# Patient Record
Sex: MALE | Race: WHITE | Employment: OTHER | URBAN - NONMETROPOLITAN AREA
[De-identification: names, ages, dates, MRNs, and addresses within clinical notes are randomized per-mention and may not be internally consistent; named-entity substitution may affect disease eponyms.]

---

## 2017-07-24 RX ORDER — HYDROCODONE BITARTRATE AND ACETAMINOPHEN 7.5; 325 MG/1; MG/1
1 TABLET ORAL EVERY 6 HOURS PRN
Qty: 120 TABLET | Refills: 0 | Status: SHIPPED | OUTPATIENT
Start: 2017-07-24 | End: 2017-07-26 | Stop reason: CLARIF

## 2017-07-26 RX ORDER — HYDROCODONE BITARTRATE AND ACETAMINOPHEN 10; 325 MG/1; MG/1
1 TABLET ORAL EVERY 6 HOURS
COMMUNITY
Start: 2017-06-30 | End: 2017-07-26 | Stop reason: SDUPTHER

## 2017-07-26 RX ORDER — HYDROCODONE BITARTRATE AND ACETAMINOPHEN 10; 325 MG/1; MG/1
1 TABLET ORAL EVERY 6 HOURS
Qty: 120 TABLET | Refills: 0 | Status: SHIPPED | OUTPATIENT
Start: 2017-07-26 | End: 2017-07-26 | Stop reason: SDUPTHER

## 2017-07-26 RX ORDER — HYDROCODONE BITARTRATE AND ACETAMINOPHEN 10; 325 MG/1; MG/1
1 TABLET ORAL EVERY 6 HOURS
Qty: 120 TABLET | Refills: 0 | Status: SHIPPED | OUTPATIENT
Start: 2017-07-26 | End: 2017-08-22 | Stop reason: SDUPTHER

## 2017-08-02 ENCOUNTER — TELEPHONE (OUTPATIENT)
Dept: FAMILY MEDICINE CLINIC | Age: 72
End: 2017-08-02

## 2017-08-02 DIAGNOSIS — G47.33 OBSTRUCTIVE SLEEP APNEA (ADULT) (PEDIATRIC): Primary | ICD-10-CM

## 2017-08-22 RX ORDER — HYDROCODONE BITARTRATE AND ACETAMINOPHEN 10; 325 MG/1; MG/1
1 TABLET ORAL EVERY 6 HOURS
Qty: 120 TABLET | Refills: 0 | Status: SHIPPED | OUTPATIENT
Start: 2017-08-22 | End: 2017-09-28 | Stop reason: SDUPTHER

## 2017-09-27 DIAGNOSIS — I10 ESSENTIAL HYPERTENSION, MALIGNANT: ICD-10-CM

## 2017-09-27 DIAGNOSIS — N40.0 BENIGN PROSTATIC HYPERPLASIA, PRESENCE OF LOWER URINARY TRACT SYMPTOMS UNSPECIFIED, UNSPECIFIED MORPHOLOGY: Primary | ICD-10-CM

## 2017-09-27 DIAGNOSIS — R73.9 BLOOD GLUCOSE ELEVATED: ICD-10-CM

## 2017-09-27 DIAGNOSIS — E78.01 FAMILIAL HYPERCHOLESTEROLEMIA: ICD-10-CM

## 2017-09-28 ENCOUNTER — OFFICE VISIT (OUTPATIENT)
Dept: FAMILY MEDICINE CLINIC | Age: 72
End: 2017-09-28
Payer: MEDICARE

## 2017-09-28 ENCOUNTER — TELEPHONE (OUTPATIENT)
Dept: FAMILY MEDICINE CLINIC | Age: 72
End: 2017-09-28

## 2017-09-28 VITALS
TEMPERATURE: 98.9 F | OXYGEN SATURATION: 97 % | WEIGHT: 233.5 LBS | DIASTOLIC BLOOD PRESSURE: 112 MMHG | BODY MASS INDEX: 35.39 KG/M2 | HEIGHT: 68 IN | SYSTOLIC BLOOD PRESSURE: 164 MMHG | RESPIRATION RATE: 18 BRPM | HEART RATE: 84 BPM

## 2017-09-28 DIAGNOSIS — J41.0 SIMPLE CHRONIC BRONCHITIS (HCC): ICD-10-CM

## 2017-09-28 DIAGNOSIS — F33.41 RECURRENT MAJOR DEPRESSIVE DISORDER, IN PARTIAL REMISSION (HCC): ICD-10-CM

## 2017-09-28 DIAGNOSIS — G47.33 OBSTRUCTIVE SLEEP APNEA SYNDROME: ICD-10-CM

## 2017-09-28 DIAGNOSIS — Z00.00 ROUTINE GENERAL MEDICAL EXAMINATION AT A HEALTH CARE FACILITY: Primary | ICD-10-CM

## 2017-09-28 DIAGNOSIS — N18.30 CKD (CHRONIC KIDNEY DISEASE) STAGE 3, GFR 30-59 ML/MIN (HCC): ICD-10-CM

## 2017-09-28 DIAGNOSIS — F41.8 DEPRESSION WITH ANXIETY: ICD-10-CM

## 2017-09-28 DIAGNOSIS — K21.9 GASTROESOPHAGEAL REFLUX DISEASE WITHOUT ESOPHAGITIS: ICD-10-CM

## 2017-09-28 DIAGNOSIS — E78.2 MIXED HYPERLIPIDEMIA: ICD-10-CM

## 2017-09-28 DIAGNOSIS — Z91.81 RISK FOR FALLS: ICD-10-CM

## 2017-09-28 DIAGNOSIS — R73.9 HYPERGLYCEMIA: ICD-10-CM

## 2017-09-28 DIAGNOSIS — R27.0 ATAXIA: ICD-10-CM

## 2017-09-28 DIAGNOSIS — I25.10 CORONARY ARTERY DISEASE INVOLVING NATIVE CORONARY ARTERY OF NATIVE HEART WITHOUT ANGINA PECTORIS: ICD-10-CM

## 2017-09-28 DIAGNOSIS — I10 ESSENTIAL HYPERTENSION: ICD-10-CM

## 2017-09-28 DIAGNOSIS — Z23 INFLUENZA VACCINE NEEDED: ICD-10-CM

## 2017-09-28 PROCEDURE — G0439 PPPS, SUBSEQ VISIT: HCPCS | Performed by: FAMILY MEDICINE

## 2017-09-28 PROCEDURE — G0008 ADMIN INFLUENZA VIRUS VAC: HCPCS | Performed by: FAMILY MEDICINE

## 2017-09-28 PROCEDURE — G8926 SPIRO NO PERF OR DOC: HCPCS | Performed by: FAMILY MEDICINE

## 2017-09-28 PROCEDURE — G8599 NO ASA/ANTIPLAT THER USE RNG: HCPCS | Performed by: FAMILY MEDICINE

## 2017-09-28 PROCEDURE — 4040F PNEUMOC VAC/ADMIN/RCVD: CPT | Performed by: FAMILY MEDICINE

## 2017-09-28 PROCEDURE — 99214 OFFICE O/P EST MOD 30 MIN: CPT | Performed by: FAMILY MEDICINE

## 2017-09-28 PROCEDURE — 3023F SPIROM DOC REV: CPT | Performed by: FAMILY MEDICINE

## 2017-09-28 PROCEDURE — 1123F ACP DISCUSS/DSCN MKR DOCD: CPT | Performed by: FAMILY MEDICINE

## 2017-09-28 PROCEDURE — 4004F PT TOBACCO SCREEN RCVD TLK: CPT | Performed by: FAMILY MEDICINE

## 2017-09-28 PROCEDURE — G8427 DOCREV CUR MEDS BY ELIG CLIN: HCPCS | Performed by: FAMILY MEDICINE

## 2017-09-28 PROCEDURE — 3017F COLORECTAL CA SCREEN DOC REV: CPT | Performed by: FAMILY MEDICINE

## 2017-09-28 PROCEDURE — G8417 CALC BMI ABV UP PARAM F/U: HCPCS | Performed by: FAMILY MEDICINE

## 2017-09-28 PROCEDURE — 90662 IIV NO PRSV INCREASED AG IM: CPT | Performed by: FAMILY MEDICINE

## 2017-09-28 RX ORDER — HYDROCODONE BITARTRATE AND ACETAMINOPHEN 10; 325 MG/1; MG/1
1 TABLET ORAL EVERY 6 HOURS
Qty: 120 TABLET | Refills: 0 | Status: SHIPPED | OUTPATIENT
Start: 2017-09-28 | End: 2017-10-31 | Stop reason: SDUPTHER

## 2017-09-28 RX ORDER — METOPROLOL SUCCINATE 50 MG/1
50 TABLET, EXTENDED RELEASE ORAL DAILY
Qty: 90 TABLET | Refills: 3 | Status: SHIPPED | OUTPATIENT
Start: 2017-09-28 | End: 2018-04-10 | Stop reason: SDUPTHER

## 2017-09-28 RX ORDER — LISINOPRIL 40 MG/1
40 TABLET ORAL DAILY
Qty: 30 TABLET | Refills: 5 | Status: SHIPPED | OUTPATIENT
Start: 2017-09-28 | End: 2018-04-10 | Stop reason: SDUPTHER

## 2017-09-28 ASSESSMENT — LIFESTYLE VARIABLES: HOW OFTEN DO YOU HAVE A DRINK CONTAINING ALCOHOL: 0

## 2017-09-28 ASSESSMENT — ANXIETY QUESTIONNAIRES: GAD7 TOTAL SCORE: 5

## 2017-09-28 ASSESSMENT — PATIENT HEALTH QUESTIONNAIRE - PHQ9: SUM OF ALL RESPONSES TO PHQ QUESTIONS 1-9: 0

## 2017-09-28 NOTE — PROGRESS NOTES
Vaccine Information Sheet, \"Influenza - Inactivated\" OR \"Live - Intranasal\"  given to Amirah Sue., or parent/legal guardian of  Amirah Sue. and verbalized understanding. Patient responses:    Have you ever had a reaction to a flu vaccine? No  Are you able to eat eggs without adverse effects? No  Do you have any current illness? No  Have you ever had Guillian Galt Syndrome? No    Flu vaccine given per order. Please see immunization tab.

## 2017-09-28 NOTE — MR AVS SNAPSHOT
After Visit Summary             Lorena Mcarthur.   2017 12:45 PM   Office Visit    Description:  Male : 1945   Provider:  Tacos Chadwick MD   Department:  [address]              Your Follow-Up and Future Appointments         Below is a list of your follow-up and future appointments. This may not be a complete list as you may have made appointments directly with providers that we are not aware of or your providers may have made some for you. Please call your providers to confirm appointments. It is important to keep your appointments. Please bring your current insurance card, photo ID, co-pay, and all medication bottles to your appointment. If self-pay, payment is expected at the time of service. Your To-Do List     Future Appointments Provider Department Dept Phone    2017 3:00 PM MD [address] [phone]    Please arrive 15 minutes prior to appointment time, bring insurance card and photo ID.     10/2/2018 1:00 PM MD [address] [phone]    If this is a sports or school physical please bring the physical form with you. Follow-Up    Return in about 3 months (around 2017) for Medicare Annual Wellness Visit in 1 year, Routine follow up. Information from Your Visit        Department     Name Address Phone Fax    [address] [address] [address]  [phone]      You Were Seen for:         Comments    Essential hypertension   [[id number]]         Vital Signs     Blood Pressure Pulse Temperature Respirations Height Weight    164/112 84 98.9 °F (37.2 °C) 18 5' 8\" (1.727 m) 233 lb 8 oz (105.9 kg)    Oxygen Saturation Body Mass Index Smoking Status             97% 35.5 kg/m2 Current Every Day Smoker         Additional Information about your Body Mass Index (BMI)           Your BMI as listed above is considered obese (30 or more).  BMI is an estimate of body fat, calculated from your height and weight. The higher your BMI, the greater your risk of heart disease, high blood pressure, type 2 diabetes, stroke, gallstones, arthritis, sleep apnea, and certain cancers. BMI is not perfect. It may overestimate body fat in athletes and people who are more muscular. Even a small weight loss (between 5 and 10 percent of your current weight) by decreasing your calorie intake and becoming more physically active will help lower your risk of developing or worsening diseases associated with obesity. Learn more at: [employer]          Instructions      Personalized Preventive Plan for Tiffanie Barger. - 9/28/2017  Medicare offers a range of preventive health benefits. Some of the tests and screenings are paid in full while other may be subject to a deductible, co-insurance, and/or copay. Some of these benefits include a comprehensive review of your medical history including lifestyle, illnesses that may run in your family, and various assessments and screenings as appropriate. After reviewing your medical record and screening and assessments performed today your provider may have ordered immunizations, labs, imaging, and/or referrals for you. A list of these orders (if applicable) as well as your Preventive Care list are included within your After Visit Summary for your review. Other Preventive Recommendations:    · A preventive eye exam performed by an eye specialist is recommended every 1-2 years to screen for glaucoma; cataracts, macular degeneration, and other eye disorders. · A preventive dental visit is recommended every 6 months. · Try to get at least 150 minutes of exercise per week or 10,000 steps per day on a pedometer . · Order or download the FREE \"Exercise & Physical Activity: Your Everyday Guide\" from [facility].  Call 2-498.363.7649 or search The [employer] Aging online. · You need 1037-5142 mg of calcium and 9356-6888 IU of vitamin D per day. It is possible to meet your calcium requirement with diet alone, but a vitamin D supplement is usually necessary to meet this goal.  · When exposed to the sun, use a sunscreen that protects against both UVA and UVB radiation with an SPF of 30 or greater. Reapply every 2 to 3 hours or after sweating, drying off with a towel, or swimming. · Always wear a seat belt when traveling in a car. Always wear a helmet when riding a bicycle or motorcycle. Today's Medication Changes          These changes are accurate as of: 9/28/17  1:40 PM.  If you have any questions, ask your nurse or doctor. CHANGE how you take these medications           lisinopril 40 MG tablet   Commonly known as:  PRINIVIL;ZESTRIL   Instructions: Take 1 tablet by mouth daily   Quantity:  30 tablet   Refills:  5   What changed:    - medication strength  - how much to take   Changed by:  Manuela Atkinson MD            Where to Get Your Medications      These medications were sent to [address] [place] [address],[address]  [address], [facility] [address]     Phone:  [phone]     HYDROcodone-acetaminophen  MG per tablet    lisinopril 40 MG tablet    metoprolol succinate 50 MG extended release tablet               Your Current Medications Are              metoprolol succinate (TOPROL XL) 50 MG extended release tablet Take 1 tablet by mouth daily    lisinopril (PRINIVIL;ZESTRIL) 40 MG tablet Take 1 tablet by mouth daily    HYDROcodone-acetaminophen (NORCO)  MG per tablet Take 1 tablet by mouth every 6 hours .     aspirin 81 MG EC tablet Take 1 tablet by mouth daily    venlafaxine (EFFEXOR) 75 MG tablet Take 1 tablet by mouth daily    atorvastatin (LIPITOR) 40 MG tablet Take 1 tablet by mouth daily clopidogrel (PLAVIX) 75 MG tablet Take 1 tablet by mouth daily    famotidine (PEPCID) 20 MG tablet Take 1 tablet by mouth 2 times daily    nitroGLYCERIN (NITROSTAT) 0.4 MG SL tablet Place 0.4 mg under the tongue every 5 minutes as needed for Chest pain      Allergies           No Known Allergies      We Ordered/Performed the following           INFLUENZA, HIGH DOSE, 65 YRS +, IM, PF, PREFILL SYR, 0.5ML (FLUZONE HD)          Additional Information        Basic Information     Date Of Birth Sex Race Ethnicity Preferred Language Preferred Written Language    1945 Male White Non-/Non  English English      Problem List as of 9/28/2017  Date Reviewed: 7/22/2016                Depression with anxiety    Hyperglycemia    Gastroesophageal reflux disease without esophagitis    Injury of right hypoglossal nerve    Stenosis of right carotid artery    Coronary artery disease involving native heart without angina pectoris    Cerebrovascular accident (CVA) due to embolism of right middle cerebral artery (HCC)    Essential hypertension    CKD (chronic kidney disease) stage 3, GFR 30-59 ml/min    Carotid artery occlusion    Obstructive sleep apnea syndrome    Cerebrovascular accident (CVA) due to embolism (HCC)    CAD (coronary artery disease)    Mixed hyperlipidemia    HTN (hypertension)    AAA (abdominal aortic aneurysm) (HCC)    Aortic dissection (HCC)    Depression    Sleep apnea    Carotid artery stenosis      Preventive Care        Date Due    Tetanus Combination Vaccine (1 - Tdap) 2/23/1962    Colonoscopy 2/23/1993    Zoster Vaccine 2/23/2003    Pneumococcal Vaccines (two) for all adults aged 72 and over (1 of 2 - PCV13) 2/23/2008    Yearly Flu Vaccine (1) 9/1/2017    Cholesterol Screening 2/11/2021            [employer] Signup           Our records indicate that you have an active [employer] account.     You can view your After Visit Summary by going to [email or web address] and logging in with your LS9 username and password. If you don't have a [employer] username and password but a parent or guardian has access to your record, the parent or guardian should login with their own [employer] username and password and access your record to view the After Visit Summary. Additional Information  If you have questions, please contact the physician practice where you receive care. Remember, [employer] is NOT to be used for urgent needs. For medical emergencies, dial 911. For questions regarding your [employer] account call [phone]. If you have a clinical question, please call your doctor's office.

## 2017-09-28 NOTE — PATIENT INSTRUCTIONS
Personalized Preventive Plan for Lyssa Caputo. - 9/28/2017  Medicare offers a range of preventive health benefits. Some of the tests and screenings are paid in full while other may be subject to a deductible, co-insurance, and/or copay. Some of these benefits include a comprehensive review of your medical history including lifestyle, illnesses that may run in your family, and various assessments and screenings as appropriate. After reviewing your medical record and screening and assessments performed today your provider may have ordered immunizations, labs, imaging, and/or referrals for you. A list of these orders (if applicable) as well as your Preventive Care list are included within your After Visit Summary for your review. Other Preventive Recommendations:    · A preventive eye exam performed by an eye specialist is recommended every 1-2 years to screen for glaucoma; cataracts, macular degeneration, and other eye disorders. · A preventive dental visit is recommended every 6 months. · Try to get at least 150 minutes of exercise per week or 10,000 steps per day on a pedometer . · Order or download the FREE \"Exercise & Physical Activity: Your Everyday Guide\" from [facility] [employer]. Call 8-309.371.3702 or search The [employer] online. · You need 9689-3792 mg of calcium and 9239-3232 IU of vitamin D per day. It is possible to meet your calcium requirement with diet alone, but a vitamin D supplement is usually necessary to meet this goal.  · When exposed to the sun, use a sunscreen that protects against both UVA and UVB radiation with an SPF of 30 or greater. Reapply every 2 to 3 hours or after sweating, drying off with a towel, or swimming. · Always wear a seat belt when traveling in a car. Always wear a helmet when riding a bicycle or motorcycle.

## 2017-09-28 NOTE — PROGRESS NOTES
[address]  [address] [facility]  [address] [place] [address]  Dept: [phone]  Dept Fax: [phone]  Loc: [phone]    Jennifer Dowell is a 74 y.o. male who presents today for his medical conditions/complaints as noted below. Jennifer Dowell is here for Medicare AWV (wants to get PT )        HPI:   CC: Here today to discuss the following:    Hypertension  Tells me he has been out of his medication for 4 days. No headache or chest pain. Hyperlipidemia  Tolerating current cholesterol medication without side effects. No body aches. Attempting to reduce processed sugar and cholesterol from diet. Coronary Artery Disease  Currently no active angina symptoms. No chest pain with exertion. No orthopnea. Depression with Anxiety  Compliant with medications. No adverse effects from medication. Depression and anxiety symptoms are stable today. No suicidal or homicidal ideation. Excessive worry, insomnia, and anxiousness are stable. Energy, concentration, and apathy are stable. Gastroesophageal Reflux Disease  Symptoms currently under control. Medication adequately controls his symptoms. No hematochezia or melena. He has obstructive sleep apnea but is not currently tolerating his CPAP. He wishes to speak to a sleep specialist regarding this. been having increased ataxia lately. Left-sided weakness secondary to his previous stroke. He's had a couple of falls since his last visit as well. No injury.  He is requesting referral to physical therapy         HPI    Past Medical History:   Diagnosis Date    AAA (abdominal aortic aneurysm) ([address]     Aortic dissection ([address] 10/10/2012    CAD (coronary artery disease)     Cancer (HCC)     lung&lymphnode    Carotid artery occlusion     Cerebrovascular accident (CVA) ([address]     Cerebrovascular accident (CVA) due to embolism ([address] 2/16/2016    Cerebrovascular accident (CVA) due to embolism Bowel sounds are normal. He exhibits no distension. There is no tenderness. There is no rebound and no guarding. Musculoskeletal: exhibits no edema. Neurological: alert. Psychiatric: normal mood and affect. behavior is normal.       Recent Results (from the past 672 hour(s))   Lipid Panel    Collection Time: 09/28/17 12:05 PM   Result Value Ref Range    Cholesterol, Total 101 (L) 160 - 199 mg/dL    Triglycerides 103 (L) 150 - 199 mg/dL    HDL 39 (L) 55 - 121 mg/dL    LDL Calculated 41 <100 mg/dL   Comprehensive Metabolic Panel    Collection Time: 09/28/17 12:05 PM   Result Value Ref Range    Sodium 141 136 - 145 mmol/L    Potassium 4.1 3.5 - 5.0 mmol/L    Chloride 103 98 - 111 mmol/L    CO2 23 22 - 29 mmol/L    Anion Gap 15 7 - 19 mmol/L    Glucose 106 74 - 109 mg/dL    BUN 23 8 - 23 mg/dL    CREATININE 1.4 (H) 0.5 - 1.2 mg/dL    GFR Non- 49 (A) >60    Calcium 11.7 (H) 8.8 - 10.2 mg/dL    Total Protein 7.2 6.6 - 8.7 g/dL    Alb 4.3 3.5 - 5.2 g/dL    Total Bilirubin 0.4 0.2 - 1.2 mg/dL    Alkaline Phosphatase 69 40 - 130 U/L    ALT 19 5 - 41 U/L    AST 20 5 - 40 U/L   Hemoglobin A1C    Collection Time: 09/28/17 12:05 PM   Result Value Ref Range    Hemoglobin A1C 6.6 (H) See comment %               Assessment & Plan:      Routine general medical examination at a health care facility  Completed today primary reason for visit addressed all of these issues as well      The following diagnoses and conditions are stable with no further orders unless indicated:  1. Essential hypertension  Blood pressure severely elevated. Refill the following medications and increase lisinopril. Advised him to continue checking his blood pressure. We will have home health come out as well. - metoprolol succinate (TOPROL XL) 50 MG extended release tablet; Take 1 tablet by mouth daily  Dispense: 90 tablet; Refill: 3  - lisinopril (PRINIVIL;ZESTRIL) 40 MG tablet; Take 1 tablet by mouth daily  Dispense: 30 tablet;  Refill: 5    2. Mixed hyperlipidemia  Currently on high intensity statin therapy and tolerating. LDL at goal    3. Coronary artery disease involving native coronary artery of native heart without angina pectoris  No chest pain or palpitations. 4. CKD (chronic kidney disease) stage 3, GFR 30-59 ml/min  GFR remains stable    5. Depression with anxiety  Symptoms stable    6. Hyperglycemia  Discussed lifestyle changes such as diet and exercise. Recommended eliminate processed food from diet such as sugar and fried foods. Recommended exercising at least 150 minutes/week. Try to do full body resistance training twice a week as well. Offered suggestions for calorie counting such as phone apps and online resources such as [employer] fitness pal and Lose it. Discussed healthy weight. Hopefully physical therapy can help him    7. Gastroesophageal reflux disease without esophagitis  Stable    8. Routine general medical examination at a health care facility  Completed today primary reason for visit addressed all of these issues as well    9. Influenza vaccine needed    - INFLUENZA, HIGH DOSE, 65 YRS +, IM, PF, PREFILL SYR, 0.5ML (FLUZONE HD)    10. Risk for falls    - External Referral To Home Health    11. Ataxia    - External Referral To Home Health    12. Recurrent major depressive disorder, in partial remission (HCC)  Currently stable    13. Obstructive sleep apnea syndrome  Refer to sleep specialist    14. Simple chronic bronchitis (HCC)  Spent 5 minutes discussing smoking cessation. Discussed medication options including nicotine replacement, Wellbutrin, and Chantix. Discussed calling 1-477-QUIT-NOW for further assistance. Recommended picking a quit date. Return in about 3 months (around 12/28/2017) for Medicare Annual Wellness Visit in 1 year, Routine follow up. Discussed use, benefit, and side effects of prescribed medications. All patient questions answered. Pt voiced understanding.  Reviewed health maintenance. Instructed to continue current medications, diet and exercise. Patient agreed with treatment plan. Follow up as directed.

## 2017-09-28 NOTE — PROGRESS NOTES
Medicare Annual Wellness Visit  Name: Maria Eugenia HENRY Date: 2017   MRN: [id number] Sex: Male   Age: 74 y.o. Ethnicity: Non-/Non    : 1945 Race: Chiquita [place]. is here for Medicare AWV (wants to get PT )    Screenings for behavioral, psychosocial and functional/safety risks, and cognitive dysfunction are all negative except as indicated below. These results, as well as other patient data from the [address] form, are documented in Flowsheets linked to this Encounter. No Known Allergies  Prior to Visit Medications    Medication Sig Taking? Authorizing Provider   HYDROcodone-acetaminophen (NORCO)  MG per tablet Take 1 tablet by mouth every 6 hours .   Abdullahi Arteaga MD   aspirin 81 MG EC tablet Take 1 tablet by mouth daily  Dimple Lake MD   venlafaxine [facility]) 75 MG tablet Take 1 tablet by mouth daily  Dimple Lake MD   atorvastatin (LIPITOR) 40 MG tablet Take 1 tablet by mouth daily  Dimple Lake MD   lisinopril (PRINIVIL;ZESTRIL) 30 MG tablet Take 1 tablet by mouth daily  Dimple Lake MD   metoprolol succinate ER (TOPROL-XL) 50 MG XL tablet Take 1 tablet by mouth daily  Dimple Lake MD   clopidogrel (PLAVIX) 75 MG tablet Take 1 tablet by mouth daily  Dimple Lake MD   famotidine (PEPCID) 20 MG tablet Take 1 tablet by mouth 2 times daily  Dimple Lake MD   nitroGLYCERIN (NITROSTAT) 0.4 MG SL tablet Place 0.4 mg under the tongue every 5 minutes as needed for Chest pain  Historical Provider, MD     Past Medical History:   Diagnosis Date    AAA (abdominal aortic aneurysm) ([address]     Aortic dissection ([address] 10/10/2012    CAD (coronary artery disease)     Cancer ([address]     lung&lymphnode    Carotid artery occlusion     Cerebrovascular accident (CVA) ([address]     Cerebrovascular accident (CVA) due to embolism ([address] 2016    Cerebrovascular accident (CVA) due to embolism of right middle cerebral artery ([address]     Depression     year?: (!) yes  Fall with injury in past year?: no  Fall Risk Interventions:    · Home safety tips provided  · Home exercises provided to promote strength and balance  · PT referral recommended he will consider    Depression:  PHQ-2 Score: 0lungs a few minutes or not  Depression Interventions:  · None indicated    Anxiety:  Anxiety Score: 5  Anxiety Interventions:  · Discussed my recommendation to refer him for therapy. He will consider    Cognitive: Words recalled: 3  Clock Drawing Test (CDT) Score: Normal  Cognitive Impairment Interventions:  · None indicated    Substance Abuse:  Social History     Social History Main Topics    Smoking status: Current Every Day Smoker     Packs/day: 0.50     Years: 40.00    Smokeless tobacco: Never Used    Alcohol use No    Drug use: No    Sexual activity: Not on file     Audit Questionnaire: Screen for Alcohol Misuse  How often do you have a drink containing alcohol?: Never  Substance Abuse Interventions:  · None indicated    Health Risk Assessment:   General  In general, how would you say your health is?: Fair  In the past 7 days, have you experienced any of the following?: (!) Anger  Do you get the social and emotional support that you need?: (!) No  Do you have a Living Will?: (!) No  General Health Risk Interventions:  · Loneliness: Recommend referral to counseling. May benefit from local services  · Social isolation: Recommend pursuing services  · Stress: Therapy recommended  · Anger: Therapy recommended  · Inadequate social/emotional support: Pursuing services locally through therapy  · No Living Will: additional information provided    Health Habits/Nutrition  Do you exercise for at least 20 minutes 2-3 times per week?: (!) No  Have you lost any weight without trying in the past 3 months?: No  Do you eat fewer than 2 meals per day?: No  Have you seen a dentist within the past year?: (!) No  Body mass index is 35.5 kg/(m^2).   Health Habits/Nutrition

## 2017-10-05 ENCOUNTER — TELEPHONE (OUTPATIENT)
Dept: FAMILY MEDICINE CLINIC | Age: 72
End: 2017-10-05

## 2017-10-05 ENCOUNTER — TELEPHONE (OUTPATIENT)
Dept: NEUROSURGERY | Age: 72
End: 2017-10-05

## 2017-10-05 NOTE — TELEPHONE ENCOUNTER
Connor Pierce with [facility] called and reported pt BP was 184/118 30 min after taking daily BP medications. He had already spoken with mikala and was advised to cb after taking meds to report reading. BP had not went down. Pt was not experiencing any other symptoms. I advised him to go to the closest walk in clinic to be evaluated and they could decide if he needed further treatment at the ER. The live out of the area.  Connor Pierce verbalized understanding and will let pt know what was discussed

## 2017-10-25 ENCOUNTER — TELEPHONE (OUTPATIENT)
Dept: FAMILY MEDICINE CLINIC | Age: 72
End: 2017-10-25

## 2017-10-25 ENCOUNTER — OFFICE VISIT (OUTPATIENT)
Dept: GASTROENTEROLOGY | Facility: CLINIC | Age: 72
End: 2017-10-25

## 2017-10-25 VITALS
BODY MASS INDEX: 34.51 KG/M2 | OXYGEN SATURATION: 98 % | SYSTOLIC BLOOD PRESSURE: 184 MMHG | DIASTOLIC BLOOD PRESSURE: 102 MMHG | HEART RATE: 60 BPM | TEMPERATURE: 96.1 F | HEIGHT: 69 IN | WEIGHT: 233 LBS

## 2017-10-25 DIAGNOSIS — Z79.02 ANTIPLATELET OR ANTITHROMBOTIC LONG-TERM USE: ICD-10-CM

## 2017-10-25 DIAGNOSIS — K63.5 POLYP OF COLON, UNSPECIFIED PART OF COLON, UNSPECIFIED TYPE: Primary | ICD-10-CM

## 2017-10-25 DIAGNOSIS — Z80.0 FAMILY HISTORY OF COLON CANCER: ICD-10-CM

## 2017-10-25 PROCEDURE — S0260 H&P FOR SURGERY: HCPCS | Performed by: NURSE PRACTITIONER

## 2017-10-25 RX ORDER — SODIUM, POTASSIUM,MAG SULFATES 17.5-3.13G
2 SOLUTION, RECONSTITUTED, ORAL ORAL ONCE
Qty: 2 BOTTLE | Refills: 0 | Status: SHIPPED | OUTPATIENT
Start: 2017-10-25 | End: 2017-10-25

## 2017-10-25 RX ORDER — HYDROCODONE BITARTRATE AND ACETAMINOPHEN 10; 325 MG/1; MG/1
1 TABLET ORAL EVERY 6 HOURS PRN
COMMUNITY
Start: 2017-09-28

## 2017-10-25 RX ORDER — FAMOTIDINE 20 MG/1
20 TABLET, FILM COATED ORAL 2 TIMES DAILY
COMMUNITY
Start: 2017-10-13

## 2017-10-25 RX ORDER — CLOPIDOGREL BISULFATE 75 MG/1
75 TABLET ORAL DAILY
Status: ON HOLD | COMMUNITY
Start: 2017-10-13 | End: 2018-04-16

## 2017-10-25 RX ORDER — LISINOPRIL 40 MG/1
40 TABLET ORAL DAILY
COMMUNITY
Start: 2017-10-23

## 2017-10-25 RX ORDER — LISINOPRIL 30 MG/1
TABLET ORAL
Status: ON HOLD | COMMUNITY
Start: 2017-08-26 | End: 2018-04-09

## 2017-10-25 RX ORDER — VENLAFAXINE 75 MG/1
75 TABLET ORAL DAILY
COMMUNITY
Start: 2017-10-16

## 2017-10-25 RX ORDER — METOPROLOL SUCCINATE 50 MG/1
50 TABLET, EXTENDED RELEASE ORAL DAILY
COMMUNITY
Start: 2017-09-28

## 2017-10-25 NOTE — TELEPHONE ENCOUNTER
Dr Fortunato Luther office needing permission for pt to be off of plavix for 5 days prior.  If ok, we just need to notify patient

## 2017-10-25 NOTE — PROGRESS NOTES
Chief Complaint   Patient presents with   • Colon Cancer Screening     Patient is here today for colon screening.     Subjective   HPI  Dalton Cheatham is a 72 y.o. male who presents as a referral for preventative maintenance. He has no complaints of nausea or vomiting. No change in bowels. No wt loss. No BRBPR. No melena. There is a family hx for colon cancer brother. No abdominal pain.The patients last colonoscopy was 9/16/14 with polyp.   Past Medical History:   Diagnosis Date   • Colon polyp    • Diverticulitis    • HTN (hypertension)    • Stroke      Past Surgical History:   Procedure Laterality Date   • ANAL FISSURECTOMY     • COLONOSCOPY  09/16/2014   • LUNG LOBECTOMY         Current Outpatient Prescriptions:   •  clopidogrel (PLAVIX) 75 MG tablet, , Disp: , Rfl:   •  famotidine (PEPCID) 20 MG tablet, , Disp: , Rfl:   •  HYDROcodone-acetaminophen (NORCO)  MG per tablet, , Disp: , Rfl:   •  lisinopril (PRINIVIL,ZESTRIL) 30 MG tablet, , Disp: , Rfl:   •  lisinopril (PRINIVIL,ZESTRIL) 40 MG tablet, , Disp: , Rfl:   •  metoprolol succinate XL (TOPROL-XL) 50 MG 24 hr tablet, , Disp: , Rfl:   •  venlafaxine (EFFEXOR) 75 MG tablet, , Disp: , Rfl:   •  sodium-potassium-magnesium sulfates (SUPREP BOWEL PREP KIT) 17.5-3.13-1.6 GM/180ML solution oral solution, Take 2 bottles by mouth 1 (One) Time for 1 dose. Split dose prep as directed by office instructions provided.  2 bottles = one kit., Disp: 2 bottle, Rfl: 0  No Known Allergies  Social History     Social History   • Marital status:      Spouse name: N/A   • Number of children: N/A   • Years of education: N/A     Occupational History   • Not on file.     Social History Main Topics   • Smoking status: Current Every Day Smoker   • Smokeless tobacco: Never Used   • Alcohol use No   • Drug use: Not on file   • Sexual activity: Not on file     Other Topics Concern   • Not on file     Social History Narrative     Family History   Problem Relation Age of Onset  "  • Colon cancer Brother        REVIEW OF SYSTEMS  General: well appearing, no fever chills or sweats, no unexplained wt loss  HEENT: no acute visual or hearing disturbances  Cardiovascular: No chest pain or palpitations  Pulmonary: No shortness of breath, coughing, wheezing or hemoptysis  : No burning, urgency, hematuria, or dysuria  Musculoskeletal: No joint pain or stiffness  Peripheral: no edema  Skin: No lesions or rashes  Neuro: No dizziness, headaches, stroke, syncope  Endocrine: No hot or cold intolerances  Hematological: No blood dyscrasias    Objective   Vitals:    10/25/17 1438   BP: (!) 184/102   Pulse: 60   Temp: 96.1 °F (35.6 °C)   SpO2: 98%   Weight: 233 lb (106 kg)   Height: 69\" (175.3 cm)     Body mass index is 34.41 kg/(m^2).    PHYSICAL EXAM  General: age appropriate well nourished well appearing, no acute distress  Head: normocephalic and atraumatic  Global assessment-supple  Neck-No JVD noted, no lymphadenopathy  Pulmonary-clear to auscultation bilaterally, normal respiratory effort  Cardiovascular-normal rate and rhythm, normal heart sounds, S1 and S2 noted  Abdomen-soft, non tender, non distended, normal bowel sounds all 4 quadrants, no hepatosplenomegaly noted  Extremities-No clubbing cyanosis or edema  Neuro-Non focal, converses appropriately, awake, alert, oriented    Imaging Results (most recent)     None        Assessment/Plan   Dalton was seen today for colon cancer screening.    Diagnoses and all orders for this visit:    Polyp of colon, unspecified part of colon, unspecified type  -     Case Request; Standing  -     Case Request    Family history of colon cancer  Comments:  brother   Orders:  -     Case Request; Standing  -     Case Request    Antiplatelet or antithrombotic long-term use  Comments:  will need to hold Plavix 5 days prior to procedure hx of stents and carotids/      Other orders  -     Implement Anesthesia Orders Day of Procedure; Standing  -     Obtain " Informed Consent; Standing  -     Verify bowel prep was successful; Standing  -     sodium-potassium-magnesium sulfates (SUPREP BOWEL PREP KIT) 17.5-3.13-1.6 GM/180ML solution oral solution; Take 2 bottles by mouth 1 (One) Time for 1 dose. Split dose prep as directed by office instructions provided.  2 bottles = one kit.      COLONOSCOPY WITH ANESTHESIA (N/A)      All risks, benefits, alternatives, and indications of colonoscopy procedure have been discussed with the patient. Risks to include perforation of the colon requiring possible surgery or colostomy, risk of bleeding from biopsies or removal of colon tissue, possibility of missing a colon polyp or cancer, or adverse drug reaction.  Benefits to include the diagnosis and management of disease of the colon and rectum. Alternatives to include barium enema, radiographic evaluation, lab testing or no intervention. Pt verbalizes understanding and agrees.

## 2017-10-26 NOTE — TELEPHONE ENCOUNTER
Okay to discontinue Plavix 5 days before procedure.  Must resume the Plavix the day after the procedure or as instructed by his gastroenterologist

## 2017-11-02 RX ORDER — HYDROCODONE BITARTRATE AND ACETAMINOPHEN 10; 325 MG/1; MG/1
1 TABLET ORAL EVERY 6 HOURS
Qty: 120 TABLET | Refills: 0 | Status: SHIPPED | OUTPATIENT
Start: 2017-11-02 | End: 2017-11-02 | Stop reason: SDUPTHER

## 2017-11-02 RX ORDER — HYDROCODONE BITARTRATE AND ACETAMINOPHEN 10; 325 MG/1; MG/1
1 TABLET ORAL EVERY 6 HOURS
Qty: 120 TABLET | Refills: 0 | Status: SHIPPED | OUTPATIENT
Start: 2017-11-02 | End: 2017-11-27 | Stop reason: SDUPTHER

## 2017-11-02 NOTE — TELEPHONE ENCOUNTER
Refilled medication and e-scribed to pharmacy. Confirm prescription was due. Make sure MOR and urine drug screen is up to date.

## 2017-11-13 PROBLEM — K63.5 POLYP OF COLON: Status: ACTIVE | Noted: 2017-11-13

## 2017-11-27 RX ORDER — HYDROCODONE BITARTRATE AND ACETAMINOPHEN 10; 325 MG/1; MG/1
1 TABLET ORAL EVERY 6 HOURS
Qty: 120 TABLET | Refills: 0 | Status: SHIPPED | OUTPATIENT
Start: 2017-11-27 | End: 2017-12-28 | Stop reason: SDUPTHER

## 2017-11-30 ENCOUNTER — TELEPHONE (OUTPATIENT)
Dept: FAMILY MEDICINE CLINIC | Age: 72
End: 2017-11-30

## 2017-11-30 NOTE — TELEPHONE ENCOUNTER
Pt was at Pt today BP was very high 168/110. Pt had not taken medication and needing advice. Told to take medication and if it does not come down recommended scheduling with NP today. Phys therapist states he would have him take meds and relay the message to the patient.

## 2017-12-01 ENCOUNTER — TELEPHONE (OUTPATIENT)
Dept: FAMILY MEDICINE CLINIC | Age: 72
End: 2017-12-01

## 2017-12-01 NOTE — TELEPHONE ENCOUNTER
Home health nurse calling. They are with patient again today and again his blood pressure is elevated. He has taken his medication today. It is 140/98. He does not have any clonidine.  Please advise

## 2017-12-01 NOTE — TELEPHONE ENCOUNTER
I'll fax in chlorthalidone to add to his current medications. This should help his blood pressure. Find out which pharmacy they want me to use.

## 2017-12-04 RX ORDER — CHLORTHALIDONE 25 MG/1
25 TABLET ORAL DAILY
Qty: 30 TABLET | Refills: 3 | Status: SHIPPED | OUTPATIENT
Start: 2017-12-04 | End: 2018-10-04

## 2017-12-29 RX ORDER — HYDROCODONE BITARTRATE AND ACETAMINOPHEN 10; 325 MG/1; MG/1
1 TABLET ORAL EVERY 6 HOURS
Qty: 120 TABLET | Refills: 0 | Status: SHIPPED | OUTPATIENT
Start: 2017-12-29 | End: 2018-01-26 | Stop reason: SDUPTHER

## 2018-01-03 ENCOUNTER — TELEPHONE (OUTPATIENT)
Dept: FAMILY MEDICINE CLINIC | Age: 73
End: 2018-01-03

## 2018-01-03 NOTE — TELEPHONE ENCOUNTER
Patient called today asking for a refill on Effexor 75 MG. I advised that I would send a request. Pharmacy has been verified.  Thanks-    Josefa 01/03/18

## 2018-01-09 ENCOUNTER — OFFICE VISIT (OUTPATIENT)
Dept: FAMILY MEDICINE CLINIC | Age: 73
End: 2018-01-09
Payer: MEDICARE

## 2018-01-09 VITALS
HEART RATE: 75 BPM | TEMPERATURE: 97.7 F | RESPIRATION RATE: 18 BRPM | WEIGHT: 233 LBS | DIASTOLIC BLOOD PRESSURE: 88 MMHG | BODY MASS INDEX: 35.43 KG/M2 | SYSTOLIC BLOOD PRESSURE: 132 MMHG | OXYGEN SATURATION: 98 %

## 2018-01-09 DIAGNOSIS — I10 ESSENTIAL HYPERTENSION: ICD-10-CM

## 2018-01-09 DIAGNOSIS — Z23 NEED FOR PROPHYLACTIC VACCINATION AGAINST STREPTOCOCCUS PNEUMONIAE (PNEUMOCOCCUS): ICD-10-CM

## 2018-01-09 DIAGNOSIS — Z12.5 ENCOUNTER FOR PROSTATE CANCER SCREENING: ICD-10-CM

## 2018-01-09 DIAGNOSIS — F41.8 DEPRESSION WITH ANXIETY: ICD-10-CM

## 2018-01-09 DIAGNOSIS — K21.9 GASTROESOPHAGEAL REFLUX DISEASE WITHOUT ESOPHAGITIS: ICD-10-CM

## 2018-01-09 DIAGNOSIS — E78.01 FAMILIAL HYPERCHOLESTEROLEMIA: ICD-10-CM

## 2018-01-09 DIAGNOSIS — I25.10 CORONARY ARTERY DISEASE INVOLVING NATIVE HEART WITHOUT ANGINA PECTORIS, UNSPECIFIED VESSEL OR LESION TYPE: Primary | ICD-10-CM

## 2018-01-09 DIAGNOSIS — R73.9 HYPERGLYCEMIA: ICD-10-CM

## 2018-01-09 DIAGNOSIS — N18.30 CKD (CHRONIC KIDNEY DISEASE) STAGE 3, GFR 30-59 ML/MIN (HCC): ICD-10-CM

## 2018-01-09 DIAGNOSIS — Z12.11 ENCOUNTER FOR SCREENING COLONOSCOPY: ICD-10-CM

## 2018-01-09 PROCEDURE — 4004F PT TOBACCO SCREEN RCVD TLK: CPT | Performed by: FAMILY MEDICINE

## 2018-01-09 PROCEDURE — G0009 ADMIN PNEUMOCOCCAL VACCINE: HCPCS | Performed by: FAMILY MEDICINE

## 2018-01-09 PROCEDURE — 99214 OFFICE O/P EST MOD 30 MIN: CPT | Performed by: FAMILY MEDICINE

## 2018-01-09 PROCEDURE — 4040F PNEUMOC VAC/ADMIN/RCVD: CPT | Performed by: FAMILY MEDICINE

## 2018-01-09 PROCEDURE — 1123F ACP DISCUSS/DSCN MKR DOCD: CPT | Performed by: FAMILY MEDICINE

## 2018-01-09 PROCEDURE — G8484 FLU IMMUNIZE NO ADMIN: HCPCS | Performed by: FAMILY MEDICINE

## 2018-01-09 PROCEDURE — 3017F COLORECTAL CA SCREEN DOC REV: CPT | Performed by: FAMILY MEDICINE

## 2018-01-09 PROCEDURE — 90732 PPSV23 VACC 2 YRS+ SUBQ/IM: CPT | Performed by: FAMILY MEDICINE

## 2018-01-09 PROCEDURE — G8417 CALC BMI ABV UP PARAM F/U: HCPCS | Performed by: FAMILY MEDICINE

## 2018-01-09 PROCEDURE — G8427 DOCREV CUR MEDS BY ELIG CLIN: HCPCS | Performed by: FAMILY MEDICINE

## 2018-01-09 PROCEDURE — G8599 NO ASA/ANTIPLAT THER USE RNG: HCPCS | Performed by: FAMILY MEDICINE

## 2018-01-09 RX ORDER — FAMOTIDINE 20 MG/1
20 TABLET, FILM COATED ORAL 2 TIMES DAILY
Qty: 180 TABLET | Refills: 3 | Status: SHIPPED | OUTPATIENT
Start: 2018-01-09 | End: 2019-01-07 | Stop reason: SDUPTHER

## 2018-01-09 RX ORDER — VENLAFAXINE 75 MG/1
75 TABLET ORAL DAILY
Qty: 90 TABLET | Refills: 3 | Status: SHIPPED | OUTPATIENT
Start: 2018-01-09 | End: 2019-02-13 | Stop reason: SDUPTHER

## 2018-01-09 RX ORDER — ATORVASTATIN CALCIUM 40 MG/1
40 TABLET, FILM COATED ORAL DAILY
Qty: 90 TABLET | Refills: 3 | Status: SHIPPED | OUTPATIENT
Start: 2018-01-09 | End: 2018-05-24 | Stop reason: SDUPTHER

## 2018-01-09 ASSESSMENT — ENCOUNTER SYMPTOMS
SHORTNESS OF BREATH: 1
DIARRHEA: 0
ANAL BLEEDING: 0
CHEST TIGHTNESS: 0
COUGH: 1
NAUSEA: 0
CONSTIPATION: 0
ABDOMINAL PAIN: 0

## 2018-01-09 NOTE — PROGRESS NOTES
After obtaining consent, and per orders of Dr. Dick Benedict, injection of Pneumovax given in Right deltoid by [place]. Patient instructed to remain in clinic for 20 minutes afterwards, and to report any adverse reaction to me immediately.

## 2018-01-17 ENCOUNTER — TELEPHONE (OUTPATIENT)
Dept: GASTROENTEROLOGY | Facility: CLINIC | Age: 73
End: 2018-01-17

## 2018-01-17 NOTE — TELEPHONE ENCOUNTER
Received Plavix clearance from Dr. Worrell. I have scanned this to you under media in his chart.     [colonoscopy]

## 2018-01-18 NOTE — TELEPHONE ENCOUNTER
OK to schedule procedure now that clearance to hold antiplatelet/anticoagulatant has been obtained.    Ruth Wellington MD

## 2018-01-26 RX ORDER — HYDROCODONE BITARTRATE AND ACETAMINOPHEN 10; 325 MG/1; MG/1
1 TABLET ORAL EVERY 6 HOURS
Qty: 120 TABLET | Refills: 0 | Status: SHIPPED | OUTPATIENT
Start: 2018-01-26 | End: 2018-02-22 | Stop reason: SDUPTHER

## 2018-01-26 NOTE — TELEPHONE ENCOUNTER
UDS: unknown (allscripts is down) put in apt notes to be done at next Office visit  Danae Vacaver: 11/28/17  Last ov: 1/9/18  Next ov: 4/10/18

## 2018-01-26 NOTE — TELEPHONE ENCOUNTER
He did not call back so I have sent a letter letting him know that we have received clearance and to please call back to schedule the colonoscopy. I have set another reminder in the chart for this message to come back to me in a couple of weeks to see if he has called back?

## 2018-02-09 ENCOUNTER — PREP FOR SURGERY (OUTPATIENT)
Dept: OTHER | Facility: HOSPITAL | Age: 73
End: 2018-02-09

## 2018-02-09 ENCOUNTER — TELEPHONE (OUTPATIENT)
Dept: GASTROENTEROLOGY | Facility: CLINIC | Age: 73
End: 2018-02-09

## 2018-02-09 DIAGNOSIS — K63.5 POLYP OF COLON, UNSPECIFIED PART OF COLON, UNSPECIFIED TYPE: Primary | ICD-10-CM

## 2018-02-09 DIAGNOSIS — Z80.0 FAMILY HISTORY OF COLON CANCER: ICD-10-CM

## 2018-02-22 ENCOUNTER — TELEPHONE (OUTPATIENT)
Dept: FAMILY MEDICINE CLINIC | Age: 73
End: 2018-02-22

## 2018-02-23 RX ORDER — HYDROCODONE BITARTRATE AND ACETAMINOPHEN 10; 325 MG/1; MG/1
1 TABLET ORAL EVERY 6 HOURS
Qty: 120 TABLET | Refills: 0 | Status: SHIPPED | OUTPATIENT
Start: 2018-02-23 | End: 2018-03-19 | Stop reason: SDUPTHER

## 2018-03-19 RX ORDER — HYDROCODONE BITARTRATE AND ACETAMINOPHEN 10; 325 MG/1; MG/1
1 TABLET ORAL EVERY 6 HOURS
Qty: 120 TABLET | Refills: 0 | Status: SHIPPED | OUTPATIENT
Start: 2018-03-21 | End: 2018-03-22 | Stop reason: SDUPTHER

## 2018-03-22 RX ORDER — HYDROCODONE BITARTRATE AND ACETAMINOPHEN 10; 325 MG/1; MG/1
1 TABLET ORAL EVERY 6 HOURS
Qty: 120 TABLET | Refills: 0 | Status: SHIPPED | OUTPATIENT
Start: 2018-03-22 | End: 2018-04-21

## 2018-04-09 ENCOUNTER — HOSPITAL ENCOUNTER (OUTPATIENT)
Facility: HOSPITAL | Age: 73
Setting detail: HOSPITAL OUTPATIENT SURGERY
Discharge: HOME | End: 2018-04-09
Attending: INTERNAL MEDICINE | Admitting: INTERNAL MEDICINE

## 2018-04-09 ENCOUNTER — ANESTHESIA EVENT (OUTPATIENT)
Dept: GASTROENTEROLOGY | Facility: HOSPITAL | Age: 73
End: 2018-04-09

## 2018-04-09 ENCOUNTER — ANESTHESIA (OUTPATIENT)
Dept: GASTROENTEROLOGY | Facility: HOSPITAL | Age: 73
End: 2018-04-09

## 2018-04-09 VITALS
BODY MASS INDEX: 34.86 KG/M2 | RESPIRATION RATE: 14 BRPM | HEART RATE: 65 BPM | HEIGHT: 68 IN | OXYGEN SATURATION: 97 % | SYSTOLIC BLOOD PRESSURE: 118 MMHG | DIASTOLIC BLOOD PRESSURE: 81 MMHG | WEIGHT: 230 LBS | TEMPERATURE: 97.4 F

## 2018-04-09 DIAGNOSIS — K63.5 POLYP OF COLON, UNSPECIFIED PART OF COLON, UNSPECIFIED TYPE: ICD-10-CM

## 2018-04-09 DIAGNOSIS — Z80.0 FAMILY HISTORY OF COLON CANCER: ICD-10-CM

## 2018-04-09 PROCEDURE — 45385 COLONOSCOPY W/LESION REMOVAL: CPT | Performed by: INTERNAL MEDICINE

## 2018-04-09 PROCEDURE — 25010000002 PROPOFOL 10 MG/ML EMULSION: Performed by: NURSE ANESTHETIST, CERTIFIED REGISTERED

## 2018-04-09 PROCEDURE — 88305 TISSUE EXAM BY PATHOLOGIST: CPT | Performed by: INTERNAL MEDICINE

## 2018-04-09 RX ORDER — SODIUM CHLORIDE 0.9 % (FLUSH) 0.9 %
3 SYRINGE (ML) INJECTION AS NEEDED
Status: DISCONTINUED | OUTPATIENT
Start: 2018-04-09 | End: 2018-04-09 | Stop reason: HOSPADM

## 2018-04-09 RX ORDER — PROPOFOL 10 MG/ML
VIAL (ML) INTRAVENOUS AS NEEDED
Status: DISCONTINUED | OUTPATIENT
Start: 2018-04-09 | End: 2018-04-09 | Stop reason: SURG

## 2018-04-09 RX ORDER — SODIUM CHLORIDE 9 MG/ML
500 INJECTION, SOLUTION INTRAVENOUS CONTINUOUS PRN
Status: DISCONTINUED | OUTPATIENT
Start: 2018-04-09 | End: 2018-04-09 | Stop reason: HOSPADM

## 2018-04-09 RX ORDER — LIDOCAINE HYDROCHLORIDE 20 MG/ML
INJECTION, SOLUTION INFILTRATION; PERINEURAL AS NEEDED
Status: DISCONTINUED | OUTPATIENT
Start: 2018-04-09 | End: 2018-04-09 | Stop reason: SURG

## 2018-04-09 RX ADMIN — LIDOCAINE HYDROCHLORIDE 0.5 ML: 10 INJECTION, SOLUTION EPIDURAL; INFILTRATION; INTRACAUDAL; PERINEURAL at 11:10

## 2018-04-09 RX ADMIN — PROPOFOL 200 MG: 10 INJECTION, EMULSION INTRAVENOUS at 12:49

## 2018-04-09 RX ADMIN — SODIUM CHLORIDE 500 ML: 9 INJECTION, SOLUTION INTRAVENOUS at 11:10

## 2018-04-09 RX ADMIN — LIDOCAINE HYDROCHLORIDE 100 MG: 20 INJECTION, SOLUTION INFILTRATION; PERINEURAL at 12:49

## 2018-04-09 NOTE — H&P
"    Chief Complaint:   Colon polyps    Subjective     HPI:   The patient has had tubular adenomas removed from the colon.  Last colonoscopy was in September 2014.    Past Medical History:   Past Medical History:   Diagnosis Date   • AAA (abdominal aortic aneurysm)    • Cancer     lymphoma   • Colon polyp    • Coronary artery disease    • Diverticulitis    • HTN (hypertension)    • Lung cancer    • Skin cancer    • Sleep apnea    • Stroke        Past Surgical History:  Past Surgical History:   Procedure Laterality Date   • ANAL FISSURECTOMY     • CARDIAC CATHETERIZATION     • CAROTID ARTERY ANGIOPLASTY     • COLONOSCOPY  09/16/2014   • LUNG LOBECTOMY          Family History:  Family History   Problem Relation Age of Onset   • Colon cancer Brother        Social History:   reports that he has quit smoking. He has never used smokeless tobacco. He reports that he does not drink alcohol or use drugs.    Medications:   Prescriptions Prior to Admission   Medication Sig Dispense Refill Last Dose   • famotidine (PEPCID) 20 MG tablet    Past Week at Unknown time   • HYDROcodone-acetaminophen (NORCO)  MG per tablet    4/9/2018 at 0800   • lisinopril (PRINIVIL,ZESTRIL) 40 MG tablet    4/8/2018 at Unknown time   • metoprolol succinate XL (TOPROL-XL) 50 MG 24 hr tablet    4/8/2018 at 1230   • venlafaxine (EFFEXOR) 75 MG tablet    4/8/2018 at Unknown time   • clopidogrel (PLAVIX) 75 MG tablet    4/1/2018       Allergies:  Review of patient's allergies indicates no known allergies.    ROS:    General: Weight stable  Resp: No SOA  Cardiovascular: No CP      Objective     /90 (BP Location: Right arm, Patient Position: Sitting)   Pulse 99   Temp 97.4 °F (36.3 °C) (Temporal Artery )   Resp 20   Ht 172.7 cm (68\")   Wt 104 kg (230 lb)   SpO2 98%   BMI 34.97 kg/m²     Physical Exam   Constitutional: Pt is oriented to person, place, and in no distress.  Eyes: No icterus  ENMT: Unremarkable   Cardiovascular: Normal rate, " regular rhythm.    Pulmonary/Chest: No distress.  No audible wheezes  Abdominal: Soft.   Skin: Skin is warm and dry.   Psychiatric: Mood, memory, affect and judgment appear normal.     Assessment/Plan     Diagnosis:  Colon polyps    Anticipated Surgical Procedure:  Colonoscopy    The risks, benefits, and alternatives of colonoscopy were reviewed with the patient today.  Risks including perforation of the colon possibly requiring surgery or colostomy.  Additional risks include risk of bleeding from biopsies or removal of colon tissue.  There is also the risk of a drug reaction or problems with anesthesia.  This will be discussed with the further by the anesthesia team on the day of the procedure.  Lastly there is a possibility of missing a colon polyp or cancer.  The benefits include the diagnosis and management of disease of the colon and rectum.  Alternatives to colonoscopy include barium enema, laboratory testing, radiographic evaluation, or no intervention.  The patient verbalizes understanding and agrees.    Much of this encounter note is an electronic transcription/translation of spoken language to printed text. The electronic translation of spoken language may permit erroneous, or at times, nonsensical words or phrases to be inadvertently transcribed; although I have reviewed the note for such errors, some may still exist.

## 2018-04-09 NOTE — ANESTHESIA PREPROCEDURE EVALUATION
Anesthesia Evaluation     Patient summary reviewed and Nursing notes reviewed   no history of anesthetic complications:  NPO Solid Status: > 8 hours             Airway   Mallampati: I  TM distance: >3 FB  Neck ROM: full  No difficulty expected  Dental      Pulmonary    (+) a smoker Former, lung cancer (s/p right lower lobectomy), sleep apnea,   (-) COPD, asthma  Cardiovascular   Exercise tolerance: poor (<4 METS)    Patient on routine beta blocker and Beta blocker given within 24 hours of surgery    (+) hypertension, CAD, cardiac stents (two stents ) more than 12 months ago PVD (AAA, s/p evar),       Neuro/Psych  (+) CVA (left sided weakness) residual symptoms,     GI/Hepatic/Renal/Endo    (+) obesity,     (-) liver disease, no renal disease, diabetes    Musculoskeletal     Abdominal    Substance History      OB/GYN          Other   (+) blood dyscrasia   history of cancer (lung ancer, lymphoma, in remission) remission                  Anesthesia Plan    ASA 3     general     intravenous induction   Anesthetic plan and risks discussed with patient.

## 2018-04-09 NOTE — ANESTHESIA POSTPROCEDURE EVALUATION
"Patient: Dalton Cheatham    Procedure Summary     Date:  04/09/18 Room / Location:  [facility] ENDOSCOPY 5 / [facility]    Anesthesia Start:  1245 Anesthesia Stop:  1314    Procedure:  COLONOSCOPY WITH ANESTHESIA (N/A ) Diagnosis:       Family history of colon cancer      Polyp of colon, unspecified part of colon, unspecified type      (Family history of colon cancer [Z80.0])      (Polyp of colon, unspecified part of colon, unspecified type [K63.5])    Surgeon:  Ruth Wellington MD Provider:  Rancho Kebede CRNA    Anesthesia Type:  general ASA Status:  3          Anesthesia Type: general  Last vitals  BP   114/90 (04/09/18 1047)   Temp   97.4 °F (36.3 °C) (04/09/18 1047)   Pulse   99 (04/09/18 1047)   Resp   20 (04/09/18 1047)     SpO2   98 % (04/09/18 1047)     Post Anesthesia Care and Evaluation    Patient location during evaluation: PHASE II  Patient participation: complete - patient participated  Level of consciousness: awake and alert  Pain management: adequate  Airway patency: patent  Anesthetic complications: No anesthetic complications    Cardiovascular status: acceptable  Respiratory status: acceptable  Hydration status: acceptable    Comments: Blood pressure 114/90, pulse 99, temperature 97.4 °F (36.3 °C), temperature source Temporal Artery , resp. rate 20, height 172.7 cm (68\"), weight 104 kg (230 lb), SpO2 98 %.    Pt discharged from PACU based on shena score >8      "

## 2018-04-10 ENCOUNTER — OFFICE VISIT (OUTPATIENT)
Dept: FAMILY MEDICINE CLINIC | Age: 73
End: 2018-04-10
Payer: MEDICARE

## 2018-04-10 VITALS
SYSTOLIC BLOOD PRESSURE: 120 MMHG | DIASTOLIC BLOOD PRESSURE: 78 MMHG | WEIGHT: 243 LBS | HEART RATE: 82 BPM | OXYGEN SATURATION: 98 % | BODY MASS INDEX: 36.95 KG/M2 | RESPIRATION RATE: 18 BRPM | TEMPERATURE: 98.7 F

## 2018-04-10 DIAGNOSIS — G47.33 OBSTRUCTIVE SLEEP APNEA SYNDROME: ICD-10-CM

## 2018-04-10 DIAGNOSIS — R42 DIZZINESS: Primary | ICD-10-CM

## 2018-04-10 DIAGNOSIS — N18.30 CKD (CHRONIC KIDNEY DISEASE) STAGE 3, GFR 30-59 ML/MIN (HCC): ICD-10-CM

## 2018-04-10 DIAGNOSIS — R53.83 OTHER FATIGUE: ICD-10-CM

## 2018-04-10 DIAGNOSIS — R73.9 HYPERGLYCEMIA: ICD-10-CM

## 2018-04-10 DIAGNOSIS — I25.10 CORONARY ARTERY DISEASE INVOLVING NATIVE HEART WITHOUT ANGINA PECTORIS, UNSPECIFIED VESSEL OR LESION TYPE: ICD-10-CM

## 2018-04-10 DIAGNOSIS — F41.8 DEPRESSION WITH ANXIETY: ICD-10-CM

## 2018-04-10 DIAGNOSIS — I10 ESSENTIAL HYPERTENSION: ICD-10-CM

## 2018-04-10 LAB
CYTO UR: NORMAL
LAB AP CASE REPORT: NORMAL
LAB AP CLINICAL INFORMATION: NORMAL
Lab: NORMAL
PATH REPORT.FINAL DX SPEC: NORMAL
PATH REPORT.GROSS SPEC: NORMAL

## 2018-04-10 PROCEDURE — G8427 DOCREV CUR MEDS BY ELIG CLIN: HCPCS | Performed by: FAMILY MEDICINE

## 2018-04-10 PROCEDURE — 3017F COLORECTAL CA SCREEN DOC REV: CPT | Performed by: FAMILY MEDICINE

## 2018-04-10 PROCEDURE — 99214 OFFICE O/P EST MOD 30 MIN: CPT | Performed by: FAMILY MEDICINE

## 2018-04-10 PROCEDURE — 4040F PNEUMOC VAC/ADMIN/RCVD: CPT | Performed by: FAMILY MEDICINE

## 2018-04-10 PROCEDURE — 1123F ACP DISCUSS/DSCN MKR DOCD: CPT | Performed by: FAMILY MEDICINE

## 2018-04-10 PROCEDURE — G8417 CALC BMI ABV UP PARAM F/U: HCPCS | Performed by: FAMILY MEDICINE

## 2018-04-10 PROCEDURE — G8598 ASA/ANTIPLAT THER USED: HCPCS | Performed by: FAMILY MEDICINE

## 2018-04-10 PROCEDURE — 4004F PT TOBACCO SCREEN RCVD TLK: CPT | Performed by: FAMILY MEDICINE

## 2018-04-10 PROCEDURE — 93000 ELECTROCARDIOGRAM COMPLETE: CPT | Performed by: FAMILY MEDICINE

## 2018-04-10 RX ORDER — METOPROLOL SUCCINATE 50 MG/1
50 TABLET, EXTENDED RELEASE ORAL DAILY
Qty: 90 TABLET | Refills: 3 | Status: SHIPPED | OUTPATIENT
Start: 2018-04-10 | End: 2019-04-09 | Stop reason: SDUPTHER

## 2018-04-10 RX ORDER — CLOPIDOGREL BISULFATE 75 MG/1
75 TABLET ORAL DAILY
Qty: 90 TABLET | Refills: 3 | Status: SHIPPED | OUTPATIENT
Start: 2018-04-10 | End: 2019-04-09 | Stop reason: SDUPTHER

## 2018-04-10 RX ORDER — LISINOPRIL 40 MG/1
40 TABLET ORAL DAILY
Qty: 90 TABLET | Refills: 3 | Status: SHIPPED | OUTPATIENT
Start: 2018-04-10 | End: 2019-04-09 | Stop reason: SDUPTHER

## 2018-04-11 ENCOUNTER — TELEPHONE (OUTPATIENT)
Dept: GASTROENTEROLOGY | Facility: CLINIC | Age: 73
End: 2018-04-11

## 2018-04-11 DIAGNOSIS — E83.52 HYPERCALCEMIA: Primary | ICD-10-CM

## 2018-04-12 ENCOUNTER — TELEPHONE (OUTPATIENT)
Dept: GASTROENTEROLOGY | Facility: CLINIC | Age: 73
End: 2018-04-12

## 2018-04-12 DIAGNOSIS — K92.1 MELENA: Primary | ICD-10-CM

## 2018-04-12 NOTE — TELEPHONE ENCOUNTER
His colonoscopy was Monday and he is having black stool today per his wife's caregiver. I explained to her that he needs to be seen at an urgent care or ER. She said she will explain this to him, but he probably won't go. If she can talk him into going, it will have to be somewhere local. (He lives in [place]). I further explained that I would let you know about this and if you have any further instruction, I will call him back.

## 2018-04-13 ENCOUNTER — ANESTHESIA (OUTPATIENT)
Dept: GASTROENTEROLOGY | Facility: HOSPITAL | Age: 73
End: 2018-04-13

## 2018-04-13 ENCOUNTER — ANESTHESIA EVENT (OUTPATIENT)
Dept: GASTROENTEROLOGY | Facility: HOSPITAL | Age: 73
End: 2018-04-13

## 2018-04-13 ENCOUNTER — HOSPITAL ENCOUNTER (INPATIENT)
Facility: HOSPITAL | Age: 73
LOS: 3 days | Discharge: HOME | End: 2018-04-16
Attending: FAMILY MEDICINE | Admitting: FAMILY MEDICINE

## 2018-04-13 DIAGNOSIS — K92.1 GASTROINTESTINAL HEMORRHAGE WITH MELENA: Primary | ICD-10-CM

## 2018-04-13 PROBLEM — K92.2 GI BLEED: Status: ACTIVE | Noted: 2018-04-13

## 2018-04-13 LAB
ABO GROUP BLD: NORMAL
ALBUMIN SERPL-MCNC: 3.4 G/DL (ref 3.5–5)
ALBUMIN/GLOB SERPL: 1.5 G/DL (ref 1.1–2.5)
ALP SERPL-CCNC: 36 U/L (ref 24–120)
ALT SERPL W P-5'-P-CCNC: 23 U/L (ref 0–54)
ANION GAP SERPL CALCULATED.3IONS-SCNC: 11 MMOL/L (ref 4–13)
APTT PPP: 32.8 SECONDS (ref 24.1–34.8)
AST SERPL-CCNC: 14 U/L (ref 7–45)
BASOPHILS # BLD AUTO: 0.06 10*3/MM3 (ref 0–0.2)
BASOPHILS NFR BLD AUTO: 0.4 % (ref 0–2)
BILIRUB SERPL-MCNC: <0.1 MG/DL (ref 0.1–1)
BILIRUB UR QL STRIP: NEGATIVE
BLD GP AB SCN SERPL QL: NEGATIVE
BUN BLD-MCNC: 76 MG/DL (ref 5–21)
BUN/CREAT SERPL: 46.3 (ref 7–25)
CALCIUM SPEC-SCNC: 10 MG/DL (ref 8.4–10.4)
CHLORIDE SERPL-SCNC: 112 MMOL/L (ref 98–110)
CLARITY UR: CLEAR
CO2 SERPL-SCNC: 19 MMOL/L (ref 24–31)
COLOR UR: YELLOW
CREAT BLD-MCNC: 1.64 MG/DL (ref 0.5–1.4)
CREAT UR-MCNC: 62.9 MG/DL
DEPRECATED RDW RBC AUTO: 45.4 FL (ref 40–54)
EOSINOPHIL # BLD AUTO: 0 10*3/MM3 (ref 0–0.7)
EOSINOPHIL NFR BLD AUTO: 0 % (ref 0–4)
ERYTHROCYTE [DISTWIDTH] IN BLOOD BY AUTOMATED COUNT: 14.6 % (ref 12–15)
GFR SERPL CREATININE-BSD FRML MDRD: 41 ML/MIN/1.73
GLOBULIN UR ELPH-MCNC: 2.3 GM/DL
GLUCOSE BLD-MCNC: 109 MG/DL (ref 70–100)
GLUCOSE UR STRIP-MCNC: NEGATIVE MG/DL
HCT VFR BLD AUTO: 22.1 % (ref 40–52)
HGB BLD-MCNC: 7.2 G/DL (ref 14–18)
HGB UR QL STRIP.AUTO: NEGATIVE
IMM GRANULOCYTES # BLD: 0.17 10*3/MM3 (ref 0–0.03)
IMM GRANULOCYTES NFR BLD: 1 % (ref 0–5)
INR PPP: 1.12 (ref 0.91–1.09)
KETONES UR QL STRIP: NEGATIVE
LEUKOCYTE ESTERASE UR QL STRIP.AUTO: NEGATIVE
LYMPHOCYTES # BLD AUTO: 4.07 10*3/MM3 (ref 0.72–4.86)
LYMPHOCYTES NFR BLD AUTO: 23.8 % (ref 15–45)
MCH RBC QN AUTO: 27.9 PG (ref 28–32)
MCHC RBC AUTO-ENTMCNC: 32.6 G/DL (ref 33–36)
MCV RBC AUTO: 85.7 FL (ref 82–95)
MONOCYTES # BLD AUTO: 1.03 10*3/MM3 (ref 0.19–1.3)
MONOCYTES NFR BLD AUTO: 6 % (ref 4–12)
NEUTROPHILS # BLD AUTO: 11.79 10*3/MM3 (ref 1.87–8.4)
NEUTROPHILS NFR BLD AUTO: 68.8 % (ref 39–78)
NITRITE UR QL STRIP: NEGATIVE
NRBC BLD MANUAL-RTO: 0 /100 WBC (ref 0–0)
PH UR STRIP.AUTO: <=5 [PH] (ref 5–8)
PLATELET # BLD AUTO: 198 10*3/MM3 (ref 130–400)
PMV BLD AUTO: 11.9 FL (ref 6–12)
POTASSIUM BLD-SCNC: 4.2 MMOL/L (ref 3.5–5.3)
PROT SERPL-MCNC: 5.7 G/DL (ref 6.3–8.7)
PROT UR QL STRIP: NEGATIVE
PROTHROMBIN TIME: 14.8 SECONDS (ref 11.9–14.6)
RBC # BLD AUTO: 2.58 10*6/MM3 (ref 4.8–5.9)
RH BLD: POSITIVE
SODIUM BLD-SCNC: 142 MMOL/L (ref 135–145)
SODIUM UR-SCNC: 83 MMOL/L (ref 30–90)
SP GR UR STRIP: 1.02 (ref 1–1.03)
T&S EXPIRATION DATE: NORMAL
TROPONIN I SERPL-MCNC: 0.03 NG/ML (ref 0–0.03)
UROBILINOGEN UR QL STRIP: NORMAL
WBC NRBC COR # BLD: 17.12 10*3/MM3 (ref 4.8–10.8)

## 2018-04-13 PROCEDURE — 99222 1ST HOSP IP/OBS MODERATE 55: CPT | Performed by: INTERNAL MEDICINE

## 2018-04-13 PROCEDURE — 86901 BLOOD TYPING SEROLOGIC RH(D): CPT | Performed by: FAMILY MEDICINE

## 2018-04-13 PROCEDURE — 0DJ08ZZ INSPECTION OF UPPER INTESTINAL TRACT, VIA NATURAL OR ARTIFICIAL OPENING ENDOSCOPIC: ICD-10-PCS | Performed by: INTERNAL MEDICINE

## 2018-04-13 PROCEDURE — 84484 ASSAY OF TROPONIN QUANT: CPT | Performed by: FAMILY MEDICINE

## 2018-04-13 PROCEDURE — 82570 ASSAY OF URINE CREATININE: CPT | Performed by: FAMILY MEDICINE

## 2018-04-13 PROCEDURE — 25010000002 PROPOFOL 10 MG/ML EMULSION: Performed by: NURSE ANESTHETIST, CERTIFIED REGISTERED

## 2018-04-13 PROCEDURE — 85025 COMPLETE CBC W/AUTO DIFF WBC: CPT | Performed by: FAMILY MEDICINE

## 2018-04-13 PROCEDURE — 86901 BLOOD TYPING SEROLOGIC RH(D): CPT

## 2018-04-13 PROCEDURE — 86900 BLOOD TYPING SEROLOGIC ABO: CPT | Performed by: FAMILY MEDICINE

## 2018-04-13 PROCEDURE — 84300 ASSAY OF URINE SODIUM: CPT | Performed by: FAMILY MEDICINE

## 2018-04-13 PROCEDURE — 30233N1 TRANSFUSION OF NONAUTOLOGOUS RED BLOOD CELLS INTO PERIPHERAL VEIN, PERCUTANEOUS APPROACH: ICD-10-PCS | Performed by: FAMILY MEDICINE

## 2018-04-13 PROCEDURE — C1751 CATH, INF, PER/CENT/MIDLINE: HCPCS

## 2018-04-13 PROCEDURE — 81003 URINALYSIS AUTO W/O SCOPE: CPT | Performed by: FAMILY MEDICINE

## 2018-04-13 PROCEDURE — 85610 PROTHROMBIN TIME: CPT | Performed by: FAMILY MEDICINE

## 2018-04-13 PROCEDURE — 86920 COMPATIBILITY TEST SPIN: CPT

## 2018-04-13 PROCEDURE — 05HY33Z INSERTION OF INFUSION DEVICE INTO UPPER VEIN, PERCUTANEOUS APPROACH: ICD-10-PCS | Performed by: FAMILY MEDICINE

## 2018-04-13 PROCEDURE — 36430 TRANSFUSION BLD/BLD COMPNT: CPT

## 2018-04-13 PROCEDURE — 86900 BLOOD TYPING SEROLOGIC ABO: CPT

## 2018-04-13 PROCEDURE — P9016 RBC LEUKOCYTES REDUCED: HCPCS

## 2018-04-13 PROCEDURE — 43235 EGD DIAGNOSTIC BRUSH WASH: CPT | Performed by: INTERNAL MEDICINE

## 2018-04-13 PROCEDURE — 80053 COMPREHEN METABOLIC PANEL: CPT | Performed by: FAMILY MEDICINE

## 2018-04-13 PROCEDURE — 85730 THROMBOPLASTIN TIME PARTIAL: CPT | Performed by: FAMILY MEDICINE

## 2018-04-13 PROCEDURE — 86850 RBC ANTIBODY SCREEN: CPT | Performed by: FAMILY MEDICINE

## 2018-04-13 RX ORDER — SODIUM CHLORIDE 9 MG/ML
500 INJECTION, SOLUTION INTRAVENOUS CONTINUOUS PRN
Status: DISCONTINUED | OUTPATIENT
Start: 2018-04-13 | End: 2018-04-16

## 2018-04-13 RX ORDER — PROPOFOL 10 MG/ML
VIAL (ML) INTRAVENOUS AS NEEDED
Status: DISCONTINUED | OUTPATIENT
Start: 2018-04-13 | End: 2018-04-13 | Stop reason: SURG

## 2018-04-13 RX ORDER — ASPIRIN 81 MG/1
81 TABLET ORAL DAILY
Status: ON HOLD | COMMUNITY
End: 2018-04-16

## 2018-04-13 RX ORDER — ATORVASTATIN CALCIUM 40 MG/1
40 TABLET, FILM COATED ORAL DAILY
COMMUNITY

## 2018-04-13 RX ORDER — SODIUM CHLORIDE 0.9 % (FLUSH) 0.9 %
1-10 SYRINGE (ML) INJECTION AS NEEDED
Status: DISCONTINUED | OUTPATIENT
Start: 2018-04-13 | End: 2018-04-16

## 2018-04-13 RX ORDER — VENLAFAXINE 37.5 MG/1
75 TABLET ORAL DAILY
Status: DISCONTINUED | OUTPATIENT
Start: 2018-04-13 | End: 2018-04-16

## 2018-04-13 RX ORDER — HYDROCODONE BITARTRATE AND ACETAMINOPHEN 7.5; 325 MG/1; MG/1
1 TABLET ORAL EVERY 6 HOURS PRN
Status: DISCONTINUED | OUTPATIENT
Start: 2018-04-13 | End: 2018-04-13

## 2018-04-13 RX ORDER — ATORVASTATIN CALCIUM 40 MG/1
40 TABLET, FILM COATED ORAL NIGHTLY
Status: DISCONTINUED | OUTPATIENT
Start: 2018-04-13 | End: 2018-04-16

## 2018-04-13 RX ORDER — CHLORTHALIDONE 25 MG/1
25 TABLET ORAL DAILY
COMMUNITY

## 2018-04-13 RX ORDER — HYDROCODONE BITARTRATE AND ACETAMINOPHEN 10; 325 MG/1; MG/1
1 TABLET ORAL EVERY 6 HOURS PRN
Status: DISCONTINUED | OUTPATIENT
Start: 2018-04-13 | End: 2018-04-16

## 2018-04-13 RX ORDER — ONDANSETRON 2 MG/ML
4 INJECTION INTRAMUSCULAR; INTRAVENOUS EVERY 6 HOURS PRN
Status: DISCONTINUED | OUTPATIENT
Start: 2018-04-13 | End: 2018-04-16

## 2018-04-13 RX ORDER — SODIUM CHLORIDE 0.9 % (FLUSH) 0.9 %
3 SYRINGE (ML) INJECTION AS NEEDED
Status: DISCONTINUED | OUTPATIENT
Start: 2018-04-13 | End: 2018-04-13 | Stop reason: HOSPADM

## 2018-04-13 RX ORDER — SODIUM CHLORIDE 9 MG/ML
50 INJECTION, SOLUTION INTRAVENOUS CONTINUOUS
Status: DISCONTINUED | OUTPATIENT
Start: 2018-04-13 | End: 2018-04-15

## 2018-04-13 RX ADMIN — VENLAFAXINE HYDROCHLORIDE 75 MG: 37.5 TABLET ORAL at 21:57

## 2018-04-13 RX ADMIN — SODIUM CHLORIDE 8 MG/HR: 900 INJECTION INTRAVENOUS at 22:48

## 2018-04-13 RX ADMIN — PROPOFOL 50 MG: 10 INJECTION, EMULSION INTRAVENOUS at 13:31

## 2018-04-13 RX ADMIN — SODIUM CHLORIDE 100 ML/HR: 9 INJECTION, SOLUTION INTRAVENOUS at 22:01

## 2018-04-13 RX ADMIN — SODIUM CHLORIDE 8 MG/HR: 900 INJECTION INTRAVENOUS at 19:10

## 2018-04-13 RX ADMIN — SODIUM CHLORIDE 1000 ML: 9 INJECTION, SOLUTION INTRAVENOUS at 10:41

## 2018-04-13 RX ADMIN — PROPOFOL 50 MG: 10 INJECTION, EMULSION INTRAVENOUS at 13:35

## 2018-04-13 RX ADMIN — PROPOFOL 50 MG: 10 INJECTION, EMULSION INTRAVENOUS at 13:33

## 2018-04-13 RX ADMIN — SODIUM CHLORIDE 100 ML/HR: 9 INJECTION, SOLUTION INTRAVENOUS at 10:42

## 2018-04-13 RX ADMIN — SODIUM CHLORIDE 8 MG/HR: 900 INJECTION INTRAVENOUS at 12:31

## 2018-04-13 RX ADMIN — DESMOPRESSIN ACETATE 40 MG: 0.2 TABLET ORAL at 21:57

## 2018-04-13 RX ADMIN — HYDROCODONE BITARTRATE AND ACETAMINOPHEN 1 TABLET: 10; 325 TABLET ORAL at 21:59

## 2018-04-13 ASSESSMENT — ENCOUNTER SYMPTOMS
ABDOMINAL PAIN: 0
CHEST TIGHTNESS: 0
COUGH: 0
NAUSEA: 0
DIARRHEA: 0
ANAL BLEEDING: 0
SHORTNESS OF BREATH: 1
CONSTIPATION: 0

## 2018-04-13 NOTE — ANESTHESIA POSTPROCEDURE EVALUATION
Patient: Dalton Cheatham    Procedure Summary     Date:  04/13/18 Room / Location:  [facility] ENDOSCOPY 6 / [facility]    Anesthesia Start:  1326 Anesthesia Stop:      Procedure:  ESOPHAGOGASTRODUODENOSCOPY WITH ANESTHESIA (N/A Esophagus) Diagnosis:      Surgeon:  Mike Sheridan MD Provider:  Kp Escobar CRNA    Anesthesia Type:  general ASA Status:  3          Anesthesia Type: general  Last vitals  BP   115/48 (04/13/18 1138)   Temp   97.9 °F (36.6 °C) (04/13/18 1138)   Pulse   81 (04/13/18 1138)   Resp   20 (04/13/18 1138)     SpO2   99 % (04/13/18 1138)     Post Anesthesia Care and Evaluation    Patient location during evaluation: PHASE II  Patient participation: complete - patient participated  Level of consciousness: awake and sleepy but conscious  Pain score: 0  Pain management: adequate  Airway patency: patent  Anesthetic complications: No anesthetic complications    Cardiovascular status: acceptable  Respiratory status: acceptable  Hydration status: acceptable

## 2018-04-13 NOTE — CONSULTS
8cm PowerGlide Midline inserted into left basilic vein with ultrasound guidance.  Flushes and draws without difficulty.  Dressing applied.

## 2018-04-13 NOTE — TELEPHONE ENCOUNTER
Please call pt to check on him.  I'd like for him to get a CBC today.  I'll enter the order.    Ruth Wellington MD

## 2018-04-13 NOTE — PLAN OF CARE
Problem: Patient Care Overview  Goal: Plan of Care Review  Outcome: Ongoing (interventions implemented as appropriate)   04/13/18 1510   Coping/Psychosocial   Plan of Care Reviewed With patient   Plan of Care Review   Progress improving   OTHER   Outcome Summary DR. MOTTA CONSULTED. ENDO DONE TODAY. CONT. TO MONITOR VITALS AND LAB VALUES. CLEAR LIQUIDS TO BE STARTED 2 HOURS AFTER ENDO. IV FLUIDS PER ORDER.  VOIDING PER URINAL NO C/O PAIN. NO DISTRESS NOTED.     Goal: Discharge Needs Assessment  Outcome: Ongoing (interventions implemented as appropriate)

## 2018-04-13 NOTE — ANESTHESIA PREPROCEDURE EVALUATION
Anesthesia Evaluation     Patient summary reviewed and Nursing notes reviewed   no history of anesthetic complications:  NPO Solid Status: > 8 hours             Airway   Mallampati: I  TM distance: >3 FB  Neck ROM: full  No difficulty expected  Dental      Pulmonary    (+) a smoker Former, lung cancer (s/p right lower lobectomy), sleep apnea,   (-) COPD, asthma  Cardiovascular   Exercise tolerance: poor (<4 METS)    Patient on routine beta blocker and Beta blocker given within 24 hours of surgery    (+) hypertension, CAD, cardiac stents (two stents ) more than 12 months ago PVD (AAA, s/p evar),       Neuro/Psych  (+) CVA (left sided weakness) residual symptoms,     GI/Hepatic/Renal/Endo    (+) obesity,     (-) liver disease, no renal disease, diabetes    Musculoskeletal     Abdominal    Substance History      OB/GYN          Other   (+) blood dyscrasia   history of cancer (lung ancer, lymphoma, in remission) remission                      Anesthesia Plan    ASA 3     general   (Returns, having dark tarry stool. Hgb 9.3 today in [place]. Pt reports left chest wall pain, reproducible with palpation, unlikely this is cardiac in origin. )  intravenous induction   Anesthetic plan and risks discussed with patient.

## 2018-04-13 NOTE — H&P
[facility]  HISTORY AND PHYSICAL    Date of Admission: 4/13/2018  Primary Care Physician: Rikki Worrell MD    Subjective     Chief Complaint: Melena    History of Present Illness  The patient is a pleasant 73 year old gentleman with a history of lung cancer, hypertension, and CAD for which he takes Plavix.  He had a colonoscopy on Monday with Dr. Wellington here.  The following night he says he felt his stool was a little dark but was not worried at that time.  His stools have become increasingly dark.  He says last night it looked like cortes tar.  He now feels weak.  He feels light headed and dizzy.  He has chest pain and SOA.  He has abdominal discomfort.  He denies fever.  He denies dysuria or frequency.  He denies cough or sputum production.  He has not been eating or drinking much.      Dr. Gutierrez from [place] discussed the case with Dr. Sheridan and myself.  He relayed that the patient has dark stool that is hemoccult positive there.           Review of Systems   Constitutional: Positive for fatigue. Negative for fever.   HENT: Negative for congestion and ear pain.    Eyes: Negative for redness and visual disturbance.   Respiratory: Negative for cough, shortness of breath and wheezing.    Cardiovascular: Positive for chest pain. Negative for palpitations.   Gastrointestinal: Positive for abdominal pain and blood in stool. Negative for diarrhea, nausea and vomiting.   Endocrine: Negative for cold intolerance and heat intolerance.   Genitourinary: Negative for dysuria and frequency.   Musculoskeletal: Negative for arthralgias and back pain.   Skin: Negative for rash and wound.   Neurological: Positive for weakness. Negative for dizziness and headaches.   Psychiatric/Behavioral: Negative for confusion. The patient is not nervous/anxious.       Otherwise complete ROS reviewed and negative except as mentioned in the HPI.    Past Medical History:   Past  Medical History:   Diagnosis Date   • AAA (abdominal aortic aneurysm)    • Cancer     lymphoma   • Colon polyp    • Coronary artery disease    • Diverticulitis    • HTN (hypertension)    • Lung cancer    • Skin cancer    • Sleep apnea    • Stroke      Past Surgical History:  Past Surgical History:   Procedure Laterality Date   • ANAL FISSURECTOMY     • CARDIAC CATHETERIZATION     • CAROTID ARTERY ANGIOPLASTY     • COLONOSCOPY  09/16/2014   • COLONOSCOPY N/A 4/9/2018    Procedure: COLONOSCOPY WITH ANESTHESIA;  Surgeon: Ruth Wellington MD;  Location: [facility] ENDOSCOPY;  Service: Gastroenterology   • LUNG LOBECTOMY       Social History:  reports that he has quit smoking. He has never used smokeless tobacco. He reports that he does not drink alcohol or use drugs.    Family History: family history includes Colon cancer in his brother.       Allergies:  No Known Allergies  Medications:  Prior to Admission medications    Medication Sig Start Date End Date Taking? Authorizing Provider   clopidogrel (PLAVIX) 75 MG tablet  10/13/17   Historical Provider, MD   famotidine (PEPCID) 20 MG tablet  10/13/17   Historical Provider, MD   HYDROcodone-acetaminophen (NORCO)  MG per tablet  9/28/17   Historical Provider, MD   lisinopril (PRINIVIL,ZESTRIL) 40 MG tablet  10/23/17   Historical Provider, MD   metoprolol succinate XL (TOPROL-XL) 50 MG 24 hr tablet  9/28/17   Historical Provider, MD   venlafaxine (EFFEXOR) 75 MG tablet  10/16/17   Historical Provider, MD     Objective     Vital Signs: /66 (BP Location: Right arm, Patient Position: Lying)   Pulse 83   Temp 97.9 °F (36.6 °C) (Oral)   Resp 20   SpO2 95%   Physical Exam   Constitutional: He is oriented to person, place, and time. He appears well-developed and well-nourished.   HENT:   Head: Normocephalic and atraumatic.   Right Ear: External ear normal.   Left Ear: External ear normal.   Nose: Nose normal.   Mouth/Throat: Mucous membranes are dry.   Eyes: Conjunctivae  and EOM are normal. Pupils are equal, round, and reactive to light. Right eye exhibits no discharge. Left eye exhibits no discharge. No scleral icterus.   Neck: Normal range of motion. Neck supple. No tracheal deviation present. No thyromegaly present.   Cardiovascular: Normal rate, regular rhythm, normal heart sounds and intact distal pulses.  Exam reveals no gallop and no friction rub.    No murmur heard.  Pulmonary/Chest: Effort normal and breath sounds normal. No stridor. No respiratory distress. He has no wheezes. He has no rales. He exhibits no tenderness.   Abdominal: Soft. Bowel sounds are normal. He exhibits no distension and no mass. There is no tenderness. There is no rebound and no guarding. No hernia.   Musculoskeletal: Normal range of motion. He exhibits no edema or deformity.   Lymphadenopathy:     He has no cervical adenopathy.   Neurological: He is alert and oriented to person, place, and time. He has normal reflexes. He displays normal reflexes. No cranial nerve deficit. He exhibits normal muscle tone. Coordination normal.   Skin: Skin is warm and dry. Capillary refill takes 2 to 3 seconds. No rash noted. No erythema. There is pallor.   Decreased turgor   Psychiatric: He has a normal mood and affect. His behavior is normal. Judgment and thought content normal.   Vitals reviewed.        Results Reviewed:    I have personally reviewed and interpreted the radiology studies and ECG obtained at time of admission.     1.  4/13/18:  CBC:  WBC's 26.1  Platelets 256, Hgb 9.3  CMP:  Na 139, K 4.8, Cl 106, CO2 20, Glucose 198, BUN 95, Creatinine 2.2.  Calcium 11.7    CXR/KUB from OSH reviewed, no acute abnormalities.          Assessment / Plan     Assessment:   1.  GI Bleed  -Had recent colonoscopy with polypectomy, however stools are melenotic.    -GI consulted  -Protonix Drip  -Trend H&H    2.  Chest pain  -EKG shows no acute changes  -serial troponin  -likely supply/demand mismatch   -Plavix held  -will  add Statin    3.  Dehydration  -IVF    4.  Acute Renal failure  -IVF  -monitor electrolytes    5.  Hypercalcemia  -IVF  -Recheck    6.  Diverticulosis    7.  CVD  -Statin  -Plavix held due to bleeding    8.  AYDE  -CPAP QHS    9. Lung Cancer  -In remission    10.  AAA  -s/p repair  -monitor    11.  Lymphoma  -In remission per pt    12.  Leukocytosis   -multifactorial  -PAtient has no tachycardia or tachypnea in spite of being dehyrated.     13.  CAD  -Plavix held given bleeding  -BP borderline, beta blocker held  -Will give Statin     14.  Essential HTN  -BP borderline  -BP meds held    15.  Anemia  -Likely ABLA  -Unable to say if chronic given lack of baseline               Code Status: Full     I discussed the patients findings and my recommendations with the patient, Physician at [facility] ED    Estimated length of stay 1-2 days    Mahad Shields MD   04/13/18   8:58 AM

## 2018-04-13 NOTE — CONSULTS
.  [facility] Gastroenterology  Initial Inpatient Consult Note    Referring Provider: Mahad Shields MD    Reason for Consultation:  Gi bleed     Subjective     History of present illness:       73-year-old transferred from [place] with GI bleed and chest pain.  He had a colonoscopy by Dr. Wellington on 04/09/2018 and had a cecal and transverse polyp removed, and left-sided diverticulosis.  He is on Plavix for history of cardiac stent.  Was off Plavix 5 days prior to the procedure.  He did take his last and only dose since the colonoscopy 2 days ago.  2 days ago he started having black stool.  He states before that felt constipated.  He has had 2 black stools this morning.  He does take aspirin on a daily basis.  He thinks he might have had ulcers in the past.  But cannot remember if he has had an upper endoscopy.  No bright red blood per rectum.  He does take Pepcid twice a day and has done so for several years.  It was mentioned from the ER doctor [place] that he had had bad pain.  The patient states that he does get occasional abdominal pain if he is a little constipated.  He does not have any abdominal pain currently.  He has had some nausea but no vomiting.  No fevers.      States he was not eating or drinking much over the last couple days.  He did not have much of an appetite.    He had chest pain shortness of breath this morning and that is why he went to the emergency along with progressive weakness.  Lab work early this morning and [place] revealed hemoglobin of 9.3, white blood cell count 26, BUN 95, creatinine 2.2, calcium 117,  troponin was 0. her O2, proBNP was 542, d-dimer is 2.37.  Chest x-ray and KUB showed no acute abnormalities.  EKG showed no acute changes.    The patient denies history of anemia and other than when he took chemotherapy several years ago.  He did have labs 3 days ago ordered by Dr. Dharmesh Worrell.  I do not have those results but will request.    Past Medical  History:  Past Medical History:   Diagnosis Date   • AAA (abdominal aortic aneurysm)    • Cancer     lymphoma   • Colon polyp    • Coronary artery disease    • Diverticulitis    • HTN (hypertension)    • Lung cancer    • Skin cancer    • Sleep apnea    • Stroke        Past Surgical History:  Past Surgical History:   Procedure Laterality Date   • ANAL FISSURECTOMY     • CARDIAC CATHETERIZATION     • CAROTID ARTERY ANGIOPLASTY     • COLONOSCOPY  09/16/2014   • COLONOSCOPY N/A 4/9/2018    Procedure: COLONOSCOPY WITH ANESTHESIA;  Surgeon: Ruth Wellington MD;  Location: [facility] ENDOSCOPY;  Service: Gastroenterology   • LUNG LOBECTOMY          Social History:   Social History   Substance Use Topics   • Smoking status: Former Smoker   • Smokeless tobacco: Never Used      Comment: quit november 2017   • Alcohol use No        Family History:  Family History   Problem Relation Age of Onset   • Colon cancer Brother        Home Meds:  Prescriptions Prior to Admission   Medication Sig Dispense Refill Last Dose   • clopidogrel (PLAVIX) 75 MG tablet    4/1/2018   • famotidine (PEPCID) 20 MG tablet    Past Week at Unknown time   • HYDROcodone-acetaminophen (NORCO)  MG per tablet    4/9/2018 at 0800   • lisinopril (PRINIVIL,ZESTRIL) 40 MG tablet    4/8/2018 at Unknown time   • metoprolol succinate XL (TOPROL-XL) 50 MG 24 hr tablet    4/8/2018 at 1230   • venlafaxine (EFFEXOR) 75 MG tablet    4/8/2018 at Unknown time       Current Meds:  Current Facility-Administered Medications   Medication Dose Route Frequency Provider Last Rate Last Dose   • buffered lidocaine syringe 0.5 mL  0.5 mL Intradermal Once PRN Antonio Giordano CRNA       • ondansetron (ZOFRAN) injection 4 mg  4 mg Intravenous Q6H PRN Mahad Shields MD       • pantoprazole (PROTONIX) 40 mg/100 mL (0.4 mg/mL) in 0.9% NS IVPB  8 mg/hr Intravenous Continuous Mahad Shields MD 20 mL/hr at 04/13/18 1231 8 mg/hr at 04/13/18 1231   • sodium chloride 0.9 % flush  1-10 mL  1-10 mL Intravenous PRN Mahad Shields MD       • sodium chloride 0.9 % flush 3 mL  3 mL Intravenous PRN Antonio iGordano CRNA       • sodium chloride 0.9 % infusion 500 mL  500 mL Intravenous Continuous PRN Antonio Giordano CRNA       • sodium chloride 0.9 % infusion  100 mL/hr Intravenous Continuous Mahad Shields  mL/hr at 04/13/18 1042 100 mL/hr at 04/13/18 1042       Allergies:  No Known Allergies    Review of Systems   Review of Systems   Constitutional: Positive for fatigue. Negative for appetite change, chills, fever and unexpected weight change.   HENT: Negative for sore throat and trouble swallowing.    Respiratory: Positive for shortness of breath. Negative for cough, chest tightness and wheezing.    Cardiovascular: Positive for chest pain. Negative for palpitations.   Gastrointestinal: Positive for blood in stool and nausea. Negative for abdominal distention, abdominal pain, diarrhea, rectal pain and vomiting.        As mentioned in hpi   Genitourinary: Negative for difficulty urinating and hematuria.   Musculoskeletal: Negative for arthralgias and back pain.   Skin: Negative for color change and rash.   Neurological: Positive for weakness. Negative for dizziness, syncope and light-headedness.   Psychiatric/Behavioral: Negative for confusion. The patient is not nervous/anxious.         Objective     Vital Signs  Temp:  [97.9 °F (36.6 °C)] 97.9 °F (36.6 °C)  Heart Rate:  [81-83] 81  Resp:  [20] 20  BP: (115-117)/(48-66) 115/48    Physical Exam:   Physical Exam   Constitutional: He appears well-developed and well-nourished. No distress.   HENT:   Head: Normocephalic and atraumatic.   Eyes: EOM are normal. No scleral icterus.   Neck: Neck supple. No JVD present.   Cardiovascular: Normal rate, regular rhythm and normal heart sounds.    Pulmonary/Chest: Effort normal and breath sounds normal. No stridor.   Abdominal: Soft. Bowel sounds are normal. He exhibits no distension and no  mass. There is no tenderness. There is no rebound and no guarding.   Musculoskeletal: He exhibits no edema.   Neurological: He is alert.   Skin: Skin is warm and dry. No rash noted.   Psychiatric: He has a normal mood and affect. His behavior is normal.   Vitals reviewed.      Results Review:   I reviewed the patient's new clinical results.  I reviewed the patient's new imaging results and agree with the interpretation.  I reviewed the patient's other test results and agree with the interpretation    Lab Results (most recent)     None          Radiology:  Imaging Results (last 24 hours)     ** No results found for the last 24 hours. **            Assessment/Plan     Active Problems:    GI bleed      1. Heme pos stool.  2. Melena  3. Coagulopathy secondary to plavix  4. Recent colonoscopy with cecal and transverse polypectomy        I had a long discussion with the patient and his wife and family.  We talked about the differential diagnosis.  I suspect this is a post-polypectomy bleeding aggravated by coagulopathy secondary to Plavix.  I suspect that his acute renal failure is secondary to his decreased by mouth intake.  Since he does have a significantly elevated BUN with black stools I think its reasonable to pursue an upper endoscopy examination to rule out peptic ulcer disease.  This would go along with her symptoms of nausea and indigestion that he complained of.    Otherwise plan to hold Plavix.  Follow his H&H closely and transfuse if needed.  Plan IV fluid hydration.  I discussed with the patient and family if there are signs of continued bleeding despite the supportive care we may need to repeat his colonoscopy exam.  We talked about the risks benefits and alternatives of this.  At this time since post polypectomy bleeds are typically self-limited on the conservative care will plan supportive care and close observation.  They are in agreement with this approach.    I discussed the patients findings and my  recommendations with patient and family.       EMR Dragon/transcription disclaimer:  Much of this encounter note is electronic transcription/translation of spoken language to printed text.  The electronic translation of spoken language may be erroneous, or at times, nonsensical words or phrases may be inadvertently transcribed.  Although I have reviewed the note for such errors, some may still exist.    Aurea AGUIRRE 10:03 AM    Mike Sheridan MD  04/13/18  1:28 PM

## 2018-04-14 LAB
ALBUMIN SERPL-MCNC: 2.9 G/DL (ref 3.5–5)
ALBUMIN/GLOB SERPL: 1.3 G/DL (ref 1.1–2.5)
ALP SERPL-CCNC: 32 U/L (ref 24–120)
ALT SERPL W P-5'-P-CCNC: 27 U/L (ref 0–54)
ANION GAP SERPL CALCULATED.3IONS-SCNC: 7 MMOL/L (ref 4–13)
AST SERPL-CCNC: 14 U/L (ref 7–45)
BASOPHILS # BLD AUTO: 0.04 10*3/MM3 (ref 0–0.2)
BASOPHILS NFR BLD AUTO: 0.4 % (ref 0–2)
BILIRUB SERPL-MCNC: 0.3 MG/DL (ref 0.1–1)
BUN BLD-MCNC: 61 MG/DL (ref 5–21)
BUN/CREAT SERPL: 42.7 (ref 7–25)
CALCIUM SPEC-SCNC: 9.8 MG/DL (ref 8.4–10.4)
CHLORIDE SERPL-SCNC: 115 MMOL/L (ref 98–110)
CO2 SERPL-SCNC: 21 MMOL/L (ref 24–31)
CREAT BLD-MCNC: 1.43 MG/DL (ref 0.5–1.4)
DEPRECATED RDW RBC AUTO: 45.1 FL (ref 40–54)
EOSINOPHIL # BLD AUTO: 0 10*3/MM3 (ref 0–0.7)
EOSINOPHIL NFR BLD AUTO: 0 % (ref 0–4)
ERYTHROCYTE [DISTWIDTH] IN BLOOD BY AUTOMATED COUNT: 14.6 % (ref 12–15)
GFR SERPL CREATININE-BSD FRML MDRD: 48 ML/MIN/1.73
GLOBULIN UR ELPH-MCNC: 2.2 GM/DL
GLUCOSE BLD-MCNC: 109 MG/DL (ref 70–100)
HCT VFR BLD AUTO: 24.4 % (ref 40–52)
HGB BLD-MCNC: 8 G/DL (ref 14–18)
IMM GRANULOCYTES # BLD: 0.07 10*3/MM3 (ref 0–0.03)
IMM GRANULOCYTES NFR BLD: 0.7 % (ref 0–5)
LYMPHOCYTES # BLD AUTO: 2.69 10*3/MM3 (ref 0.72–4.86)
LYMPHOCYTES NFR BLD AUTO: 26.3 % (ref 15–45)
MCH RBC QN AUTO: 28.1 PG (ref 28–32)
MCHC RBC AUTO-ENTMCNC: 32.8 G/DL (ref 33–36)
MCV RBC AUTO: 85.6 FL (ref 82–95)
MONOCYTES # BLD AUTO: 0.6 10*3/MM3 (ref 0.19–1.3)
MONOCYTES NFR BLD AUTO: 5.9 % (ref 4–12)
NEUTROPHILS # BLD AUTO: 6.83 10*3/MM3 (ref 1.87–8.4)
NEUTROPHILS NFR BLD AUTO: 66.7 % (ref 39–78)
NRBC BLD MANUAL-RTO: 0 /100 WBC (ref 0–0)
PLATELET # BLD AUTO: 146 10*3/MM3 (ref 130–400)
PMV BLD AUTO: 11.5 FL (ref 6–12)
POTASSIUM BLD-SCNC: 4.4 MMOL/L (ref 3.5–5.3)
PROT SERPL-MCNC: 5.1 G/DL (ref 6.3–8.7)
RBC # BLD AUTO: 2.85 10*6/MM3 (ref 4.8–5.9)
SODIUM BLD-SCNC: 143 MMOL/L (ref 135–145)
TROPONIN I SERPL-MCNC: 0.03 NG/ML (ref 0–0.03)
WBC NRBC COR # BLD: 10.23 10*3/MM3 (ref 4.8–10.8)

## 2018-04-14 PROCEDURE — 80053 COMPREHEN METABOLIC PANEL: CPT | Performed by: FAMILY MEDICINE

## 2018-04-14 PROCEDURE — 86900 BLOOD TYPING SEROLOGIC ABO: CPT

## 2018-04-14 PROCEDURE — P9016 RBC LEUKOCYTES REDUCED: HCPCS

## 2018-04-14 PROCEDURE — 36430 TRANSFUSION BLD/BLD COMPNT: CPT

## 2018-04-14 PROCEDURE — 85025 COMPLETE CBC W/AUTO DIFF WBC: CPT | Performed by: FAMILY MEDICINE

## 2018-04-14 PROCEDURE — 84484 ASSAY OF TROPONIN QUANT: CPT | Performed by: FAMILY MEDICINE

## 2018-04-14 PROCEDURE — 99232 SBSQ HOSP IP/OBS MODERATE 35: CPT | Performed by: INTERNAL MEDICINE

## 2018-04-14 RX ORDER — METOPROLOL SUCCINATE 50 MG/1
50 TABLET, EXTENDED RELEASE ORAL DAILY
Status: DISCONTINUED | OUTPATIENT
Start: 2018-04-14 | End: 2018-04-16

## 2018-04-14 RX ORDER — PANTOPRAZOLE SODIUM 40 MG/1
40 TABLET, DELAYED RELEASE ORAL
Status: DISCONTINUED | OUTPATIENT
Start: 2018-04-14 | End: 2018-04-16

## 2018-04-14 RX ADMIN — DESMOPRESSIN ACETATE 40 MG: 0.2 TABLET ORAL at 21:18

## 2018-04-14 RX ADMIN — SODIUM CHLORIDE 8 MG/HR: 900 INJECTION INTRAVENOUS at 07:22

## 2018-04-14 RX ADMIN — VENLAFAXINE HYDROCHLORIDE 75 MG: 37.5 TABLET ORAL at 09:46

## 2018-04-14 RX ADMIN — HYDROCODONE BITARTRATE AND ACETAMINOPHEN 1 TABLET: 10; 325 TABLET ORAL at 21:17

## 2018-04-14 RX ADMIN — SODIUM CHLORIDE 8 MG/HR: 900 INJECTION INTRAVENOUS at 03:15

## 2018-04-14 RX ADMIN — SODIUM CHLORIDE 100 ML/HR: 9 INJECTION, SOLUTION INTRAVENOUS at 07:22

## 2018-04-14 RX ADMIN — HYDROCODONE BITARTRATE AND ACETAMINOPHEN 1 TABLET: 10; 325 TABLET ORAL at 09:48

## 2018-04-14 RX ADMIN — HYDROCODONE BITARTRATE AND ACETAMINOPHEN 1 TABLET: 10; 325 TABLET ORAL at 15:19

## 2018-04-14 RX ADMIN — METOPROLOL SUCCINATE 50 MG: 50 TABLET, FILM COATED, EXTENDED RELEASE ORAL at 15:14

## 2018-04-14 NOTE — PROGRESS NOTES
[facility] Gastroenterology  Inpatient Progress Note     TODAY'S DATE: 04/14/18  Dalton Cheatham  1945      Reason for Follow Up:  Gi bleed     Subjective:     No bm this am, last bowel movement was yesterday morning. No abdominal pain, no n/v,  On clear liquids. Received 2 units of blood since here, hgb this am 8.0.   Looks and feels better.      No Known Allergies    Current Facility-Administered Medications:   •  atorvastatin (LIPITOR) tablet 40 mg, 40 mg, Oral, Nightly, Mahad Shields MD, 40 mg at 04/13/18 2157  •  HYDROcodone-acetaminophen (NORCO)  MG per tablet 1 tablet, 1 tablet, Oral, Q6H PRN, Mahad Shields MD, 1 tablet at 04/14/18 0948  •  ondansetron (ZOFRAN) injection 4 mg, 4 mg, Intravenous, Q6H PRN, Mahad Shields MD  •  pantoprazole (PROTONIX) 40 mg/100 mL (0.4 mg/mL) in 0.9% NS IVPB, 8 mg/hr, Intravenous, Continuous, Mahad Shields MD, Last Rate: 20 mL/hr at 04/14/18 0722, 8 mg/hr at 04/14/18 0722  •  sodium chloride 0.9 % flush 1-10 mL, 1-10 mL, Intravenous, PRN, Mahad Shields MD  •  sodium chloride 0.9 % infusion 500 mL, 500 mL, Intravenous, Continuous PRN, Antonio Giordano CRNA  •  sodium chloride 0.9 % infusion, 100 mL/hr, Intravenous, Continuous, Mahad Shields MD, Last Rate: 100 mL/hr at 04/14/18 0722, 100 mL/hr at 04/14/18 0722  •  venlafaxine (EFFEXOR) tablet 75 mg, 75 mg, Oral, Daily, Mahad Shields MD, 75 mg at 04/14/18 0946    Review of Systems   Constitutional: Negative for chills and fever.   Respiratory: Negative for cough, shortness of breath and wheezing.    Cardiovascular: Negative for chest pain and palpitations.   Gastrointestinal: Negative for abdominal distention, abdominal pain, anal bleeding, blood in stool, constipation, diarrhea, nausea, rectal pain and vomiting.       Objective     Vital Signs  Temp:  [97.7 °F (36.5 °C)-98.7 °F (37.1 °C)] 97.9 °F (36.6 °C)  Heart Rate:  [64-93] 86  Resp:  [13-21] 16  BP:  (102-149)/(41-68) 149/60  Body mass index is 34.04 kg/m².    Intake/Output Summary (Last 24 hours) at 04/14/18 1120  Last data filed at 04/14/18 0740   Gross per 24 hour   Intake             3167 ml   Output             1325 ml   Net             1842 ml     I/O this shift:  In: 891 [I.V.:891]  Out: 600 [Urine:600]       PHYSICAL EXAM  Physical Exam   Constitutional: He appears well-developed and well-nourished. No distress.   Cardiovascular: Normal rate, regular rhythm and normal heart sounds.    Pulmonary/Chest: Effort normal and breath sounds normal.   Abdominal: Soft. Bowel sounds are normal. He exhibits no distension and no mass. There is no tenderness. There is no rebound and no guarding.   Musculoskeletal: He exhibits no edema.   Neurological: He is alert.   Skin: Skin is warm and dry.   Psychiatric: He has a normal mood and affect. His behavior is normal.   Vitals reviewed.          Results Review:   I have reviewed all of the patient's current test results      Results from last 7 days  Lab Units 04/14/18  0830 04/13/18  1738   WBC 10*3/mm3 10.23 17.12*   HEMOGLOBIN g/dL 8.0* 7.2*   HEMATOCRIT % 24.4* 22.1*   PLATELETS 10*3/mm3 146 198         Results from last 7 days  Lab Units 04/14/18  0830 04/13/18  1738   SODIUM mmol/L 143 142   POTASSIUM mmol/L 4.4 4.2   CHLORIDE mmol/L 115* 112*   CO2 mmol/L 21.0* 19.0*   BUN mg/dL 61* 76*   CREATININE mg/dL 1.43* 1.64*   CALCIUM mg/dL 9.8 10.0   BILIRUBIN mg/dL 0.3 <0.1*   ALK PHOS U/L 32 36   ALT (SGPT) U/L 27 23   AST (SGOT) U/L 14 14   GLUCOSE mg/dL 109* 109*         Results from last 7 days  Lab Units 04/13/18  1738   INR  1.12*       No results found for: LIPASE    Radiology Review:  Imaging Results (last 24 hours)     ** No results found for the last 24 hours. **          EGD 4-13-18   - Small hiatal hernia.  - A small amount of food (residue) in the stomach with dark fluid but no raymundo blood.  - Normal examined duodenum.  No specimens collected.      Assessment/Plan     Patient Active Problem List   Diagnosis Code   • Colon polyps K63.5   • Family history of colon cancer Z80.0   • Antiplatelet or antithrombotic long-term use Z79.02   • Polyp of colon K63.5   • GI bleed K92.2      1. Heme pos stool.  2. Melena  3. Coagulopathy secondary to plavix  4. Recent colonoscopy with cecal and transverse polypectomy    There are no signs of active GI bleeding.  We will plan to advance diet as tolerated.  Continue to follow closely.  If he has any additional signs of bleeding we will consider colonoscopy investigation that point.  Otherwise continue to hold Plavix and supportive care.  Okay to stop Protonix drip      EMR Dragon/transcription disclaimer:  Much of this encounter note is electronic transcription/translation of spoken language to printed text.  The electronic translation of spoken language may be erroneous, or at times, nonsensical words or phrases may be inadvertently transcribed.  Although I have reviewed the note for such errors, some may still exist.      Aurea AGUIRRE  9:39 AM    Mike Sheridan MD  04/14/18  11:20 AM

## 2018-04-14 NOTE — PLAN OF CARE
Problem: Patient Care Overview  Goal: Plan of Care Review  Outcome: Ongoing (interventions implemented as appropriate)   04/14/18 0552   Coping/Psychosocial   Plan of Care Reviewed With patient   Plan of Care Review   Progress improving   OTHER   Outcome Summary HGB = 8.0 HCT = 24.4 UNIT BLOOD X 1 TO BE GIVEN. PRN PAIN MED GIVEN X 2 THUS FAR THIS SHIFT. VOIDING. NO BM TODAY. DIET ADVANCED TO FULL LIQUIDS TODAY AND PT. TOLERATED WELL. NO DISTRESS NOTED.       Problem: Gastrointestinal Bleeding (Adult)  Goal: Signs and Symptoms of Listed Potential Problems Will be Absent, Minimized or Managed (Gastrointestinal Bleeding)  Outcome: Ongoing (interventions implemented as appropriate)

## 2018-04-14 NOTE — PROGRESS NOTES
[facility] Medicine Services  INPATIENT PROGRESS NOTE    Length of Stay: 1  Date of Admission: 4/13/2018  Primary Care Physician: Rikki Worrell MD    Subjective   Chief Complaint: Weak, tired.  Black bowel movement yesterday.  HPI   Lying in bed.  He tells me he feels weak and tired.  He was able to sit on the side of the bed to void.  He has not yet ambulated as he is extremely weak.  He had black tarry bowel movement yesterday.  No bowel movement since.  He reported lightheadedness and dizziness.  He reported shortness of breath.  He received 2 units packed red blood cells.  Still complains of being weak and tired.  He denies abdominal pain.  He denies dysuria.  He denies sputum production or cough.    Review of Systems   Constitutional: Positive for fatigue. Negative for unexpected weight change.   HENT: Negative for congestion and trouble swallowing.    Eyes: Negative for photophobia and visual disturbance.   Respiratory: Positive for shortness of breath. Negative for cough and wheezing.    Cardiovascular: Negative for chest pain, palpitations and leg swelling.   Gastrointestinal: Negative for abdominal distention, anal bleeding (Black tarry stool day of admission), constipation, diarrhea, nausea and vomiting.   Endocrine: Negative for cold intolerance, heat intolerance and polyuria.   Genitourinary: Negative for dysuria and urgency.   Musculoskeletal: Negative for gait problem.   Skin: Negative for wound.   Allergic/Immunologic: Negative for immunocompromised state.   Neurological: Positive for weakness and light-headedness.   Hematological: Negative for adenopathy. Does not bruise/bleed easily.   Psychiatric/Behavioral: Negative for agitation, behavioral problems and confusion.      All pertinent negatives and positives are as above. All other systems have been reviewed and are negative unless otherwise stated.     Objective    Temp:  [97.7 °F (36.5 °C)-98.7 °F  (37.1 °C)] 98.2 °F (36.8 °C)  Heart Rate:  [64-93] 69  Resp:  [13-21] 16  BP: (102-149)/(41-73) 143/73  Physical Exam   Constitutional: He is oriented to person, place, and time. He appears well-developed and well-nourished.   HENT:   Head: Normocephalic and atraumatic.   Eyes: EOM are normal. Pupils are equal, round, and reactive to light.   Neck: Normal range of motion. Neck supple.   Cardiovascular: Normal rate, regular rhythm, normal heart sounds and intact distal pulses.  Exam reveals no gallop and no friction rub.    No murmur heard.  Pulmonary/Chest: Effort normal and breath sounds normal. No respiratory distress. He has no wheezes. He has no rales.   Abdominal: Soft. Bowel sounds are normal.   Genitourinary:   Genitourinary Comments: Voiding   Musculoskeletal: He exhibits no edema or deformity.   Neurological: He is alert and oriented to person, place, and time.   Skin: Skin is warm and dry.   Psychiatric: He has a normal mood and affect. His behavior is normal. Judgment and thought content normal.     Results Review:  I have reviewed the labs, radiology results, and diagnostic studies.    Laboratory Data:     Results from last 7 days  Lab Units 04/14/18  0830 04/13/18  1738   WBC 10*3/mm3 10.23 17.12*   HEMOGLOBIN g/dL 8.0* 7.2*   HEMATOCRIT % 24.4* 22.1*   PLATELETS 10*3/mm3 146 198          Results from last 7 days  Lab Units 04/14/18  0830 04/13/18  1738   SODIUM mmol/L 143 142   POTASSIUM mmol/L 4.4 4.2   CHLORIDE mmol/L 115* 112*   CO2 mmol/L 21.0* 19.0*   BUN mg/dL 61* 76*   CREATININE mg/dL 1.43* 1.64*   CALCIUM mg/dL 9.8 10.0   BILIRUBIN mg/dL 0.3 <0.1*   ALK PHOS U/L 32 36   ALT (SGPT) U/L 27 23   AST (SGOT) U/L 14 14   GLUCOSE mg/dL 109* 109*        Imaging Results (all)     None        Endoscopy 4/13/18  - Small hiatal hernia.  - A small amount of food (residue) in the stomach with dark fluid but no raymundo blood.  - Normal examined duodenum.  - No specimens collected.  continue  empiric  Intake/Output    Intake/Output Summary (Last 24 hours) at 04/14/18 1333  Last data filed at 04/14/18 1100   Gross per 24 hour   Intake             3167 ml   Output             1625 ml   Net             1542 ml       Scheduled Meds    atorvastatin 40 mg Oral Nightly   pantoprazole 40 mg Oral Q AM   venlafaxine 75 mg Oral Daily       I have reviewed the patient current medications.     Assessment/Plan     Hospital Problem List     GI bleed        Assessment/Plan  1.  GI bleed with melena  - Colonoscopy with polypectomy 4/90/18  - Dr. Sheridan consulted.  - Endoscopy 4/14/18 small amount of food residual in the stomach with dark fluid but no raymundo blood.  - Protonix drip.  Okay to discontinue per GI medicine  - CBC tomorrow.  H/H 8/24.4 post transfusion 2 unit packed red blood cells.  Patient remains symptomatic weak and tired.  -  GI to consider colonoscopy if any additional signs of bleeding.    2.  Blood loss anemia requiring transfusion  -  H/H 7.2/22.1.  Transfuse 2 units packed red blood cells.  H/H -8/24.4 today.  Patient remains weak and tired.  We will transfuse additional unit packed red blood cells today.  -  CBC in a.m. to monitor blood loss  -  GI to consider colonoscopy for any additional signs of bleeding.    3.  Dehydration   -  IV fluids at 100 mL per hour.  We will decrease to 50 mL per hour.    4.  Acute kidney injury.  -  Creatinine 1.6.  Hydrated with IV fluids.  Repeat creatinine 1.43 today.  -  Comprehensive metabolic panel tomorrow to monitor renal function    5.  Hypercalcemia  -  Improved with IV fluid hydration  -  Complains of metabolic panel tomorrow to monitor calcium    6.  Diverticulosis    7.  Coronary artery disease, stable  -  Plavix on hold  -  Atorvastatin  -  -143.  Beta blocker held on admission.  Resume Toprol    8.  Essential hypertension  -  -143.  Beta blocker held on admission.  Resume Toprol today.  -  Hold lisinopril due to elevated creatinine    9.   Vascular disease  -  Statin, atorvastatin resumed  -  Plavix on hold secondary to blood loss anemia    10.  Obstructive sleep apnea  -  CPAP at bedtime    11.  Leukocytosis, resolved    12.  History of lymphoma  -  Remission per patient      The above documentation resulted from a face-to-face encounter by me Ute AGUIRRE, [facility].    Discharge Planning: I expect patient to be discharged to home in 1-2 days.    CARLA Weeks   04/14/18   1:33 PM

## 2018-04-14 NOTE — PLAN OF CARE
Problem: Patient Care Overview  Goal: Plan of Care Review  Outcome: Ongoing (interventions implemented as appropriate)   04/14/18 0141   Coping/Psychosocial   Plan of Care Reviewed With patient   Plan of Care Review   Progress no change   OTHER   Outcome Summary pt. c/o back pain x1 so far this shift; voiding per urinal; up with assist x1; IVF and protonix drip infusing; patient getting 2 units of PRBC, 1st given without issue; no BM so far this shift; will monitor.     Goal: Individualization and Mutuality  Outcome: Ongoing (interventions implemented as appropriate)    Goal: Discharge Needs Assessment  Outcome: Ongoing (interventions implemented as appropriate)    Goal: Interprofessional Rounds/Family Conf  Outcome: Ongoing (interventions implemented as appropriate)      Problem: Gastrointestinal Bleeding (Adult)  Goal: Signs and Symptoms of Listed Potential Problems Will be Absent, Minimized or Managed (Gastrointestinal Bleeding)  Outcome: Ongoing (interventions implemented as appropriate)

## 2018-04-15 LAB
ABO + RH BLD: NORMAL
ALBUMIN SERPL-MCNC: 3.5 G/DL (ref 3.5–5)
ALBUMIN/GLOB SERPL: 1.5 G/DL (ref 1.1–2.5)
ALP SERPL-CCNC: 36 U/L (ref 24–120)
ALT SERPL W P-5'-P-CCNC: 27 U/L (ref 0–54)
ANION GAP SERPL CALCULATED.3IONS-SCNC: 10 MMOL/L (ref 4–13)
AST SERPL-CCNC: 25 U/L (ref 7–45)
BH BB BLOOD EXPIRATION DATE: NORMAL
BH BB BLOOD TYPE BARCODE: 5100
BH BB DISPENSE STATUS: NORMAL
BH BB PRODUCT CODE: NORMAL
BH BB UNIT NUMBER: NORMAL
BILIRUB SERPL-MCNC: 0.2 MG/DL (ref 0.1–1)
BUN BLD-MCNC: 33 MG/DL (ref 5–21)
BUN/CREAT SERPL: 25.4 (ref 7–25)
CALCIUM SPEC-SCNC: 10.4 MG/DL (ref 8.4–10.4)
CHLORIDE SERPL-SCNC: 108 MMOL/L (ref 98–110)
CO2 SERPL-SCNC: 20 MMOL/L (ref 24–31)
CREAT BLD-MCNC: 1.3 MG/DL (ref 0.5–1.4)
CROSSMATCH INTERPRETATION: NORMAL
GFR SERPL CREATININE-BSD FRML MDRD: 54 ML/MIN/1.73
GLOBULIN UR ELPH-MCNC: 2.4 GM/DL
GLUCOSE BLD-MCNC: 129 MG/DL (ref 70–100)
HCT VFR BLD AUTO: 26.7 % (ref 40–52)
HCT VFR BLD AUTO: 27.5 % (ref 40–52)
HGB BLD-MCNC: 9.2 G/DL (ref 14–18)
HGB BLD-MCNC: 9.3 G/DL (ref 14–18)
POTASSIUM BLD-SCNC: 4.5 MMOL/L (ref 3.5–5.3)
PROT SERPL-MCNC: 5.9 G/DL (ref 6.3–8.7)
SODIUM BLD-SCNC: 138 MMOL/L (ref 135–145)
UNIT  ABO: NORMAL
UNIT  RH: NORMAL

## 2018-04-15 PROCEDURE — 80053 COMPREHEN METABOLIC PANEL: CPT | Performed by: NURSE PRACTITIONER

## 2018-04-15 PROCEDURE — 85018 HEMOGLOBIN: CPT | Performed by: NURSE PRACTITIONER

## 2018-04-15 PROCEDURE — 85014 HEMATOCRIT: CPT | Performed by: NURSE PRACTITIONER

## 2018-04-15 PROCEDURE — 85014 HEMATOCRIT: CPT | Performed by: FAMILY MEDICINE

## 2018-04-15 PROCEDURE — 85018 HEMOGLOBIN: CPT | Performed by: FAMILY MEDICINE

## 2018-04-15 PROCEDURE — 99232 SBSQ HOSP IP/OBS MODERATE 35: CPT | Performed by: INTERNAL MEDICINE

## 2018-04-15 RX ADMIN — DESMOPRESSIN ACETATE 40 MG: 0.2 TABLET ORAL at 20:38

## 2018-04-15 RX ADMIN — VENLAFAXINE HYDROCHLORIDE 75 MG: 37.5 TABLET ORAL at 09:01

## 2018-04-15 RX ADMIN — SODIUM CHLORIDE 50 ML/HR: 9 INJECTION, SOLUTION INTRAVENOUS at 01:12

## 2018-04-15 RX ADMIN — HYDROCODONE BITARTRATE AND ACETAMINOPHEN 1 TABLET: 10; 325 TABLET ORAL at 08:28

## 2018-04-15 RX ADMIN — METOPROLOL SUCCINATE 50 MG: 50 TABLET, FILM COATED, EXTENDED RELEASE ORAL at 09:01

## 2018-04-15 RX ADMIN — HYDROCODONE BITARTRATE AND ACETAMINOPHEN 1 TABLET: 10; 325 TABLET ORAL at 14:40

## 2018-04-15 RX ADMIN — HYDROCODONE BITARTRATE AND ACETAMINOPHEN 1 TABLET: 10; 325 TABLET ORAL at 20:38

## 2018-04-15 RX ADMIN — PANTOPRAZOLE SODIUM 40 MG: 40 TABLET, DELAYED RELEASE ORAL at 06:31

## 2018-04-15 NOTE — PROGRESS NOTES
[facility] Medicine Services  INPATIENT PROGRESS NOTE    Length of Stay: 2  Date of Admission: 4/13/2018  Primary Care Physician: Rikki Worrell MD    Subjective   Chief Complaint: Black bowel movement.  Weak, tired.  Feels some better today.  HPI   Sitting up on side of bed.  Family members in room.  He reports one black bowel movement yesterday.  He denies nausea, vomiting or abdominal pain.  He denies palpitations, chest pain, or shortness of breath.  He received 1 unit packed red blood cells yesterday.  He reports feeling some better today.  Less tired.  He just ambulated in the hallway without shortness of breath.    Review of Systems   Constitutional: Positive for fatigue. Negative for unexpected weight change.   HENT: Negative for congestion and trouble swallowing.    Eyes: Negative for photophobia and visual disturbance.   Respiratory: Positive for shortness of breath. Negative for cough and wheezing.    Cardiovascular: Negative for chest pain, palpitations and leg swelling.   Gastrointestinal: Negative for abdominal distention, anal bleeding (Black tarry stool day), constipation, diarrhea, nausea and vomiting.   Endocrine: Negative for cold intolerance, heat intolerance and polyuria.   Genitourinary: Negative for dysuria and urgency.   Musculoskeletal: Negative for gait problem.   Skin: Negative for wound.   Allergic/Immunologic: Negative for immunocompromised state.   Neurological: Positive for weakness and light-headedness.   Hematological: Negative for adenopathy. Does not bruise/bleed easily.   Psychiatric/Behavioral: Negative for agitation, behavioral problems and confusion.   All pertinent negatives and positives are as above. All other systems have been reviewed and are negative unless otherwise stated.     Objective    Temp:  [97.5 °F (36.4 °C)-98.2 °F (36.8 °C)] (P) 97.9 °F (36.6 °C)  Heart Rate:  [52-64] (P) 54  Resp:  [16-18] (P) 16  BP:  (125-149)/(55-73) (P) 120/61  Physical Exam  Constitutional: He is oriented to person, place, and time. He appears well-developed and well-nourished.   HENT:   Head: Normocephalic and atraumatic.   Eyes: EOM are normal. Pupils are equal, round, and reactive to light.   Neck: Normal range of motion. Neck supple.   Cardiovascular: Normal rate, regular rhythm, normal heart sounds and intact distal pulses.  Exam reveals no gallop and no friction rub.    No murmur heard.  Pulmonary/Chest: Effort normal and breath sounds normal. No respiratory distress. He has no wheezes. He has no rales.   Abdominal: Soft. Bowel sounds are normal.   Genitourinary:   Genitourinary Comments: Voiding   Musculoskeletal: He exhibits no edema or deformity.   Neurological: He is alert and oriented to person, place, and time.   Skin: Skin is warm and dry.   Psychiatric: He has a normal mood and affect. His behavior is normal. Judgment and thought content normal    Results Review:  I have reviewed the labs, radiology results, and diagnostic studies.    Laboratory Data:     Results from last 7 days  Lab Units 04/15/18  0300 04/14/18  0830 04/13/18  1738   WBC 10*3/mm3  --  10.23 17.12*   HEMOGLOBIN g/dL 9.2* 8.0* 7.2*   HEMATOCRIT % 26.7* 24.4* 22.1*   PLATELETS 10*3/mm3  --  146 198          Results from last 7 days  Lab Units 04/14/18  0830 04/13/18  1738   SODIUM mmol/L 143 142   POTASSIUM mmol/L 4.4 4.2   CHLORIDE mmol/L 115* 112*   CO2 mmol/L 21.0* 19.0*   BUN mg/dL 61* 76*   CREATININE mg/dL 1.43* 1.64*   CALCIUM mg/dL 9.8 10.0   BILIRUBIN mg/dL 0.3 <0.1*   ALK PHOS U/L 32 36   ALT (SGPT) U/L 27 23   AST (SGOT) U/L 14 14   GLUCOSE mg/dL 109* 109*        Imaging Results (all)     None          Intake/Output    Intake/Output Summary (Last 24 hours) at 04/15/18 1218  Last data filed at 04/15/18 0334   Gross per 24 hour   Intake           1071.9 ml   Output             2050 ml   Net           -978.1 ml       Scheduled Meds    atorvastatin 40 mg  Oral Nightly   metoprolol succinate XL 50 mg Oral Daily   pantoprazole 40 mg Oral Q AM   venlafaxine 75 mg Oral Daily       I have reviewed the patient current medications.     Assessment/Plan     Hospital Problem List     GI bleed        Assessment/Plan  1.  GI bleed with melena  - Colonoscopy with polypectomy 4/90/18  - Dr. Sheridan consulted.  - Endoscopy 4/14/18 small amount of food residual in the stomach with dark fluid but no raymundo blood.  - Protonix drip discontinued. Now on oral protonix  - CBC tomorrow.  H/H 9.2/26.7.  Transfuse total of 3 units packed red blood cells  Patient remains symptomatic weak and tired.  -  GI to consider colonoscopy if any additional signs of bleeding.  -  Continue to hold Plavix ×1 week per GI recommendation     2.  Blood loss anemia requiring transfusion  -  H/H 7.2/22.1.  Transfuse 2 units packed red blood cells.  H/H -8/24.4 4/14.  Transfuse 1 unit packed red blood cells.  Toprol 3 units transfused during admission.  -  CBC in a.m. to monitor blood loss  -  GI to consider colonoscopy for any additional signs of bleeding.  -  Hold Plavix ×1 week per GI recommendation    3.  Dehydration   -   discontinue IV fluids.  Patient tolerating diet     4.  Acute kidney injury.  -  Creatinine 1.6.  Hydrated with IV fluids.  Repeat creatinine 1.43 today.  -  Comprehensive metabolic panel tomorrow to monitor renal function     5.  Hypercalcemia  -  Improved with IV fluid hydration  -  Complains of metabolic panel tomorrow to monitor calcium     6.  Diverticulosis     7.  Coronary artery disease, stable  -  Plavix on hold  -  Atorvastatin  -  -143.  Beta blocker held on admission.  Resume Toprol     8.  Essential hypertension  -  -143.  Beta blocker held on admission. Toprol rstarted 4/14  -  Hold lisinopril due to elevated creatinine     9.  Vascular disease  -  Statin, atorvastatin resumed  -  Plavix on hold secondary to blood loss anemia     10.  Obstructive sleep apnea  -   CPAP at bedtime     11.  Leukocytosis, resolved     12.  History of lymphoma  -  Remission per patient        The above documentation resulted from a face-to-face encounter by me Ute AGUIRRE, [facility].     Discharge Planning: I expect patient to be discharged to home in 1-2 days.       CARLA Weeks   04/15/18   12:18 PM

## 2018-04-15 NOTE — PLAN OF CARE
Problem: Patient Care Overview  Goal: Plan of Care Review  Outcome: Ongoing (interventions implemented as appropriate)   04/15/18 7779   Coping/Psychosocial   Plan of Care Reviewed With patient   Plan of Care Review   Progress improving   OTHER   Outcome Summary HGB = 9.3 HCT = 27.5  PT. HAS AMBULATED IN [place] WITH ASSISTANCE. TOLERATED OK BUT C/O WEAKNESS. PRN PAIN MED AVAILABLE. NO DISTRESS NOTED. HOPING TO GO HOME THE FIRST PART OF THE WEEK.       Problem: Gastrointestinal Bleeding (Adult)  Goal: Signs and Symptoms of Listed Potential Problems Will be Absent, Minimized or Managed (Gastrointestinal Bleeding)  Outcome: Ongoing (interventions implemented as appropriate)

## 2018-04-15 NOTE — PROGRESS NOTES
[facility] Gastroenterology  Inpatient Progress Note     TODAY'S DATE: 04/15/18  Dalton Cheatham  1945      Reason for Follow Up:  Gi bleed     Subjective:    He thinks he had a bowel movement yesterday afternoon.  He states it is black.  He feels well.  He feels improved.  He was able to eat 100% of his diet.  Denies abdominal pain.  No Known Allergies    Current Facility-Administered Medications:   •  atorvastatin (LIPITOR) tablet 40 mg, 40 mg, Oral, Nightly, Mahad Shields MD, 40 mg at 04/14/18 2118  •  HYDROcodone-acetaminophen (NORCO)  MG per tablet 1 tablet, 1 tablet, Oral, Q6H PRN, Mahad Shields MD, 1 tablet at 04/15/18 0828  •  metoprolol succinate XL (TOPROL-XL) 24 hr tablet 50 mg, 50 mg, Oral, Daily, CARLA Solares, 50 mg at 04/15/18 0901  •  ondansetron (ZOFRAN) injection 4 mg, 4 mg, Intravenous, Q6H PRN, Mahad Shields MD  •  pantoprazole (PROTONIX) EC tablet 40 mg, 40 mg, Oral, Q AM, Mike Sheridan MD, 40 mg at 04/15/18 0631  •  sodium chloride 0.9 % flush 1-10 mL, 1-10 mL, Intravenous, PRN, Mahad Shields MD  •  sodium chloride 0.9 % infusion 500 mL, 500 mL, Intravenous, Continuous PRN, Antonio Giordano CRNA  •  sodium chloride 0.9 % infusion, 50 mL/hr, Intravenous, Continuous, CARLA Solares, Last Rate: 50 mL/hr at 04/15/18 0112, 50 mL/hr at 04/15/18 0112  •  venlafaxine (EFFEXOR) tablet 75 mg, 75 mg, Oral, Daily, Mahad Shields MD, 75 mg at 04/15/18 0901    Review of Systems   Constitutional: Negative for chills and fever.   Respiratory: Negative for cough, shortness of breath and wheezing.    Cardiovascular: Negative for chest pain and palpitations.   Gastrointestinal: Negative for abdominal distention, abdominal pain, anal bleeding, blood in stool, constipation, diarrhea, nausea, rectal pain and vomiting.       Objective     Vital Signs  Temp:  [97.5 °F (36.4 °C)-98.2 °F (36.8 °C)] (P) 97.9 °F (36.6 °C)  Heart Rate:  [52-64]  (P) 54  Resp:  [16-18] (P) 16  BP: (125-149)/(55-73) (P) 120/61  Body mass index is 34.04 kg/m².    Intake/Output Summary (Last 24 hours) at 04/15/18 1117  Last data filed at 04/15/18 0334   Gross per 24 hour   Intake           1071.9 ml   Output             2050 ml   Net           -978.1 ml     No intake/output data recorded.       PHYSICAL EXAM  Physical Exam   Constitutional: He appears well-developed and well-nourished. No distress.   Cardiovascular: Normal rate, regular rhythm and normal heart sounds.    Pulmonary/Chest: Effort normal and breath sounds normal.   Abdominal: Soft. Bowel sounds are normal. He exhibits no distension and no mass. There is no tenderness. There is no rebound and no guarding.   Musculoskeletal: He exhibits no edema.   Neurological: He is alert.   Skin: Skin is warm and dry.   Psychiatric: He has a normal mood and affect. His behavior is normal.   Vitals reviewed.          Results Review:   I have reviewed all of the patient's current test results      Results from last 7 days  Lab Units 04/15/18  0300 04/14/18  0830 04/13/18  1738   WBC 10*3/mm3  --  10.23 17.12*   HEMOGLOBIN g/dL 9.2* 8.0* 7.2*   HEMATOCRIT % 26.7* 24.4* 22.1*   PLATELETS 10*3/mm3  --  146 198         Results from last 7 days  Lab Units 04/14/18  0830 04/13/18  1738   SODIUM mmol/L 143 142   POTASSIUM mmol/L 4.4 4.2   CHLORIDE mmol/L 115* 112*   CO2 mmol/L 21.0* 19.0*   BUN mg/dL 61* 76*   CREATININE mg/dL 1.43* 1.64*   CALCIUM mg/dL 9.8 10.0   BILIRUBIN mg/dL 0.3 <0.1*   ALK PHOS U/L 32 36   ALT (SGPT) U/L 27 23   AST (SGOT) U/L 14 14   GLUCOSE mg/dL 109* 109*         Results from last 7 days  Lab Units 04/13/18  1738   INR  1.12*       No results found for: LIPASE    Radiology Review:  Imaging Results (last 24 hours)     ** No results found for the last 24 hours. **          EGD 4-13-18   - Small hiatal hernia.  - A small amount of food (residue) in the stomach with dark fluid but no raymundo blood.  - Normal examined  duodenum.  No specimens collected.     Assessment/Plan     Patient Active Problem List   Diagnosis Code   • Colon polyps K63.5   • Family history of colon cancer Z80.0   • Antiplatelet or antithrombotic long-term use Z79.02   • Polyp of colon K63.5   • GI bleed K92.2      1. Heme pos stool.  2. Melena  3. Coagulopathy secondary to plavix  4. Recent colonoscopy with cecal and transverse polypectomy  5.  Acute renal insufficiency    There are no signs of active GI bleeding.  He is improving.  Renal function is resolving.  We will continue on daily PPI.  Continue to hold Plavix I suggest to hold it for one week.  Continue to advance diet to regular.  Hopefully should be able to be discharged in the next 24 hours    EMR Dragon/transcription disclaimer:  Much of this encounter note is electronic transcription/translation of spoken language to printed text.  The electronic translation of spoken language may be erroneous, or at times, nonsensical words or phrases may be inadvertently transcribed.  Although I have reviewed the note for such errors, some may still exist.        Mike Sheridan MD  04/15/18  11:17 AM

## 2018-04-15 NOTE — PLAN OF CARE
Problem: Patient Care Overview  Goal: Plan of Care Review  Outcome: Ongoing (interventions implemented as appropriate)  ivf cont. prbc completed. No active bleeding noted. Voiding without difficulty. Po pain med for back pain- effective relief. Awaiting post transfusion h/h.    04/15/18 0134   Coping/Psychosocial   Plan of Care Reviewed With patient   Plan of Care Review   Progress improving     Goal: Individualization and Mutuality  Outcome: Ongoing (interventions implemented as appropriate)    Goal: Discharge Needs Assessment  Outcome: Ongoing (interventions implemented as appropriate)      Problem: Gastrointestinal Bleeding (Adult)  Goal: Signs and Symptoms of Listed Potential Problems Will be Absent, Minimized or Managed (Gastrointestinal Bleeding)  Outcome: Ongoing (interventions implemented as appropriate)

## 2018-04-16 VITALS
OXYGEN SATURATION: 100 % | TEMPERATURE: 97.8 F | WEIGHT: 230.5 LBS | SYSTOLIC BLOOD PRESSURE: 164 MMHG | RESPIRATION RATE: 18 BRPM | HEART RATE: 67 BPM | HEIGHT: 69 IN | BODY MASS INDEX: 34.14 KG/M2 | DIASTOLIC BLOOD PRESSURE: 81 MMHG

## 2018-04-16 LAB
ALBUMIN SERPL-MCNC: 3.4 G/DL (ref 3.5–5)
ALBUMIN/GLOB SERPL: 1.3 G/DL (ref 1.1–2.5)
ALP SERPL-CCNC: 43 U/L (ref 24–120)
ALT SERPL W P-5'-P-CCNC: 27 U/L (ref 0–54)
ANION GAP SERPL CALCULATED.3IONS-SCNC: 7 MMOL/L (ref 4–13)
AST SERPL-CCNC: 22 U/L (ref 7–45)
BASOPHILS # BLD AUTO: 0.03 10*3/MM3 (ref 0–0.2)
BASOPHILS NFR BLD AUTO: 0.4 % (ref 0–2)
BILIRUB SERPL-MCNC: 0.3 MG/DL (ref 0.1–1)
BUN BLD-MCNC: 25 MG/DL (ref 5–21)
BUN/CREAT SERPL: 19.4 (ref 7–25)
CALCIUM SPEC-SCNC: 10.7 MG/DL (ref 8.4–10.4)
CHLORIDE SERPL-SCNC: 108 MMOL/L (ref 98–110)
CO2 SERPL-SCNC: 25 MMOL/L (ref 24–31)
CREAT BLD-MCNC: 1.29 MG/DL (ref 0.5–1.4)
DEPRECATED RDW RBC AUTO: 44.6 FL (ref 40–54)
EOSINOPHIL # BLD AUTO: 0.01 10*3/MM3 (ref 0–0.7)
EOSINOPHIL NFR BLD AUTO: 0.1 % (ref 0–4)
ERYTHROCYTE [DISTWIDTH] IN BLOOD BY AUTOMATED COUNT: 14.6 % (ref 12–15)
GFR SERPL CREATININE-BSD FRML MDRD: 54 ML/MIN/1.73
GLOBULIN UR ELPH-MCNC: 2.6 GM/DL
GLUCOSE BLD-MCNC: 90 MG/DL (ref 70–100)
HCT VFR BLD AUTO: 28.8 % (ref 40–52)
HGB BLD-MCNC: 9.5 G/DL (ref 14–18)
IMM GRANULOCYTES # BLD: 0.04 10*3/MM3 (ref 0–0.03)
IMM GRANULOCYTES NFR BLD: 0.5 % (ref 0–5)
LYMPHOCYTES # BLD AUTO: 2.38 10*3/MM3 (ref 0.72–4.86)
LYMPHOCYTES NFR BLD AUTO: 29.7 % (ref 15–45)
MCH RBC QN AUTO: 28.4 PG (ref 28–32)
MCHC RBC AUTO-ENTMCNC: 33 G/DL (ref 33–36)
MCV RBC AUTO: 86 FL (ref 82–95)
MONOCYTES # BLD AUTO: 0.52 10*3/MM3 (ref 0.19–1.3)
MONOCYTES NFR BLD AUTO: 6.5 % (ref 4–12)
NEUTROPHILS # BLD AUTO: 5.04 10*3/MM3 (ref 1.87–8.4)
NEUTROPHILS NFR BLD AUTO: 62.8 % (ref 39–78)
NRBC BLD MANUAL-RTO: 0 /100 WBC (ref 0–0)
PLATELET # BLD AUTO: 164 10*3/MM3 (ref 130–400)
PMV BLD AUTO: 11.5 FL (ref 6–12)
POTASSIUM BLD-SCNC: 4.3 MMOL/L (ref 3.5–5.3)
PROT SERPL-MCNC: 6 G/DL (ref 6.3–8.7)
RBC # BLD AUTO: 3.35 10*6/MM3 (ref 4.8–5.9)
SODIUM BLD-SCNC: 140 MMOL/L (ref 135–145)
WBC NRBC COR # BLD: 8.02 10*3/MM3 (ref 4.8–10.8)

## 2018-04-16 PROCEDURE — 85025 COMPLETE CBC W/AUTO DIFF WBC: CPT | Performed by: FAMILY MEDICINE

## 2018-04-16 PROCEDURE — 99232 SBSQ HOSP IP/OBS MODERATE 35: CPT | Performed by: INTERNAL MEDICINE

## 2018-04-16 PROCEDURE — 80053 COMPREHEN METABOLIC PANEL: CPT | Performed by: FAMILY MEDICINE

## 2018-04-16 RX ORDER — ATORVASTATIN CALCIUM 40 MG/1
40 TABLET, FILM COATED ORAL NIGHTLY
Status: DISCONTINUED | OUTPATIENT
Start: 2018-04-16 | End: 2018-04-16 | Stop reason: HOSPADM

## 2018-04-16 RX ORDER — ASPIRIN 81 MG/1
81 TABLET ORAL DAILY
Start: 2018-04-16

## 2018-04-16 RX ORDER — VENLAFAXINE 37.5 MG/1
75 TABLET ORAL DAILY
Status: DISCONTINUED | OUTPATIENT
Start: 2018-04-17 | End: 2018-04-16 | Stop reason: HOSPADM

## 2018-04-16 RX ORDER — LISINOPRIL 20 MG/1
40 TABLET ORAL DAILY
Status: DISCONTINUED | OUTPATIENT
Start: 2018-04-16 | End: 2018-04-16 | Stop reason: HOSPADM

## 2018-04-16 RX ORDER — ACETAMINOPHEN 325 MG/1
650 TABLET ORAL EVERY 6 HOURS PRN
Status: DISCONTINUED | OUTPATIENT
Start: 2018-04-16 | End: 2018-04-16 | Stop reason: HOSPADM

## 2018-04-16 RX ORDER — CLOPIDOGREL BISULFATE 75 MG/1
75 TABLET ORAL DAILY
Qty: 30 TABLET
Start: 2018-04-16

## 2018-04-16 RX ORDER — PANTOPRAZOLE SODIUM 40 MG/1
40 TABLET, DELAYED RELEASE ORAL
Status: DISCONTINUED | OUTPATIENT
Start: 2018-04-17 | End: 2018-04-16 | Stop reason: HOSPADM

## 2018-04-16 RX ORDER — METOPROLOL SUCCINATE 50 MG/1
50 TABLET, EXTENDED RELEASE ORAL DAILY
Status: DISCONTINUED | OUTPATIENT
Start: 2018-04-17 | End: 2018-04-16 | Stop reason: HOSPADM

## 2018-04-16 RX ORDER — HYDROCODONE BITARTRATE AND ACETAMINOPHEN 10; 325 MG/1; MG/1
1 TABLET ORAL EVERY 6 HOURS PRN
Status: DISCONTINUED | OUTPATIENT
Start: 2018-04-16 | End: 2018-04-16 | Stop reason: HOSPADM

## 2018-04-16 RX ORDER — VALACYCLOVIR HYDROCHLORIDE 500 MG/1
500 TABLET, FILM COATED ORAL
Status: CANCELLED | OUTPATIENT
Start: 2018-04-16 | End: 2018-04-23

## 2018-04-16 RX ADMIN — VENLAFAXINE HYDROCHLORIDE 75 MG: 37.5 TABLET ORAL at 08:19

## 2018-04-16 RX ADMIN — HYDROCODONE BITARTRATE AND ACETAMINOPHEN 1 TABLET: 10; 325 TABLET ORAL at 14:26

## 2018-04-16 RX ADMIN — PANTOPRAZOLE SODIUM 40 MG: 40 TABLET, DELAYED RELEASE ORAL at 05:49

## 2018-04-16 RX ADMIN — METOPROLOL SUCCINATE 50 MG: 50 TABLET, FILM COATED, EXTENDED RELEASE ORAL at 08:19

## 2018-04-16 RX ADMIN — ACETAMINOPHEN 650 MG: 325 TABLET ORAL at 10:50

## 2018-04-16 RX ADMIN — HYDROCODONE BITARTRATE AND ACETAMINOPHEN 1 TABLET: 10; 325 TABLET ORAL at 08:19

## 2018-04-16 NOTE — PLAN OF CARE
Problem: Patient Care Overview  Goal: Plan of Care Review  Outcome: Ongoing (interventions implemented as appropriate)   04/15/18 1706 04/16/18 0114   Coping/Psychosocial   Plan of Care Reviewed With patient --    Plan of Care Review   Progress improving --    OTHER   Outcome Summary --  Pain medcation PRN, given once so far this shift. Labs to be drawn this AM, no BMs this shift. Tolerating fulls without c/o N/V.     Goal: Individualization and Mutuality  Outcome: Ongoing (interventions implemented as appropriate)   04/16/18 0114   Individualization   Patient Specific Preferences none   Patient Specific Goals (Include Timeframe) no bloody BMs, no pain   Patient Specific Interventions PRN pain medication     Goal: Discharge Needs Assessment  Outcome: Ongoing (interventions implemented as appropriate)   04/13/18 1134 04/14/18 1645   Discharge Needs Assessment   Readmission Within the Last 30 Days --  no previous admission in last 30 days   Concerns to be Addressed --  no discharge needs identified   Patient/Family Anticipates Transition to home with family --    Patient/Family Anticipated Services at Transition none --    Transportation Concerns car, none --    Transportation Anticipated car, drives self;family or friend will provide --    Disability   Equipment Currently Used at Home none --        Problem: Gastrointestinal Bleeding (Adult)  Goal: Signs and Symptoms of Listed Potential Problems Will be Absent, Minimized or Managed (Gastrointestinal Bleeding)  Outcome: Ongoing (interventions implemented as appropriate)   04/15/18 1706   Goal/Outcome Evaluation   Problems Assessed (GI Bleeding) all   Problems Present (GI Bleeding) situational response

## 2018-04-16 NOTE — PLAN OF CARE
Problem: Patient Care Overview  Goal: Plan of Care Review   04/16/18 1430   Coping/Psychosocial   Plan of Care Reviewed With patient;family   Plan of Care Review   Progress improving   OTHER   Outcome Summary Patient denies BM today or yesterday, medicated for low back pain x3, tolerated regular diet, no c/o nausea or vomiting, discharge home with family     Goal: Individualization and Mutuality  Outcome: Outcome(s) achieved Date Met: 04/16/18 04/16/18 0114 04/16/18 1430   Individualization   Patient Specific Preferences none --    Patient Specific Goals (Include Timeframe) no bloody BMs, no pain --    Patient Specific Interventions PRN pain medication --    Mutuality/Individual Preferences   What Anxieties, Fears, Concerns, or Questions Do You Have About Your Care? --  None   What Information Would Help Us Give You More Personalized Care? --  Close door   How Would You and/or Your Support Person Like to Participate in Your Care? --  Be included in care   Mutuality/Individual Preferences   How to Address Anxieties/Fears --  NA     Goal: Discharge Needs Assessment   04/13/18 1134 04/14/18 1645   Discharge Needs Assessment   Readmission Within the Last 30 Days --  no previous admission in last 30 days   Concerns to be Addressed --  no discharge needs identified   Patient/Family Anticipates Transition to home with family --    Patient/Family Anticipated Services at Transition none --    Transportation Concerns car, none --    Transportation Anticipated car, drives self;family or friend will provide --    Disability   Equipment Currently Used at Home none --      Goal: Interprofessional Rounds/Family Conf  Outcome: Ongoing (interventions implemented as appropriate)   04/16/18 1435   Interdisciplinary Rounds/Family Conf   Summary Wife at bedside, questions encourgaed and answered   Participants patient;family;physician       Problem: Gastrointestinal Bleeding (Adult)  Intervention: Monitor/Manage Gastrointestinal Bleed  Characteristics/Effects   04/16/18 1430   Safety Management   Medication Review/Management medications reviewed     Intervention: Support/Optimize Psychosocial Response to Illness   04/16/18 0800 04/16/18 1430   Psychosocial Support   Family/Support System Care --  involvement promoted;presence promoted;support provided   Coping/Psychosocial Interventions   Supportive Measures active listening utilized --      Intervention: Provide Comfort Measures Related to Nausea/Vomiting   04/16/18 1430   Monitor/Manage Hypovolemia   Nausea/Vomiting Interventions stimuli minimized     Intervention: Elevate Head of Bed 30-45 Degrees   04/16/18 1430   Positioning   Head of Bed (HOB) HOB at 30-45 degrees   Safety Interventions   Aspiration Precautions awake/alert before oral intake       Goal: Signs and Symptoms of Listed Potential Problems Will be Absent, Minimized or Managed (Gastrointestinal Bleeding)  Outcome: Ongoing (interventions implemented as appropriate)   04/16/18 1430   Goal/Outcome Evaluation   Problems Assessed (GI Bleeding) all   Problems Present (GI Bleeding) hemorrhage

## 2018-04-16 NOTE — PROGRESS NOTES
[facility] Gastroenterology  Inpatient Progress Note     TODAY'S DATE: 04/16/18  Dalton Cheatham  1945      Reason for Follow Up:  Gi bleed     Subjective:      No bm today or yesterday, no abdominal pain. No n/v. No sign of active gi bleeding. On solid diet.       No Known Allergies    Current Facility-Administered Medications:   •  acetaminophen (TYLENOL) tablet 650 mg, 650 mg, Oral, Q6H PRN, Murphy Chen MD, 650 mg at 04/16/18 1050  •  atorvastatin (LIPITOR) tablet 40 mg, 40 mg, Oral, Nightly, Murphy Chen MD  •  HYDROcodone-acetaminophen (NORCO)  MG per tablet 1 tablet, 1 tablet, Oral, Q6H PRN, Murphy Chen MD  •  lisinopril (PRINIVIL,ZESTRIL) tablet 40 mg, 40 mg, Oral, Daily, Ute Flores, APRN  •  [START ON 4/17/2018] metoprolol succinate XL (TOPROL-XL) 24 hr tablet 50 mg, 50 mg, Oral, Daily, Murphy Chen MD  •  [START ON 4/17/2018] pantoprazole (PROTONIX) EC tablet 40 mg, 40 mg, Oral, Q AM, Murphy Chen MD  •  [START ON 4/17/2018] venlafaxine (EFFEXOR) tablet 75 mg, 75 mg, Oral, Daily, Murphy Chen MD    Review of Systems   Constitutional: Negative for chills and fever.   Respiratory: Negative for cough, shortness of breath and wheezing.    Cardiovascular: Negative for chest pain and palpitations.   Gastrointestinal: Negative for abdominal distention, abdominal pain, anal bleeding, blood in stool, constipation, diarrhea, nausea, rectal pain and vomiting.       Objective     Vital Signs  Temp:  [97.7 °F (36.5 °C)-98.1 °F (36.7 °C)] 97.8 °F (36.6 °C)  Heart Rate:  [52-67] 67  Resp:  [16-18] 18  BP: (130-164)/(64-81) 164/81  Body mass index is 34.04 kg/m².    Intake/Output Summary (Last 24 hours) at 04/16/18 1330  Last data filed at 04/16/18 0806   Gross per 24 hour   Intake                0 ml   Output              575 ml   Net             -575 ml     I/O this shift:  In: -   Out: 200 [Urine:200]       PHYSICAL EXAM  Physical Exam   Constitutional: He appears well-developed  and well-nourished. No distress.   Cardiovascular: Normal rate, regular rhythm and normal heart sounds.    Pulmonary/Chest: Effort normal and breath sounds normal.   Abdominal: Soft. Bowel sounds are normal. He exhibits no distension and no mass. There is no tenderness. There is no rebound and no guarding.   Neurological: He is alert.   Skin: Skin is warm and dry.   Vitals reviewed.          Results Review:   I have reviewed all of the patient's current test results      Results from last 7 days  Lab Units 04/16/18  0459 04/15/18  1324 04/15/18  0300 04/14/18  0830 04/13/18  1738   WBC 10*3/mm3 8.02  --   --  10.23 17.12*   HEMOGLOBIN g/dL 9.5* 9.3* 9.2* 8.0* 7.2*   HEMATOCRIT % 28.8* 27.5* 26.7* 24.4* 22.1*   PLATELETS 10*3/mm3 164  --   --  146 198         Results from last 7 days  Lab Units 04/16/18  0459 04/15/18  1324 04/14/18  0830   SODIUM mmol/L 140 138 143   POTASSIUM mmol/L 4.3 4.5 4.4   CHLORIDE mmol/L 108 108 115*   CO2 mmol/L 25.0 20.0* 21.0*   BUN mg/dL 25* 33* 61*   CREATININE mg/dL 1.29 1.30 1.43*   CALCIUM mg/dL 10.7* 10.4 9.8   BILIRUBIN mg/dL 0.3 0.2 0.3   ALK PHOS U/L 43 36 32   ALT (SGPT) U/L 27 27 27   AST (SGOT) U/L 22 25 14   GLUCOSE mg/dL 90 129* 109*         Results from last 7 days  Lab Units 04/13/18  1738   INR  1.12*       No results found for: LIPASE    Radiology Review:  Imaging Results (last 24 hours)     ** No results found for the last 24 hours. **            Assessment/Plan     Patient Active Problem List   Diagnosis Code   • Colon polyps K63.5   • Family history of colon cancer Z80.0   • Antiplatelet or antithrombotic long-term use Z79.02   • Polyp of colon K63.5   • GI bleed K92.2       1. Heme pos stool.  2. Melena  3. Coagulopathy secondary to plavix  4. Recent colonoscopy with cecal and transverse polypectomy  5.  Acute renal insufficiency  6.  Polypectomy bleeding    There are no signs of active bleeding.  He is getting back to his baseline.  He is tolerating regular diet.   Okay to discharge from a GI standpoint.  I advised that he hold his Plavix for this week and can restart on Saturday.  He is to follow-up with GI as needed and continue ongoing care with his primary physicians.    Discussed with the patient and wife about signs and symptoms he needs to watch for recurrent bleeding.  If he should develop any then he is to go to his nearest hospital ER.  They expressed understanding    EMR Dragon/transcription disclaimer:  Much of this encounter note is electronic transcription/translation of spoken language to printed text.  The electronic translation of spoken language may be erroneous, or at times, nonsensical words or phrases may be inadvertently transcribed.  Although I have reviewed the note for such errors, some may still exist.      Aurea AGUIRRE  10:02 AM    Mike Sheridan MD  04/16/18  1:30 PM

## 2018-04-16 NOTE — DISCHARGE SUMMARY
[facility] Medicine Services  DISCHARGE SUMMARY       Date of Admission: 4/13/2018  Date of Discharge:  4/16/2018  Primary Care Physician: Rikki Worrell MD    Discharge Diagnoses:  Hospital Problem List     GI bleed        Discharge diagnoses   1.  GI bleed with melena  2.  Blood loss anemia requiring transfusion 3 units packed red blood cells  3.  Dehydration, resolved with hydration  4.  Acute kidney injury  5.  Hypercalcemia  6.  Diverticulosis  7.  Coronary artery disease, stable  8.  Essential hypertension  9.  Vascular disease  10.  Obstructive sleep apnea, wears CPAP  11.  Leukocytosis, resolved  12.  History of lymphoma in remission  Presenting Problem/History of Present Illness:  GI bleed [K92.2]     Chief Complaint on Day of Discharge: No complaints.  No black stools.  No abdominal pain  History of Present Illness on Day of Discharge:   Lying in bed.  No family present.  He tolerated regular diet without nausea, vomiting or abdominal pain.  No further episodes of black stools.  He has ambulated without shortness of breath.  He denies chest pain, palpitations or shortness of breath.  He was seen by Dr. Sheridan today who cleared him for discharge.  May resume anticoagulation 4/22/18/     Consults: Dr. Mike Shreidan, gastroenterology    Procedures Performed:   Endoscopy 4/13/18  - Small hiatal hernia.  - A small amount of food (residue) in the stomach with dark fluid but no raymundo blood.  - Normal examined duodenum.  - No specimens collected.    Pertinent Test Results:  Laboratory Data:      Results from last 7 days  Lab Units 04/16/18  0459 04/15/18  1324 04/15/18  0300 04/14/18  0830 04/13/18  1738   WBC 10*3/mm3 8.02  --   --  10.23 17.12*   HEMOGLOBIN g/dL 9.5* 9.3* 9.2* 8.0* 7.2*   HEMATOCRIT % 28.8* 27.5* 26.7* 24.4* 22.1*   PLATELETS 10*3/mm3 164  --   --  146 198          Results from last 7 days  Lab Units 04/16/18  0459 04/15/18  1324 04/14/18  0830    SODIUM mmol/L 140 138 143   POTASSIUM mmol/L 4.3 4.5 4.4   CHLORIDE mmol/L 108 108 115*   CO2 mmol/L 25.0 20.0* 21.0*   BUN mg/dL 25* 33* 61*   CREATININE mg/dL 1.29 1.30 1.43*   CALCIUM mg/dL 10.7* 10.4 9.8   BILIRUBIN mg/dL 0.3 0.2 0.3   ALK PHOS U/L 43 36 32   ALT (SGPT) U/L 27 27 27   AST (SGOT) U/L 22 25 14   GLUCOSE mg/dL 90 129* 109*       Lab Results  Lab Value Date/Time   TROPONINI 0.030 04/14/2018 0830   TROPONINI 0.031 04/13/2018 1738     Protime-INR [[id number]] (Abnormal) Collected: 04/13/18 1738   Lab Status: Final result Specimen: Blood Updated: 04/13/18 1756    Protime 14.8 (H) Seconds     INR 1.12 (H)   aPTT [[id number]] (Normal) Collected: 04/13/18 1738   Lab Status: Final result Specimen: Blood Updated: 04/13/18 1756    PTT 32.8 seconds       Collected: 04/13/18 1738   Lab Status: Final result Specimen: Blood Updated: 04/13/18 1819    Troponin I 0.031 ng/mL    Urinalysis With / Culture If Indicated - Urine, Clean Catch [[id number]] (Normal) Collected: 04/13/18 1228   Lab Status: Final result Specimen: Urine from Urine, Clean Catch Updated: 04/13/18 1237    Color, UA Yellow    Appearance, UA Clear    pH, UA <=5.0    Specific Gravity, UA 1.019    Glucose, UA Negative    Ketones, UA Negative    Bilirubin, UA Negative    Blood, UA Negative    Protein, UA Negative    Leuk Esterase, UA Negative    Nitrite, UA Negative    Urobilinogen, UA 0.2 E.U./dL   Narrative:     Urine microscopic not indicated.   Sodium, Urine, Random - Urine, Clean Catch [[id number]] (Normal) Collected: 04/13/18 1228   Lab Status: Final result Specimen: Urine from Urine, Clean Catch Updated: 04/13/18 1259    Sodium, Urine 83 mmol/L    Creatinine, Urine, Random - Urine, Clean Catch [[id number]] Collected: 04/13/18 1228   Lab Status: Final result Specimen: Urine from Urine, Clean Catch Updated: 04/13/18 1259    Creatinine, Urine 62.9 mg/dL      Imaging Results (all)     None        Hospital Course  Patient is a 73 y.o. male presented to  [facility] 4/13/18 after having black stools.  Patient had colonoscopy on 4/9/18 by Dr. Wellington.  The following day he reported his stool was a little dark but he was not worried at that time.  He then reported stools becoming increasingly dark-looking black as cortes tar.  He complained of weakness, lightheadedness and dizziness.  He noted chest pain and shortness of air.  He reported abdominal discomfort but denied fever.  He denied dysuria or frequency.  He denies sputum cough.  He reported decreased eating and drinking.  He presented to outside facility where stool was reported as dark and Hemoccult positive.    He was admitted to the medical floor with melena.  WBC 17.1, hemoglobin 7.2, hematocrit 22.1.  He was placed on IV fluid hydration.  He was transfused 2 units packed red blood cells and  hemoglobin improved to 8 with hematocrit 24.4.  He was seen in consultation by Dr. Mike Sheridan from gastroenterology as patient had recent colonoscopy on 4/9 by Dr. Wellington.  Dr. Sheridan elected to proceed with endoscopy 4/13.  Findings included small hiatal hernia.  Small amount of food residue in the stomach with dark fluid but no raymundo blood.  Normal examined duodenum.  No specimens collected.  If there were no active signs of GI bleeding patient was allowed clear liquid diet.  If patient had any additional signs of bleeding then consider colonoscopy investigation.  Otherwise, hold Plavix and supportive care.  Protonix drip was discontinued and he was placed on oral protonix.  Patient continued to complain of shortness of breath weakness and lightheaded with ambulation.  He was transfused an additional unit of packed red blood cells for symptomatic anemia.  Hemoglobin improved to 9.2 hematocrit 26.7 posttransfusion.  He received a total of 3 units packed red blood cells during admission.  Hemoglobin 9.5 with hematocrit 28.8 on date of discharge.  He was noted to have black stools during admission.  He had no  "further stools greater than 36 hours prior to discharge.  Diet was advanced to regular and he tolerated without nausea, vomiting or abdominal pain.    Initial creatinine 1.64.  He was hydrated with IV fluids.  By discharge creatinine 1.29.  He has history of hypertension which has remained stable.    On 4/16/18 he is medically stable for discharge.  He has been evaluated by gastroenterology as well as Hospital medicine on date of discharge.  He may resume Plavix and aspirin 4/22/18.  He will follow-up with his primary care provider Dr. Dharmesh Worrell on 4/23/18.  He will follow-up with GI medicine on as-needed basis.    Physical Exam on Discharge:  /81 (BP Location: Right arm, Patient Position: Sitting)   Pulse 67   Temp 97.8 °F (36.6 °C) (Oral)   Resp 18   Ht 175.3 cm (69\")   Wt 105 kg (230 lb 8 oz)   SpO2 100%   BMI 34.04 kg/m²   Physical Exam  Constitutional: He is oriented to person, place, and time. He appears well-developed and well-nourished.   HENT:   Head: Normocephalic and atraumatic.   Eyes: EOM are normal. Pupils are equal, round, and reactive to light.   Neck: Normal range of motion. Neck supple.   Cardiovascular: Normal rate, regular rhythm, normal heart sounds and intact distal pulses.  Exam reveals no gallop and no friction rub.    No murmur heard.  Pulmonary/Chest: Effort normal and breath sounds normal. No respiratory distress. He has no wheezes. He has no rales.   Abdominal: Soft. Bowel sounds are normal.   Genitourinary:   Genitourinary Comments: Voiding   Musculoskeletal: He exhibits no edema or deformity.   Neurological: He is alert and oriented to person, place, and time.   Skin: Skin is warm and dry.   Psychiatric: He has a normal mood and affect. His behavior is normal. Judgment and thought content normal     Condition on Discharge:  Stable and improved    Discharge Disposition:  Home with family    Discharge Diet:   Diet Instructions     Advance Diet As Tolerated          as " tolerated    Activity at Discharge:   Activity Instructions     Activity as Tolerated          as tolerated    Discharge Care Plan / Instructions:   1.  Should he have worsening returning symptoms black stools he should seek medical attention.  2.  Hold Plavix and aspirin.  May resume 4/22/18 per gastroenterology    Discharge Medications:   Dalton Cheatham   Home Medication Instructions JADE:[id number]    Printed on:04/16/18 1339   Medication Information                      aspirin 81 MG EC tablet  Take 1 tablet by mouth Daily. Hold.  May resume 4/22/18.             atorvastatin (LIPITOR) 40 MG tablet  Take 40 mg by mouth Daily.             chlorthalidone (HYGROTON) 25 MG tablet  Take 25 mg by mouth Daily.             clopidogrel (PLAVIX) 75 MG tablet  Take 1 tablet by mouth Daily. Hold.  May resume 4/22/18.             famotidine (PEPCID) 20 MG tablet  Take 20 mg by mouth 2 (Two) Times a Day.             HYDROcodone-acetaminophen (NORCO)  MG per tablet  Take 1 tablet by mouth Every 6 (Six) Hours As Needed for Mild Pain .             lisinopril (PRINIVIL,ZESTRIL) 40 MG tablet  Take 40 mg by mouth Daily.             metoprolol succinate XL (TOPROL-XL) 50 MG 24 hr tablet  Take 50 mg by mouth Daily.             venlafaxine (EFFEXOR) 75 MG tablet  Take 75 mg by mouth Daily.               Follow-up Appointments:   Follow-up Information     Rikki Worrell MD Follow up.    Specialty:  Family Medicine  Why:  April 23, 2018 at 11:15AM with Christa  Contact information:  [address] [place] [address] DR GARCIA  [facility] [id number]  [phone]                   Test Results Pending at Discharge: None    The above documentation resulted from a face-to-face encounter by me Ute AGUIRRE, [facility].    CARLA Weeks  04/16/18  1:38 PM    Time:  This discharge process required 35 minutes for completion.     Plan discussed with Dr. Chen and patient.    Time spent in face-to-face evaluation, chart review,  planning and education 35 minutes.  Please note that portions of this note may have been completed with a voice recognition program. Efforts were made to edit the dictations, but occasionally words are mistranscribed.      I personally evaluated and examined the patient in conjunction with CARLA Cornelius and agree with the assessment, treatment plan, and disposition of the patient as recorded by her. My history, exam, and further recommendations are: I have reviewed and agree with the plans. Mayank.

## 2018-04-17 ENCOUNTER — TELEPHONE (OUTPATIENT)
Dept: INTERNAL MEDICINE | Age: 73
End: 2018-04-17

## 2018-04-17 DIAGNOSIS — K92.2 GASTROINTESTINAL HEMORRHAGE, UNSPECIFIED GASTROINTESTINAL HEMORRHAGE TYPE: Primary | ICD-10-CM

## 2018-04-23 ENCOUNTER — OFFICE VISIT (OUTPATIENT)
Dept: FAMILY MEDICINE CLINIC | Age: 73
End: 2018-04-23
Payer: MEDICARE

## 2018-04-23 VITALS
DIASTOLIC BLOOD PRESSURE: 76 MMHG | HEIGHT: 68 IN | SYSTOLIC BLOOD PRESSURE: 124 MMHG | OXYGEN SATURATION: 97 % | TEMPERATURE: 97.6 F | BODY MASS INDEX: 35.24 KG/M2 | HEART RATE: 81 BPM | WEIGHT: 232.5 LBS

## 2018-04-23 DIAGNOSIS — I10 ESSENTIAL HYPERTENSION: Primary | ICD-10-CM

## 2018-04-23 DIAGNOSIS — D62 ACUTE BLOOD LOSS ANEMIA: ICD-10-CM

## 2018-04-23 DIAGNOSIS — Z79.899 LONG-TERM USE OF HIGH-RISK MEDICATION: ICD-10-CM

## 2018-04-23 DIAGNOSIS — G89.29 CHRONIC MIDLINE LOW BACK PAIN WITHOUT SCIATICA: ICD-10-CM

## 2018-04-23 DIAGNOSIS — K92.2 GASTROINTESTINAL HEMORRHAGE, UNSPECIFIED GASTROINTESTINAL HEMORRHAGE TYPE: ICD-10-CM

## 2018-04-23 DIAGNOSIS — M54.50 CHRONIC MIDLINE LOW BACK PAIN WITHOUT SCIATICA: ICD-10-CM

## 2018-04-23 LAB
AMPHETAMINE SCREEN, URINE: NEGATIVE
BARBITURATE SCREEN, URINE: NEGATIVE
BENZODIAZEPINE SCREEN, URINE: NEGATIVE
COCAINE METABOLITE SCREEN URINE: NEGATIVE
MDMA URINE: NEGATIVE
METHADONE SCREEN, URINE: NEGATIVE
METHAMPHETAMINE, URINE: NEGATIVE
OPIATE SCREEN URINE: POSITIVE
OXYCODONE SCREEN URINE: NEGATIVE
PHENCYCLIDINE SCREEN URINE: NEGATIVE
PROPOXYPHENE SCREEN, URINE: NEGATIVE
THC: NEGATIVE
TRICYCLIC ANTIDEPRESSANTS, UR: NEGATIVE

## 2018-04-23 PROCEDURE — 99496 TRANSJ CARE MGMT HIGH F2F 7D: CPT | Performed by: NURSE PRACTITIONER

## 2018-04-23 PROCEDURE — 80305 DRUG TEST PRSMV DIR OPT OBS: CPT | Performed by: NURSE PRACTITIONER

## 2018-04-23 RX ORDER — HYDROCODONE BITARTRATE AND ACETAMINOPHEN 10; 325 MG/1; MG/1
1 TABLET ORAL EVERY 6 HOURS PRN
COMMUNITY
End: 2018-04-23 | Stop reason: SDUPTHER

## 2018-04-23 RX ORDER — HYDROCODONE BITARTRATE AND ACETAMINOPHEN 10; 325 MG/1; MG/1
1 TABLET ORAL EVERY 6 HOURS PRN
Qty: 120 TABLET | Refills: 0 | Status: SHIPPED | OUTPATIENT
Start: 2018-04-23 | End: 2020-10-15 | Stop reason: SDUPTHER

## 2018-04-23 ASSESSMENT — ENCOUNTER SYMPTOMS
BACK PAIN: 1
CHEST TIGHTNESS: 0
DIARRHEA: 0
SHORTNESS OF BREATH: 0
SORE THROAT: 0
BLOOD IN STOOL: 0
NAUSEA: 0
VOMITING: 0
WHEEZING: 0
COUGH: 0
ABDOMINAL PAIN: 0

## 2018-05-24 DIAGNOSIS — E78.01 FAMILIAL HYPERCHOLESTEROLEMIA: ICD-10-CM

## 2018-05-24 RX ORDER — HYDROCODONE BITARTRATE AND ACETAMINOPHEN 10; 325 MG/1; MG/1
1 TABLET ORAL EVERY 6 HOURS PRN
Qty: 120 TABLET | Refills: 0 | OUTPATIENT
Start: 2018-05-24 | End: 2018-06-23

## 2018-05-24 RX ORDER — ATORVASTATIN CALCIUM 40 MG/1
40 TABLET, FILM COATED ORAL DAILY
Qty: 90 TABLET | Refills: 3 | Status: SHIPPED | OUTPATIENT
Start: 2018-05-24 | End: 2019-04-09 | Stop reason: SDUPTHER

## 2018-05-24 RX ORDER — HYDROCODONE BITARTRATE AND ACETAMINOPHEN 10; 325 MG/1; MG/1
1 TABLET ORAL EVERY 6 HOURS
Qty: 120 TABLET | Refills: 0 | Status: SHIPPED | OUTPATIENT
Start: 2018-05-24 | End: 2018-06-18 | Stop reason: SDUPTHER

## 2018-06-18 DIAGNOSIS — M54.50 CHRONIC MIDLINE LOW BACK PAIN WITHOUT SCIATICA: Primary | ICD-10-CM

## 2018-06-18 DIAGNOSIS — G89.29 CHRONIC MIDLINE LOW BACK PAIN WITHOUT SCIATICA: Primary | ICD-10-CM

## 2018-06-18 RX ORDER — HYDROCODONE BITARTRATE AND ACETAMINOPHEN 10; 325 MG/1; MG/1
1 TABLET ORAL EVERY 6 HOURS
Qty: 120 TABLET | Refills: 0 | Status: SHIPPED | OUTPATIENT
Start: 2018-06-18 | End: 2018-06-22 | Stop reason: SDUPTHER

## 2018-06-22 DIAGNOSIS — M54.50 CHRONIC MIDLINE LOW BACK PAIN WITHOUT SCIATICA: ICD-10-CM

## 2018-06-22 DIAGNOSIS — G89.29 CHRONIC MIDLINE LOW BACK PAIN WITHOUT SCIATICA: ICD-10-CM

## 2018-06-22 RX ORDER — HYDROCODONE BITARTRATE AND ACETAMINOPHEN 10; 325 MG/1; MG/1
1 TABLET ORAL EVERY 6 HOURS
Qty: 120 TABLET | Refills: 0 | Status: SHIPPED | OUTPATIENT
Start: 2018-06-22 | End: 2018-06-25 | Stop reason: SDUPTHER

## 2018-06-25 DIAGNOSIS — G89.29 CHRONIC MIDLINE LOW BACK PAIN WITHOUT SCIATICA: ICD-10-CM

## 2018-06-25 DIAGNOSIS — M54.50 CHRONIC MIDLINE LOW BACK PAIN WITHOUT SCIATICA: ICD-10-CM

## 2018-06-25 RX ORDER — HYDROCODONE BITARTRATE AND ACETAMINOPHEN 10; 325 MG/1; MG/1
1 TABLET ORAL EVERY 6 HOURS
Qty: 120 TABLET | Refills: 0 | Status: SHIPPED | OUTPATIENT
Start: 2018-06-25 | End: 2018-06-26 | Stop reason: SDUPTHER

## 2018-06-26 DIAGNOSIS — M54.50 CHRONIC MIDLINE LOW BACK PAIN WITHOUT SCIATICA: ICD-10-CM

## 2018-06-26 DIAGNOSIS — G89.29 CHRONIC MIDLINE LOW BACK PAIN WITHOUT SCIATICA: ICD-10-CM

## 2018-06-26 RX ORDER — HYDROCODONE BITARTRATE AND ACETAMINOPHEN 10; 325 MG/1; MG/1
1 TABLET ORAL EVERY 6 HOURS
Qty: 120 TABLET | Refills: 0 | Status: SHIPPED | OUTPATIENT
Start: 2018-06-26 | End: 2018-07-20 | Stop reason: SDUPTHER

## 2018-07-10 ENCOUNTER — HOSPITAL ENCOUNTER (OUTPATIENT)
Dept: GENERAL RADIOLOGY | Age: 73
Discharge: HOME | End: 2018-07-10
Payer: MEDICARE

## 2018-07-10 ENCOUNTER — OFFICE VISIT (OUTPATIENT)
Dept: FAMILY MEDICINE CLINIC | Age: 73
End: 2018-07-10
Payer: MEDICARE

## 2018-07-10 VITALS
WEIGHT: 232 LBS | OXYGEN SATURATION: 98 % | TEMPERATURE: 98.7 F | DIASTOLIC BLOOD PRESSURE: 78 MMHG | BODY MASS INDEX: 35.28 KG/M2 | SYSTOLIC BLOOD PRESSURE: 132 MMHG | HEART RATE: 70 BPM | RESPIRATION RATE: 18 BRPM

## 2018-07-10 DIAGNOSIS — K21.9 GASTROESOPHAGEAL REFLUX DISEASE WITHOUT ESOPHAGITIS: ICD-10-CM

## 2018-07-10 DIAGNOSIS — I10 ESSENTIAL HYPERTENSION: ICD-10-CM

## 2018-07-10 DIAGNOSIS — M54.50 CHRONIC MIDLINE LOW BACK PAIN WITHOUT SCIATICA: ICD-10-CM

## 2018-07-10 DIAGNOSIS — G89.29 CHRONIC MIDLINE LOW BACK PAIN WITHOUT SCIATICA: ICD-10-CM

## 2018-07-10 DIAGNOSIS — M25.562 CHRONIC PAIN OF BOTH KNEES: ICD-10-CM

## 2018-07-10 DIAGNOSIS — R73.9 HYPERGLYCEMIA: ICD-10-CM

## 2018-07-10 DIAGNOSIS — E78.2 MIXED HYPERLIPIDEMIA: ICD-10-CM

## 2018-07-10 DIAGNOSIS — I73.9 CLAUDICATION (HCC): ICD-10-CM

## 2018-07-10 DIAGNOSIS — I25.10 CORONARY ARTERY DISEASE INVOLVING NATIVE HEART WITHOUT ANGINA PECTORIS, UNSPECIFIED VESSEL OR LESION TYPE: ICD-10-CM

## 2018-07-10 DIAGNOSIS — G89.29 CHRONIC PAIN OF BOTH KNEES: ICD-10-CM

## 2018-07-10 DIAGNOSIS — J41.0 SIMPLE CHRONIC BRONCHITIS (HCC): ICD-10-CM

## 2018-07-10 DIAGNOSIS — G47.33 OBSTRUCTIVE SLEEP APNEA SYNDROME: ICD-10-CM

## 2018-07-10 DIAGNOSIS — I63.9 CEREBROVASCULAR ACCIDENT (CVA), UNSPECIFIED MECHANISM (HCC): ICD-10-CM

## 2018-07-10 DIAGNOSIS — I71.40 ABDOMINAL AORTIC ANEURYSM (AAA) WITHOUT RUPTURE: Primary | ICD-10-CM

## 2018-07-10 DIAGNOSIS — M25.561 CHRONIC PAIN OF BOTH KNEES: ICD-10-CM

## 2018-07-10 DIAGNOSIS — I65.23 BILATERAL CAROTID ARTERY OCCLUSION: ICD-10-CM

## 2018-07-10 DIAGNOSIS — N18.30 CKD (CHRONIC KIDNEY DISEASE) STAGE 3, GFR 30-59 ML/MIN (HCC): ICD-10-CM

## 2018-07-10 DIAGNOSIS — F33.41 RECURRENT MAJOR DEPRESSIVE DISORDER IN PARTIAL REMISSION (HCC): ICD-10-CM

## 2018-07-10 DIAGNOSIS — F41.8 DEPRESSION WITH ANXIETY: ICD-10-CM

## 2018-07-10 PROCEDURE — G8926 SPIRO NO PERF OR DOC: HCPCS | Performed by: FAMILY MEDICINE

## 2018-07-10 PROCEDURE — G8417 CALC BMI ABV UP PARAM F/U: HCPCS | Performed by: FAMILY MEDICINE

## 2018-07-10 PROCEDURE — 73562 X-RAY EXAM OF KNEE 3: CPT

## 2018-07-10 PROCEDURE — G8427 DOCREV CUR MEDS BY ELIG CLIN: HCPCS | Performed by: FAMILY MEDICINE

## 2018-07-10 PROCEDURE — 1123F ACP DISCUSS/DSCN MKR DOCD: CPT | Performed by: FAMILY MEDICINE

## 2018-07-10 PROCEDURE — 3017F COLORECTAL CA SCREEN DOC REV: CPT | Performed by: FAMILY MEDICINE

## 2018-07-10 PROCEDURE — 72100 X-RAY EXAM L-S SPINE 2/3 VWS: CPT

## 2018-07-10 PROCEDURE — 3023F SPIROM DOC REV: CPT | Performed by: FAMILY MEDICINE

## 2018-07-10 PROCEDURE — 99214 OFFICE O/P EST MOD 30 MIN: CPT | Performed by: FAMILY MEDICINE

## 2018-07-10 PROCEDURE — 1101F PT FALLS ASSESS-DOCD LE1/YR: CPT | Performed by: FAMILY MEDICINE

## 2018-07-10 PROCEDURE — G8598 ASA/ANTIPLAT THER USED: HCPCS | Performed by: FAMILY MEDICINE

## 2018-07-10 PROCEDURE — 4004F PT TOBACCO SCREEN RCVD TLK: CPT | Performed by: FAMILY MEDICINE

## 2018-07-10 PROCEDURE — 4040F PNEUMOC VAC/ADMIN/RCVD: CPT | Performed by: FAMILY MEDICINE

## 2018-07-10 NOTE — PROGRESS NOTES
CAD (coronary artery disease)     Cancer (HCC)     lung&lymphnode    Carotid artery occlusion     [facility]) [facility] [facility]     Cerebrovascular accident (CVA) due to embolism ([facility] [address] 2/16/2016    Cerebrovascular accident (CVA) due to embolism of right middle cerebral artery ([facility] [address]     Depression     HTN (hypertension)     Hyperlipidemia     MI (myocardial infarction)     acute 5/13/2015    Unspecified sleep apnea       Past Surgical History:   Procedure Laterality Date    CARDIAC CATHETERIZATION  5/13/15  [facility]    EF 40%. stent to LAD, stent to posterior descending branch of RCA.  CAROTID ENDARTERECTOMY Right 7/22/2016    Right carotid endarterectomy,carotid arteriograms. [facility]    COLONOSCOPY      LUNG CANCER SURGERY      right side    LYMPHADENECTOMY      removed lymphnodes     RECTAL SURGERY      fistula     VASCULAR SURGERY  10/29/12 [facility]    open exposure of R common femoral artery; percutaneous cannulation of L  femoral artery with 7-Persian sheath; suprarenal abd aortagram with bilat iliofem arteriogram; endoluminal graft repair of AAA with endologix [id number] main body, 28x95 infrarenal cuff; balloon angioplasty of infrarenal abd aorta and endoluminal graft with coda balloon; completion abd aortogram w bilat iliofem arteriogram;     VASCULAR SURGERY  10/29/ [facility]     (cont) open repair of right common femoral ateriotomy; perclose repair of left common femoral artery puncture site. Family History   Problem Relation Age of Onset    High Blood Pressure Father     High Blood Pressure Mother     Diabetes Mother        Social History   Substance Use Topics    Smoking status: Current Every Day Smoker     Packs/day: 0.50     Years: 40.00    Smokeless tobacco: Never Used    Alcohol use No      Current Outpatient Prescriptions   Medication Sig Dispense Refill    HYDROcodone-acetaminophen (NORCO)  MG per tablet Take 1 tablet by mouth every 6 hours for 30 days. . 120 tablet 0    disease) stage 3, GFR 30-59 ml/min  Lab Results   Component Value Date    CREATININE 1.6 (H) 04/10/2018     Lab Results   Component Value Date    BUN 27 (H) 04/10/2018     Lab Results   Component Value Date    CREATININE 1.6 (H) 04/10/2018     Lab Results   Component Value Date    BUN 27 (H) 04/10/2018       Lab Results   Component Value Date    .9 (H) 02/11/2016    CALCIUM 10.9 (H) 04/10/2018       Lab Results   Component Value Date    HGB 13.6 (L) 04/10/2018       Lab Results   Component Value Date    LABALBU 4.4 04/10/2018       No results found for: Adriana Markhamdoch    Refer to nephrology      7. Depression with anxiety  stable    8. Essential hypertension  BP Readings from Last 3 Encounters:   07/10/18 132/78   04/23/18 124/76   04/10/18 120/78     stable    9. Gastroesophageal reflux disease without esophagitis  stable    10. Hyperglycemia  Discussed lifestyle changes such as diet and exercise. Recommended eliminate processed food from diet such as sugar and fried foods. Recommended exercising at least 150 minutes/week. Try to do full body resistance training twice a week as well. Offered suggestions for calorie counting such as phone apps and online resources such as My fitness pal and Lose it. Discussed healthy weight. Check the following labs:  Lab Results   Component Value Date    LABA1C 6.4 (H) 07/10/2018     No results found for: EAG    - Hemoglobin A1C; Future    11. Mixed hyperlipidemia  Cotninue with high intensity statin    12. Obstructive sleep apnea syndrome  Doesn't wear CPAP. Encouraged to wear CPAP today    13. Chronic pain of both knees  Will check imaging  - XR KNEE LEFT (3 VIEWS); Future  - XR KNEE RIGHT (3 VIEWS); Future    14. Claudication ([address]  Check the following.  - VL LOWER EXTREMITY ARTERIAL SEGMENTAL PRESSURES W PPG BILATERAL; Future        Return in about 3 months (around 10/10/2018) for AWV - 30 minute visit.            Discussed use, benefit, and side effects of

## 2018-07-11 PROBLEM — F33.41 RECURRENT MAJOR DEPRESSIVE DISORDER IN PARTIAL REMISSION (HCC): Status: ACTIVE | Noted: 2018-07-11

## 2018-07-11 ASSESSMENT — ENCOUNTER SYMPTOMS
BACK PAIN: 1
CHEST TIGHTNESS: 0
DIARRHEA: 0
CONSTIPATION: 0
ABDOMINAL PAIN: 0
ANAL BLEEDING: 0
NAUSEA: 0
SHORTNESS OF BREATH: 0
COUGH: 0

## 2018-07-16 ENCOUNTER — HOSPITAL ENCOUNTER (OUTPATIENT)
Dept: ULTRASOUND IMAGING | Age: 73
Discharge: HOME | End: 2018-07-16
Payer: MEDICARE

## 2018-07-16 ENCOUNTER — HOSPITAL ENCOUNTER (OUTPATIENT)
Dept: VASCULAR LAB | Age: 73
Discharge: HOME | End: 2018-07-16
Payer: MEDICARE

## 2018-07-16 DIAGNOSIS — I71.40 ABDOMINAL AORTIC ANEURYSM (AAA) WITHOUT RUPTURE: ICD-10-CM

## 2018-07-16 DIAGNOSIS — I65.23 BILATERAL CAROTID ARTERY OCCLUSION: ICD-10-CM

## 2018-07-16 DIAGNOSIS — I73.9 CLAUDICATION (HCC): ICD-10-CM

## 2018-07-16 PROCEDURE — 76775 US EXAM ABDO BACK WALL LIM: CPT

## 2018-07-16 PROCEDURE — 93923 UPR/LXTR ART STDY 3+ LVLS: CPT

## 2018-07-16 PROCEDURE — 93880 EXTRACRANIAL BILAT STUDY: CPT

## 2018-07-20 DIAGNOSIS — G89.29 CHRONIC MIDLINE LOW BACK PAIN WITHOUT SCIATICA: ICD-10-CM

## 2018-07-20 DIAGNOSIS — M54.50 CHRONIC MIDLINE LOW BACK PAIN WITHOUT SCIATICA: ICD-10-CM

## 2018-07-20 RX ORDER — HYDROCODONE BITARTRATE AND ACETAMINOPHEN 10; 325 MG/1; MG/1
1 TABLET ORAL EVERY 6 HOURS
Qty: 120 TABLET | Refills: 0 | Status: SHIPPED | OUTPATIENT
Start: 2018-07-26 | End: 2018-08-22 | Stop reason: SDUPTHER

## 2018-07-23 ENCOUNTER — HOSPITAL ENCOUNTER (OUTPATIENT)
Dept: CT IMAGING | Age: 73
Discharge: HOME | End: 2018-07-23
Payer: MEDICARE

## 2018-07-23 ENCOUNTER — OFFICE VISIT (OUTPATIENT)
Dept: VASCULAR SURGERY | Age: 73
End: 2018-07-23
Payer: MEDICARE

## 2018-07-23 VITALS
RESPIRATION RATE: 18 BRPM | HEART RATE: 65 BPM | TEMPERATURE: 97.4 F | DIASTOLIC BLOOD PRESSURE: 81 MMHG | SYSTOLIC BLOOD PRESSURE: 122 MMHG

## 2018-07-23 DIAGNOSIS — I71.40 ABDOMINAL AORTIC ANEURYSM (AAA) WITHOUT RUPTURE: Primary | ICD-10-CM

## 2018-07-23 DIAGNOSIS — I71.40 ABDOMINAL AORTIC ANEURYSM (AAA) WITHOUT RUPTURE: ICD-10-CM

## 2018-07-23 DIAGNOSIS — I71.00 DISSECTION OF AORTA, UNSPECIFIED PORTION OF AORTA (HCC): ICD-10-CM

## 2018-07-23 DIAGNOSIS — I65.21 STENOSIS OF RIGHT CAROTID ARTERY: ICD-10-CM

## 2018-07-23 DIAGNOSIS — I73.9 PVD (PERIPHERAL VASCULAR DISEASE) (HCC): ICD-10-CM

## 2018-07-23 PROCEDURE — 3017F COLORECTAL CA SCREEN DOC REV: CPT | Performed by: PHYSICIAN ASSISTANT

## 2018-07-23 PROCEDURE — 4004F PT TOBACCO SCREEN RCVD TLK: CPT | Performed by: PHYSICIAN ASSISTANT

## 2018-07-23 PROCEDURE — G8417 CALC BMI ABV UP PARAM F/U: HCPCS | Performed by: PHYSICIAN ASSISTANT

## 2018-07-23 PROCEDURE — 99214 OFFICE O/P EST MOD 30 MIN: CPT | Performed by: PHYSICIAN ASSISTANT

## 2018-07-23 PROCEDURE — G8427 DOCREV CUR MEDS BY ELIG CLIN: HCPCS | Performed by: PHYSICIAN ASSISTANT

## 2018-07-23 PROCEDURE — 74176 CT ABD & PELVIS W/O CONTRAST: CPT

## 2018-07-23 PROCEDURE — G8598 ASA/ANTIPLAT THER USED: HCPCS | Performed by: PHYSICIAN ASSISTANT

## 2018-07-23 PROCEDURE — 4040F PNEUMOC VAC/ADMIN/RCVD: CPT | Performed by: PHYSICIAN ASSISTANT

## 2018-07-23 PROCEDURE — 1123F ACP DISCUSS/DSCN MKR DOCD: CPT | Performed by: PHYSICIAN ASSISTANT

## 2018-07-23 PROCEDURE — 1101F PT FALLS ASSESS-DOCD LE1/YR: CPT | Performed by: PHYSICIAN ASSISTANT

## 2018-07-23 NOTE — PROGRESS NOTES
Patient Care Team:  Oswald Neely MD as PCP - General (Family Medicine)  SHAE Cordova MD as Consulting Physician (Cardiology)      History and Physical:    Mr. Aneudy Hector has prior history of abdominal aortic aneurysm s/p EVAR in 2012. He also has a history of carotid artery stenosis and PVD. He reports a history of CBP from a previous injury. he reports that this is unchanged. He denies any abdominal pain. He  Denies any new onset of partial or complete loss of vision affecting only one eye, speech difficulty or lateralizing weakness, numbness/tingling.      Salina Matos is a 74 y.o. 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    Stenosis of right carotid artery     Coronary artery disease involving native heart without angina pectoris     Cerebrovascular accident (CVA) due to embolism of right middle cerebral artery ([address]     Essential hypertension     CKD (chronic kidney disease) stage 3, GFR 30-59 ml/min 07/17/2016    Carotid artery occlusion 07/16/2016    Obstructive sleep apnea syndrome 07/16/2016    Cerebrovascular accident (CVA) due to embolism ([address] 02/16/2016    Cerebrovascular accident (CVA) ([address]     CAD (coronary artery disease)     Mixed hyperlipidemia     AAA (abdominal aortic aneurysm) ([address] 10/10/2012    Aortic dissection ([address] 10/10/2012    Carotid artery stenosis 02/08/2012    Depression     Sleep apnea      Current Outpatient Prescriptions   Medication Sig Dispense Refill    [START ON 7/26/2018] HYDROcodone-acetaminophen lymphnodes     RECTAL SURGERY      fistula     VASCULAR SURGERY  10/29/12 [facility]    open exposure of R common femoral artery; percutaneous cannulation of L  femoral artery with 7-Uzbek sheath; suprarenal abd aortagram with bilat iliofem arteriogram; endoluminal graft repair of AAA with endologix [id number] main body, 28x95 infrarenal cuff; balloon angioplasty of infrarenal abd aorta and endoluminal graft with coda balloon; completion abd aortogram w bilat iliofem arteriogram;     VASCULAR SURGERY  10/29/ [facility]     (cont) open repair of right common femoral ateriotomy; perclose repair of left common femoral artery puncture site. Family History   Problem Relation Age of Onset    High Blood Pressure Father     High Blood Pressure Mother     Diabetes Mother      Social History   Substance Use Topics    Smoking status: Current Every Day Smoker     Packs/day: 0.50     Years: 40.00    Smokeless tobacco: Never Used      Comment: quit smoking in nov 2017    Alcohol use No       Old records have been obtained from the referring providers. These records have been reviewed and summarized. Review of Systems    Constitutional - no significant activity change, appetite change, or unexpected weight change. No fever or chills. No diaphoresis or significant fatigue. HENT - no significant rhinorrhea or epistaxis. No tinnitus or significant hearing loss. Eyes - no sudden vision change or amaurosis. Respiratory - no significant shortness of breath, wheezing, or stridor. No apnea, cough, or chest tightness associated with shortness of breath. Cardiovascular - no chest pain, syncope, or significant dizziness. No palpitations or significant leg swelling. Reports leg pain due to his CBP  Gastrointestinal -  has not had abdominal swelling or pain. No blood in stool. No severe constipation, diarrhea, nausea, or vomiting. Genitourinary - No difficulty urinating, dysuria, frequency, or urgency.   No flank pain or diabetes mellitus were discussed with the patient. CDU - 7/16/18   Impression        Well healed right carotid endarterectomy site with no recurrent stenosis    or residual plaque.    There is heterogeneous plaque visualized in the left internal carotid    artery(ies).    There is less than 50% stenosis of the left internal carotid artery.    There is normal antegrade flow in the bilateral vertebral artery(ies). HARVEY - 7/16/18   Impression        Based on ankle-brachial indices and doppler waveforms, the patient has    relatively normal flow to the right lower extremity arterial system at   [facility]    The patient has noncompressible left tibial arteries. This would be    consistent with a history of diabetes and calcified tibial vessels.    However, there is good doppler waveforms from the bilateral femoral all    the way to the tibial arteries. Great toe pressures are good bilaterally.   Kimani Duran, there is no critical stenosis in the left lower extremities.   - RACHEL:Right: 1.02. Left: 1.45. Aortic us - 7/16/18  Aortobiiliac stent graft noted. Maximal diameter of the distal   abdominal aorta is 3.9 cm. Allowing for difficulties of cross modality   comparison this does suggests slight increase in diameter from 2015   noncontrast CT. Assessment    1. Abdominal aortic aneurysm (AAA) without rupture ([address]    2. PVD (peripheral vascular disease) ([address]    3. Stenosis of right carotid artery          Plan    Recommend continue ASA EC 81 mg daily  Recommend continue Plavix 75 mg daily  Strongly encouraged continue statin therapy  Recommended no smoking  Symptoms of rupture reviewed with the patient including sudden onset severe back pain or abdominal pain. This pain can sometimes radiate into the groin or leg. The patient may experience a feeling of impending doom or death. If this occurs they have been instructed to call 911 and get to the emergency room telling them you have an aneurysm.   Patient has

## 2018-07-24 ENCOUNTER — TELEPHONE (OUTPATIENT)
Dept: VASCULAR SURGERY | Age: 73
End: 2018-07-24

## 2018-07-24 DIAGNOSIS — I71.40 ABDOMINAL AORTIC ANEURYSM (AAA) WITHOUT RUPTURE: Primary | ICD-10-CM

## 2018-07-24 NOTE — TELEPHONE ENCOUNTER
I sw the pt to let him know his graft looks good. As long as the pt is doing okay we will f/u in one year. Pt is aware.

## 2018-07-24 NOTE — TELEPHONE ENCOUNTER
----- Message from Allison Bragg PA-C sent at 7/24/2018 12:23 PM CDT -----  Call and let him know his graft looks good. No problems.  We will repeat in 12 months

## 2018-08-22 DIAGNOSIS — G89.29 CHRONIC MIDLINE LOW BACK PAIN WITHOUT SCIATICA: ICD-10-CM

## 2018-08-22 DIAGNOSIS — M54.50 CHRONIC MIDLINE LOW BACK PAIN WITHOUT SCIATICA: ICD-10-CM

## 2018-08-23 RX ORDER — HYDROCODONE BITARTRATE AND ACETAMINOPHEN 10; 325 MG/1; MG/1
1 TABLET ORAL EVERY 6 HOURS
Qty: 120 TABLET | Refills: 0 | Status: SHIPPED | OUTPATIENT
Start: 2018-08-23 | End: 2018-09-21 | Stop reason: SDUPTHER

## 2018-08-29 NOTE — TELEPHONE ENCOUNTER
- Improving since diuresis/ACEI was held.  - Hold Nephrotoxic medications, no Contrast, Keep normotensive and euvolemic    - Re-assess BMP a few days after discharge, would need close follow-up in clinic   Patient was asleep so I let his wife know that we have received Plavix clearance from Dr. Worrell and can go ahead and set up colonoscopy. She will let him know this and have him return my call. I have set a reminder in the chart for this message to come back me in a week to see if he has called me back.

## 2018-09-21 DIAGNOSIS — M54.50 CHRONIC MIDLINE LOW BACK PAIN WITHOUT SCIATICA: ICD-10-CM

## 2018-09-21 DIAGNOSIS — G89.29 CHRONIC MIDLINE LOW BACK PAIN WITHOUT SCIATICA: ICD-10-CM

## 2018-09-21 RX ORDER — HYDROCODONE BITARTRATE AND ACETAMINOPHEN 10; 325 MG/1; MG/1
1 TABLET ORAL EVERY 6 HOURS
Qty: 120 TABLET | Refills: 0 | Status: SHIPPED | OUTPATIENT
Start: 2018-09-21 | End: 2018-10-19 | Stop reason: SDUPTHER

## 2018-10-02 ENCOUNTER — OFFICE VISIT (OUTPATIENT)
Dept: FAMILY MEDICINE CLINIC | Age: 73
End: 2018-10-02
Payer: MEDICARE

## 2018-10-02 ENCOUNTER — TELEPHONE (OUTPATIENT)
Dept: FAMILY MEDICINE CLINIC | Age: 73
End: 2018-10-02

## 2018-10-02 VITALS
DIASTOLIC BLOOD PRESSURE: 82 MMHG | RESPIRATION RATE: 18 BRPM | WEIGHT: 235 LBS | BODY MASS INDEX: 35.61 KG/M2 | HEIGHT: 68 IN | TEMPERATURE: 97.3 F | SYSTOLIC BLOOD PRESSURE: 132 MMHG | OXYGEN SATURATION: 98 % | HEART RATE: 62 BPM

## 2018-10-02 DIAGNOSIS — Z23 NEED FOR INFLUENZA VACCINATION: ICD-10-CM

## 2018-10-02 DIAGNOSIS — E83.52 HYPERCALCEMIA: ICD-10-CM

## 2018-10-02 DIAGNOSIS — G89.29 CHRONIC MIDLINE LOW BACK PAIN WITHOUT SCIATICA: ICD-10-CM

## 2018-10-02 DIAGNOSIS — K21.9 GASTROESOPHAGEAL REFLUX DISEASE WITHOUT ESOPHAGITIS: ICD-10-CM

## 2018-10-02 DIAGNOSIS — E78.2 MIXED HYPERLIPIDEMIA: ICD-10-CM

## 2018-10-02 DIAGNOSIS — R53.83 OTHER FATIGUE: ICD-10-CM

## 2018-10-02 DIAGNOSIS — Z00.00 ROUTINE GENERAL MEDICAL EXAMINATION AT A HEALTH CARE FACILITY: ICD-10-CM

## 2018-10-02 DIAGNOSIS — F41.8 DEPRESSION WITH ANXIETY: ICD-10-CM

## 2018-10-02 DIAGNOSIS — N18.30 CKD (CHRONIC KIDNEY DISEASE) STAGE 3, GFR 30-59 ML/MIN (HCC): ICD-10-CM

## 2018-10-02 DIAGNOSIS — R73.9 HYPERGLYCEMIA: ICD-10-CM

## 2018-10-02 DIAGNOSIS — E21.3 HYPERPARATHYROIDISM (HCC): ICD-10-CM

## 2018-10-02 DIAGNOSIS — M54.50 CHRONIC MIDLINE LOW BACK PAIN WITHOUT SCIATICA: ICD-10-CM

## 2018-10-02 DIAGNOSIS — I10 ESSENTIAL HYPERTENSION: ICD-10-CM

## 2018-10-02 DIAGNOSIS — I25.10 CORONARY ARTERY DISEASE INVOLVING NATIVE HEART WITHOUT ANGINA PECTORIS, UNSPECIFIED VESSEL OR LESION TYPE: Primary | ICD-10-CM

## 2018-10-02 DIAGNOSIS — M13.0 POLYARTHRITIS: ICD-10-CM

## 2018-10-02 PROCEDURE — G8427 DOCREV CUR MEDS BY ELIG CLIN: HCPCS | Performed by: FAMILY MEDICINE

## 2018-10-02 PROCEDURE — 3017F COLORECTAL CA SCREEN DOC REV: CPT | Performed by: FAMILY MEDICINE

## 2018-10-02 PROCEDURE — G8417 CALC BMI ABV UP PARAM F/U: HCPCS | Performed by: FAMILY MEDICINE

## 2018-10-02 PROCEDURE — G8598 ASA/ANTIPLAT THER USED: HCPCS | Performed by: FAMILY MEDICINE

## 2018-10-02 PROCEDURE — G0008 ADMIN INFLUENZA VIRUS VAC: HCPCS | Performed by: FAMILY MEDICINE

## 2018-10-02 PROCEDURE — 99214 OFFICE O/P EST MOD 30 MIN: CPT | Performed by: FAMILY MEDICINE

## 2018-10-02 PROCEDURE — 1123F ACP DISCUSS/DSCN MKR DOCD: CPT | Performed by: FAMILY MEDICINE

## 2018-10-02 PROCEDURE — 4004F PT TOBACCO SCREEN RCVD TLK: CPT | Performed by: FAMILY MEDICINE

## 2018-10-02 PROCEDURE — G8482 FLU IMMUNIZE ORDER/ADMIN: HCPCS | Performed by: FAMILY MEDICINE

## 2018-10-02 PROCEDURE — 90662 IIV NO PRSV INCREASED AG IM: CPT | Performed by: FAMILY MEDICINE

## 2018-10-02 PROCEDURE — G0439 PPPS, SUBSEQ VISIT: HCPCS | Performed by: FAMILY MEDICINE

## 2018-10-02 PROCEDURE — 4040F PNEUMOC VAC/ADMIN/RCVD: CPT | Performed by: FAMILY MEDICINE

## 2018-10-02 PROCEDURE — 1101F PT FALLS ASSESS-DOCD LE1/YR: CPT | Performed by: FAMILY MEDICINE

## 2018-10-02 ASSESSMENT — LIFESTYLE VARIABLES: HOW OFTEN DO YOU HAVE A DRINK CONTAINING ALCOHOL: 0

## 2018-10-02 ASSESSMENT — ANXIETY QUESTIONNAIRES: GAD7 TOTAL SCORE: 6

## 2018-10-02 ASSESSMENT — PATIENT HEALTH QUESTIONNAIRE - PHQ9
SUM OF ALL RESPONSES TO PHQ QUESTIONS 1-9: 2
SUM OF ALL RESPONSES TO PHQ QUESTIONS 1-9: 2

## 2018-10-02 NOTE — PROGRESS NOTES
Packs/day: 0.50     Years: 40.00    Smokeless tobacco: Never Used      Comment: quit smoking in nov 2017, restarted smoking since then    Alcohol use No      Current Outpatient Prescriptions   Medication Sig Dispense Refill    HYDROcodone-acetaminophen (NORCO)  MG per tablet Take 1 tablet by mouth every 6 hours for 30 days. . 120 tablet 0    atorvastatin (LIPITOR) 40 MG tablet Take 1 tablet by mouth daily 90 tablet 3    clopidogrel (PLAVIX) 75 MG tablet Take 1 tablet by mouth daily 90 tablet 3    lisinopril (PRINIVIL;ZESTRIL) 40 MG tablet Take 1 tablet by mouth daily 90 tablet 3    metoprolol succinate (TOPROL XL) 50 MG extended release tablet Take 1 tablet by mouth daily 90 tablet 3    venlafaxine (EFFEXOR) 75 MG tablet Take 1 tablet by mouth daily 90 tablet 3    famotidine (PEPCID) 20 MG tablet Take 1 tablet by mouth 2 times daily 180 tablet 3    aspirin 81 MG EC tablet Take 1 tablet by mouth daily 30 tablet 3    nitroGLYCERIN (NITROSTAT) 0.4 MG SL tablet Place 0.4 mg under the tongue every 5 minutes as needed for Chest pain       No current facility-administered medications for this visit. No Known Allergies    Health Maintenance   Topic Date Due    DTaP/Tdap/Td vaccine (1 - Tdap) 02/[birth year]/1962    Shingles Vaccine (1 of 2 - 2 Dose Series) 02/23/1993    Pneumococcal low/med risk (2 of 2 - PCV13) 01/09/2019    A1C test (Diabetic or Prediabetic)  10/02/2019    Potassium monitoring  10/02/2019    Creatinine monitoring  10/02/2019    Lipid screen  10/02/2023    Colon cancer screen colonoscopy  04/13/2028    Flu vaccine  Completed       Subjective:      Review of Systems   Constitutional: Positive for fatigue. Negative for chills and fever. HENT: Negative for congestion. Respiratory: Positive for shortness of breath. Negative for cough and chest tightness. Cardiovascular: Negative for chest pain, palpitations and leg swelling.    Gastrointestinal: Negative for abdominal pain, anal bleeding, constipation, diarrhea and nausea. Genitourinary: Negative for difficulty urinating. Musculoskeletal: Positive for arthralgias, back pain, myalgias, neck pain and neck stiffness. Negative for gait problem and joint swelling. Psychiatric/Behavioral: Negative. See HPI for visit specific review of symptoms. All others negative      Objective:   /82   Pulse 62   Temp 97.3 °F (36.3 °C)   Resp 18   Ht 5' 8\" (1.727 m)   Wt 235 lb (106.6 kg)   SpO2 98%   BMI 35.73 kg/m²   Physical Exam   Constitutional: He appears well-developed. He does not appear ill. Eyes: Pupils are equal, round, and reactive to light. Neck: Normal range of motion. Neck supple. Cardiovascular: Normal rate and regular rhythm. Exam reveals no friction rub. No murmur heard. Pulmonary/Chest: Effort normal and breath sounds normal. No respiratory distress. He has no wheezes. He has no rales. Abdominal: Soft. Bowel sounds are normal. He exhibits no distension. There is no tenderness. There is no rebound and no guarding. Musculoskeletal: He exhibits no edema. Neurological: He is alert. Psychiatric: He has a normal mood and affect.  His behavior is normal.         Recent Results (from the past 672 hour(s))   CK    Collection Time: 10/02/18  2:22 PM   Result Value Ref Range    Total CK 32 (L) 39 - 308 U/L   C-Reactive Protein    Collection Time: 10/02/18  2:22 PM   Result Value Ref Range    CRP 0.38 0.00 - 0.50 mg/dL   Rheumatoid Factor    Collection Time: 10/02/18  2:22 PM   Result Value Ref Range    Rheumatoid Factor <10.0 <14 IU/mL   Sedimentation Rate    Collection Time: 10/02/18  2:22 PM   Result Value Ref Range    Sed Rate 20 (H) 0 - 15 mm/Hr   Uric Acid    Collection Time: 10/02/18  2:22 PM   Result Value Ref Range    Uric Acid, Serum 9.3 (H) 3.4 - 7.0 mg/dL   CBC Auto Differential    Collection Time: 10/02/18  2:22 PM   Result Value Ref Range    WBC 10.8 4.8 - 10.8 K/uL    RBC 5.09 4.70 - 6.10 M/uL 74year-old with chronic pain of both knees. Reference:   None. FINDINGS:   3 views of the right knee. No acute fracture or dislocation of the right knee. No joint effusion. Femorotibial joint spaces are maintained. There is chondrocalcinosis   in the medial and lateral compartments. No osteophytes are   appreciated. Vascular calcifications noted.       Impression   No acute findings. Chondrocalcinosis of the medial and lateral   compartments. Signed by Dr Jaimee Vazquez on 7/10/2018 5:05 PM     No radiation information found for this patient   Narrative   History:   74year-old with chronic pain of both knees. Reference:   Left knee radiograph February 2016. FINDINGS:   3 views of the left knee. No acute fracture or dislocation of the left knee. No joint effusion. Femorotibial joint spaces are maintained. There is chondrocalcinosis   in the medial and lateral compartments. No osteophytes are   appreciated. Vascular calcifications noted.       Impression   No acute findings. Chondrocalcinosis of the medial and lateral   compartments. Signed by Dr Jaimee Vazquez on 7/10/2018 5:04 PM         Assessment & Plan: The following diagnoses and conditions are stable with no further orders unless indicated:  1. Coronary artery disease involving native heart without angina pectoris, unspecified vessel or lesion type  Discussed cardiovascular risk reduction including stopping smoking. 2. Need for influenza vaccination    - INFLUENZA, HIGH DOSE, 65 YRS +, IM, PF, PREFILL SYR, 0.5ML (FLUZONE HD)    3. Routine general medical examination at a health care facility  Completed annual wellness today. 4. Polyarthritis  Check the following lab studies to further evaluate for possible secondary causes of his diffuse arthritic pains.  - MIGUELINA Screen with Reflex; Future  - CK; Future  - C-Reactive Protein; Future  - Rheumatoid Factor; Future  - Sedimentation Rate; Future  - Uric Acid; Future    5.  Chronic midline Metabolic Panel; Future    13. Hypercalcemia/Hyperparathyroidism ([facility] [address]  Calcium is elevated. He is currently asymptomatic and the calcium. His PTH is also elevated. Recommended discontinuing chlorthalidone increasing hydration and will refer to ENT for consideration of parathyroidectomy  Refer to nephrology as well. CT scan of abdomen and pelvis in July showed no evidence of kidney stones. Return in about 3 months (around 1/2/2019) for Routine follow up - 15 minute visit. Discussed use, benefit, and side effects of prescribed medications. All patient questions answered. Pt voiced understanding. Reviewed health maintenance. Instructed to continue current medications, diet and exercise. Patient agreed with treatment plan. Follow up as directed.

## 2018-10-02 NOTE — PATIENT INSTRUCTIONS
healthier? If you're thinking about quitting smoking, you may have a few reasons to be smoke-free. Your health may be one of them. When you quit smoking, you lower your risks for cancer, lung disease, heart attack, stroke, blood vessel disease, and blindness from macular degeneration. When you're smoke-free, you get sick less often, and you heal faster. You are less likely to get colds, flu, bronchitis, and pneumonia. As a nonsmoker, you may find that your mood is better and you are less stressed. When and how will you feel healthier? Quitting has real health benefits that start from day 1 of being smoke-free. And the longer you stay smoke-free, the healthier you get and the better you feel. The first hours  After just 20 minutes, your blood pressure and heart rate go down. That means there's less stress on your heart and blood vessels. Within 12 hours, the level of carbon monoxide in your blood drops back to normal. That makes room for more oxygen. With more oxygen in your body, you may notice that you have more energy than when you smoked. After 2 weeks  Your lungs start to work better. Your risk of heart attack starts to drop. After 1 month  When your lungs are clear, you cough less and breathe deeper, so it's easier to be active. Your sense of taste and smell return. That means you can enjoy food more than you have since you started smoking. Over the years  After 1 year, your risk of heart disease is half what it would be if you kept smoking. After 5 years, your risk of stroke starts to shrink. Within a few years after that, it's about the same as if you'd never smoked. After 10 years, your risk of dying from lung cancer is cut by about half. And your risk for many other types of cancer is lower too. How would quitting help others in your life? When you quit smoking, you improve the health of everyone who now breathes in your smoke.   Their heart, lung, and cancer risks drop, much like are on the lower shelves (about waist level). Keep a cordless phone and a flashlight with new batteries by your bed. If possible, put a phone in each of the main rooms of your house, or carry a cell phone in case you fall and cannot reach a phone. Or, you can wear a device around your neck or wrist. You push a button that sends a signal for help. Wear low-heeled shoes that fit well and give your feet good support. Use footwear with nonskid soles. Check the heels and soles of your shoes for wear. Repair or replace worn heels or soles. Do not wear socks without shoes on wood floors. Walk on the grass when the sidewalks are slippery. If you live in an area that gets snow and ice in the winter, sprinkle salt on slippery steps and sidewalks. Preventing falls in the bath  Install grab bars and nonskid mats inside and outside your shower or tub and near the toilet and sinks. Use shower chairs and bath benches. Use a hand-held shower head that will allow you to sit while showering. Get into a tub or shower by putting the weaker leg in first. Get out of a tub or shower with your strong side first.  Repair loose toilet seats and consider installing a raised toilet seat to make getting on and off the toilet easier. Keep your bathroom door unlocked while you are in the shower. Where can you learn more? Go to https://BabelversepeesterVoxound.[email or web address] org and sign in to your tomoguides account. Enter [phone] in the [facility] box to learn more about \"Preventing Falls: Care Instructions. \"     If you do not have an account, please click on the \"Sign Up Now\" link. Current as of: May 12, 2017  Content Version: 11.7  © 0481-9982 [employer]. Care instructions adapted under license by [facility] ([facility]). If you have questions about a medical condition or this instruction, always ask your healthcare professional. [place] [address] any warranty or liability for your use of this information. Patient Education        Learning About Managing Anger  What causes anger? Many things can cause anger: Stress at work or at home. Social situations that make you angry. A response to everyday events. Anger signals your body to prepare for a fight. This reaction is often called \"fight or flight. \" When you get angry, adrenaline and other hormones are released into your blood. Then your blood pressure goes up, your heart beats faster, and you breathe faster. When you express anger in a healthy way, it can inspire change and make you productive. But if you don't have the skills to express anger in a healthy way, anger can build up. You may hurt others-and yourself-emotionally and even physically. Violent behavior often starts with verbal threats or fairly minor incidents. But over time, it can involve physical harm. It can include physical, verbal, or sexual abuse of an intimate partner (domestic violence), a child (child abuse), or an older adult (elder abuse). It can also make you sick. Anger and constant hostility keep your blood pressure high. They increase your chances of having another health problem, such as depression, a heart attack, or a stroke. Some people with post-traumatic stress disorder (PTSD) feel angry and on alert all the time. It may feel like there are no other ways to react when you are angry. But when you learn to work with anger in appropriate and healthy ways, your anger no longer controls you. How can you manage your anger? The first step to managing anger is to be more aware of it. Note the thoughts, feelings, and emotions that you have when you get angry. Practice noticing these signs of anger when you are calm. If you are more aware of the signs of anger, you can take steps to manage it. Here are a few tips: Think before you act. Take time to stop and cool down when you feel yourself getting angry. Count to 10 while you take slow, steady breaths.  Practice some other form of mental relaxation. Learn the feelings that lead to angry outbursts. Anger and hostility may be a symptom of unhappy feelings or depression about your job, your relationship, or other aspects of your personal life. Avoid situations that trigger your anger. If standing in line bothers you, do errands at less busy times. Express anger in a healthy way. You might:  Go for a short walk or jog. Draw, paint, or listen to music to release the anger. Write in a daily journal.  Use \"I\" statements, not \"you\" statements, to discuss your anger. Say \"I don't feel valued when my needs are not being met\" instead of \"You make me mad when you are so inconsiderate. \"  Take care of yourself. Exercise regularly. Eat a balanced diet. Don't skip meals. Try to get 8 hours of sleep each night. Limit your use of alcohol, and don't use illegal drugs. Practice yoga, meditation, or teja chi to relax. Explore other resources that may be available through your job or your community. Contact your human resources department at work. You might be able to get services through an employee assistance program.  Contact your local hospital, mental health facility, or health department. Ask what types of programs or support groups are available in your area. Do not keep guns in your home. If you must have guns in your home, unload them and lock them up. Lock ammunition in a separate place. Keep guns away from children. Where can you find help? If anger or stress starts to harm your work or personal relationships, you might seek help. You can learn ways to control your feelings and actions. Talk to someone you trust, or find a counselor. There are groups in your area that can connect you with people to talk to. Behavioral Health Treatment Services . This service from the national Substance Abuse and [address] can help you find local counselors. Search online at [place]. samhsa.gov or call

## 2018-10-02 NOTE — PROGRESS NOTES
18   Temp: 97.3 °F (36.3 °C)   SpO2: 98%   Weight: 235 lb (106.6 kg)   Height: 5' 8\" (1.727 m)     Body mass index is 35.73 kg/m². Patient's complete Health Risk Assessment and screening values have been reviewed and are found in Flowsheets. The following problems were reviewed today and where indicated follow up appointments were made and/or referrals ordered.     Positive Risk Factor Screenings with Interventions:     Anxiety:  Anxiety Score: 6  Anxiety Screening: (!) Positive Screening for Anxiety  Anxiety Interventions:  · has apt today with PCP to discuss    Substance Abuse:  Social History     Tobacco History     Smoking Status  Current Every Day Smoker Smoking Frequency  0.5 packs/day for 40 years (20 pk yrs)    Smokeless Tobacco Use  Never Used    Tobacco Comment  quit smoking in nov 2017, restarted smoking since then          Alcohol History     Alcohol Use Status  No          Drug Use     Drug Use Status  No          Sexual Activity     Sexually Active  Not Asked               Audit Questionnaire: Screen for Alcohol Misuse  How often do you have a drink containing alcohol?: Never  Substance Abuse Interventions:  · Tobacco abuse:  tobacco cessation tips and resources provided    General Health:  General  In general, how would you say your health is?: Good  In the past 7 days, have you experienced any of the following?: (!) New or Increased Pain, New or Increased Fatigue, Stress, Anger, Loneliness  Do you get the social and emotional support that you need?: (!) No  Do you have a Living Will?: (!) No  General Health Risk Interventions:  · No Living Will: additional information provided    Health Habits/Nutrition:  Health Habits/Nutrition  Do you exercise for at least 20 minutes 2-3 times per week?: Yes  Have you lost any weight without trying in the past 3 months?: No  Do you eat fewer than 2 meals per day?: (!) Yes (his routine is twice daily)  Have you seen a dentist within the past year?: (!) No balance and smoking and anger issues at home . Advised to call family service society to see about help with dentures and glasses. Macario Roldan LPN on 35/8/7502, performed the documented evaluation under the direct supervision of the attending physician. This encounter was performed under myMario MDs, direct supervision, 10/2/2018.

## 2018-10-04 DIAGNOSIS — E21.3 HYPERPARATHYROIDISM (HCC): ICD-10-CM

## 2018-10-04 DIAGNOSIS — N18.30 CKD (CHRONIC KIDNEY DISEASE), STAGE III (HCC): Primary | ICD-10-CM

## 2018-10-04 DIAGNOSIS — E83.52 HYPERCALCEMIA: Primary | ICD-10-CM

## 2018-10-04 ASSESSMENT — ENCOUNTER SYMPTOMS
BACK PAIN: 1
CONSTIPATION: 0
ABDOMINAL PAIN: 0
SHORTNESS OF BREATH: 1
CHEST TIGHTNESS: 0
NAUSEA: 0
DIARRHEA: 0
COUGH: 0
ANAL BLEEDING: 0

## 2018-10-10 ENCOUNTER — TELEPHONE (OUTPATIENT)
Dept: FAMILY MEDICINE CLINIC | Age: 73
End: 2018-10-10

## 2018-10-10 DIAGNOSIS — E21.3 HYPERPARATHYROIDISM (HCC): Primary | ICD-10-CM

## 2018-10-19 DIAGNOSIS — G89.29 CHRONIC MIDLINE LOW BACK PAIN WITHOUT SCIATICA: ICD-10-CM

## 2018-10-19 DIAGNOSIS — M54.50 CHRONIC MIDLINE LOW BACK PAIN WITHOUT SCIATICA: ICD-10-CM

## 2018-10-19 NOTE — TELEPHONE ENCOUNTER
Aniya Rivera called requesting a refill of the below medication which has been pended for you:     Requested Prescriptions     Pending Prescriptions Disp Refills    HYDROcodone-acetaminophen (NORCO)  MG per tablet 120 tablet 0     Sig: Take 1 tablet by mouth every 6 hours for 30 days. .       Last Appointment Date: 10/2/2018  Next Appointment Date: 1/7/2019    No Known Allergies      Oswaldo Cranker UTD  Last [employer]DS [employer] Contract 4/23/18

## 2018-10-20 RX ORDER — HYDROCODONE BITARTRATE AND ACETAMINOPHEN 10; 325 MG/1; MG/1
1 TABLET ORAL EVERY 6 HOURS
Qty: 120 TABLET | Refills: 0 | Status: SHIPPED | OUTPATIENT
Start: 2018-10-20 | End: 2018-11-19 | Stop reason: SDUPTHER

## 2018-11-19 DIAGNOSIS — M54.50 CHRONIC MIDLINE LOW BACK PAIN WITHOUT SCIATICA: ICD-10-CM

## 2018-11-19 DIAGNOSIS — G89.29 CHRONIC MIDLINE LOW BACK PAIN WITHOUT SCIATICA: ICD-10-CM

## 2018-11-20 RX ORDER — HYDROCODONE BITARTRATE AND ACETAMINOPHEN 10; 325 MG/1; MG/1
1 TABLET ORAL EVERY 6 HOURS
Qty: 120 TABLET | Refills: 0 | Status: SHIPPED | OUTPATIENT
Start: 2018-11-20 | End: 2018-12-17 | Stop reason: SDUPTHER

## 2018-12-17 DIAGNOSIS — G89.29 CHRONIC MIDLINE LOW BACK PAIN WITHOUT SCIATICA: ICD-10-CM

## 2018-12-17 DIAGNOSIS — M54.50 CHRONIC MIDLINE LOW BACK PAIN WITHOUT SCIATICA: ICD-10-CM

## 2018-12-17 RX ORDER — HYDROCODONE BITARTRATE AND ACETAMINOPHEN 10; 325 MG/1; MG/1
1 TABLET ORAL EVERY 6 HOURS
Qty: 120 TABLET | Refills: 0 | Status: SHIPPED | OUTPATIENT
Start: 2018-12-17 | End: 2019-01-14 | Stop reason: SDUPTHER

## 2018-12-17 NOTE — TELEPHONE ENCOUNTER
Jada Barton called requesting a refill of the below medication which has been pended for you:     Requested Prescriptions     Pending Prescriptions Disp Refills    HYDROcodone-acetaminophen (NORCO)  MG per tablet 120 tablet 0     Sig: Take 1 tablet by mouth every 6 hours for 30 days. .       Last Appointment Date: 10/2/2018  Next Appointment Date: 1/7/2019  Richard Barillas- ALHAJI today  UDS- UTD  No Known Allergies      Mayi Anderson

## 2018-12-27 ENCOUNTER — HOSPITAL ENCOUNTER (OUTPATIENT)
Dept: ULTRASOUND IMAGING | Age: 73
Discharge: HOME | End: 2018-12-27
Payer: MEDICARE

## 2018-12-27 DIAGNOSIS — N18.30 CKD (CHRONIC KIDNEY DISEASE), STAGE III (HCC): ICD-10-CM

## 2018-12-27 PROCEDURE — 76770 US EXAM ABDO BACK WALL COMP: CPT

## 2019-01-07 ENCOUNTER — OFFICE VISIT (OUTPATIENT)
Dept: FAMILY MEDICINE CLINIC | Age: 74
End: 2019-01-07
Payer: MEDICARE

## 2019-01-07 VITALS
WEIGHT: 232 LBS | SYSTOLIC BLOOD PRESSURE: 132 MMHG | DIASTOLIC BLOOD PRESSURE: 92 MMHG | BODY MASS INDEX: 35.28 KG/M2 | HEART RATE: 75 BPM | TEMPERATURE: 97 F | OXYGEN SATURATION: 98 %

## 2019-01-07 DIAGNOSIS — E78.2 MIXED HYPERLIPIDEMIA: ICD-10-CM

## 2019-01-07 DIAGNOSIS — I10 ESSENTIAL HYPERTENSION: ICD-10-CM

## 2019-01-07 DIAGNOSIS — M54.50 CHRONIC MIDLINE LOW BACK PAIN WITHOUT SCIATICA: Primary | ICD-10-CM

## 2019-01-07 DIAGNOSIS — Z72.0 TOBACCO USE: ICD-10-CM

## 2019-01-07 DIAGNOSIS — G89.29 CHRONIC MIDLINE LOW BACK PAIN WITHOUT SCIATICA: Primary | ICD-10-CM

## 2019-01-07 DIAGNOSIS — K21.9 GASTROESOPHAGEAL REFLUX DISEASE WITHOUT ESOPHAGITIS: ICD-10-CM

## 2019-01-07 DIAGNOSIS — N18.30 CKD (CHRONIC KIDNEY DISEASE), STAGE III (HCC): ICD-10-CM

## 2019-01-07 DIAGNOSIS — F41.8 DEPRESSION WITH ANXIETY: ICD-10-CM

## 2019-01-07 DIAGNOSIS — I71.40 ABDOMINAL AORTIC ANEURYSM (AAA) WITHOUT RUPTURE: ICD-10-CM

## 2019-01-07 DIAGNOSIS — E21.3 HYPERPARATHYROIDISM (HCC): ICD-10-CM

## 2019-01-07 DIAGNOSIS — F33.41 RECURRENT MAJOR DEPRESSIVE DISORDER IN PARTIAL REMISSION (HCC): ICD-10-CM

## 2019-01-07 DIAGNOSIS — E83.52 HYPERCALCEMIA: ICD-10-CM

## 2019-01-07 DIAGNOSIS — J41.0 SIMPLE CHRONIC BRONCHITIS (HCC): ICD-10-CM

## 2019-01-07 PROCEDURE — G8427 DOCREV CUR MEDS BY ELIG CLIN: HCPCS | Performed by: FAMILY MEDICINE

## 2019-01-07 PROCEDURE — G8598 ASA/ANTIPLAT THER USED: HCPCS | Performed by: FAMILY MEDICINE

## 2019-01-07 PROCEDURE — 3017F COLORECTAL CA SCREEN DOC REV: CPT | Performed by: FAMILY MEDICINE

## 2019-01-07 PROCEDURE — G8417 CALC BMI ABV UP PARAM F/U: HCPCS | Performed by: FAMILY MEDICINE

## 2019-01-07 PROCEDURE — 3023F SPIROM DOC REV: CPT | Performed by: FAMILY MEDICINE

## 2019-01-07 PROCEDURE — G8926 SPIRO NO PERF OR DOC: HCPCS | Performed by: FAMILY MEDICINE

## 2019-01-07 PROCEDURE — 4004F PT TOBACCO SCREEN RCVD TLK: CPT | Performed by: FAMILY MEDICINE

## 2019-01-07 PROCEDURE — G8482 FLU IMMUNIZE ORDER/ADMIN: HCPCS | Performed by: FAMILY MEDICINE

## 2019-01-07 PROCEDURE — 4040F PNEUMOC VAC/ADMIN/RCVD: CPT | Performed by: FAMILY MEDICINE

## 2019-01-07 PROCEDURE — 1101F PT FALLS ASSESS-DOCD LE1/YR: CPT | Performed by: FAMILY MEDICINE

## 2019-01-07 PROCEDURE — 1123F ACP DISCUSS/DSCN MKR DOCD: CPT | Performed by: FAMILY MEDICINE

## 2019-01-07 PROCEDURE — 99214 OFFICE O/P EST MOD 30 MIN: CPT | Performed by: FAMILY MEDICINE

## 2019-01-07 RX ORDER — FAMOTIDINE 20 MG/1
20 TABLET, FILM COATED ORAL 2 TIMES DAILY
Qty: 180 TABLET | Refills: 3 | Status: SHIPPED | OUTPATIENT
Start: 2019-01-07 | End: 2020-02-24

## 2019-01-10 ASSESSMENT — ENCOUNTER SYMPTOMS
COUGH: 0
CONSTIPATION: 0
CHEST TIGHTNESS: 0
BACK PAIN: 1
ABDOMINAL PAIN: 0
SHORTNESS OF BREATH: 1
ANAL BLEEDING: 0
DIARRHEA: 0
NAUSEA: 0

## 2019-01-14 DIAGNOSIS — G89.29 CHRONIC MIDLINE LOW BACK PAIN WITHOUT SCIATICA: ICD-10-CM

## 2019-01-14 DIAGNOSIS — M54.50 CHRONIC MIDLINE LOW BACK PAIN WITHOUT SCIATICA: ICD-10-CM

## 2019-01-15 RX ORDER — HYDROCODONE BITARTRATE AND ACETAMINOPHEN 10; 325 MG/1; MG/1
1 TABLET ORAL EVERY 6 HOURS
Qty: 120 TABLET | Refills: 0 | Status: SHIPPED | OUTPATIENT
Start: 2019-01-15 | End: 2019-02-12 | Stop reason: SDUPTHER

## 2019-02-12 DIAGNOSIS — G89.29 CHRONIC MIDLINE LOW BACK PAIN WITHOUT SCIATICA: ICD-10-CM

## 2019-02-12 DIAGNOSIS — M54.50 CHRONIC MIDLINE LOW BACK PAIN WITHOUT SCIATICA: ICD-10-CM

## 2019-02-12 RX ORDER — HYDROCODONE BITARTRATE AND ACETAMINOPHEN 10; 325 MG/1; MG/1
1 TABLET ORAL EVERY 6 HOURS
Qty: 120 TABLET | Refills: 0 | Status: SHIPPED | OUTPATIENT
Start: 2019-02-12 | End: 2019-03-11 | Stop reason: SDUPTHER

## 2019-02-13 DIAGNOSIS — F41.8 DEPRESSION WITH ANXIETY: ICD-10-CM

## 2019-02-13 RX ORDER — VENLAFAXINE 75 MG/1
TABLET ORAL
Qty: 60 TABLET | Refills: 1 | Status: SHIPPED | OUTPATIENT
Start: 2019-02-13 | End: 2019-06-17 | Stop reason: SDUPTHER

## 2019-03-11 DIAGNOSIS — G89.29 CHRONIC MIDLINE LOW BACK PAIN WITHOUT SCIATICA: ICD-10-CM

## 2019-03-11 DIAGNOSIS — M54.50 CHRONIC MIDLINE LOW BACK PAIN WITHOUT SCIATICA: ICD-10-CM

## 2019-03-11 RX ORDER — HYDROCODONE BITARTRATE AND ACETAMINOPHEN 10; 325 MG/1; MG/1
1 TABLET ORAL EVERY 6 HOURS
Qty: 120 TABLET | Refills: 0 | Status: SHIPPED | OUTPATIENT
Start: 2019-03-11 | End: 2019-04-09 | Stop reason: SDUPTHER

## 2019-04-09 ENCOUNTER — OFFICE VISIT (OUTPATIENT)
Dept: FAMILY MEDICINE CLINIC | Age: 74
End: 2019-04-09
Payer: MEDICARE

## 2019-04-09 VITALS
TEMPERATURE: 98 F | DIASTOLIC BLOOD PRESSURE: 94 MMHG | HEART RATE: 62 BPM | OXYGEN SATURATION: 98 % | WEIGHT: 224 LBS | SYSTOLIC BLOOD PRESSURE: 134 MMHG | BODY MASS INDEX: 34.06 KG/M2

## 2019-04-09 DIAGNOSIS — M54.50 CHRONIC MIDLINE LOW BACK PAIN WITHOUT SCIATICA: ICD-10-CM

## 2019-04-09 DIAGNOSIS — E78.2 MIXED HYPERLIPIDEMIA: ICD-10-CM

## 2019-04-09 DIAGNOSIS — M17.0 PRIMARY OSTEOARTHRITIS OF BOTH KNEES: ICD-10-CM

## 2019-04-09 DIAGNOSIS — I25.10 CORONARY ARTERY DISEASE INVOLVING NATIVE HEART WITHOUT ANGINA PECTORIS, UNSPECIFIED VESSEL OR LESION TYPE: ICD-10-CM

## 2019-04-09 DIAGNOSIS — E78.01 FAMILIAL HYPERCHOLESTEROLEMIA: ICD-10-CM

## 2019-04-09 DIAGNOSIS — I10 ESSENTIAL HYPERTENSION: ICD-10-CM

## 2019-04-09 DIAGNOSIS — G89.29 CHRONIC MIDLINE LOW BACK PAIN WITHOUT SCIATICA: ICD-10-CM

## 2019-04-09 DIAGNOSIS — F41.8 DEPRESSION WITH ANXIETY: Primary | ICD-10-CM

## 2019-04-09 DIAGNOSIS — K21.9 GASTROESOPHAGEAL REFLUX DISEASE WITHOUT ESOPHAGITIS: ICD-10-CM

## 2019-04-09 PROCEDURE — 99214 OFFICE O/P EST MOD 30 MIN: CPT | Performed by: FAMILY MEDICINE

## 2019-04-09 PROCEDURE — G8427 DOCREV CUR MEDS BY ELIG CLIN: HCPCS | Performed by: FAMILY MEDICINE

## 2019-04-09 PROCEDURE — 4004F PT TOBACCO SCREEN RCVD TLK: CPT | Performed by: FAMILY MEDICINE

## 2019-04-09 PROCEDURE — G8598 ASA/ANTIPLAT THER USED: HCPCS | Performed by: FAMILY MEDICINE

## 2019-04-09 PROCEDURE — G8417 CALC BMI ABV UP PARAM F/U: HCPCS | Performed by: FAMILY MEDICINE

## 2019-04-09 PROCEDURE — 1123F ACP DISCUSS/DSCN MKR DOCD: CPT | Performed by: FAMILY MEDICINE

## 2019-04-09 PROCEDURE — 4040F PNEUMOC VAC/ADMIN/RCVD: CPT | Performed by: FAMILY MEDICINE

## 2019-04-09 RX ORDER — METOPROLOL SUCCINATE 50 MG/1
50 TABLET, EXTENDED RELEASE ORAL DAILY
Qty: 90 TABLET | Refills: 3 | Status: SHIPPED | OUTPATIENT
Start: 2019-04-09 | End: 2020-05-20

## 2019-04-09 RX ORDER — LISINOPRIL 40 MG/1
40 TABLET ORAL DAILY
Qty: 90 TABLET | Refills: 3 | Status: SHIPPED | OUTPATIENT
Start: 2019-04-09 | End: 2020-05-20

## 2019-04-09 RX ORDER — ATORVASTATIN CALCIUM 40 MG/1
40 TABLET, FILM COATED ORAL DAILY
Qty: 90 TABLET | Refills: 3 | Status: SHIPPED | OUTPATIENT
Start: 2019-04-09 | End: 2019-10-24 | Stop reason: SDUPTHER

## 2019-04-09 RX ORDER — CLOPIDOGREL BISULFATE 75 MG/1
75 TABLET ORAL DAILY
Qty: 90 TABLET | Refills: 3 | Status: SHIPPED | OUTPATIENT
Start: 2019-04-09 | End: 2020-04-09

## 2019-04-09 RX ORDER — HYDROCODONE BITARTRATE AND ACETAMINOPHEN 10; 325 MG/1; MG/1
1 TABLET ORAL EVERY 6 HOURS
Qty: 120 TABLET | Refills: 0 | Status: SHIPPED | OUTPATIENT
Start: 2019-04-09 | End: 2019-05-10 | Stop reason: SDUPTHER

## 2019-04-09 ASSESSMENT — ENCOUNTER SYMPTOMS
BACK PAIN: 1
CONSTIPATION: 0
DIARRHEA: 0
NAUSEA: 0
SHORTNESS OF BREATH: 0
ABDOMINAL PAIN: 0
CHEST TIGHTNESS: 0
ANAL BLEEDING: 0
COUGH: 0

## 2019-04-09 NOTE — PROGRESS NOTES
homicidal ideation. Excessive worry, insomnia, and anxiousness are stable. Energy, concentration, and apathy are stable. Gastroesophageal Reflux Disease  Symptoms currently under control. Medication adequately controls his symptoms. No hematochezia or melena. HPI    Past Medical History:   Diagnosis Date    AAA (abdominal aortic aneurysm) ([address]     Aortic dissection ([address] 10/10/2012    CAD (coronary artery disease)     Cancer (HCC)     lung&lymphnode    Carotid artery occlusion     Cerebrovascular accident (CVA) ([address]     Cerebrovascular accident (CVA) due to embolism ([address] 2/16/2016    Cerebrovascular accident (CVA) due to embolism of right middle cerebral artery ([address]     Depression     HTN (hypertension)     Hyperlipidemia     MI (myocardial infarction) ([address]     acute 5/13/2015    Unspecified sleep apnea       Past Surgical History:   Procedure Laterality Date    CARDIAC CATHETERIZATION  5/13/15  [address]    EF 40%. stent to LAD, stent to posterior descending branch of RCA.  CAROTID ENDARTERECTOMY Right 7/22/2016    Right carotid endarterectomy,carotid arteriograms. [facility]    COLONOSCOPY      LUNG CANCER SURGERY      right side    LYMPHADENECTOMY      removed lymphnodes     RECTAL SURGERY      fistula     VASCULAR SURGERY  10/29/12 [facility]    open exposure of R common femoral artery; percutaneous cannulation of L  femoral artery with 7-Lithuanian sheath; suprarenal abd aortagram with bilat iliofem arteriogram; endoluminal graft repair of AAA with endologix [id number] main body, 28x95 infrarenal cuff; balloon angioplasty of infrarenal abd aorta and endoluminal graft with coda balloon; completion abd aortogram w bilat iliofem arteriogram;     VASCULAR SURGERY  10/29/ [facility]     (cont) open repair of right common femoral ateriotomy; perclose repair of left common femoral artery puncture site.         Family History   Problem Relation Age of Onset    High Blood Pressure Father     High Blood Glucose 109 74 - 109 mg/dL    BUN 25 (H) 8 - 23 mg/dL    CREATININE 1.6 (H) 0.5 - 1.2 mg/dL    GFR Non-African American 42 (A) >60    Calcium 11.5 (H) 8.8 - 10.2 mg/dL    Total Protein 7.6 6.6 - 8.7 g/dL    Alb 4.4 3.5 - 5.2 g/dL    Total Bilirubin 0.5 0.2 - 1.2 mg/dL    Alkaline Phosphatase 78 40 - 130 U/L    ALT 22 5 - 41 U/L    AST 17 5 - 40 U/L   TSH without Reflex    Collection Time: 04/09/19  2:40 PM   Result Value Ref Range    TSH 1.120 0.270 - 4.200 uIU/mL   CBC    Collection Time: 04/09/19  2:40 PM   Result Value Ref Range    WBC 10.3 4.8 - 10.8 K/uL    RBC 5.29 4.70 - 6.10 M/uL    Hemoglobin 14.2 14.0 - 18.0 g/dL    Hematocrit 46.7 42.0 - 52.0 %    MCV 88.3 80.0 - 94.0 fL    MCH 26.8 (L) 27.0 - 31.0 pg    MCHC 30.4 (L) 33.0 - 37.0 g/dL    RDW 15.0 (H) 11.5 - 14.5 %    Platelets 621 246 - 487 K/uL    MPV 12.3 9.4 - 12.4 fL               Assessment & Plan: The following diagnoses and conditions are stable with no further orders unless indicated:  1. Familial hypercholesterolemia  Lab Results   Component Value Date    CHOL 120 (L) 10/02/2018    CHOL 101 (L) 09/28/2017    CHOL 133 (L) 02/11/2016     Lab Results   Component Value Date    TRIG 145 10/02/2018    TRIG 103 (L) 09/28/2017    TRIG 219 (H) 02/11/2016     Lab Results   Component Value Date    HDL 38 (L) 10/02/2018    HDL 39 (L) 09/28/2017    HDL 30 (L) 02/11/2016     Lab Results   Component Value Date    LDLCALC 53 10/02/2018    LDLCALC 41 09/28/2017     No results found for: LABVLDL, VLDL  No results found for: CHOLHDLRATIO  Continue with high intensity statin therapy  - atorvastatin (LIPITOR) 40 MG tablet; Take 1 tablet by mouth daily  Dispense: 90 tablet; Refill: 3    2. Coronary artery disease involving native heart without angina pectoris, unspecified vessel or lesion type  No chest pain or palpitations. Continue with antiplatelet therapy  - clopidogrel (PLAVIX) 75 MG tablet; Take 1 tablet by mouth daily  Dispense: 90 tablet;  Refill: 3    3. Essential hypertension  BP Readings from Last 3 Encounters:   04/09/19 (!) 134/94   01/07/19 (!) 132/92   10/02/18 132/82     Stable  Diastolic blood pressure is slightly elevated. We'll continue to monitor  - lisinopril (PRINIVIL;ZESTRIL) 40 MG tablet; Take 1 tablet by mouth daily  Dispense: 90 tablet; Refill: 3  - metoprolol succinate (TOPROL XL) 50 MG extended release tablet; Take 1 tablet by mouth daily  Dispense: 90 tablet; Refill: 3    4. Chronic midline low back pain without sciatica  Lower back pain and his chronic pain issues continue to be an issue. He is unsuccessful and reducing his hydrocodone. Now has been having some increased knee pain as well  - HYDROcodone-acetaminophen (NORCO)  MG per tablet; Take 1 tablet by mouth every 6 hours for 30 days. Dispense: 120 tablet; Refill: 0    5. Depression with anxiety  stable    6. Gastroesophageal reflux disease without esophagitis  stable    7. . Primary osteoarthritis of both knees  - External Referral To Orthopedic Surgery          Return in about 3 months (around 7/9/2019) for Routine follow up - 15 minute visit. Discussed use, benefit, and side effects of prescribed medications. All patient questions answered. Pt voiced understanding. Reviewed health maintenance. Instructedto continue current medications, diet and exercise. Patient agreed with treatmentplan. Follow up as directed.

## 2019-04-26 ENCOUNTER — OFFICE VISIT (OUTPATIENT)
Dept: FAMILY MEDICINE CLINIC | Age: 74
End: 2019-04-26
Payer: MEDICARE

## 2019-04-26 VITALS
BODY MASS INDEX: 34.82 KG/M2 | HEART RATE: 75 BPM | DIASTOLIC BLOOD PRESSURE: 100 MMHG | SYSTOLIC BLOOD PRESSURE: 138 MMHG | TEMPERATURE: 97.5 F | WEIGHT: 229 LBS | OXYGEN SATURATION: 98 %

## 2019-04-26 DIAGNOSIS — Z87.891 PERSONAL HISTORY OF TOBACCO USE: Primary | ICD-10-CM

## 2019-04-26 DIAGNOSIS — R79.89 ELEVATED PARATHYROID HORMONE RELATED PEPTIDE LEVEL: ICD-10-CM

## 2019-04-26 DIAGNOSIS — N18.30 CKD (CHRONIC KIDNEY DISEASE), STAGE III (HCC): ICD-10-CM

## 2019-04-26 DIAGNOSIS — E83.52 HYPERCALCEMIA: ICD-10-CM

## 2019-04-26 PROCEDURE — G0296 VISIT TO DETERM LDCT ELIG: HCPCS | Performed by: FAMILY MEDICINE

## 2019-04-26 PROCEDURE — 4004F PT TOBACCO SCREEN RCVD TLK: CPT | Performed by: FAMILY MEDICINE

## 2019-04-26 PROCEDURE — G8417 CALC BMI ABV UP PARAM F/U: HCPCS | Performed by: FAMILY MEDICINE

## 2019-04-26 PROCEDURE — G8598 ASA/ANTIPLAT THER USED: HCPCS | Performed by: FAMILY MEDICINE

## 2019-04-26 PROCEDURE — G8427 DOCREV CUR MEDS BY ELIG CLIN: HCPCS | Performed by: FAMILY MEDICINE

## 2019-04-26 PROCEDURE — 99214 OFFICE O/P EST MOD 30 MIN: CPT | Performed by: FAMILY MEDICINE

## 2019-04-26 PROCEDURE — 1123F ACP DISCUSS/DSCN MKR DOCD: CPT | Performed by: FAMILY MEDICINE

## 2019-04-26 PROCEDURE — 4040F PNEUMOC VAC/ADMIN/RCVD: CPT | Performed by: FAMILY MEDICINE

## 2019-04-26 NOTE — PROGRESS NOTES
[address] [place] [address] [facility]  [address] [place] [address]  Dept: [phone]  Dept Fax: [phone]  Loc: [phone]    Kelby Dance. is a 66 y.o. male who presents today for his medical conditions/complaints as noted below. Kelby Dance. is here for Discuss Labs        HPI:   CC: Here today to discuss the following:    Is here for follow-up to review his laboratory work. he's had a persistently elevated calcium. He's been asymptomatic but I have recommended referral to nephrology and endocrinology. He has not kept his appointment with the nephrologist. He has a history of chronic kidney disease along with the hypercalcemia. Her recent laboratory work PTH-related peptide was obtained as well. It showed significant elevation. Reviewed previous imaging studies including CT of abdomen and pelvis showed no obvious abnormalities. He does not have an up-to-date CT scan of his chest however. HPI    Past Medical History:   Diagnosis Date    AAA (abdominal aortic aneurysm) ([address]     Aortic dissection ([address] 10/10/2012    CAD (coronary artery disease)     Cancer (HCC)     lung&lymphnode    Carotid artery occlusion     Cerebrovascular accident (CVA) ([address]     Cerebrovascular accident (CVA) due to embolism ([address] 2/16/2016    Cerebrovascular accident (CVA) due to embolism of right middle cerebral artery ([address]     Depression     HTN (hypertension)     Hyperlipidemia     MI (myocardial infarction) ([address]     acute 5/13/2015    Unspecified sleep apnea       Past Surgical History:   Procedure Laterality Date    CARDIAC CATHETERIZATION  5/13/15  [address]    EF 40%. stent to LAD, stent to posterior descending branch of RCA.  CAROTID ENDARTERECTOMY Right 7/22/2016    Right carotid endarterectomy,carotid arteriograms. [facility]    COLONOSCOPY      LUNG CANCER SURGERY      right side    LYMPHADENECTOMY      removed lymphnodes     RECTAL SURGERY fistula     VASCULAR SURGERY  10/29/12 [facility]    open exposure of R common femoral artery; percutaneous cannulation of L  femoral artery with 7-french sheath; suprarenal abd aortagram with bilat iliofem arteriogram; endoluminal graft repair of AAA with endologix [id number] main body, 28x95 infrarenal cuff; balloon angioplasty of infrarenal abd aorta and endoluminal graft with coda balloon; completion abd aortogram w bilat iliofem arteriogram;     VASCULAR SURGERY  10/29/ [facility]     (cont) open repair of right common femoral ateriotomy; perclose repair of left common femoral artery puncture site. Family History   Problem Relation Age of Onset    High Blood Pressure Father     High Blood Pressure Mother     Diabetes Mother        Social History     Tobacco Use    Smoking status: Current Every Day Smoker     Packs/day: 0.50     Years: 40.00     Pack years: 20.00    Smokeless tobacco: Never Used    Tobacco comment: quit smoking in nov 2017, restarted smoking since then   Substance Use Topics    Alcohol use: No     Current Outpatient Medications   Medication Sig Dispense Refill    atorvastatin (LIPITOR) 40 MG tablet Take 1 tablet by mouth daily 90 tablet 3    clopidogrel (PLAVIX) 75 MG tablet Take 1 tablet by mouth daily 90 tablet 3    lisinopril (PRINIVIL;ZESTRIL) 40 MG tablet Take 1 tablet by mouth daily 90 tablet 3    metoprolol succinate (TOPROL XL) 50 MG extended release tablet Take 1 tablet by mouth daily 90 tablet 3    HYDROcodone-acetaminophen (NORCO)  MG per tablet Take 1 tablet by mouth every 6 hours for 30 days.  120 tablet 0    venlafaxine (EFFEXOR) 75 MG tablet TAKE 1 TABLET DAILY 60 tablet 1    famotidine (PEPCID) 20 MG tablet Take 1 tablet by mouth 2 times daily 180 tablet 3    aspirin 81 MG EC tablet Take 1 tablet by mouth daily 30 tablet 3    nitroGLYCERIN (NITROSTAT) 0.4 MG SL tablet Place 0.4 mg under the tongue every 5 minutes as needed for Chest pain       No current facility-administered medications for this visit. No Known Allergies    Health Maintenance   Topic Date Due    DTaP/Tdap/Td vaccine (1 - Tdap) 02/23/1962    Shingles Vaccine (1 of 2) 02/23/1993    Pneumococcal 65+ years Vaccine (2 of 2 - PCV13) 01/09/2019    Potassium monitoring  04/09/2020    Creatinine monitoring  04/09/2020    Flu vaccine  Completed       Subjective:      Review of Systems   Constitutional: Negative for chills and fever. HENT: Negative for congestion. Respiratory: Negative for cough, chest tightness and shortness of breath. Cardiovascular: Negative for chest pain, palpitations and leg swelling. Gastrointestinal: Negative for abdominal pain, anal bleeding, constipation, diarrhea and nausea. Genitourinary: Negative for difficulty urinating. Psychiatric/Behavioral: Negative. SeeHPI for visit specific review of symptoms. All others negative      Objective:   BP (!) 138/100   Pulse 75   Temp 97.5 °F (36.4 °C)   Wt 229 lb (103.9 kg)   SpO2 98%   BMI 34.82 kg/m²   Physical Exam   Constitutional: He appears well-developed. He does not appear ill. Eyes: Pupils are equal, round, and reactive to light. Neck: Normal range of motion. Neck supple. Cardiovascular: Normal rate and regular rhythm. Exam reveals no friction rub. No murmur heard. Pulmonary/Chest: Effort normal and breath sounds normal. No respiratory distress. He has no wheezes. He has no rales. Abdominal: Soft. Bowel sounds are normal. He exhibits no distension. There is no tenderness. There is no rebound and no guarding. Musculoskeletal: He exhibits no edema. Neurological: He is alert. Psychiatric: He has a normal mood and affect.  His behavior is normal.         Recent Results (from the past 672 hour(s))   Vitamin D 1,25 Dihydroxy    Collection Time: 04/09/19  2:40 PM   Result Value Ref Range    Vit D, 1,25-Dihydroxy 25.9 19.9 - 79.3 pg/mL   PTH-Related Peptide    Collection Time: 04/09/19 2:40 PM   Result Value Ref Range    PTH Related Peptide 4.9 (H) 0.0 - 2.3 pmol/L   Phosphorus    Collection Time: 04/09/19  2:40 PM   Result Value Ref Range    Phosphorus 2.4 (L) 2.5 - 4.5 mg/dL   Comprehensive Metabolic Panel    Collection Time: 04/09/19  2:40 PM   Result Value Ref Range    Sodium 141 136 - 145 mmol/L    Potassium 5.0 3.5 - 5.0 mmol/L    Chloride 102 98 - 111 mmol/L    CO2 23 22 - 29 mmol/L    Anion Gap 16 7 - 19 mmol/L    Glucose 109 74 - 109 mg/dL    BUN 25 (H) 8 - 23 mg/dL    CREATININE 1.6 (H) 0.5 - 1.2 mg/dL    GFR Non-African American 42 (A) >60    Calcium 11.5 (H) 8.8 - 10.2 mg/dL    Total Protein 7.6 6.6 - 8.7 g/dL    Alb 4.4 3.5 - 5.2 g/dL    Total Bilirubin 0.5 0.2 - 1.2 mg/dL    Alkaline Phosphatase 78 40 - 130 U/L    ALT 22 5 - 41 U/L    AST 17 5 - 40 U/L   TSH without Reflex    Collection Time: 04/09/19  2:40 PM   Result Value Ref Range    TSH 1.120 0.270 - 4.200 uIU/mL   CBC    Collection Time: 04/09/19  2:40 PM   Result Value Ref Range    WBC 10.3 4.8 - 10.8 K/uL    RBC 5.29 4.70 - 6.10 M/uL    Hemoglobin 14.2 14.0 - 18.0 g/dL    Hematocrit 46.7 42.0 - 52.0 %    MCV 88.3 80.0 - 94.0 fL    MCH 26.8 (L) 27.0 - 31.0 pg    MCHC 30.4 (L) 33.0 - 37.0 g/dL    RDW 15.0 (H) 11.5 - 14.5 %    Platelets 776 685 - 700 K/uL    MPV 12.3 9.4 - 12.4 fL               Assessment & Plan: The following diagnoses and conditions are stable with no further orders unless indicated:  1. Personal history of tobacco use  We'll do CT scan of his lungs  - GA VISIT TO DISCUSS LUNG CA SCREEN W LDCT  - CT Lung Screening (Annual); Future    2. Elevated parathyroid hormone related peptide level  He has a history of chronic back pain, renal failure, known elevated parathyroid-related peptide. Like to get the opinion from hematology regarding the possibility of Myelodysplastic syndrome contributing to his findings  - External Referral To Hematology Oncology    3.  CKD (chronic kidney disease), stage III

## 2019-05-02 ASSESSMENT — ENCOUNTER SYMPTOMS
CHEST TIGHTNESS: 0
ABDOMINAL PAIN: 0
CONSTIPATION: 0
NAUSEA: 0
SHORTNESS OF BREATH: 0
DIARRHEA: 0
ANAL BLEEDING: 0
COUGH: 0

## 2019-05-10 DIAGNOSIS — G89.29 CHRONIC MIDLINE LOW BACK PAIN WITHOUT SCIATICA: ICD-10-CM

## 2019-05-10 DIAGNOSIS — M54.50 CHRONIC MIDLINE LOW BACK PAIN WITHOUT SCIATICA: ICD-10-CM

## 2019-05-10 RX ORDER — HYDROCODONE BITARTRATE AND ACETAMINOPHEN 10; 325 MG/1; MG/1
1 TABLET ORAL EVERY 6 HOURS
Qty: 120 TABLET | Refills: 0 | Status: SHIPPED | OUTPATIENT
Start: 2019-05-10 | End: 2019-06-10 | Stop reason: SDUPTHER

## 2019-05-10 NOTE — TELEPHONE ENCOUNTER
Vikas Aguilar called requesting a refill of the below medication which has been pended for you:     Requested Prescriptions     Pending Prescriptions Disp Refills    HYDROcodone-acetaminophen (NORCO)  MG per tablet 120 tablet 0     Sig: Take 1 tablet by mouth every 6 hours for 30 days.        Last Appointment Date: 4/26/2019  Next Appointment Date: 7/22/2019    No Known Allergies      Linad MANE  Last UDS & Contract 4/23/18

## 2019-06-07 DIAGNOSIS — M54.50 CHRONIC MIDLINE LOW BACK PAIN WITHOUT SCIATICA: ICD-10-CM

## 2019-06-07 DIAGNOSIS — G89.29 CHRONIC MIDLINE LOW BACK PAIN WITHOUT SCIATICA: ICD-10-CM

## 2019-06-07 NOTE — TELEPHONE ENCOUNTER
Mendoza Rojas. called to request a refill on his medication. Last office visit : 2019   Next office visit : 2019     Last UDS:   Amphetamine Screen, Urine   Date Value Ref Range Status   2018 Negative  Final     Barbiturate Screen, Urine   Date Value Ref Range Status   2018 Negative  Final     Benzodiazepine Screen, Urine   Date Value Ref Range Status   2018 Negative  Final     Cocaine Metabolite Screen, Urine   Date Value Ref Range Status   2018 Negative  Final     MDMA, Urine   Date Value Ref Range Status   2018 Negative  Final     Methamphetamine, Urine   Date Value Ref Range Status   2018 Negative  Final     Opiate Scrn, Ur   Date Value Ref Range Status   2018 Positive  Final     Oxycodone Screen, Ur   Date Value Ref Range Status   2018 Negative  Final     PCP Screen, Urine   Date Value Ref Range Status   2018 Negative  Final     Propoxyphene Screen, Urine   Date Value Ref Range Status   2018 Negative  Final     Tricyclic Antidepressants, Urine   Date Value Ref Range Status   2018 Negative  Final       Last Olive Curlin19  Medication Contract: [id number]  Last Fill: 5/10/19    Requested Prescriptions     Pending Prescriptions Disp Refills    HYDROcodone-acetaminophen (NORCO)  MG per tablet 120 tablet 0     Sig: Take 1 tablet by mouth every 6 hours for 30 days. Please approve or refuse this medication.    Gregory Newell

## 2019-06-10 RX ORDER — HYDROCODONE BITARTRATE AND ACETAMINOPHEN 10; 325 MG/1; MG/1
1 TABLET ORAL EVERY 6 HOURS
Qty: 120 TABLET | Refills: 0 | Status: SHIPPED | OUTPATIENT
Start: 2019-06-10 | End: 2019-07-10 | Stop reason: SDUPTHER

## 2019-06-12 ENCOUNTER — HOSPITAL ENCOUNTER (OUTPATIENT)
Dept: INFUSION THERAPY | Age: 74
Discharge: HOME | End: 2019-06-12
Payer: MEDICARE

## 2019-06-12 PROCEDURE — 82378 CARCINOEMBRYONIC ANTIGEN: CPT

## 2019-06-12 PROCEDURE — 83615 LACTATE (LD) (LDH) ENZYME: CPT

## 2019-06-12 PROCEDURE — 86140 C-REACTIVE PROTEIN: CPT

## 2019-06-12 PROCEDURE — 80053 COMPREHEN METABOLIC PANEL: CPT

## 2019-06-12 PROCEDURE — 84153 ASSAY OF PSA TOTAL: CPT

## 2019-06-12 PROCEDURE — 85025 COMPLETE CBC W/AUTO DIFF WBC: CPT

## 2019-06-14 LAB
ALBUMIN SERPL-MCNC: 4.5 G/DL (ref 3.5–5.2)
ALP BLD-CCNC: 72 U/L (ref 40–130)
ALT SERPL-CCNC: 22 U/L (ref 5–41)
ANION GAP SERPL CALCULATED.3IONS-SCNC: 13 MMOL/L (ref 7–19)
AST SERPL-CCNC: 14 U/L (ref 5–40)
BACTERIA: NEGATIVE /HPF
BASOPHILS ABSOLUTE: 0.1 K/UL (ref 0–0.2)
BASOPHILS RELATIVE PERCENT: 0.8 % (ref 0–1)
BILIRUB SERPL-MCNC: 0.5 MG/DL (ref 0.2–1.2)
BILIRUBIN URINE: ABNORMAL
BLOOD, URINE: NEGATIVE
BUN BLDV-MCNC: 30 MG/DL (ref 8–23)
CALCIUM SERPL-MCNC: 11.2 MG/DL (ref 8.8–10.2)
CHLORIDE BLD-SCNC: 101 MMOL/L (ref 98–111)
CLARITY: CLEAR
CO2: 23 MMOL/L (ref 22–29)
COLOR: ABNORMAL
CREAT SERPL-MCNC: 1.6 MG/DL (ref 0.5–1.2)
CREATININE URINE: 324.3 MG/DL (ref 4.2–622)
EOSINOPHILS ABSOLUTE: 0 K/UL (ref 0–0.6)
EOSINOPHILS RELATIVE PERCENT: 0 % (ref 0–5)
EPITHELIAL CELLS, UA: 1 /HPF (ref 0–5)
GFR NON-AFRICAN AMERICAN: 42
GLUCOSE BLD-MCNC: 118 MG/DL (ref 74–109)
GLUCOSE URINE: NEGATIVE MG/DL
HCT VFR BLD CALC: 47.5 % (ref 42–52)
HEMOGLOBIN: 14.9 G/DL (ref 14–18)
HYALINE CASTS: 3 /HPF (ref 0–8)
KETONES, URINE: NEGATIVE MG/DL
LEUKOCYTE ESTERASE, URINE: NEGATIVE
LYMPHOCYTES ABSOLUTE: 2.4 K/UL (ref 1.1–4.5)
LYMPHOCYTES RELATIVE PERCENT: 24.9 % (ref 20–40)
MAGNESIUM: 1.7 MG/DL (ref 1.6–2.4)
MCH RBC QN AUTO: 27.6 PG (ref 27–31)
MCHC RBC AUTO-ENTMCNC: 31.4 G/DL (ref 33–37)
MCV RBC AUTO: 88 FL (ref 80–94)
MONOCYTES ABSOLUTE: 0.7 K/UL (ref 0–0.9)
MONOCYTES RELATIVE PERCENT: 6.8 % (ref 0–10)
NEUTROPHILS ABSOLUTE: 6.5 K/UL (ref 1.5–7.5)
NEUTROPHILS RELATIVE PERCENT: 67.3 % (ref 50–65)
NITRITE, URINE: NEGATIVE
PARATHYROID HORMONE INTACT: 324.9 PG/ML (ref 15–65)
PDW BLD-RTO: 13.9 % (ref 11.5–14.5)
PH UA: 5.5 (ref 5–8)
PHOSPHORUS: 2.8 MG/DL (ref 2.5–4.5)
PLATELET # BLD: 251 K/UL (ref 130–400)
PMV BLD AUTO: 11.5 FL (ref 9.4–12.4)
POTASSIUM SERPL-SCNC: 5.2 MMOL/L (ref 3.5–5)
PROTEIN PROTEIN: 132 MG/DL (ref 15–45)
PROTEIN UA: 100 MG/DL
RBC # BLD: 5.4 M/UL (ref 4.7–6.1)
RBC UA: 2 /HPF (ref 0–4)
SODIUM BLD-SCNC: 137 MMOL/L (ref 136–145)
SPECIFIC GRAVITY UA: 1.03 (ref 1–1.03)
TOTAL PROTEIN: 7.7 G/DL (ref 6.6–8.7)
URIC ACID, SERUM: 7.5 MG/DL (ref 3.4–7)
UROBILINOGEN, URINE: 1 E.U./DL
VITAMIN D 25-HYDROXY: 13.6 NG/ML
WBC # BLD: 9.7 K/UL (ref 4.8–10.8)
WBC UA: 2 /HPF (ref 0–5)

## 2019-06-17 LAB — ANA IGG, ELISA: NORMAL

## 2019-06-20 ENCOUNTER — HOSPITAL ENCOUNTER (OUTPATIENT)
Dept: NUCLEAR MEDICINE | Age: 74
Discharge: HOME | End: 2019-06-22
Payer: MEDICARE

## 2019-06-20 ENCOUNTER — HOSPITAL ENCOUNTER (OUTPATIENT)
Dept: CT IMAGING | Age: 74
Discharge: HOME | End: 2019-06-20
Payer: MEDICARE

## 2019-06-20 ENCOUNTER — HOSPITAL ENCOUNTER (OUTPATIENT)
Dept: ULTRASOUND IMAGING | Age: 74
Discharge: HOME | End: 2019-06-20
Payer: MEDICARE

## 2019-06-20 DIAGNOSIS — N28.89 OTHER SPECIFIED DISORDERS OF KIDNEY AND URETER: ICD-10-CM

## 2019-06-20 DIAGNOSIS — M85.80 OTHER SPECIFIED DISORDERS OF BONE DENSITY AND STRUCTURE, UNSPECIFIED SITE: ICD-10-CM

## 2019-06-20 DIAGNOSIS — N28.9 DISORDER OF KIDNEY AND URETER: ICD-10-CM

## 2019-06-20 PROCEDURE — 76770 US EXAM ABDO BACK WALL COMP: CPT

## 2019-06-20 PROCEDURE — 78306 BONE IMAGING WHOLE BODY: CPT

## 2019-06-20 PROCEDURE — 3430000000 HC RX DIAGNOSTIC RADIOPHARMACEUTICAL: Performed by: INTERNAL MEDICINE

## 2019-06-20 PROCEDURE — 71250 CT THORAX DX C-: CPT

## 2019-06-20 PROCEDURE — A9561 TC99M OXIDRONATE: HCPCS | Performed by: INTERNAL MEDICINE

## 2019-06-20 RX ADMIN — TECHNETIUM TC 99M OXIDRONATE 20 MILLICURIE: 3.15 INJECTION, POWDER, LYOPHILIZED, FOR SOLUTION INTRAVENOUS at 13:51

## 2019-07-08 ENCOUNTER — HOSPITAL ENCOUNTER (OUTPATIENT)
Dept: INFUSION THERAPY | Age: 74
Discharge: HOME | End: 2019-07-08
Payer: MEDICARE

## 2019-07-08 DIAGNOSIS — M54.50 CHRONIC MIDLINE LOW BACK PAIN WITHOUT SCIATICA: ICD-10-CM

## 2019-07-08 DIAGNOSIS — G89.29 CHRONIC MIDLINE LOW BACK PAIN WITHOUT SCIATICA: ICD-10-CM

## 2019-07-08 PROCEDURE — 85025 COMPLETE CBC W/AUTO DIFF WBC: CPT

## 2019-07-08 PROCEDURE — 99211 OFF/OP EST MAY X REQ PHY/QHP: CPT

## 2019-07-08 NOTE — TELEPHONE ENCOUNTER
Pt needs refill on hydrocodone sent to [facility] in [place] or call pt at [phone]

## 2019-07-10 RX ORDER — HYDROCODONE BITARTRATE AND ACETAMINOPHEN 10; 325 MG/1; MG/1
1 TABLET ORAL EVERY 6 HOURS
Qty: 120 TABLET | Refills: 0 | Status: SHIPPED | OUTPATIENT
Start: 2019-07-10 | End: 2019-08-06 | Stop reason: SDUPTHER

## 2019-07-22 ENCOUNTER — OFFICE VISIT (OUTPATIENT)
Dept: FAMILY MEDICINE CLINIC | Age: 74
End: 2019-07-22
Payer: MEDICARE

## 2019-07-22 VITALS
WEIGHT: 222 LBS | BODY MASS INDEX: 33.75 KG/M2 | HEART RATE: 68 BPM | DIASTOLIC BLOOD PRESSURE: 68 MMHG | OXYGEN SATURATION: 98 % | TEMPERATURE: 97 F | SYSTOLIC BLOOD PRESSURE: 116 MMHG

## 2019-07-22 DIAGNOSIS — I10 ESSENTIAL (PRIMARY) HYPERTENSION: ICD-10-CM

## 2019-07-22 DIAGNOSIS — K21.9 GASTROESOPHAGEAL REFLUX DISEASE WITHOUT ESOPHAGITIS: Primary | ICD-10-CM

## 2019-07-22 DIAGNOSIS — G89.29 CHRONIC MIDLINE LOW BACK PAIN WITHOUT SCIATICA: ICD-10-CM

## 2019-07-22 DIAGNOSIS — E78.2 MIXED HYPERLIPIDEMIA: ICD-10-CM

## 2019-07-22 DIAGNOSIS — N18.30 CKD (CHRONIC KIDNEY DISEASE), STAGE III (HCC): ICD-10-CM

## 2019-07-22 DIAGNOSIS — I25.10 CORONARY ARTERY DISEASE INVOLVING NATIVE HEART WITHOUT ANGINA PECTORIS, UNSPECIFIED VESSEL OR LESION TYPE: ICD-10-CM

## 2019-07-22 DIAGNOSIS — M54.50 CHRONIC MIDLINE LOW BACK PAIN WITHOUT SCIATICA: ICD-10-CM

## 2019-07-22 DIAGNOSIS — N40.0 BENIGN PROSTATIC HYPERPLASIA WITHOUT LOWER URINARY TRACT SYMPTOMS: ICD-10-CM

## 2019-07-22 DIAGNOSIS — F41.8 DEPRESSION WITH ANXIETY: ICD-10-CM

## 2019-07-22 PROCEDURE — G8417 CALC BMI ABV UP PARAM F/U: HCPCS | Performed by: FAMILY MEDICINE

## 2019-07-22 PROCEDURE — G8427 DOCREV CUR MEDS BY ELIG CLIN: HCPCS | Performed by: FAMILY MEDICINE

## 2019-07-22 PROCEDURE — 4004F PT TOBACCO SCREEN RCVD TLK: CPT | Performed by: FAMILY MEDICINE

## 2019-07-22 PROCEDURE — 1123F ACP DISCUSS/DSCN MKR DOCD: CPT | Performed by: FAMILY MEDICINE

## 2019-07-22 PROCEDURE — 4040F PNEUMOC VAC/ADMIN/RCVD: CPT | Performed by: FAMILY MEDICINE

## 2019-07-22 PROCEDURE — G8598 ASA/ANTIPLAT THER USED: HCPCS | Performed by: FAMILY MEDICINE

## 2019-07-22 PROCEDURE — 99214 OFFICE O/P EST MOD 30 MIN: CPT | Performed by: FAMILY MEDICINE

## 2019-07-22 RX ORDER — VENLAFAXINE 75 MG/1
TABLET ORAL
Qty: 90 TABLET | Refills: 3 | Status: SHIPPED | OUTPATIENT
Start: 2019-07-22 | End: 2020-08-28

## 2019-07-22 RX ORDER — DOXAZOSIN 2 MG/1
2 TABLET ORAL NIGHTLY
COMMUNITY
Start: 2019-06-14 | End: 2021-10-28 | Stop reason: SDUPTHER

## 2019-07-26 ASSESSMENT — ENCOUNTER SYMPTOMS
ABDOMINAL PAIN: 0
CONSTIPATION: 0
NAUSEA: 0
DIARRHEA: 0
ANAL BLEEDING: 0
CHEST TIGHTNESS: 0
BACK PAIN: 1
SHORTNESS OF BREATH: 0
COUGH: 0

## 2019-07-30 ENCOUNTER — OFFICE VISIT (OUTPATIENT)
Dept: VASCULAR SURGERY | Age: 74
End: 2019-07-30
Payer: MEDICARE

## 2019-07-30 ENCOUNTER — HOSPITAL ENCOUNTER (OUTPATIENT)
Dept: VASCULAR LAB | Age: 74
Discharge: HOME | End: 2019-07-30
Payer: MEDICARE

## 2019-07-30 ENCOUNTER — HOSPITAL ENCOUNTER (OUTPATIENT)
Dept: CT IMAGING | Age: 74
Discharge: HOME | End: 2019-07-30
Payer: MEDICARE

## 2019-07-30 VITALS
RESPIRATION RATE: 18 BRPM | OXYGEN SATURATION: 98 % | HEART RATE: 57 BPM | SYSTOLIC BLOOD PRESSURE: 150 MMHG | DIASTOLIC BLOOD PRESSURE: 80 MMHG

## 2019-07-30 DIAGNOSIS — I71.40 ABDOMINAL AORTIC ANEURYSM (AAA) WITHOUT RUPTURE: Primary | ICD-10-CM

## 2019-07-30 DIAGNOSIS — I73.9 PVD (PERIPHERAL VASCULAR DISEASE) (HCC): ICD-10-CM

## 2019-07-30 DIAGNOSIS — I71.40 ABDOMINAL AORTIC ANEURYSM (AAA) WITHOUT RUPTURE: ICD-10-CM

## 2019-07-30 DIAGNOSIS — I70.203 ATHEROSCLEROSIS OF NATIVE ARTERY OF BOTH LOWER EXTREMITIES, WITH UNSPECIFIED PRESENCE OF CLINICAL MANIFESTATION (HCC): ICD-10-CM

## 2019-07-30 DIAGNOSIS — I65.23 BILATERAL CAROTID ARTERY STENOSIS: ICD-10-CM

## 2019-07-30 DIAGNOSIS — I65.21 STENOSIS OF RIGHT CAROTID ARTERY: ICD-10-CM

## 2019-07-30 LAB
ALBUMIN SERPL-MCNC: 4.5 G/DL (ref 3.5–5.2)
ALP BLD-CCNC: 65 U/L (ref 40–130)
ALT SERPL-CCNC: 14 U/L (ref 5–41)
ANION GAP SERPL CALCULATED.3IONS-SCNC: 13 MMOL/L (ref 7–19)
AST SERPL-CCNC: 13 U/L (ref 5–40)
BILIRUB SERPL-MCNC: 0.6 MG/DL (ref 0.2–1.2)
BUN BLDV-MCNC: 28 MG/DL (ref 8–23)
CALCIUM SERPL-MCNC: 11.7 MG/DL (ref 8.8–10.2)
CHLORIDE BLD-SCNC: 99 MMOL/L (ref 98–111)
CO2: 25 MMOL/L (ref 22–29)
CREAT SERPL-MCNC: 1.5 MG/DL (ref 0.5–1.2)
GFR NON-AFRICAN AMERICAN: 45
GLUCOSE BLD-MCNC: 101 MG/DL (ref 74–109)
POTASSIUM SERPL-SCNC: 4.8 MMOL/L (ref 3.5–5)
SODIUM BLD-SCNC: 137 MMOL/L (ref 136–145)
TOTAL PROTEIN: 7.8 G/DL (ref 6.6–8.7)

## 2019-07-30 PROCEDURE — 4004F PT TOBACCO SCREEN RCVD TLK: CPT | Performed by: PHYSICIAN ASSISTANT

## 2019-07-30 PROCEDURE — 93880 EXTRACRANIAL BILAT STUDY: CPT

## 2019-07-30 PROCEDURE — G8427 DOCREV CUR MEDS BY ELIG CLIN: HCPCS | Performed by: PHYSICIAN ASSISTANT

## 2019-07-30 PROCEDURE — G8598 ASA/ANTIPLAT THER USED: HCPCS | Performed by: PHYSICIAN ASSISTANT

## 2019-07-30 PROCEDURE — G8417 CALC BMI ABV UP PARAM F/U: HCPCS | Performed by: PHYSICIAN ASSISTANT

## 2019-07-30 PROCEDURE — 93923 UPR/LXTR ART STDY 3+ LVLS: CPT

## 2019-07-30 PROCEDURE — 4040F PNEUMOC VAC/ADMIN/RCVD: CPT | Performed by: PHYSICIAN ASSISTANT

## 2019-07-30 PROCEDURE — 1123F ACP DISCUSS/DSCN MKR DOCD: CPT | Performed by: PHYSICIAN ASSISTANT

## 2019-07-30 PROCEDURE — 74176 CT ABD & PELVIS W/O CONTRAST: CPT

## 2019-07-30 PROCEDURE — 99214 OFFICE O/P EST MOD 30 MIN: CPT | Performed by: PHYSICIAN ASSISTANT

## 2019-07-31 ENCOUNTER — TELEPHONE (OUTPATIENT)
Dept: GASTROENTEROLOGY | Facility: CLINIC | Age: 74
End: 2019-07-31

## 2019-07-31 ENCOUNTER — PREP FOR SURGERY (OUTPATIENT)
Dept: OTHER | Facility: HOSPITAL | Age: 74
End: 2019-07-31

## 2019-07-31 DIAGNOSIS — C49.A0 GASTROINTESTINAL STROMAL TUMOR (HCC): Primary | ICD-10-CM

## 2019-07-31 NOTE — TELEPHONE ENCOUNTER
konrad pt and he was a bit confused because he states that Mahogany Bustos told him that the CT was good. He agreed to schedule 8/14/19. Letter sent to Dr. Valencia to see if pt can stop Plavix for 5 days prior to procedure. Case request pended to you to sign.

## 2019-07-31 NOTE — TELEPHONE ENCOUNTER
Please let patient know that his CT scan was forwarded to me by his primary care provider.  It shows a possible mass in the stomach.  I recommend that we proceed with endoscopy to assess.  He is on Plavix.  Will need to get clearance to hold it and then proceed when able.  Okay to start the process to schedule if okay with the patient.    Ruth Wellington MD

## 2019-08-01 ENCOUNTER — TELEPHONE (OUTPATIENT)
Dept: GASTROENTEROLOGY | Facility: CLINIC | Age: 74
End: 2019-08-01

## 2019-08-01 PROBLEM — C49.A0 GASTROINTESTINAL STROMAL TUMOR: Status: ACTIVE | Noted: 2019-08-01

## 2019-08-01 NOTE — TELEPHONE ENCOUNTER
Dr. Valencia office sent back request to hold Plavix for EGD because pt hasn't been seen since 2015 and was no longer a patient of his. I called pt and he states that Dr. Worrell took care of his Plavix. Letter sent to Dr. Worrell.

## 2019-08-06 DIAGNOSIS — G89.29 CHRONIC MIDLINE LOW BACK PAIN WITHOUT SCIATICA: ICD-10-CM

## 2019-08-06 DIAGNOSIS — M54.50 CHRONIC MIDLINE LOW BACK PAIN WITHOUT SCIATICA: ICD-10-CM

## 2019-08-06 RX ORDER — HYDROCODONE BITARTRATE AND ACETAMINOPHEN 10; 325 MG/1; MG/1
1 TABLET ORAL EVERY 6 HOURS
Qty: 120 TABLET | Refills: 0 | Status: SHIPPED | OUTPATIENT
Start: 2019-08-06 | End: 2019-09-05 | Stop reason: SDUPTHER

## 2019-08-08 ENCOUNTER — CLINICAL DOCUMENTATION (OUTPATIENT)
Dept: FAMILY MEDICINE CLINIC | Age: 74
End: 2019-08-08

## 2019-08-08 NOTE — PROGRESS NOTES
Contacted by his gastroenterologist for approval for surveillance esophagoduodenoscopy. he has a history of gastrointestinal hemorrhage in 2018.he remains on chronic anticoagulation with aspirin and Plavix due to vascular disease. Is having no signs or symptoms associated with bleeding at this time. Hemoglobin was stable in June. Current comorbidities include but are not limited to worsening chronic kidney disease, hypercalcemia, and hyperkalemia. Would recommend postponing any further anesthesia and interventions unless indicated symptomatically.

## 2019-08-09 ENCOUNTER — TELEPHONE (OUTPATIENT)
Dept: GASTROENTEROLOGY | Facility: CLINIC | Age: 74
End: 2019-08-09

## 2019-08-09 NOTE — TELEPHONE ENCOUNTER
Dr. Clancy requests to postpone EGD until pt is seen by them. Julia called pt and canceled procedure and told pt they needed to call Dr. Worrell office for an appt.

## 2019-09-05 DIAGNOSIS — G89.29 CHRONIC MIDLINE LOW BACK PAIN WITHOUT SCIATICA: ICD-10-CM

## 2019-09-05 DIAGNOSIS — M54.50 CHRONIC MIDLINE LOW BACK PAIN WITHOUT SCIATICA: ICD-10-CM

## 2019-09-05 RX ORDER — HYDROCODONE BITARTRATE AND ACETAMINOPHEN 10; 325 MG/1; MG/1
1 TABLET ORAL EVERY 6 HOURS
Qty: 120 TABLET | Refills: 0 | Status: SHIPPED | OUTPATIENT
Start: 2019-09-05 | End: 2019-10-03 | Stop reason: SDUPTHER

## 2019-10-02 DIAGNOSIS — G89.29 CHRONIC MIDLINE LOW BACK PAIN WITHOUT SCIATICA: ICD-10-CM

## 2019-10-02 DIAGNOSIS — M54.50 CHRONIC MIDLINE LOW BACK PAIN WITHOUT SCIATICA: ICD-10-CM

## 2019-10-03 RX ORDER — HYDROCODONE BITARTRATE AND ACETAMINOPHEN 10; 325 MG/1; MG/1
1 TABLET ORAL EVERY 6 HOURS
Qty: 120 TABLET | Refills: 0 | Status: SHIPPED | OUTPATIENT
Start: 2019-10-03 | End: 2019-10-30 | Stop reason: SDUPTHER

## 2019-10-09 ENCOUNTER — TELEPHONE (OUTPATIENT)
Dept: OTOLARYNGOLOGY | Facility: CLINIC | Age: 74
End: 2019-10-09

## 2019-10-24 ENCOUNTER — OFFICE VISIT (OUTPATIENT)
Dept: FAMILY MEDICINE CLINIC | Age: 74
End: 2019-10-24
Payer: MEDICARE

## 2019-10-24 VITALS
HEART RATE: 72 BPM | TEMPERATURE: 98.3 F | DIASTOLIC BLOOD PRESSURE: 72 MMHG | OXYGEN SATURATION: 99 % | BODY MASS INDEX: 34.71 KG/M2 | WEIGHT: 229 LBS | HEIGHT: 68 IN | SYSTOLIC BLOOD PRESSURE: 102 MMHG

## 2019-10-24 DIAGNOSIS — Z23 NEED FOR INFLUENZA VACCINATION: ICD-10-CM

## 2019-10-24 DIAGNOSIS — I10 ESSENTIAL (PRIMARY) HYPERTENSION: ICD-10-CM

## 2019-10-24 DIAGNOSIS — K31.89 MASS OF STOMACH: Primary | ICD-10-CM

## 2019-10-24 DIAGNOSIS — F41.8 DEPRESSION WITH ANXIETY: ICD-10-CM

## 2019-10-24 DIAGNOSIS — I25.10 CORONARY ARTERY DISEASE INVOLVING NATIVE HEART WITHOUT ANGINA PECTORIS, UNSPECIFIED VESSEL OR LESION TYPE: ICD-10-CM

## 2019-10-24 DIAGNOSIS — M54.50 CHRONIC MIDLINE LOW BACK PAIN WITHOUT SCIATICA: ICD-10-CM

## 2019-10-24 DIAGNOSIS — G89.29 CHRONIC MIDLINE LOW BACK PAIN WITHOUT SCIATICA: ICD-10-CM

## 2019-10-24 DIAGNOSIS — E78.01 FAMILIAL HYPERCHOLESTEROLEMIA: ICD-10-CM

## 2019-10-24 PROCEDURE — G8427 DOCREV CUR MEDS BY ELIG CLIN: HCPCS | Performed by: FAMILY MEDICINE

## 2019-10-24 PROCEDURE — 4004F PT TOBACCO SCREEN RCVD TLK: CPT | Performed by: FAMILY MEDICINE

## 2019-10-24 PROCEDURE — G8417 CALC BMI ABV UP PARAM F/U: HCPCS | Performed by: FAMILY MEDICINE

## 2019-10-24 PROCEDURE — 1123F ACP DISCUSS/DSCN MKR DOCD: CPT | Performed by: FAMILY MEDICINE

## 2019-10-24 PROCEDURE — 90653 IIV ADJUVANT VACCINE IM: CPT | Performed by: FAMILY MEDICINE

## 2019-10-24 PROCEDURE — G0008 ADMIN INFLUENZA VIRUS VAC: HCPCS | Performed by: FAMILY MEDICINE

## 2019-10-24 PROCEDURE — 4040F PNEUMOC VAC/ADMIN/RCVD: CPT | Performed by: FAMILY MEDICINE

## 2019-10-24 PROCEDURE — G8482 FLU IMMUNIZE ORDER/ADMIN: HCPCS | Performed by: FAMILY MEDICINE

## 2019-10-24 PROCEDURE — 99214 OFFICE O/P EST MOD 30 MIN: CPT | Performed by: FAMILY MEDICINE

## 2019-10-24 PROCEDURE — G8598 ASA/ANTIPLAT THER USED: HCPCS | Performed by: FAMILY MEDICINE

## 2019-10-24 RX ORDER — ATORVASTATIN CALCIUM 40 MG/1
40 TABLET, FILM COATED ORAL DAILY
Qty: 90 TABLET | Refills: 3 | Status: SHIPPED | OUTPATIENT
Start: 2019-10-24 | End: 2020-08-19

## 2019-10-25 ENCOUNTER — TELEPHONE (OUTPATIENT)
Dept: FAMILY MEDICINE CLINIC | Age: 74
End: 2019-10-25

## 2019-10-25 ASSESSMENT — ENCOUNTER SYMPTOMS
DIARRHEA: 0
NAUSEA: 0
CONSTIPATION: 0
SHORTNESS OF BREATH: 0
ABDOMINAL PAIN: 0
COUGH: 0
CHEST TIGHTNESS: 0
BACK PAIN: 1
ANAL BLEEDING: 0

## 2019-10-28 ENCOUNTER — PREP FOR SURGERY (OUTPATIENT)
Dept: OTHER | Facility: HOSPITAL | Age: 74
End: 2019-10-28

## 2019-10-28 DIAGNOSIS — C49.A0 GASTROINTESTINAL STROMAL TUMOR (HCC): Primary | ICD-10-CM

## 2019-10-30 DIAGNOSIS — M54.50 CHRONIC MIDLINE LOW BACK PAIN WITHOUT SCIATICA: ICD-10-CM

## 2019-10-30 DIAGNOSIS — G89.29 CHRONIC MIDLINE LOW BACK PAIN WITHOUT SCIATICA: ICD-10-CM

## 2019-10-30 RX ORDER — HYDROCODONE BITARTRATE AND ACETAMINOPHEN 10; 325 MG/1; MG/1
1 TABLET ORAL EVERY 6 HOURS
Qty: 120 TABLET | Refills: 0 | Status: SHIPPED | OUTPATIENT
Start: 2019-10-30 | End: 2019-12-02 | Stop reason: SDUPTHER

## 2019-11-08 ENCOUNTER — ANESTHESIA (OUTPATIENT)
Dept: GASTROENTEROLOGY | Facility: HOSPITAL | Age: 74
End: 2019-11-08

## 2019-11-08 ENCOUNTER — HOSPITAL ENCOUNTER (OUTPATIENT)
Facility: HOSPITAL | Age: 74
Setting detail: HOSPITAL OUTPATIENT SURGERY
Discharge: HOME | End: 2019-11-08
Attending: INTERNAL MEDICINE | Admitting: INTERNAL MEDICINE

## 2019-11-08 ENCOUNTER — ANESTHESIA EVENT (OUTPATIENT)
Dept: GASTROENTEROLOGY | Facility: HOSPITAL | Age: 74
End: 2019-11-08

## 2019-11-08 VITALS
SYSTOLIC BLOOD PRESSURE: 120 MMHG | TEMPERATURE: 97.6 F | HEART RATE: 58 BPM | DIASTOLIC BLOOD PRESSURE: 64 MMHG | RESPIRATION RATE: 15 BRPM | BODY MASS INDEX: 34.1 KG/M2 | OXYGEN SATURATION: 93 % | HEIGHT: 68 IN | WEIGHT: 225 LBS

## 2019-11-08 DIAGNOSIS — C49.A0 GASTROINTESTINAL STROMAL TUMOR (HCC): ICD-10-CM

## 2019-11-08 PROCEDURE — 43239 EGD BIOPSY SINGLE/MULTIPLE: CPT | Performed by: INTERNAL MEDICINE

## 2019-11-08 PROCEDURE — 25010000002 PROPOFOL 10 MG/ML EMULSION: Performed by: NURSE ANESTHETIST, CERTIFIED REGISTERED

## 2019-11-08 PROCEDURE — 88305 TISSUE EXAM BY PATHOLOGIST: CPT | Performed by: INTERNAL MEDICINE

## 2019-11-08 RX ORDER — SODIUM CHLORIDE 0.9 % (FLUSH) 0.9 %
3 SYRINGE (ML) INJECTION EVERY 12 HOURS SCHEDULED
Status: DISCONTINUED | OUTPATIENT
Start: 2019-11-08 | End: 2019-11-08 | Stop reason: HOSPADM

## 2019-11-08 RX ORDER — DOXAZOSIN MESYLATE 4 MG/1
4 TABLET ORAL NIGHTLY
COMMUNITY

## 2019-11-08 RX ORDER — PROPOFOL 10 MG/ML
VIAL (ML) INTRAVENOUS AS NEEDED
Status: DISCONTINUED | OUTPATIENT
Start: 2019-11-08 | End: 2019-11-08 | Stop reason: SURG

## 2019-11-08 RX ORDER — SODIUM CHLORIDE 0.9 % (FLUSH) 0.9 %
3-10 SYRINGE (ML) INJECTION AS NEEDED
Status: DISCONTINUED | OUTPATIENT
Start: 2019-11-08 | End: 2019-11-08 | Stop reason: HOSPADM

## 2019-11-08 RX ORDER — SODIUM CHLORIDE 9 MG/ML
100 INJECTION, SOLUTION INTRAVENOUS CONTINUOUS
Status: DISCONTINUED | OUTPATIENT
Start: 2019-11-08 | End: 2019-11-08 | Stop reason: HOSPADM

## 2019-11-08 RX ADMIN — SODIUM CHLORIDE 100 ML/HR: 9 INJECTION, SOLUTION INTRAVENOUS at 10:19

## 2019-11-08 RX ADMIN — PROPOFOL 50 MG: 10 INJECTION, EMULSION INTRAVENOUS at 10:23

## 2019-11-08 RX ADMIN — PROPOFOL 40 MG: 10 INJECTION, EMULSION INTRAVENOUS at 10:27

## 2019-11-08 RX ADMIN — LIDOCAINE HYDROCHLORIDE 200 MG: 20 INJECTION, SOLUTION INTRAVENOUS at 10:23

## 2019-11-08 NOTE — ANESTHESIA PREPROCEDURE EVALUATION
Anesthesia Evaluation     no history of anesthetic complications:  NPO Solid Status: > 8 hours  NPO Liquid Status: > 2 hours           Airway   Mallampati: II  TM distance: >3 FB  Neck ROM: full  Dental    (+) poor dentition        Pulmonary - normal exam    breath sounds clear to auscultation  (+) a smoker (0.5 ppd) Current Smoked day of surgery, lung cancer (RLL pneumonectomy 1995), sleep apnea (not compliant with CPAP),   (-) asthma, recent URI, no home oxygen  Cardiovascular - normal exam  Exercise tolerance: poor (<4 METS)    Rhythm: regular  Rate: normal    (+) hypertension, past MI (2 yrs ago) , CAD, cardiac stents (2 yrs ago)   (-) pacemaker, angina, CABG      Neuro/Psych  (+) CVA (x2, one year ago, left arm/leg weakness) residual symptoms,     (-) seizures, TIA  GI/Hepatic/Renal/Endo    (+) morbid obesity, GERD,    (-) liver disease, no renal disease, diabetes, no thyroid disorder    Musculoskeletal     Abdominal    Substance History      OB/GYN          Other                        Anesthesia Plan    ASA 3     MAC     intravenous induction     Anesthetic plan, all risks, benefits, and alternatives have been provided, discussed and informed consent has been obtained with: patient.

## 2019-11-08 NOTE — ANESTHESIA POSTPROCEDURE EVALUATION
Patient: Dalton Cheatham    Procedure Summary     Date:  11/08/19 Room / Location:  [facility] ENDOSCOPY 2 / [facility] ENDOSCOPY    Anesthesia Start:  1021 Anesthesia Stop:  1037    Procedure:  ESOPHAGOGASTRODUODENOSCOPY WITH ANESTHESIA (N/A ) Diagnosis:       Gastrointestinal stromal tumor (CMS/HCC)      (Gastrointestinal stromal tumor (CMS/HCC) [C49.A0])    Surgeon:  Ruth Wellington MD Provider:  Coy Al CRNA    Anesthesia Type:  MAC ASA Status:  3          Anesthesia Type: MAC  Last vitals  BP   173/86 (11/08/19 0955)   Temp   97.6 °F (36.4 °C) (11/08/19 0955)   Pulse   61 (11/08/19 0955)   Resp   18 (11/08/19 0955)     SpO2   97 % (11/08/19 0955)     Post Anesthesia Care and Evaluation    Patient location during evaluation: PHASE II  Patient participation: complete - patient participated  Level of consciousness: awake  Pain score: 0  Pain management: adequate  Airway patency: patent  Anesthetic complications: No anesthetic complications  PONV Status: none  Cardiovascular status: acceptable  Respiratory status: acceptable  Hydration status: acceptable

## 2019-11-08 NOTE — H&P
Chief Complaint:   Abnormal CT scan    Subjective     HPI:   He had a CT scan done at outside facility.  This shows a 2 cm mass in the lesser curvature.  He is now arranged for endoscopy to assess.  His plavix is on hold.      Past Medical History:   Past Medical History:   Diagnosis Date   • AAA (abdominal aortic aneurysm) (CMS/HCC)    • Cancer (CMS/HCC)     lymphoma   • Colon polyp    • Coronary artery disease    • Diverticulitis    • HTN (hypertension)    • Lung cancer (CMS/HCC)    • Skin cancer    • Sleep apnea    • Stroke (CMS/HCC)        Past Surgical History:  Past Surgical History:   Procedure Laterality Date   • ANAL FISSURECTOMY     • CARDIAC CATHETERIZATION     • CAROTID ARTERY ANGIOPLASTY     • COLONOSCOPY  09/16/2014   • COLONOSCOPY N/A 4/9/2018    Procedure: COLONOSCOPY WITH ANESTHESIA;  Surgeon: Ruth Wellington MD;  Location: [facility] ENDOSCOPY;  Service: Gastroenterology   • ENDOSCOPY N/A 4/13/2018    Procedure: ESOPHAGOGASTRODUODENOSCOPY WITH ANESTHESIA;  Surgeon: Mike Sheridan MD;  Location: [facility] ENDOSCOPY;  Service: Gastroenterology   • LUNG LOBECTOMY         Family History:  Family History   Problem Relation Age of Onset   • Colon cancer Brother        Social History:   reports that he has quit smoking. He has never used smokeless tobacco. He reports that he does not drink alcohol or use drugs.    Medications:   Medications Prior to Admission   Medication Sig Dispense Refill Last Dose   • aspirin 81 MG EC tablet Take 1 tablet by mouth Daily. Hold.  May resume 4/22/18.   11/7/2019 at Unknown time   • atorvastatin (LIPITOR) 40 MG tablet Take 40 mg by mouth Daily.   11/7/2019 at Unknown time   • chlorthalidone (HYGROTON) 25 MG tablet Take 25 mg by mouth Daily.   11/7/2019 at Unknown time   • doxazosin (CARDURA) 4 MG tablet Take 4 mg by mouth Every Night.   11/7/2019 at Unknown time   • famotidine (PEPCID) 20 MG tablet Take 20 mg by mouth 2 (Two) Times a Day.   11/8/2019 at Unknown time   •  "HYDROcodone-acetaminophen (NORCO)  MG per tablet Take 1 tablet by mouth Every 6 (Six) Hours As Needed for Mild Pain .   11/8/2019 at Unknown time   • lisinopril (PRINIVIL,ZESTRIL) 40 MG tablet Take 40 mg by mouth Daily.   11/8/2019 at Unknown time   • venlafaxine (EFFEXOR) 75 MG tablet Take 75 mg by mouth Daily.   11/8/2019 at Unknown time   • clopidogrel (PLAVIX) 75 MG tablet Take 1 tablet by mouth Daily. Hold.  May resume 4/22/18. 30 tablet  11/2/2019   • metoprolol succinate XL (TOPROL-XL) 50 MG 24 hr tablet Take 50 mg by mouth Daily.   11/6/2019       Allergies:  Patient has no known allergies.    ROS:    General: Weight stable  Resp: No SOA  Cardiovascular: No CP      Objective     /86 (BP Location: Right arm, Patient Position: Sitting)   Pulse 61   Temp 97.6 °F (36.4 °C) (Temporal)   Resp 18   Ht 172.7 cm (68\")   Wt 102 kg (225 lb)   SpO2 97%   BMI 34.21 kg/m²     Physical Exam   Constitutional: Pt is oriented to person, place, and in no distress.  Eyes: No icterus  ENMT: Unremarkable   Cardiovascular: Normal rate, regular rhythm.    Pulmonary/Chest: No distress.  No audible wheezes  Abdominal: Soft.   Skin: Skin is warm and dry.   Psychiatric: Mood, memory, affect and judgment appear normal.     Assessment/Plan     Diagnosis:  Abnormal CT stomach    Anticipated Surgical Procedure:  Endoscopy    The risks, benefits, and alternatives of endoscopy were reviewed with the patient today.  Risks including perforation, with or without dilation, possibly requiring surgery.  Additional risks include risk of bleeding.  There is also the risk of a drug reaction or problems with anesthesia.  This will be discussed with the further by the anesthesia team on the day of the procedure. The benefits include the diagnosis and management of disease of the upper digestive tract.  Alternatives to endoscopy include upper GI series, laboratory testing, radiographic evaluation, or no intervention.  The patient " verbalizes understanding and agrees.    Much of this encounter note is an electronic transcription/translation of spoken language to printed text. The electronic translation of spoken language may permit erroneous, or at times, nonsensical words or phrases to be inadvertently transcribed; although I have reviewed the note for such errors, some may still exist.

## 2019-11-11 ENCOUNTER — TELEPHONE (OUTPATIENT)
Dept: GASTROENTEROLOGY | Facility: CLINIC | Age: 74
End: 2019-11-11

## 2019-11-11 LAB
CYTO UR: NORMAL
LAB AP CASE REPORT: NORMAL
LAB AP CLINICAL INFORMATION: NORMAL
PATH REPORT.FINAL DX SPEC: NORMAL
PATH REPORT.GROSS SPEC: NORMAL

## 2019-11-11 NOTE — TELEPHONE ENCOUNTER
Please let him know that the biopsies from the nodule in the stomach were completely normal as expected.  The next step to evaluate this would be the endoscopic ultrasound as I described after the procedure.  I would recommend referring him to Dr. Emmett Peterson at [facility].  We talked about this as well.  Assuming patient is still in agreement, okay to generate referral and pend referral to me.    Ruth Wellington MD

## 2019-11-12 DIAGNOSIS — C49.A0 GASTROINTESTINAL STROMAL TUMOR (HCC): Primary | ICD-10-CM

## 2019-11-25 NOTE — PROGRESS NOTES
Dionicio Cardenas MD     Chief Complaint   Patient presents with   • Thyroid Problem        History of Present Illness  Dalton Cheatham is a  74 y.o.  male who presents for evaluation of parathyroid problems. He was found to have elevated calcium and parathyroid levels. He has had no current complaints.  The patient has not had complaints of: kidney stones, bone and joint pain, fatigue, depression and moodiness .    Review of Systems  Reviewed per patient intake note    Past History:  Past Medical History:   Diagnosis Date   • AAA (abdominal aortic aneurysm) (CMS/HCC)    • Cancer (CMS/HCC)     lymphoma   • Colon polyp    • Coronary artery disease    • Diverticulitis    • HTN (hypertension)    • Lung cancer (CMS/HCC)    • Skin cancer    • Sleep apnea    • Stroke (CMS/HCC)      Past Surgical History:   Procedure Laterality Date   • ANAL FISSURECTOMY     • CARDIAC CATHETERIZATION     • CAROTID ARTERY ANGIOPLASTY     • COLONOSCOPY  09/16/2014   • COLONOSCOPY N/A 4/9/2018    Procedure: COLONOSCOPY WITH ANESTHESIA;  Surgeon: Ruth Wellington MD;  Location: [facility] ENDOSCOPY;  Service: Gastroenterology   • ENDOSCOPY N/A 4/13/2018    Procedure: ESOPHAGOGASTRODUODENOSCOPY WITH ANESTHESIA;  Surgeon: Mike Sheridan MD;  Location: [facility] ENDOSCOPY;  Service: Gastroenterology   • ENDOSCOPY N/A 11/8/2019    Procedure: ESOPHAGOGASTRODUODENOSCOPY WITH ANESTHESIA;  Surgeon: Ruth Wellington MD;  Location: [facility] ENDOSCOPY;  Service: Gastroenterology   • LUNG LOBECTOMY       Family History   Problem Relation Age of Onset   • Colon cancer Brother    • Cancer Maternal Aunt    • Cancer Maternal Uncle    • Cancer Paternal Grandmother      Social History     Tobacco Use   • Smoking status: Current Every Day Smoker     Packs/day: 0.50     Types: Cigarettes     Start date: 1959   • Smokeless tobacco: Never Used   • Tobacco comment: stopped and started several times   Substance Use Topics   • Alcohol use: Yes     Comment: occasionally   •  Drug use: No     Outpatient Medications Marked as Taking for the 11/27/19 encounter (Office Visit) with Dionicio Cardenas MD   Medication Sig Dispense Refill   • aspirin 81 MG EC tablet Take 1 tablet by mouth Daily. Hold.  May resume 4/22/18.     • atorvastatin (LIPITOR) 40 MG tablet Take 40 mg by mouth Daily.     • clopidogrel (PLAVIX) 75 MG tablet Take 1 tablet by mouth Daily. Hold.  May resume 4/22/18. 30 tablet    • doxazosin (CARDURA) 4 MG tablet Take 4 mg by mouth Every Night.     • famotidine (PEPCID) 20 MG tablet Take 20 mg by mouth 2 (Two) Times a Day.     • HYDROcodone-acetaminophen (NORCO)  MG per tablet Take 1 tablet by mouth Every 6 (Six) Hours As Needed for Mild Pain .     • lisinopril (PRINIVIL,ZESTRIL) 40 MG tablet Take 40 mg by mouth Daily.     • metoprolol succinate XL (TOPROL-XL) 50 MG 24 hr tablet Take 50 mg by mouth Daily.     • nitroglycerin (NITROSTAT) 0.4 MG SL tablet Place 0.4 mg under the tongue.     • venlafaxine (EFFEXOR) 75 MG tablet Take 75 mg by mouth Daily.       Allergies:  Patient has no known allergies.        Vital Signs:   Temp:  [98.1 °F (36.7 °C)] 98.1 °F (36.7 °C)  Heart Rate:  [64] 64  BP: (141)/(84) 141/84    Physical Exam  CONSTITUTIONAL: He is in no acute distress.  COMMUNICATION AND VOICE: able to communicate normally, normal voice quality  HEAD: normocephalic, no lesions, atraumatic, no tenderness, no masses   FACE: appearance normal, no lesions, no tenderness, no deformities, facial motion symmetric  SALIVARY GLANDS: parotid glands with no tenderness, no swelling, no masses, submandibular glands with normal size, nontender  EYES: ocular motility normal, eyelids normal, orbits normal, no proptosis, conjunctiva normal , pupils equal, round  HEARING: response to conversational voice normal bilaterally   EXTERNAL EARS: auricles without lesions  EXTERNAL EAR CANALS: There is a cerumen impaction on the right greater than left ear.  It was cleared with a cerumen  curette  TYMPANIC MEMBRANES: tympanic membrane appearance normal, no lesions, no perforation, normal mobility, no fluid  EXTERNAL NOSE: structure normal, no tenderness on palpation, no nasal discharge, no lesions, no evidence of trauma, nostrils patent  INTRANASAL EXAM: nasal mucosa normal, vestibule within normal limits, inferior turbinate normal, nasal septum without overtly obstructing anterior deviation  LIPS: structure normal, no tenderness on palpation, no lesions, no evidence of trauma  TEETH: dentition within normal limits for age  GUMS: gingivae healthy  ORAL MUCOSA: oral mucosa normal, no mucosal lesions  FLOOR OF MOUTH: Warthin's duct patent, mucosa normal  TONGUE: lingual mucosa normal without lesions, normal tongue mobility  PALATE: soft and hard palates with normal mucosa and structure  OROPHARYNX: oropharyngeal mucosa normal, tonsil fossa with normal in appearance  NECK: neck appearance normal, no masses or tenderness  THYROID: no overt thyromegaly, no tenderness, nodules or mass present on palpation, position midline   LYMPH NODES: no lymphadenopathy  CHEST/RESPIRATORY: respiratory effort normal  CARDIOVASCULAR: extremities without cyanosis or edema, no overt jugulovenous distension present  NEUROLOGIC/PSYCHIATRIC: oriented appropriately for age, mood normal, affect appropriate, cranial nerves intact grossly unless specifically mentioned above     Ear Cerumen Removal  Date/Time: 11/27/2019 5:11 PM  Performed by: Dionicio Cardenas MD  Authorized by: Dionicio Cardenas MD   Consent: Verbal consent obtained.  Patient understanding: patient states understanding of the procedure being performed    Anesthesia:  Local Anesthetic: none  Location details: right ear and left ear  Patient tolerance: Patient tolerated the procedure well with no immediate complications  Procedure type: instrumentation (Curette)              RESULTS REVIEW:    Lab Results   Component Value Date    TSH 1.120 04/09/2019      Lab Results   Component Value Date    .9 (H) 06/14/2019    CALCIUM 11.7 (H) 07/30/2019           Assessment   1. Hyperparathyroidism (CMS/[facility])        Plan   Medical and surgical options were discussed including continued medical management and surgical options. Etiology of hypercalcemia and hyperparathyroidism were discussed including parathyroid adenoma, parathyroid hyperplasia and neoplasm parathyroid hormone related protein secretion. Possibility of 3, 4 or 5 glands in normal anatomy was discussed. Anatomic variations leading to unexpected gland location (upper neck, retroesophageal, retrocarotid and substernal) were discussed. Risks, benefits and alternatives were discussed and questions were answered.  After considering the options, the patient did not think he wanted to proceed with surgical options.  The family member with the patient was concerned that he would not be a good surgical candidate.  Evidently, he has a mass in his stomach and is about to get another EGD for better evaluation.  I have recommended holding off on imaging and repeating his laboratory studies in 3 months while they decide whether or not they wish to proceed with pursuing possible surgical options.              Orders Placed This Encounter   Procedures   • Ear Cerumen Removal   • Calcium   • PTH, Intact       Return in about 4 months (around 3/27/2020).      Dionicio Cardenas MD  11/27/19  5:12 PM

## 2019-11-27 ENCOUNTER — OFFICE VISIT (OUTPATIENT)
Dept: OTOLARYNGOLOGY | Facility: CLINIC | Age: 74
End: 2019-11-27

## 2019-11-27 VITALS
DIASTOLIC BLOOD PRESSURE: 84 MMHG | SYSTOLIC BLOOD PRESSURE: 141 MMHG | BODY MASS INDEX: 34.1 KG/M2 | WEIGHT: 225 LBS | HEIGHT: 68 IN | HEART RATE: 64 BPM | TEMPERATURE: 98.1 F

## 2019-11-27 DIAGNOSIS — H61.23 BILATERAL IMPACTED CERUMEN: ICD-10-CM

## 2019-11-27 DIAGNOSIS — E21.3 HYPERPARATHYROIDISM (HCC): Primary | ICD-10-CM

## 2019-11-27 PROCEDURE — 99202 OFFICE O/P NEW SF 15 MIN: CPT | Performed by: OTOLARYNGOLOGY

## 2019-11-27 PROCEDURE — 69210 REMOVE IMPACTED EAR WAX UNI: CPT | Performed by: OTOLARYNGOLOGY

## 2019-11-27 RX ORDER — NITROGLYCERIN 0.4 MG/1
0.4 TABLET SUBLINGUAL
COMMUNITY

## 2019-11-27 NOTE — PROGRESS NOTES
Edna Williamson MA   Patient Intake Note    Review of Systems  Review of Systems   Constitutional: Negative for chills, fatigue and fever.   HENT:        See HPI   Respiratory: Negative for cough, choking, shortness of breath and wheezing.    Gastrointestinal: Negative for diarrhea, nausea and vomiting.   Neurological: Positive for dizziness and headaches. Negative for seizures, weakness and light-headedness.   Psychiatric/Behavioral: Negative for sleep disturbance.       Tobacco Use: Screening and Cessation Intervention  Social History    Tobacco Use      Smoking status: Former Smoker      Smokeless tobacco: Never Used      Tobacco comment: quit november 2017        Edna Williamson MA  11/27/2019  3:10 PM

## 2019-11-27 NOTE — PATIENT INSTRUCTIONS
IF YOU SMOKE OR USE TOBACCO PLEASE READ THE FOLLOWING:  Why is smoking bad for me?  Smoking increases the risk of heart disease, lung disease, vascular disease, stroke, and cancer. If you smoke, STOP!    For more information:    Quit Now [place]  1-800-QUIT-NOW  https://[place].quitlogix.org/en-[place]/  Steps to Quit Smoking  Smoking tobacco can be harmful to your health and can affect almost every organ in your body. Smoking puts you, and those around you, at risk for developing many serious chronic diseases. Quitting smoking is difficult, but it is one of the best things that you can do for your health. It is never too late to quit.  What are the benefits of quitting smoking?  When you quit smoking, you lower your risk of developing serious diseases and conditions, such as:  · Lung cancer or lung disease, such as COPD.  · Heart disease.  · Stroke.  · Heart attack.  · Infertility.  · Osteoporosis and bone fractures.  Additionally, symptoms such as coughing, wheezing, and shortness of breath may get better when you quit. You may also find that you get sick less often because your body is stronger at fighting off colds and infections. If you are pregnant, quitting smoking can help to reduce your chances of having a baby of low birth weight.  How do I get ready to quit?  When you decide to quit smoking, create a plan to make sure that you are successful. Before you quit:  · Pick a date to quit. Set a date within the next two weeks to give you time to prepare.  · Write down the reasons why you are quitting. Keep this list in places where you will see it often, such as on your bathroom mirror or in your car or wallet.  · Identify the people, places, things, and activities that make you want to smoke (triggers) and avoid them. Make sure to take these actions:  ¨ Throw away all cigarettes at home, at work, and in your car.  ¨ Throw away smoking accessories, such as ashtrays and lighters.  ¨ Clean your car and make  sure to empty the ashtray.  ¨ Clean your home, including curtains and carpets.  · Tell your family, friends, and coworkers that you are quitting. Support from your loved ones can make quitting easier.  · Talk with your health care provider about your options for quitting smoking.  · Find out what treatment options are covered by your health insurance.  What strategies can I use to quit smoking?  Talk with your healthcare provider about different strategies to quit smoking. Some strategies include:  · Quitting smoking altogether instead of gradually lessening how much you smoke over a period of time. Research shows that quitting “cold turkey” is more successful than gradually quitting.  · Attending in-person counseling to help you build problem-solving skills. You are more likely to have success in quitting if you attend several counseling sessions. Even short sessions of 10 minutes can be effective.  · Finding resources and support systems that can help you to quit smoking and remain smoke-free after you quit. These resources are most helpful when you use them often. They can include:  ¨ Online chats with a counselor.  ¨ Telephone quitlines.  ¨ Printed self-help materials.  ¨ Support groups or group counseling.  ¨ Text messaging programs.  ¨ Mobile phone applications.  · Taking medicines to help you quit smoking. (If you are pregnant or breastfeeding, talk with your health care provider first.) Some medicines contain nicotine and some do not. Both types of medicines help with cravings, but the medicines that include nicotine help to relieve withdrawal symptoms. Your health care provider may recommend:  ¨ Nicotine patches, gum, or lozenges.  ¨ Nicotine inhalers or sprays.  ¨ Non-nicotine medicine that is taken by mouth.  Talk with your health care provider about combining strategies, such as taking medicines while you are also receiving in-person counseling. Using these two strategies together makes you more likely  to succeed in quitting than if you used either strategy on its own.  If you are pregnant or breastfeeding, talk with your health care provider about finding counseling or other support strategies to quit smoking. Do not take medicine to help you quit smoking unless told to do so by your health care provider.  What things can I do to make it easier to quit?  Quitting smoking might feel overwhelming at first, but there is a lot that you can do to make it easier. Take these important actions:  · Reach out to your family and friends and ask that they support and encourage you during this time. Call telephone quitlines, reach out to support groups, or work with a counselor for support.  · Ask people who smoke to avoid smoking around you.  · Avoid places that trigger you to smoke, such as bars, parties, or smoke-break areas at work.  · Spend time around people who do not smoke.  · Lessen stress in your life, because stress can be a smoking trigger for some people. To lessen stress, try:  ¨ Exercising regularly.  ¨ Deep-breathing exercises.  ¨ Yoga.  ¨ Meditating.  ¨ Performing a body scan. This involves closing your eyes, scanning your body from head to toe, and noticing which parts of your body are particularly tense. Purposefully relax the muscles in those areas.  · Download or purchase mobile phone or tablet apps (applications) that can help you stick to your quit plan by providing reminders, tips, and encouragement. There are many free apps, such as QuitGuide from the CDC (Centers for Disease Control and Prevention). You can find other support for quitting smoking (smoking cessation) through smokefree.gov and other websites.  How will I feel when I quit smoking?  Within the first 24 hours of quitting smoking, you may start to feel some withdrawal symptoms. These symptoms are usually most noticeable 2-3 days after quitting, but they usually do not last beyond 2-3 weeks. Changes or symptoms that you might experience  include:  · Mood swings.  · Restlessness, anxiety, or irritation.  · Difficulty concentrating.  · Dizziness.  · Strong cravings for sugary foods in addition to nicotine.  · Mild weight gain.  · Constipation.  · Nausea.  · Coughing or a sore throat.  · Changes in how your medicines work in your body.  · A depressed mood.  · Difficulty sleeping (insomnia).  After the first 2-3 weeks of quitting, you may start to notice more positive results, such as:  · Improved sense of smell and taste.  · Decreased coughing and sore throat.  · Slower heart rate.  · Lower blood pressure.  · Clearer skin.  · The ability to breathe more easily.  · Fewer sick days.  Quitting smoking is very challenging for most people. Do not get discouraged if you are not successful the first time. Some people need to make many attempts to quit before they achieve long-term success. Do your best to stick to your quit plan, and talk with your health care provider if you have any questions or concerns.  This information is not intended to replace advice given to you by your health care provider. Make sure you discuss any questions you have with your health care provider.  Document Released: 12/12/2002 Document Revised: 08/15/2017 Document Reviewed: 05/03/2016  Elsevier Interactive Patient Education © 2017 Elsevier Inc.        For more information:  [place] Tobacco QuitLine  1-800-QUIT-NOW  http://www.tnquitline.org

## 2019-12-02 DIAGNOSIS — M54.50 CHRONIC MIDLINE LOW BACK PAIN WITHOUT SCIATICA: ICD-10-CM

## 2019-12-02 DIAGNOSIS — G89.29 CHRONIC MIDLINE LOW BACK PAIN WITHOUT SCIATICA: ICD-10-CM

## 2019-12-02 RX ORDER — HYDROCODONE BITARTRATE AND ACETAMINOPHEN 10; 325 MG/1; MG/1
1 TABLET ORAL EVERY 6 HOURS
Qty: 120 TABLET | Refills: 0 | Status: SHIPPED | OUTPATIENT
Start: 2019-12-02 | End: 2019-12-27 | Stop reason: SDUPTHER

## 2019-12-27 DIAGNOSIS — M54.50 CHRONIC MIDLINE LOW BACK PAIN WITHOUT SCIATICA: ICD-10-CM

## 2019-12-27 DIAGNOSIS — G89.29 CHRONIC MIDLINE LOW BACK PAIN WITHOUT SCIATICA: ICD-10-CM

## 2019-12-27 RX ORDER — HYDROCODONE BITARTRATE AND ACETAMINOPHEN 10; 325 MG/1; MG/1
1 TABLET ORAL EVERY 6 HOURS
Qty: 120 TABLET | Refills: 0 | Status: SHIPPED | OUTPATIENT
Start: 2019-12-27 | End: 2020-01-27 | Stop reason: SDUPTHER

## 2020-01-08 ENCOUNTER — HOSPITAL ENCOUNTER (OUTPATIENT)
Age: 75
Setting detail: OUTPATIENT SURGERY
Discharge: HOME | End: 2020-01-08
Attending: INTERNAL MEDICINE | Admitting: INTERNAL MEDICINE
Payer: MEDICARE

## 2020-01-08 ENCOUNTER — ANESTHESIA (OUTPATIENT)
Dept: ENDOSCOPY | Age: 75
End: 2020-01-08
Payer: MEDICARE

## 2020-01-08 ENCOUNTER — ANESTHESIA EVENT (OUTPATIENT)
Dept: ENDOSCOPY | Age: 75
End: 2020-01-08
Payer: MEDICARE

## 2020-01-08 VITALS
BODY MASS INDEX: 32.58 KG/M2 | DIASTOLIC BLOOD PRESSURE: 73 MMHG | RESPIRATION RATE: 18 BRPM | OXYGEN SATURATION: 98 % | HEART RATE: 68 BPM | HEIGHT: 69 IN | SYSTOLIC BLOOD PRESSURE: 135 MMHG | TEMPERATURE: 98 F | WEIGHT: 220 LBS

## 2020-01-08 VITALS
RESPIRATION RATE: 19 BRPM | OXYGEN SATURATION: 99 % | SYSTOLIC BLOOD PRESSURE: 141 MMHG | DIASTOLIC BLOOD PRESSURE: 86 MMHG

## 2020-01-08 PROCEDURE — 88341 IMHCHEM/IMCYTCHM EA ADD ANTB: CPT

## 2020-01-08 PROCEDURE — 3700000001 HC ADD 15 MINUTES (ANESTHESIA): Performed by: INTERNAL MEDICINE

## 2020-01-08 PROCEDURE — 2580000003 HC RX 258: Performed by: INTERNAL MEDICINE

## 2020-01-08 PROCEDURE — 43242 EGD US FINE NEEDLE BX/ASPIR: CPT | Performed by: INTERNAL MEDICINE

## 2020-01-08 PROCEDURE — 2709999900 HC NON-CHARGEABLE SUPPLY: Performed by: INTERNAL MEDICINE

## 2020-01-08 PROCEDURE — 2720000010 HC SURG SUPPLY STERILE: Performed by: INTERNAL MEDICINE

## 2020-01-08 PROCEDURE — 88360 TUMOR IMMUNOHISTOCHEM/MANUAL: CPT

## 2020-01-08 PROCEDURE — 88305 TISSUE EXAM BY PATHOLOGIST: CPT

## 2020-01-08 PROCEDURE — 2500000003 HC RX 250 WO HCPCS: Performed by: NURSE ANESTHETIST, CERTIFIED REGISTERED

## 2020-01-08 PROCEDURE — 88342 IMHCHEM/IMCYTCHM 1ST ANTB: CPT

## 2020-01-08 PROCEDURE — 3700000000 HC ANESTHESIA ATTENDED CARE: Performed by: INTERNAL MEDICINE

## 2020-01-08 PROCEDURE — 6360000002 HC RX W HCPCS: Performed by: NURSE ANESTHETIST, CERTIFIED REGISTERED

## 2020-01-08 PROCEDURE — 88173 CYTOPATH EVAL FNA REPORT: CPT

## 2020-01-08 PROCEDURE — 3609018500 HC EGD US SCOPE W/ADJACENT STRUCTURES: Performed by: INTERNAL MEDICINE

## 2020-01-08 PROCEDURE — 7100000011 HC PHASE II RECOVERY - ADDTL 15 MIN: Performed by: INTERNAL MEDICINE

## 2020-01-08 PROCEDURE — 7100000010 HC PHASE II RECOVERY - FIRST 15 MIN: Performed by: INTERNAL MEDICINE

## 2020-01-08 RX ORDER — SODIUM CHLORIDE, SODIUM LACTATE, POTASSIUM CHLORIDE, CALCIUM CHLORIDE 600; 310; 30; 20 MG/100ML; MG/100ML; MG/100ML; MG/100ML
INJECTION, SOLUTION INTRAVENOUS CONTINUOUS
Status: DISCONTINUED | OUTPATIENT
Start: 2020-01-08 | End: 2020-01-08 | Stop reason: HOSPADM

## 2020-01-08 RX ORDER — ENALAPRILAT 2.5 MG/2ML
1.25 INJECTION INTRAVENOUS
Status: DISCONTINUED | OUTPATIENT
Start: 2020-01-08 | End: 2020-01-08 | Stop reason: HOSPADM

## 2020-01-08 RX ORDER — DIPHENHYDRAMINE HYDROCHLORIDE 50 MG/ML
12.5 INJECTION INTRAMUSCULAR; INTRAVENOUS
Status: DISCONTINUED | OUTPATIENT
Start: 2020-01-08 | End: 2020-01-08 | Stop reason: HOSPADM

## 2020-01-08 RX ORDER — GLYCOPYRROLATE 0.2 MG/ML
INJECTION INTRAMUSCULAR; INTRAVENOUS PRN
Status: DISCONTINUED | OUTPATIENT
Start: 2020-01-08 | End: 2020-01-08 | Stop reason: SDUPTHER

## 2020-01-08 RX ORDER — METOCLOPRAMIDE HYDROCHLORIDE 5 MG/ML
10 INJECTION INTRAMUSCULAR; INTRAVENOUS
Status: DISCONTINUED | OUTPATIENT
Start: 2020-01-08 | End: 2020-01-08 | Stop reason: HOSPADM

## 2020-01-08 RX ORDER — LIDOCAINE HYDROCHLORIDE 10 MG/ML
INJECTION, SOLUTION INFILTRATION; PERINEURAL PRN
Status: DISCONTINUED | OUTPATIENT
Start: 2020-01-08 | End: 2020-01-08 | Stop reason: SDUPTHER

## 2020-01-08 RX ORDER — HYDRALAZINE HYDROCHLORIDE 20 MG/ML
5 INJECTION INTRAMUSCULAR; INTRAVENOUS EVERY 10 MIN PRN
Status: DISCONTINUED | OUTPATIENT
Start: 2020-01-08 | End: 2020-01-08 | Stop reason: HOSPADM

## 2020-01-08 RX ORDER — MEPERIDINE HYDROCHLORIDE 50 MG/ML
12.5 INJECTION INTRAMUSCULAR; INTRAVENOUS; SUBCUTANEOUS EVERY 5 MIN PRN
Status: DISCONTINUED | OUTPATIENT
Start: 2020-01-08 | End: 2020-01-08 | Stop reason: HOSPADM

## 2020-01-08 RX ORDER — LABETALOL 20 MG/4 ML (5 MG/ML) INTRAVENOUS SYRINGE
5 EVERY 10 MIN PRN
Status: DISCONTINUED | OUTPATIENT
Start: 2020-01-08 | End: 2020-01-08 | Stop reason: HOSPADM

## 2020-01-08 RX ORDER — PROPOFOL 10 MG/ML
INJECTION, EMULSION INTRAVENOUS PRN
Status: DISCONTINUED | OUTPATIENT
Start: 2020-01-08 | End: 2020-01-08 | Stop reason: SDUPTHER

## 2020-01-08 RX ORDER — PROPOFOL 10 MG/ML
INJECTION, EMULSION INTRAVENOUS CONTINUOUS PRN
Status: DISCONTINUED | OUTPATIENT
Start: 2020-01-08 | End: 2020-01-08 | Stop reason: SDUPTHER

## 2020-01-08 RX ADMIN — SODIUM CHLORIDE, SODIUM LACTATE, POTASSIUM CHLORIDE, AND CALCIUM CHLORIDE: 600; 310; 30; 20 INJECTION, SOLUTION INTRAVENOUS at 10:18

## 2020-01-08 RX ADMIN — PROPOFOL 50 MG: 10 INJECTION, EMULSION INTRAVENOUS at 14:13

## 2020-01-08 RX ADMIN — LIDOCAINE HYDROCHLORIDE 50 MG: 10 INJECTION, SOLUTION INFILTRATION; PERINEURAL at 14:13

## 2020-01-08 RX ADMIN — GLYCOPYRROLATE 0.2 MG: 1 INJECTION INTRAMUSCULAR; INTRAVENOUS at 14:12

## 2020-01-08 RX ADMIN — PROPOFOL 150 MCG/KG/MIN: 10 INJECTION, EMULSION INTRAVENOUS at 14:13

## 2020-01-08 RX ADMIN — SODIUM CHLORIDE, SODIUM LACTATE, POTASSIUM CHLORIDE, AND CALCIUM CHLORIDE: 600; 310; 30; 20 INJECTION, SOLUTION INTRAVENOUS at 14:07

## 2020-01-08 RX ADMIN — PROPOFOL 50 MG: 10 INJECTION, EMULSION INTRAVENOUS at 14:16

## 2020-01-08 ASSESSMENT — PAIN SCALES - GENERAL
PAINLEVEL_OUTOF10: 0
PAINLEVEL_OUTOF10: 0

## 2020-01-08 ASSESSMENT — LIFESTYLE VARIABLES: SMOKING_STATUS: 1

## 2020-01-08 ASSESSMENT — PAIN - FUNCTIONAL ASSESSMENT: PAIN_FUNCTIONAL_ASSESSMENT: 0-10

## 2020-01-08 NOTE — H&P
4/9/19   Erik Long MD   metoprolol succinate (TOPROL XL) 50 MG extended release tablet Take 1 tablet by mouth daily 4/9/19   Erik Long MD   famotidine (PEPCID) 20 MG tablet Take 1 tablet by mouth 2 times daily 1/7/19   Erik Long MD   aspirin 81 MG EC tablet Take 1 tablet by mouth daily 8/5/16   Bridget Ramos MD   nitroGLYCERIN (NITROSTAT) 0.4 MG SL tablet Place 0.4 mg under the tongue every 5 minutes as needed for Chest pain    Historical Provider, MD       Past Medical History:  Past Medical History:   Diagnosis Date    [facility]     Aortic dissection ([facility] 10/10/2012    CAD (coronary artery disease)     Cancer (HCC)     lung&lymphnode    Carotid artery occlusion     Cerebrovascular accident (CVA) ([address]     Cerebrovascular accident (CVA) due to embolism ([address] 2/16/2016    Cerebrovascular accident (CVA) due to embolism of right middle cerebral artery ([address]     Depression     HTN (hypertension)     Hyperlipidemia     MI (myocardial infarction) ([address]     acute 5/13/2015    Non Hodgkin's lymphoma ([facility]     Sleep apnea     no c-pap    Unspecified sleep apnea        Past Surgical History:  Past Surgical History:   Procedure Laterality Date    CARDIAC CATHETERIZATION  5/13/15  [address]    EF 40%. stent to LAD, stent to posterior descending branch of RCA.  CAROTID ENDARTERECTOMY Right 7/22/2016    Right carotid endarterectomy,carotid arteriograms. S    COLONOSCOPY      LUNG CANCER SURGERY      right side    LYMPHADENECTOMY      removed lymphnodes     RECTAL SURGERY      fistula     VASCULAR SURGERY  10/29/12 [facility]    open exposure of R common femoral artery; percutaneous cannulation of L  femoral artery with 7-french sheath; suprarenal abd aortagram with bilat iliofem arteriogram; endoluminal graft repair of AAA with endologix [id number] main body, 28x95 infrarenal cuff; balloon angioplasty of infrarenal abd aorta and endoluminal graft with coda balloon; completion abd aortogram w bilat iliofem arteriogram;     VASCULAR SURGERY  10/29/ [facility]     (cont) open repair of right common femoral ateriotomy; perclose repair of left common femoral artery puncture site. Social History:  Social History     Tobacco Use    Smoking status: Current Every Day Smoker     Packs/day: 0.50     Years: 40.00     Pack years: 20.00    Smokeless tobacco: Never Used    Tobacco comment: quit smoking in nov 2017, restarted smoking since then   Substance Use Topics    Alcohol use: No    Drug use: No       Vital Signs:   Vitals:    01/08/20 1006   BP: (!) 178/74   Pulse: 60   Resp: 18   Temp: 98 °F (36.7 °C)   SpO2: 93%        Physical Exam:  Cardiac:  []WNL  [x]Comments: abnormal rhythm with irregular rate. Discussed with CRNA - she was okay to continue   Pulmonary:  [x]WNL   []Comments:  Neuro/Mental Status:  [x]WNL  []Comments:  Abdominal:  [x]WNL    []Comments:  Other:   []WNL  []Comments:    Informed Consent:  The risks and benefits of the procedure have been discussed with either the patient or if they cannot consent, their representative. Assessment:  Patient examined and appropriate for planned sedation and procedure. Plan:  Proceed with planned sedation and procedure as above.          Vaishnavi Reyes MD

## 2020-01-08 NOTE — ANESTHESIA POSTPROCEDURE EVALUATION
Department of Anesthesiology  Postprocedure Note    Patient: Tejas Cordon MRN: [id number]  YOB: 1945  Date of evaluation: 1/8/2020  Time:  3:18 PM     Procedure Summary     Date:  01/08/20 Room / Location:  [address]    Anesthesia Start:  0061 Anesthesia Stop:      Procedure:  EGD ESOPHAGOGASTRODUODENOSCOPY ULTRASOUND with biopsy (N/A ) Diagnosis:  (GASTRIC NODULE)    Surgeon:  Ronal Mcclure MD Responsible Provider:  TREV Castanon CRNA    Anesthesia Type:  general ASA Status:  4          Anesthesia Type: general    Lay Phase I: Lay Score: 10    Lay Phase II: Lay Score: 9    Last vitals: Reviewed and per EMR flowsheets.        Anesthesia Post Evaluation    Patient location during evaluation: PACU  Patient participation: complete - patient participated  Level of consciousness: sleepy but conscious  Pain score: 0  Airway patency: patent  Nausea & Vomiting: no vomiting and no nausea  Complications: no  Cardiovascular status: hemodynamically stable  Respiratory status: acceptable, spontaneous ventilation and nonlabored ventilation  Hydration status: stable

## 2020-01-08 NOTE — ANESTHESIA PRE PROCEDURE
Department of Anesthesiology  Preprocedure Note       Name:  Watson Hughes. Age:  66 y.o.  :  1945                                          MRN:  [id number]         Date:  2020      Surgeon: Lianne Slaughter):  Rachael Guillory MD    Procedure: Procedure(s):  CAROTID ENDARTERECTOMY    Medications prior to admission:   Prior to Admission medications    Medication Sig Start Date End Date Taking? Authorizing Provider   HYDROcodone-acetaminophen (NORCO)  MG per tablet Take 1 tablet by mouth every 6 hours for 30 days. 19  Raina Trevino MD   atorvastatin (LIPITOR) 40 MG tablet Take 1 tablet by mouth daily 10/24/19   Raina Trevino MD   doxazosin (CARDURA) 2 MG tablet Take 1 tablet by mouth daily 19   Historical Provider, MD   venlafaxine (EFFEXOR) 75 MG tablet TAKE 1 TABLET DAILY 19   Raina Trevino MD   clopidogrel (PLAVIX) 75 MG tablet Take 1 tablet by mouth daily 19   Raina Trevino MD   lisinopril (PRINIVIL;ZESTRIL) 40 MG tablet Take 1 tablet by mouth daily 19   Raina Trevino MD   metoprolol succinate (TOPROL XL) 50 MG extended release tablet Take 1 tablet by mouth daily 19   Raina Trevino MD   famotidine (PEPCID) 20 MG tablet Take 1 tablet by mouth 2 times daily 19   Raina Trevino MD   aspirin 81 MG EC tablet Take 1 tablet by mouth daily 16   Julia Aguilar MD   nitroGLYCERIN (NITROSTAT) 0.4 MG SL tablet Place 0.4 mg under the tongue every 5 minutes as needed for Chest pain    Historical Provider, MD       Current medications:    No current facility-administered medications for this visit. No current outpatient medications on file.      Facility-Administered Medications Ordered in Other Visits   Medication Dose Route Frequency Provider Last Rate Last Dose    lactated ringers infusion   Intravenous Continuous Rachael Guillory  mL/hr at 20 1018         Allergies:  No Known Allergies    Problem List:    Patient Active Problem List   Diagnosis Code    Depression F32.9    Sleep apnea G47.30    Carotid artery stenosis I65.29    AAA (abdominal aortic aneurysm) ([facility]) I71.4    Aortic dissection ([facility]) I71.00    CAD (coronary artery disease) I25.10    Mixed hyperlipidemia E78.2    Cerebrovascular accident (CVA) ([facility] I63.9    Cerebrovascular accident (CVA) due to embolism ([facility]) I63.9    Carotid artery occlusion I65.29    Obstructive sleep apnea syndrome G47.33    CKD (chronic kidney disease) stage 3, GFR 30-59 ml/min ([facility]) N18.3    Coronary artery disease involving native heart without angina pectoris I25.10    Cerebrovascular accident (CVA) due to embolism of right middle cerebral artery (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) J41.0    Chronic midline low back pain without sciatica M54.5, G89.29    Recurrent major depressive disorder in partial remission ([facility]) F33.41    Hyperparathyroidism ([facility]) E21.3    Atherosclerosis of native artery of both lower extremities ([facility]) I70.203    Bilateral carotid artery stenosis I65.23       Past Medical History:        Diagnosis Date    AAA (abdominal aortic aneurysm) ([facility])     Aortic dissection ([facility] [address] 10/10/2012    CAD (coronary artery disease)     Cancer ([facility])     lung&lymphnode    Carotid artery occlusion     Cerebrovascular accident (CVA) ([facility] [facility]     Cerebrovascular accident (CVA) due to embolism ([facility] [address] [facility] 2/16/2016    Cerebrovascular accident (CVA) due to embolism of right middle cerebral artery (HCC)     Depression     HTN (hypertension)     Hyperlipidemia     MI (myocardial infarction) ([address]     acute 5/13/2015    Non Hodgkin's lymphoma ([address]     Sleep apnea     no c-pap    Unspecified sleep apnea        Past Surgical History: Lab Results   Component Value Date    WBC 9.7 06/14/2019    RBC 5.40 06/14/2019    HGB 14.9 06/14/2019    HCT 47.5 06/14/2019    HCT 43.3 07/29/2011    MCV 88.0 06/14/2019    RDW 13.9 06/14/2019     06/14/2019     07/29/2011       CMP:   Lab Results   Component Value Date     07/30/2019     07/29/2011    K 4.8 07/30/2019    K 3.9 07/29/2011    CL 99 07/30/2019     07/29/2011    CO2 25 07/30/2019    BUN 28 07/30/2019    CREATININE 1.5 07/30/2019    CREATININE 1.0 07/29/2011    LABGLOM 45 07/30/2019    GLUCOSE 101 07/30/2019    PROT 7.8 07/30/2019    CALCIUM 11.7 07/30/2019    BILITOT 0.6 07/30/2019    ALKPHOS 65 07/30/2019    AST 13 07/30/2019    ALT 14 07/30/2019       POC Tests: No results for input(s): POCGLU, POCNA, POCK, POCCL, POCBUN, POCHEMO, POCHCT in the last 72 hours. Coags:   Lab Results   Component Value Date    PROTIME 13.1 07/15/2016    INR 0.99 07/15/2016       HCG (If Applicable): No results found for: PREGTESTUR, PREGSERUM, HCG, HCGQUANT     ABGs: No results found for: PHART, PO2ART, BIU2EDV, ESO2DLR, BEART, T5MGFYFT     Type & Screen (If Applicable):  No results found for: [facility]    Anesthesia Evaluation  Patient summary reviewed and Nursing notes reviewed no history of anesthetic complications:   Airway: Mallampati: IV  TM distance: >3 FB   Neck ROM: full  Mouth opening: > = 3 FB Dental: normal exam         Pulmonary:normal exam    (+) sleep apnea: on noncompliant,  current smoker          Patient smoked on day of surgery. Cardiovascular:  Exercise tolerance: poor (<4 METS),   (+) hypertension:, past MI:, CABG/stent (2015 lad and rca stent):, hyperlipidemia    (-) CAD             ROS comment: M-MODE AND TWO-DIMENSIONAL ECHOCARDIOGRAM  1. The aortic root appears normal.  2. The aortic valve has 3 leaflets with sclerotic changes noted. 3. The mitral leaflets have normal thickness and mobility.   4. The tricuspid leaflets have normal thickness and mobility. 5. Left ventricular chamber size, wall thickness and wall motion appear  normal. The left ventricular ejection fraction is in excess of 60%. 6. Normal right ventricular size and wall motion. 7. Normal right atrial size. 8. Normal left atrial size. 9. No pericardial effusion or intracavitary thrombus noted.     COLOR FLOW AND DOPPLER WAVEFORM ANALYSIS  1. No aortic stenosis or insufficiency. 2. The mitral inflow pattern is reversed, indicating left ventricular  diastolic dysfunction. No mitral regurgitation of significance noted. 3. No tricuspid regurgitation of significance noted. 4. A saline contrast study was carried out, and, while technically  difficult, there was no overt evidence of an intracardiac shunt.     IMPRESSION  1. Technically very difficult study with limited views. 2. Essentially normal left ventricular systolic function with an ejection  fraction of 60% or greater. 3. Sclerotic changes of the aortic valve. 4. No evidence of an intracardiac shunt on saline contrast study. 5. Technically difficult Doppler studies with no overt abnormality seen.              Neuro/Psych:   (+) CVA: residual symptoms, psychiatric history:            GI/Hepatic/Renal:   (+) GERD:, renal disease: CRI,           Endo/Other:    (+) blood dyscrasia: anticoagulation therapy:., malignancy/cancer. Abdominal:           Vascular:                                          Anesthesia Plan      general     ASA 4       Induction: intravenous. Anesthetic plan and risks discussed with patient.                       TREV Taveras - CRNA   1/8/2020

## 2020-01-10 ENCOUNTER — LAB REQUISITION (OUTPATIENT)
Dept: LAB | Facility: HOSPITAL | Age: 75
End: 2020-01-10

## 2020-01-10 DIAGNOSIS — Z00.00 ROUTINE GENERAL MEDICAL EXAMINATION AT A HEALTH CARE FACILITY: ICD-10-CM

## 2020-01-10 PROCEDURE — 88342 IMHCHEM/IMCYTCHM 1ST ANTB: CPT

## 2020-01-17 LAB
LAB AP CASE REPORT: NORMAL
PATH REPORT.FINAL DX SPEC: NORMAL

## 2020-01-27 RX ORDER — HYDROCODONE BITARTRATE AND ACETAMINOPHEN 10; 325 MG/1; MG/1
1 TABLET ORAL EVERY 6 HOURS
Qty: 120 TABLET | Refills: 0 | Status: SHIPPED | OUTPATIENT
Start: 2020-01-27 | End: 2020-02-24 | Stop reason: SDUPTHER

## 2020-01-27 NOTE — TELEPHONE ENCOUNTER
Kylie Elliott. called to request a refill on his medication. Last office visit : 10/24/2019   Next office visit : 2/27/2020     Last UDS:   Amphetamine Screen, Urine   Date Value Ref Range Status   04/23/2018 Negative  Final     Barbiturate Screen, Urine   Date Value Ref Range Status   04/23/2018 Negative  Final     Benzodiazepine Screen, Urine   Date Value Ref Range Status   04/23/2018 Negative  Final     Cocaine Metabolite Screen, Urine   Date Value Ref Range Status   04/23/2018 Negative  Final     MDMA, Urine   Date Value Ref Range Status   04/23/2018 Negative  Final     Methamphetamine, Urine   Date Value Ref Range Status   04/23/2018 Negative  Final     Opiate Scrn, Ur   Date Value Ref Range Status   04/23/2018 Positive  Final     Oxycodone Screen, Ur   Date Value Ref Range Status   04/23/2018 Negative  Final     PCP Screen, Urine   Date Value Ref Range Status   04/23/2018 Negative  Final     Propoxyphene Screen, Urine   Date Value Ref Range Status   04/23/2018 Negative  Final     Tricyclic Antidepressants, Urine   Date Value Ref Range Status   04/23/2018 Negative  Final       Last Maria Victoria Nixon: 10/30/19  Medication Contract: needs update at next visit   Last Fill: 12/27/19    Requested Prescriptions     Pending Prescriptions Disp Refills    HYDROcodone-acetaminophen (NORCO)  MG per tablet 120 tablet 0     Sig: Take 1 tablet by mouth every 6 hours for 30 days. Please approve or refuse this medication.    Radha Katz

## 2020-01-31 PROBLEM — K31.7 GASTRIC POLYP: Status: ACTIVE | Noted: 2020-01-31

## 2020-02-24 RX ORDER — HYDROCODONE BITARTRATE AND ACETAMINOPHEN 10; 325 MG/1; MG/1
1 TABLET ORAL EVERY 6 HOURS
Qty: 120 TABLET | Refills: 0 | Status: SHIPPED | OUTPATIENT
Start: 2020-02-24 | End: 2020-03-24 | Stop reason: SDUPTHER

## 2020-02-24 NOTE — TELEPHONE ENCOUNTER
Gladys Kohli. called to request a refill on his medication. Last office visit : 10/24/2019   Next office visit : 2/27/2020     Last UDS:   Amphetamine Screen, Urine   Date Value Ref Range Status   04/23/2018 Negative  Final     Barbiturate Screen, Urine   Date Value Ref Range Status   04/23/2018 Negative  Final     Benzodiazepine Screen, Urine   Date Value Ref Range Status   04/23/2018 Negative  Final     Cocaine Metabolite Screen, Urine   Date Value Ref Range Status   04/23/2018 Negative  Final     MDMA, Urine   Date Value Ref Range Status   04/23/2018 Negative  Final     Methamphetamine, Urine   Date Value Ref Range Status   04/23/2018 Negative  Final     Opiate Scrn, Ur   Date Value Ref Range Status   04/23/2018 Positive  Final     Oxycodone Screen, Ur   Date Value Ref Range Status   04/23/2018 Negative  Final     PCP Screen, Urine   Date Value Ref Range Status   04/23/2018 Negative  Final     Propoxyphene Screen, Urine   Date Value Ref Range Status   04/23/2018 Negative  Final     Tricyclic Antidepressants, Urine   Date Value Ref Range Status   04/23/2018 Negative  Final       Last Garcia Senior: 2/22/20  Medication Contract: needs update at next visit   Last Fill: 1/27/20    Requested Prescriptions     Pending Prescriptions Disp Refills    HYDROcodone-acetaminophen (NORCO)  MG per tablet 120 tablet 0     Sig: Take 1 tablet by mouth every 6 hours for 30 days. Please approve or refuse this medication.    Quinton Peabody

## 2020-02-27 ENCOUNTER — OFFICE VISIT (OUTPATIENT)
Dept: FAMILY MEDICINE CLINIC | Age: 75
End: 2020-02-27
Payer: MEDICARE

## 2020-02-27 ENCOUNTER — OFFICE VISIT (OUTPATIENT)
Dept: GASTROENTEROLOGY | Age: 75
End: 2020-02-27
Payer: MEDICARE

## 2020-02-27 VITALS
HEART RATE: 69 BPM | OXYGEN SATURATION: 98 % | BODY MASS INDEX: 34.1 KG/M2 | SYSTOLIC BLOOD PRESSURE: 128 MMHG | DIASTOLIC BLOOD PRESSURE: 82 MMHG | TEMPERATURE: 97.4 F | WEIGHT: 225 LBS | HEIGHT: 68 IN

## 2020-02-27 VITALS
HEIGHT: 68 IN | HEART RATE: 68 BPM | BODY MASS INDEX: 34.1 KG/M2 | OXYGEN SATURATION: 99 % | DIASTOLIC BLOOD PRESSURE: 80 MMHG | WEIGHT: 225 LBS | SYSTOLIC BLOOD PRESSURE: 120 MMHG

## 2020-02-27 PROCEDURE — 99214 OFFICE O/P EST MOD 30 MIN: CPT | Performed by: FAMILY MEDICINE

## 2020-02-27 PROCEDURE — 4040F PNEUMOC VAC/ADMIN/RCVD: CPT | Performed by: FAMILY MEDICINE

## 2020-02-27 PROCEDURE — 4040F PNEUMOC VAC/ADMIN/RCVD: CPT | Performed by: NURSE PRACTITIONER

## 2020-02-27 PROCEDURE — 1123F ACP DISCUSS/DSCN MKR DOCD: CPT | Performed by: FAMILY MEDICINE

## 2020-02-27 PROCEDURE — 1123F ACP DISCUSS/DSCN MKR DOCD: CPT | Performed by: NURSE PRACTITIONER

## 2020-02-27 PROCEDURE — 99213 OFFICE O/P EST LOW 20 MIN: CPT | Performed by: NURSE PRACTITIONER

## 2020-02-27 PROCEDURE — G8417 CALC BMI ABV UP PARAM F/U: HCPCS | Performed by: FAMILY MEDICINE

## 2020-02-27 PROCEDURE — G8482 FLU IMMUNIZE ORDER/ADMIN: HCPCS | Performed by: FAMILY MEDICINE

## 2020-02-27 PROCEDURE — G8427 DOCREV CUR MEDS BY ELIG CLIN: HCPCS | Performed by: NURSE PRACTITIONER

## 2020-02-27 PROCEDURE — 4004F PT TOBACCO SCREEN RCVD TLK: CPT | Performed by: NURSE PRACTITIONER

## 2020-02-27 PROCEDURE — G8482 FLU IMMUNIZE ORDER/ADMIN: HCPCS | Performed by: NURSE PRACTITIONER

## 2020-02-27 PROCEDURE — G8427 DOCREV CUR MEDS BY ELIG CLIN: HCPCS | Performed by: FAMILY MEDICINE

## 2020-02-27 PROCEDURE — G8417 CALC BMI ABV UP PARAM F/U: HCPCS | Performed by: NURSE PRACTITIONER

## 2020-02-27 PROCEDURE — G0439 PPPS, SUBSEQ VISIT: HCPCS | Performed by: FAMILY MEDICINE

## 2020-02-27 PROCEDURE — 4004F PT TOBACCO SCREEN RCVD TLK: CPT | Performed by: FAMILY MEDICINE

## 2020-02-27 ASSESSMENT — PATIENT HEALTH QUESTIONNAIRE - PHQ9
SUM OF ALL RESPONSES TO PHQ QUESTIONS 1-9: 0
SUM OF ALL RESPONSES TO PHQ QUESTIONS 1-9: 0

## 2020-02-27 ASSESSMENT — LIFESTYLE VARIABLES: HOW OFTEN DO YOU HAVE A DRINK CONTAINING ALCOHOL: 0

## 2020-02-27 NOTE — PATIENT INSTRUCTIONS
Miralax one teaspoon daily mixed as directed until you are having daily bowel movement. If no bm for 4 days increase to two teaspoons daily and gradually increase every 3-4 days until desired bowel movement is achieved. If you do not have bm for more than 4 days please use magnesim citrate, 1/2 bottle, to prevent becoming obstructed but continue to use miralax daily. If your stools become too loose or too frequent on daily dose you may reduce the amount taken daily. Make reductions gradually, every 3-4 days. Adjust dose, more or less, as needed for desired bm. You should have a soft bm at least every 3 days. Miralax is safe and effective for long term relief of chronic constipation. Drink 6-8 glasses of water daily. Regular exercise encouraged. High fiber diet recommended.

## 2020-02-27 NOTE — PROGRESS NOTES
femoral artery puncture site. Family History   Problem Relation Age of Onset    High Blood Pressure Father     High Blood Pressure Mother     Diabetes Mother     Colon Polyps Brother     Colon Cancer Neg Hx        Social History     Tobacco Use    Smoking status: Current Every Day Smoker     Packs/day: 0.50     Years: 40.00     Pack years: 20.00    Smokeless tobacco: Never Used    Tobacco comment: quit smoking in nov 2017, restarted smoking since then   Substance Use Topics    Alcohol use: No     Current Outpatient Medications   Medication Sig Dispense Refill    HYDROcodone-acetaminophen (NORCO)  MG per tablet Take 1 tablet by mouth every 6 hours for 30 days. 120 tablet 0    famotidine (PEPCID) 20 MG tablet TAKE 1 TABLET TWICE DAILY 180 tablet 3    atorvastatin (LIPITOR) 40 MG tablet Take 1 tablet by mouth daily 90 tablet 3    doxazosin (CARDURA) 2 MG tablet Take 1 tablet by mouth daily      venlafaxine (EFFEXOR) 75 MG tablet TAKE 1 TABLET DAILY 90 tablet 3    clopidogrel (PLAVIX) 75 MG tablet Take 1 tablet by mouth daily 90 tablet 3    lisinopril (PRINIVIL;ZESTRIL) 40 MG tablet Take 1 tablet by mouth daily 90 tablet 3    metoprolol succinate (TOPROL XL) 50 MG extended release tablet Take 1 tablet by mouth daily 90 tablet 3    aspirin 81 MG EC tablet Take 1 tablet by mouth daily 30 tablet 3    nitroGLYCERIN (NITROSTAT) 0.4 MG SL tablet Place 0.4 mg under the tongue every 5 minutes as needed for Chest pain       No current facility-administered medications for this visit.       No Known Allergies    Health Maintenance   Topic Date Due    Annual Wellness Visit (AWV)  05/29/2019    Lipid screen  10/02/2019    Shingles Vaccine (1 of 2) 10/24/2020 (Originally 2/23/1993)    DTaP/Tdap/Td vaccine (1 - Tdap) 02/27/2021 (Originally 2/23/1956)    Potassium monitoring  07/30/2020    Creatinine monitoring  07/30/2020    Flu vaccine  Completed    Pneumococcal 65+ years Vaccine  Completed    indicated:  1. Chronic midline low back pain without sciatica  Discussed risk and benefits of taking opioids. Risks include addiction and tolerance. If develops impairment or over sedation with medication, discontinue and contact me. Do not take medication with alcohol. Advised to take sparingly. Advised to keep medication hidden and locked up as risk of theft is high with these medications as well. Long discussion with the patient today regarding options. Reviewed his imaging studies  It has been 2 years since he had a plain film imaging of his lower back which showed lumbar spondylosis  States is not interested in pursuing surgical intervention or further imaging at this time. Suggested referral to pain management for options of treatment as well. He states he will consider. 2. Gastroesophageal reflux disease without esophagitis  Overall stable    3. Familial hypercholesterolemia  Lab Results   Component Value Date    CHOL 120 (L) 10/02/2018    CHOL 101 (L) 09/28/2017    CHOL 133 (L) 02/11/2016     Lab Results   Component Value Date    TRIG 145 10/02/2018    TRIG 103 (L) 09/28/2017    TRIG 219 (H) 02/11/2016     Lab Results   Component Value Date    HDL 38 (L) 10/02/2018    HDL 39 (L) 09/28/2017    HDL 30 (L) 02/11/2016     Lab Results   Component Value Date    LDLCALC 53 10/02/2018    LDLCALC 41 09/28/2017     No results found for: LABVLDL, VLDL  No results found for: CHOLHDLRATIO  LDL goal of less than 70. Continue with high intensity statin therapy  4. Coronary artery disease involving native heart without angina pectoris, unspecified vessel or lesion type  Continue with cardiovascular risk reduction  Has had no chest pain or palpitations since last visit  Continue with Plavix. Continue with ACE inhibitor and beta-blocker as well as atorvastatin    5. Depression with anxiety  Stable on Effexor    6.  Essential (primary) hypertension  BP Readings from Last 3 Encounters:   02/27/20 120/80   02/27/20 128/82   01/08/20 135/73     Stable    7. Benign prostatic hyperplasia without lower urinary tract symptoms  Satisfied with doxazosin    8. Constipation, unspecified constipation type  Continue with senna and Colace as needed            Return in about 3 months (around 5/27/2020) for Routine follow up - 15 minute visit. Discussed use, benefit, and side effects of prescribed medications. All patient questions answered. Pt voiced understanding. Reviewed health maintenance. Instructedto continue current medications, diet and exercise. Patient agreed with treatmentplan. Follow up as directed.        Note dictated using [id number] [employer]

## 2020-02-27 NOTE — PROGRESS NOTES
Subjective:     Patient ID: Paco Yun is a 66 y.o. male  PCP: Coreen Bueno MD  Referring Provider: No ref. provider found    HPI  Patient presents to the office today with the following complaints: Follow-up    Pt here for follow up after EUS. Today, he reports he is doing well. He denies any weight loss or abdominal pain  Pt reports history of lung cancer, skin cancer, and non-Hodgkin lymphoma. He reports chronic arthritic pain. He states he understands diagnosis and agrees with plan. Daughter present in room, verbalized understanding. No questions or concerns at this time. Lesion of concern - in the proximal stomach, there was a well circumscribed hypoecohic and largely homogenous lesion seen. This lesion measured 1.89 x 1.86cm in size. This lesion appeared to arise from the gastric layers (I believe it arose from the 4th gastric layer) but it was more difficult than normal to identify. 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 FNA was pursued. 3 passes were made with 22G FNA needle. Slow pull suction was used on first pass and 10cc of suction was used on the 3rd pass to insure no cystic of fluid component. No fluid was aspirated. Specimen was placed in cytolyte and sent for cytology. IMPRESSION:  - 1.89cm hypoechoic yamilet-gastric mass s/p FNA as above - main ddx including GIST/leiomyoma vs less likely gastric adenocarcinoma (given stability for multiple months) vs lymphoma - s/p FNA as above. RECOMMENDATIONS:   - Await pathology results. - Schedule f/u OV in our office in 6-8 weeks. - Okay to restart Plavix in 2 days. FINAL DIAGNOSIS:  Subcutaneous tissue, fine-needle aspiration of perigastric mass, ThinPrep  and cell block: Gastrointestinal stromal tumor, favor low-grade. COMMENT:   The histologic grade of this tumor is based on a relatively  limited amount of tissue. Clinical correlation is suggested. CBG/CBG     This was my first time assessing Mr. Stahl    Assessment:     1. Benign gastrointestinal stromal tumor (GIST)    2. Encounter for follow-up surveillance of malignant gastrointestinal stromal tumor (GIST)            Plan:   - Follow up in one year or sooner if needed  - Repeat EGD/EUS in one year    Orders  No orders of the defined types were placed in this encounter. Medications  No orders of the defined types were placed in this encounter. Patient History:     Past Medical History:   Diagnosis Date    AAA (abdominal aortic aneurysm) ([address]     Aortic dissection ([address] 10/10/2012    CAD (coronary artery disease)     Cancer (HCC)     lung&lymphnode    Carotid artery occlusion     Cerebrovascular accident (CVA) ([address]     Cerebrovascular accident (CVA) due to embolism ([address] 2/16/2016    Cerebrovascular accident (CVA) due to embolism of right middle cerebral artery ([address]     Depression     HTN (hypertension)     Hyperlipidemia     MI (myocardial infarction) ([address]     acute 5/13/2015    Non Hodgkin's lymphoma ([address]     Sleep apnea     no c-pap    Unspecified sleep apnea        Past Surgical History:   Procedure Laterality Date    CARDIAC CATHETERIZATION  5/13/15  [address]    EF 40%. stent to LAD, stent to posterior descending branch of RCA.  CAROTID ENDARTERECTOMY Right 7/22/2016    Right carotid endarterectomy,carotid arteriograms. [facility]    COLONOSCOPY  04/09/2018    [facility] LUNG CANCER SURGERY      right side    LYMPHADENECTOMY      removed lymphnodes     RECTAL SURGERY      fistula     UPPER GASTROINTESTINAL ENDOSCOPY N/A 1/8/2020    Dr Guadalupe Garcia (Dr Jai Ron pt) w/EUS-Small low grade GIST in the stomach, repeat in 1 yr    UPPER GASTROINTESTINAL ENDOSCOPY  11/08/2019    Juan Manuel: normal    UPPER GASTROINTESTINAL ENDOSCOPY  04/13/2018    Dr Syed Mode: normal, 2cm HH, small amount of food in stomach w/dark fluid,but not kathy blood    VASCULAR SURGERY  10/29/12 [facility]    open exposure of R common distress. Appearance: He is well-developed and overweight. Eyes:      General:         Right eye: No discharge. Left eye: No discharge. Neck:      Musculoskeletal: Normal range of motion. Cardiovascular:      Rate and Rhythm: Normal rate and regular rhythm. Heart sounds: No murmur. Pulmonary:      Effort: Pulmonary effort is normal. No respiratory distress. Breath sounds: Normal breath sounds. Abdominal:      General: Bowel sounds are normal. There is no distension. Palpations: Abdomen is soft. There is no mass. Tenderness: There is no abdominal tenderness. There is no guarding or rebound. Musculoskeletal: Normal range of motion. Skin:     General: Skin is warm and dry. Comments: Skin and flaky, bruising to bilateral upper extremities   Neurological:      Mental Status: He is alert and oriented to person, place, and time.    Psychiatric:         Behavior: Behavior normal.

## 2020-02-27 NOTE — PROGRESS NOTES
(CVA) ([address]     Cerebrovascular accident (CVA) due to embolism ([address] 2/16/2016    Cerebrovascular accident (CVA) due to embolism of right middle cerebral artery ([address]     Depression     HTN (hypertension)     Hyperlipidemia     MI (myocardial infarction) ([address]     acute 5/13/2015    Non Hodgkin's lymphoma ([address]     Sleep apnea     no c-pap    Unspecified sleep apnea        Past Surgical History:   Procedure Laterality Date    CARDIAC CATHETERIZATION  5/13/15  [facility]    EF 40%. stent to LAD, stent to posterior descending branch of RCA.  CAROTID ENDARTERECTOMY Right 7/22/2016    Right carotid endarterectomy,carotid arteriograms. 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, repeat in 1 yr    UPPER GASTROINTESTINAL ENDOSCOPY  11/08/2019    Juan Manuel: normal    UPPER GASTROINTESTINAL ENDOSCOPY  04/13/2018    Dr Jacquie Nina: normal, 2cm HH, small amount of food in stomach w/dark fluid,but not kathy blood    VASCULAR SURGERY  10/29/12 [facility]    open exposure of R common femoral artery; percutaneous cannulation of L  femoral artery with 7-Estonian sheath; suprarenal abd aortagram with bilat iliofem arteriogram; endoluminal graft repair of AAA with endologix [id number] main body, 28x95 infrarenal cuff; balloon angioplasty of infrarenal abd aorta and endoluminal graft with coda balloon; completion abd aortogram w bilat iliofem arteriogram;     VASCULAR SURGERY  10/29/ [facility]     (cont) open repair of right common femoral ateriotomy; perclose repair of left common femoral artery puncture site.         Family History   Problem Relation Age of Onset    High Blood Pressure Father     High Blood Pressure Mother     Diabetes Mother        CareTeam (Including outside providers/suppliers regularly involved in providing care):   Patient Care Team:  Evelia Hollingsworth MD as PCP - General (Family Medicine)  Evelia Hollingsworth MD as PCP - [facility]  SHAE Estevez MD as Consulting Physician (Cardiology)  Calixto Munson MD as Consulting Physician (Gastroenterology)    Wt Readings from Last 3 Encounters:   02/27/20 225 lb (102.1 kg)   01/08/20 220 lb (99.8 kg)   10/24/19 229 lb (103.9 kg)     Vitals:    02/27/20 1407   BP: (!) 128/96   Pulse: 69   Temp: 97.4 °F (36.3 °C)   SpO2: 98%   Weight: 225 lb (102.1 kg)   Height: 5' 8\" (1.727 m)           The following problems were reviewed today and where indicated follow up appointments were made and/or referrals ordered. Risk Factor Screenings with Interventions     Fall Risk:  Timed Up and Go Test > 12 seconds? (Complete if either Fall Risk answers are Yes): (!) yes  2 or more falls in past year?: no  Fall with injury in past year?: no  Fall Risk Interventions:    · Home safety tips provided.   Recommend he continue ambulating with assistance of a cane    Depression:  PHQ-2 Score: 0  Depression Interventions:  · Not indicated     Anxiety:     Anxiety Interventions:  · Not indicated     Cognitive:  Clock Drawing Test (CDT) Score: Normal  Cognitive Impairment Interventions:  · Not indicated     Substance Abuse:  Social History     Socioeconomic History    Marital status:      Spouse name: Not on file    Number of children: Not on file    Years of education: Not on file    Highest education level: Not on file   Occupational History    Not on file   Social Needs    Financial resource strain: Not on file    Food insecurity:     Worry: Not on file     Inability: Not on file    Transportation needs:     Medical: Not on file     Non-medical: Not on file   Tobacco Use    Smoking status: Current Every Day Smoker     Packs/day: 0.50     Years: 40.00     Pack years: 20.00    Smokeless tobacco: Never Used    Tobacco comment: quit smoking in nov 2017, Out    Hepatitis B vaccine  Aged Out    Hib vaccine  Aged Out    Meningococcal (ACWY) vaccine  Aged Out       Recommendations for [employer] Due: see orders.   Recommended screening schedule for the next 5-10 years is provided to the patient in written form: see Patient Instructions/AVS.

## 2020-02-28 ASSESSMENT — ENCOUNTER SYMPTOMS
SHORTNESS OF BREATH: 0
COUGH: 0
BLOOD IN STOOL: 0
DIARRHEA: 0
ABDOMINAL DISTENTION: 0
CONSTIPATION: 0
RECTAL PAIN: 0
VOMITING: 0
ABDOMINAL PAIN: 0
ANAL BLEEDING: 0
CHOKING: 0
NAUSEA: 0
TROUBLE SWALLOWING: 0

## 2020-03-03 ASSESSMENT — ENCOUNTER SYMPTOMS
CHEST TIGHTNESS: 0
NAUSEA: 0
ABDOMINAL PAIN: 0
DIARRHEA: 0
SHORTNESS OF BREATH: 0
COUGH: 0
ANAL BLEEDING: 0
CONSTIPATION: 1
BACK PAIN: 1

## 2020-03-24 RX ORDER — HYDROCODONE BITARTRATE AND ACETAMINOPHEN 10; 325 MG/1; MG/1
1 TABLET ORAL EVERY 6 HOURS
Qty: 120 TABLET | Refills: 0 | Status: SHIPPED | OUTPATIENT
Start: 2020-03-24 | End: 2020-04-20 | Stop reason: SDUPTHER

## 2020-04-09 RX ORDER — CLOPIDOGREL BISULFATE 75 MG/1
TABLET ORAL
Qty: 90 TABLET | Refills: 1 | Status: SHIPPED | OUTPATIENT
Start: 2020-04-09 | End: 2020-07-16

## 2020-04-20 RX ORDER — HYDROCODONE BITARTRATE AND ACETAMINOPHEN 10; 325 MG/1; MG/1
1 TABLET ORAL EVERY 6 HOURS
Qty: 120 TABLET | Refills: 0 | Status: SHIPPED | OUTPATIENT
Start: 2020-04-20 | End: 2020-05-19 | Stop reason: SDUPTHER

## 2020-05-20 RX ORDER — LISINOPRIL 40 MG/1
TABLET ORAL
Qty: 90 TABLET | Refills: 1 | Status: SHIPPED | OUTPATIENT
Start: 2020-05-20 | End: 2020-11-30

## 2020-05-20 RX ORDER — METOPROLOL SUCCINATE 50 MG/1
TABLET, EXTENDED RELEASE ORAL
Qty: 90 TABLET | Refills: 1 | Status: SHIPPED | OUTPATIENT
Start: 2020-05-20 | End: 2020-09-03

## 2020-05-21 RX ORDER — HYDROCODONE BITARTRATE AND ACETAMINOPHEN 10; 325 MG/1; MG/1
1 TABLET ORAL EVERY 6 HOURS
Qty: 120 TABLET | Refills: 0 | Status: SHIPPED | OUTPATIENT
Start: 2020-05-21 | End: 2020-06-18 | Stop reason: SDUPTHER

## 2020-06-25 ENCOUNTER — VIRTUAL VISIT (OUTPATIENT)
Dept: FAMILY MEDICINE CLINIC | Age: 75
End: 2020-06-25

## 2020-07-16 RX ORDER — CLOPIDOGREL BISULFATE 75 MG/1
TABLET ORAL
Qty: 90 TABLET | Refills: 1 | Status: SHIPPED | OUTPATIENT
Start: 2020-07-16 | End: 2021-01-20

## 2020-07-16 NOTE — TELEPHONE ENCOUNTER
Scooby Loco. called to request a refill on his medication.       Last office visit : 2/27/2020   Next office visit : Visit date not found     Requested Prescriptions     Signed Prescriptions Disp Refills    clopidogrel (PLAVIX) 75 MG tablet 90 tablet 1     Sig: TAKE ONE (1) TABLET EVERY DAY     Authorizing Provider: Inés Briseno     Ordering User: Yina Landaverde

## 2020-07-17 RX ORDER — HYDROCODONE BITARTRATE AND ACETAMINOPHEN 10; 325 MG/1; MG/1
1 TABLET ORAL EVERY 6 HOURS
Qty: 120 TABLET | Refills: 0 | Status: SHIPPED | OUTPATIENT
Start: 2020-07-17 | End: 2020-07-31 | Stop reason: SDUPTHER

## 2020-07-17 NOTE — TELEPHONE ENCOUNTER
Baltazar Mills. called to request a refill on his medication. Last office visit : 2/27/2020   Next office visit : Visit date not found     Last UDS:   Amphetamine Screen, Urine   Date Value Ref Range Status   04/23/2018 Negative  Final     Barbiturate Screen, Urine   Date Value Ref Range Status   04/23/2018 Negative  Final     Benzodiazepine Screen, Urine   Date Value Ref Range Status   04/23/2018 Negative  Final     Cocaine Metabolite Screen, Urine   Date Value Ref Range Status   04/23/2018 Negative  Final     MDMA, Urine   Date Value Ref Range Status   04/23/2018 Negative  Final     Methamphetamine, Urine   Date Value Ref Range Status   04/23/2018 Negative  Final     Opiate Scrn, Ur   Date Value Ref Range Status   04/23/2018 Positive  Final     Oxycodone Screen, Ur   Date Value Ref Range Status   04/23/2018 Negative  Final     PCP Screen, Urine   Date Value Ref Range Status   04/23/2018 Negative  Final     Propoxyphene Screen, Urine   Date Value Ref Range Status   04/23/2018 Negative  Final     Tricyclic Antidepressants, Urine   Date Value Ref Range Status   04/23/2018 Negative  Final       Last Emily Hackett: 6/16/20  Medication Contract: needs update at next visit   Last Fill: 6/18/20    Requested Prescriptions     Pending Prescriptions Disp Refills    HYDROcodone-acetaminophen (NORCO)  MG per tablet 120 tablet 0     Sig: Take 1 tablet by mouth every 6 hours for 30 days. Please approve or refuse this medication.    Clementina Chen

## 2020-07-31 RX ORDER — HYDROCODONE BITARTRATE AND ACETAMINOPHEN 10; 325 MG/1; MG/1
1 TABLET ORAL EVERY 6 HOURS
Qty: 120 TABLET | Refills: 0 | Status: SHIPPED | OUTPATIENT
Start: 2020-07-31 | End: 2020-09-11 | Stop reason: SDUPTHER

## 2020-07-31 NOTE — TELEPHONE ENCOUNTER
Galo Rogers. called to request a refill on his medication. Last office visit : 2/27/2020   Next office visit : Visit date not found     Last UDS:   Amphetamine Screen, Urine   Date Value Ref Range Status   04/23/2018 Negative  Final     Barbiturate Screen, Urine   Date Value Ref Range Status   04/23/2018 Negative  Final     Benzodiazepine Screen, Urine   Date Value Ref Range Status   04/23/2018 Negative  Final     Cocaine Metabolite Screen, Urine   Date Value Ref Range Status   04/23/2018 Negative  Final     MDMA, Urine   Date Value Ref Range Status   04/23/2018 Negative  Final     Methamphetamine, Urine   Date Value Ref Range Status   04/23/2018 Negative  Final     Opiate Scrn, Ur   Date Value Ref Range Status   04/23/2018 Positive  Final     Oxycodone Screen, Ur   Date Value Ref Range Status   04/23/2018 Negative  Final     PCP Screen, Urine   Date Value Ref Range Status   04/23/2018 Negative  Final     Propoxyphene Screen, Urine   Date Value Ref Range Status   04/23/2018 Negative  Final     Tricyclic Antidepressants, Urine   Date Value Ref Range Status   04/23/2018 Negative  Final       Last Jasmin Lyme: 6/16/20  Medication Contract: needs update at next visit   Last Fill: 7/17/20    Requested Prescriptions     Pending Prescriptions Disp Refills    HYDROcodone-acetaminophen (NORCO)  MG per tablet 120 tablet 0     Sig: Take 1 tablet by mouth every 6 hours for 30 days. Please approve or refuse this medication.    Diamond Braga

## 2020-08-03 NOTE — OP NOTE
Spoke with patient to follow up below. He say that his sx from initial covid never fully went away, and employee health has not allowed him to return to work. He says that he did finally test negative on 7/27 was off quarantine was ready to go back to work then over the last 3-4 days he developed sx of ST, Headache, bodyaches, cough with clear sputum. He has not checked fever but has been taking tylenol for few days for ST and headache. He says work says he cannot return to work with sx.  He says that his wife has been ill and went to get tested for covid today and he him self has scheduled to retest again on Thursday.   Since he never returned to work yet he asks if the disability paper work would be able to be adjusted through 7/27?   Did discuss evaluation of current sx in [facility].   Please advise     layers (I believe it arose from the 4th gastric layer) but it was more difficult than normal to identify. 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 FNA was pursued. 3 passes were made with 22G FNA needle. Slow pull suction was used on first pass and 10cc of suction was used on the 3rd pass to insure no cystic of fluid component. No fluid was aspirated. Specimen was placed in cytolyte and sent for cytology. Estimated Blood Loss: minimal    IMPRESSION:  - 1.89cm hypoechoic yamilet-gastric mass s/p FNA as above - main ddx including GIST/leiomyoma vs less likely gastric adenocarcinoma (given stability for multiple months) vs lymphoma - s/p FNA as above. RECOMMENDATIONS:   - Await pathology results. - Schedule f/u OV in our office in 6-8 weeks. - Okay to restart Plavix in 2 days. The results were discussed with the patient and family. A copy of the images obtained were given to the patient.      Andrew Bean MD  01/08/20  3:03 PM

## 2020-08-14 ENCOUNTER — TELEPHONE (OUTPATIENT)
Dept: FAMILY MEDICINE CLINIC | Age: 75
End: 2020-08-14

## 2020-08-14 NOTE — TELEPHONE ENCOUNTER
Jackie Galvez called stating that their pain medication was not received by pharmacy. Please call willow to advise.     Last Appt:  6/25/2020  Next Appt:   Visit date not found  Preferred Pharmacy: [facility]

## 2020-08-28 ENCOUNTER — HOSPITAL ENCOUNTER (OUTPATIENT)
Dept: VASCULAR LAB | Age: 75
Discharge: HOME | End: 2020-08-28
Payer: MEDICARE

## 2020-08-28 ENCOUNTER — HOSPITAL ENCOUNTER (OUTPATIENT)
Dept: CT IMAGING | Age: 75
Discharge: HOME | End: 2020-08-28
Payer: MEDICARE

## 2020-08-28 PROCEDURE — 74176 CT ABD & PELVIS W/O CONTRAST: CPT

## 2020-08-28 PROCEDURE — 93880 EXTRACRANIAL BILAT STUDY: CPT

## 2020-08-28 PROCEDURE — 93923 UPR/LXTR ART STDY 3+ LVLS: CPT

## 2020-08-28 RX ORDER — VENLAFAXINE 75 MG/1
TABLET ORAL
Qty: 90 TABLET | Refills: 2 | Status: SHIPPED | OUTPATIENT
Start: 2020-08-28 | End: 2021-04-09

## 2020-08-31 ENCOUNTER — VIRTUAL VISIT (OUTPATIENT)
Dept: VASCULAR SURGERY | Age: 75
End: 2020-08-31
Payer: MEDICARE

## 2020-08-31 PROCEDURE — 99442 PR PHYS/QHP TELEPHONE EVALUATION 11-20 MIN: CPT | Performed by: PHYSICIAN ASSISTANT

## 2020-08-31 NOTE — PROGRESS NOTES
 Depression     Sleep apnea      Current Outpatient Medications   Medication Sig Dispense Refill    metoprolol succinate (TOPROL XL) 50 MG extended release tablet TAKE 1 TABLET BY MOUTH EVERY DAY 90 tablet 1    pregabalin (LYRICA) 50 MG capsule Take 1 capsule by mouth 3 times daily for 30 days. 90 capsule 5    baclofen (LIORESAL) 10 MG tablet Take 1 tablet by mouth 2 times daily 60 tablet 0    venlafaxine (EFFEXOR) 75 MG tablet TAKE ONE (1) TABLET EVERY DAY 90 tablet 2    atorvastatin (LIPITOR) 40 MG tablet TAKE ONE (1) TABLET EVERY DAY 90 tablet 1    clopidogrel (PLAVIX) 75 MG tablet TAKE ONE (1) TABLET EVERY DAY 90 tablet 1    lisinopril (PRINIVIL;ZESTRIL) 40 MG tablet TAKE 1 TABLET DAILY 90 tablet 1    famotidine (PEPCID) 20 MG tablet TAKE 1 TABLET TWICE DAILY 180 tablet 3    doxazosin (CARDURA) 2 MG tablet Take 1 tablet by mouth daily      aspirin 81 MG EC tablet Take 1 tablet by mouth daily 30 tablet 3    nitroGLYCERIN (NITROSTAT) 0.4 MG SL tablet Place 0.4 mg under the tongue every 5 minutes as needed for Chest pain       No current facility-administered medications for this visit. Allergies: Patient has no known allergies. Past Medical History:   Diagnosis Date    AAA (abdominal aortic aneurysm) ([address]     Aortic dissection ([address] 10/10/2012    CAD (coronary artery disease)     Cancer (HCC)     lung&lymphnode    Carotid artery occlusion     Cerebrovascular accident (CVA) ([address]     Cerebrovascular accident (CVA) due to embolism ([address] 2/16/2016    Cerebrovascular accident (CVA) due to embolism of right middle cerebral artery ([address]     Depression     HTN (hypertension)     Hyperlipidemia     MI (myocardial infarction) ([address]     acute 5/13/2015    Non Hodgkin's lymphoma ([address]     Sleep apnea     no c-pap    Unspecified sleep apnea      Past Surgical History:   Procedure Laterality Date    CARDIAC CATHETERIZATION  5/13/15  [facility]    EF 40%.  stent to LAD, stent to posterior denies any claudication, ischemic rest pain or any new or nonhealing wounds. He reports a history of chronic back pain with radiculopathy and numbness both legs(unchanged). He is still smoking. He denies any new onset of partial or complete loss of vision affecting only one eye, speech difficulty or lateralizing weakness, numbness/tingling      HARVEY -       Impression          Based on ankle brachial indices and doppler waveforms, the patient has     mildly diminished flow to the bilateral lower extremity arterial system at    [employer]          Signature          ----------------------------------------------------------------     Electronically signed by Shantelle Sheridan MD(Interpreting     physician) on 08/28/2020 04:44 PM     ----------------------------------------------------------------         Velocities are measured in cm/s ; Diameters are measured in mm         Pressures    +--------------------------------------++--------+-----+----+--------+-----+    !                                      ! ! Right   !     !Left!        !     !    +--------------------------------------++--------+-----+----+--------+-----+    ! Location                              ! ! Pressure! Ratio!    !Pressure! Ratio! +--------------------------------------++--------+-----+----+--------+-----+    ! Upper Thigh                           !!255     !1.34 !    !255     !1.34 !    +--------------------------------------++--------+-----+----+--------+-----+    ! Lower Thigh                           !!227     !1.19 !    !255     !1.34 !    +--------------------------------------++--------+-----+----+--------+-----+    ! Calf                                  !!210     !1.11 !    !225     !1.18 !    +--------------------------------------++--------+-----+----+--------+-----+    ! Ankle PT                              !!131     !1.17 !    !211     !1.11 ! +--------------------------------------++--------+-----+----+--------+-----+    ! DP                                    !!199     !1.05 !    !208     !1.09 !    +--------------------------------------++--------+-----+----+--------+-----+    ! Great Toe                             !!184     !0.97 !    !255     !1.34 !    +--------------------------------------++--------+-----+----+--------+-----+           - Brachial Pressure:Right: 179. Left:190.           - RACHEL:Right: 1.17. Left: 1. 11.         Plethysmographic Digit Evaluation    +---------++--------+-----+---------------++--------+-----+----------------+    !         ! ! Right   !     ! Left           !!        !     !                !    +---------++--------+-----+---------------++--------+-----+----------------+    ! Location ! !Pressure! Ratio! PPG Wave Form  !!Pressure! Ratio! PPG Wave Form   !    +---------++--------+-----+---------------++--------+-----+----------------+    ! Great UFQ!!695     !0.97 !               !!255     !1.34 !                !    +---------++--------+-----+---------------++--------+-----+----------------+             CDU -  Impression          There is mixed plaque visualized in the bilateral internal carotid     arteries.          There is less than 50% stenosis in the right internal carotid artery.          There is less than 50% stenosis of the left internal carotid artery.          There is normal antegrade flow in the bilateral vertebral arteries.          Signature          ----------------------------------------------------------------     Electronically signed by Viky Morales     physician) on 08/28/2020 04:45 PM     ----------------------------------------------------------------         Blood Pressure:Right arm 179/ mmHg. Left arm 190/ mmHg.         Velocities are measured in cm/s ; Diameters are measured in mm         Carotid Right Measurements    +------------+-------+-------+--------+-------+------------+---------------+    ! Location    !PSV    !EDV    ! Angle   !RI     !%Stenosis   ! Tortuosity     ! +------------+-------+-------+--------+-------+------------+---------------+    ! Prox CCA    !52.6   !8.25   !60      !0.84   !            !               !    +------------+-------+-------+--------+-------+------------+---------------+    ! Mid CCA     !50.3   !9.43   !60      !0.81   !            !               !    +------------+-------+-------+--------+-------+------------+---------------+    ! Prox ICA    !23.6   !5.95   !60      !0.75   !            !               !    +------------+-------+-------+--------+-------+------------+---------------+    ! Mid ICA     !20.5   !6.43   !60      !0.69   !            !               !    +------------+-------+-------+--------+-------+------------+---------------+    ! Dist ICA    !25.7   !5.48   !60      !0.79   !            !               !    +------------+-------+-------+--------+-------+------------+---------------+    ! Prox ECA    !53.8   !6.29   !60      !0.88   !            !               !    +------------+-------+-------+--------+-------+------------+---------------+    ! Vertebral   !42.1   !8.57   !60      !0. 8    !            !               !    +------------+-------+-------+--------+-------+------------+---------------+           - There is antegrade vertebral flow noted on the right side.           - Additional Measurements:ICAPSV/CCAPSV 0.49. ICAEDV/CCAEDV 0.78.         Carotid Left Measurements    +------------+-------+-------+--------+-------+------------+---------------+    ! Location    !PSV    !EDV    ! Angle   !RI     !%Stenosis   ! Tortuosity     !    +------------+-------+-------+--------+-------+------------+---------------+    ! Prox CCA    !121    !21.2   !60      !0.82   !            !               !    +------------+-------+-------+--------+-------+------------+---------------+    ! Mid CCA     !80.1   !12.6   !60      !0.84   !            !               !    +------------+-------+-------+--------+-------+------------+---------------+    ! Prox ICA    !62.7   !15.8   !60      !0.75   !            !               !    +------------+-------+-------+--------+-------+------------+---------------+    ! Mid ICA     !61.6   !14.1   !60      !0.77   !            !               !    +------------+-------+-------+--------+-------+------------+---------------+    ! Dist ICA    !66.8   !21.1   !60      !0.68   !            !               !    +------------+-------+-------+--------+-------+------------+---------------+    ! Prox ECA    !58.1   !11.8   !60      !0. 8    !            !               !    +------------+-------+-------+--------+-------+------------+---------------+    ! Vertebral   !34.6   !9.38   !60      !0.73   !            !               !    +------------+-------+-------+--------+-------+------------+---------------+           - There is antegrade vertebral flow noted on the left side.           - Additional Measurements:ICAPSV/CCAPSV 0.55. ICAEDV/CCAEDV 1.             Assessment/Plan -      1. PVD  2. Carotid Artery Stenosis  3. Tobacco abuse      Recommend he continue ASA, Plavix and statin daily  Recommend smoking cessation  Recommend good blood pressure control  We will follow-up in 12 months with a repeat lower extremity arterial study and CDU. or sooner if claudication worsens/develops, develops new wound or IRP. Patient was instructed to go to ER or call office immediately with any new onset of partial or complete loss of vision affecting only one eye, speech difficulty or lateralizing weakness, numbness/tingling      I affirm this is a Patient Initiated Episode with a Patient who has not had a related appointment within my department in the past 7 days or scheduled within the next 24 hours.     Patient identification was verified at the start of the visit: Yes    Total Time: minutes: 11-20 minutes    Note: not billable if this call serves to triage the patient into an appointment for the relevant concern      Andrew Hackett

## 2020-09-01 ENCOUNTER — VIRTUAL VISIT (OUTPATIENT)
Dept: FAMILY MEDICINE CLINIC | Age: 75
End: 2020-09-01
Payer: MEDICARE

## 2020-09-01 PROCEDURE — 99443 PR PHYS/QHP TELEPHONE EVALUATION 21-30 MIN: CPT | Performed by: FAMILY MEDICINE

## 2020-09-01 RX ORDER — BACLOFEN 10 MG/1
10 TABLET ORAL 2 TIMES DAILY
Qty: 60 TABLET | Refills: 0 | Status: SHIPPED | OUTPATIENT
Start: 2020-09-01 | End: 2021-01-04 | Stop reason: ALTCHOICE

## 2020-09-01 RX ORDER — PREGABALIN 50 MG/1
50 CAPSULE ORAL 3 TIMES DAILY
Qty: 90 CAPSULE | Refills: 5 | Status: ON HOLD | OUTPATIENT
Start: 2020-09-01 | End: 2021-02-17 | Stop reason: HOSPADM

## 2020-09-01 NOTE — PROGRESS NOTES
Collette Helper. is a 66 y.o. male evaluated via telephone on 9/1/2020. Consent:  He and/or health care decision maker is aware that that he may receive a bill for this telephone service, depending on his insurance coverage, and has provided verbal consent to proceed: Yes      Documentation:  I communicated with the patient and/or health care decision maker about the following:  Details of this discussion including any medical advice provided:    S: Was in ED at [facility] on 8/12. He was given steroids and muscle relaxants without relief. States he finished his Robaxin and received dexamethasone IM in the hospital.  Continues to have chronic lower back issues. This is been previously documented. He does not feel like his back issues are getting any better. He continues to take hydrocodone chronically does not feel that is adequately controlling his pain at the moment. Pain does radiate down both legs. He said no falls since leaving the emergency department. Impression    Atheromatous changes of the abdominal aorta and iliac    arteries. An aortoiliac endograft in place. The patency of the graft    and any possibility of endoleak is not evaluated due to lack of    contrast enhancement. A mild increase in size of the left internal iliac aneurysm which now    measures 2.9 cm. It previously measured 2.5 cm    A 2 cm exophytic solid nodule from the lesser curvature of the stomach    which is stable since the previous study. The diverticulosis of the colon. No evidence for diverticulitis.     Signed by Dr Renato Velarde on 8/28/2020 1:13 PM           Impression         Michaelle Barnes is mixed plaque visualized in the bilateral internal carotid     arteries.          There is less than 50% stenosis in the right internal carotid artery.          There is less than 50% stenosis of the left internal carotid artery.          There is normal antegrade flow in the bilateral vertebral arteries.       Signature          ----------------------------------------------------------------     Electronically signed by Alma Delia Diaz MD(Interpreting     physician) on 08/28/2020 04:45 PM     ----------------------------------------------------------------     Extremities Arteries:Lower Arterial Plethysmography, VASC LOWER EXTREMITY     ART SEGMENTAL PRESSURES W [facility].         Indications for Study:PVD.         Risk Factors           - The patient's risk factor(s) include: dyslipidemia and arterial        hypertension.           - Current - Every day.           - The patient's risk factor(s) include: Prior CVA, ,         Allergies      - No known allergies.          Impression          Based on ankle brachial indices and doppler waveforms, the patient has     mildly diminished flow to the bilateral lower extremity arterial system at    [facility]          Signature          ----------------------------------------------------------------     Electronically signed by Alma Delia Diaz MD(Interpreting     physician) on 08/28/2020 04:44 PM     ----------------------------------------------------------------       Assessment/Plan  1. Chronic midline low back pain without sciatica  Discussed risks and benefits of this new medication. Discussed potential side effects. He voiced understanding. Recommend we get a plain imaging of his spine as he has a history of chronic vertebral fracture  - pregabalin (LYRICA) 50 MG capsule; Take 1 capsule by mouth 3 times daily for 30 days. Dispense: 90 capsule; Refill: 5  - baclofen (LIORESAL) 10 MG tablet; Take 1 tablet by mouth 2 times daily  Dispense: 60 tablet; Refill: 0  - XR LUMBAR SPINE (2-3 VIEWS); Future    2.  Dissection of aorta, unspecified portion of aorta ([address]  Following up with his vascular specialist.  He went for CT scan of his abdomen and pelvis along with carotid ultrasound and lower extremity evaluation for peripheral vascular disease on August 28.    3. Simple chronic bronchitis (HCC)  Respiratory symptoms are stable. Advised to abstain from tobacco    4. Recurrent major depressive disorder in partial remission (HCC)  Stable    5. Hyperparathyroidism ([address]  Lab Results   Component Value Date    .9 (H) 06/14/2019    CALCIUM 11.7 (H) 07/30/2019    PHOS 2.8 06/14/2019   He had been referred to nephrology and ENT. He had missed his laboratory appointment. Encouraged him to repeat his labs as soon as possible. Encouraged him to keep his follow-up appointments with his specialists as well. His last appointment with his ENT was in November of last year    We will need to recheck his laboratory work    No follow-ups on file. I affirm this is a Patient Initiated Episode with an Established Patient who has not had a related appointment within my department in the past 7 days or scheduled within the next 24 hours. Patient identification was verified at the start of the visit: Yes    Total Time: minutes: 21-30 minutes    Note: not billable if this call serves to triage the patient into an appointment for the relevant concern      Stan Cueto     Pertinent History and Information      Past Medical History:   Diagnosis Date    AAA (abdominal aortic aneurysm) ([address]     Aortic dissection ([address] 10/10/2012    CAD (coronary artery disease)     Cancer ([address]     lung&lymphnode    Carotid artery occlusion     Cerebrovascular accident (CVA) ([address]     Cerebrovascular accident (CVA) due to embolism ([address] 2/16/2016    Cerebrovascular accident (CVA) due to embolism of right middle cerebral artery ([address]     Depression     HTN (hypertension)     Hyperlipidemia     MI (myocardial infarction) ([address]     acute 5/13/2015    Non Hodgkin's lymphoma ([address]     Sleep apnea     no c-pap    Unspecified sleep apnea       Past Surgical History:   Procedure Laterality Date    CARDIAC CATHETERIZATION  5/13/15  [facility]    EF 40%. stent to LAD, stent to posterior descending branch of RCA.  CAROTID ENDARTERECTOMY Right 7/22/2016    Right carotid endarterectomy,carotid arteriograms. 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, repeat in 1 yr    UPPER GASTROINTESTINAL ENDOSCOPY  11/08/2019    Juan Manuel: normal    UPPER GASTROINTESTINAL ENDOSCOPY  04/13/2018    Dr Ball Silence: normal, 2cm HH, small amount of food in stomach w/dark fluid,but not kathy blood    VASCULAR SURGERY  10/29/12 [facility]    open exposure of R common femoral artery; percutaneous cannulation of L  femoral artery with 7-Bengali sheath; suprarenal abd aortagram with bilat iliofem arteriogram; endoluminal graft repair of AAA with endologix [id number] main body, 28x95 infrarenal cuff; balloon angioplasty of infrarenal abd aorta and endoluminal graft with coda balloon; completion abd aortogram w bilat iliofem arteriogram;     VASCULAR SURGERY  10/29/ [facility]     (cont) open repair of right common femoral ateriotomy; perclose repair of left common femoral artery puncture site. Family History   Problem Relation Age of Onset    High Blood Pressure Father     High Blood Pressure Mother     Diabetes Mother     Colon Polyps Brother     Colon Cancer Neg Hx        Social History     Tobacco Use    Smoking status: Current Every Day Smoker     Packs/day: 0.50     Years: 40.00     Pack years: 20.00    Smokeless tobacco: Never Used    Tobacco comment: quit smoking in nov 2017, restarted smoking since then   Substance Use Topics    Alcohol use: No     Current Outpatient Medications   Medication Sig Dispense Refill    pregabalin (LYRICA) 50 MG capsule Take 1 capsule by mouth 3 times daily for 30 days.  90 capsule 5    baclofen (LIORESAL) 10 MG tablet Take 1 tablet by mouth 2 times daily 60 tablet 0    venlafaxine (EFFEXOR) 75 MG tablet TAKE ONE (1) TABLET EVERY DAY 90 tablet 2    atorvastatin (LIPITOR) 40 MG tablet TAKE ONE (1) TABLET EVERY DAY 90 tablet 1    clopidogrel (PLAVIX) 75 MG tablet TAKE ONE (1) TABLET EVERY DAY 90 tablet 1    metoprolol succinate (TOPROL XL) 50 MG extended release tablet TAKE 1 TABLET DAILY 90 tablet 1    lisinopril (PRINIVIL;ZESTRIL) 40 MG tablet TAKE 1 TABLET DAILY 90 tablet 1    famotidine (PEPCID) 20 MG tablet TAKE 1 TABLET TWICE DAILY 180 tablet 3    doxazosin (CARDURA) 2 MG tablet Take 1 tablet by mouth daily      aspirin 81 MG EC tablet Take 1 tablet by mouth daily 30 tablet 3    nitroGLYCERIN (NITROSTAT) 0.4 MG SL tablet Place 0.4 mg under the tongue every 5 minutes as needed for Chest pain       No current facility-administered medications for this visit. No Known Allergies    No results found for this or any previous visit (from the past 672 hour(s)).

## 2020-09-03 RX ORDER — METOPROLOL SUCCINATE 50 MG/1
TABLET, EXTENDED RELEASE ORAL
Qty: 90 TABLET | Refills: 1 | Status: ON HOLD | OUTPATIENT
Start: 2020-09-03 | End: 2021-02-17 | Stop reason: HOSPADM

## 2020-09-11 RX ORDER — HYDROCODONE BITARTRATE AND ACETAMINOPHEN 10; 325 MG/1; MG/1
1 TABLET ORAL EVERY 6 HOURS PRN
Qty: 120 TABLET | Refills: 0 | Status: SHIPPED | OUTPATIENT
Start: 2020-09-11 | End: 2020-10-11

## 2020-09-11 NOTE — TELEPHONE ENCOUNTER
Scooby Loco. called to request a refill on his medication. Last office visit : 9/1/2020   Next office visit : 1/4/2021     Last UDS:   Amphetamine Screen, Urine   Date Value Ref Range Status   04/23/2018 Negative  Final     Barbiturate Screen, Urine   Date Value Ref Range Status   04/23/2018 Negative  Final     Benzodiazepine Screen, Urine   Date Value Ref Range Status   04/23/2018 Negative  Final     Cocaine Metabolite Screen, Urine   Date Value Ref Range Status   04/23/2018 Negative  Final     MDMA, Urine   Date Value Ref Range Status   04/23/2018 Negative  Final     Methamphetamine, Urine   Date Value Ref Range Status   04/23/2018 Negative  Final     Opiate Scrn, Ur   Date Value Ref Range Status   04/23/2018 Positive  Final     Oxycodone Screen, Ur   Date Value Ref Range Status   04/23/2018 Negative  Final     PCP Screen, Urine   Date Value Ref Range Status   04/23/2018 Negative  Final     Propoxyphene Screen, Urine   Date Value Ref Range Status   04/23/2018 Negative  Final     Tricyclic Antidepressants, Urine   Date Value Ref Range Status   04/23/2018 Negative  Final       Last Matheus Sleet: 6/16/20  Medication Contract: needs update at next visit   Last Fill: 7/31/20    Requested Prescriptions     Pending Prescriptions Disp Refills    HYDROcodone-acetaminophen (NORCO)  MG per tablet 120 tablet 0     Sig: Take 1 tablet by mouth every 6 hours for 30 days. Please approve or refuse this medication.    Nicola Durham

## 2020-09-11 NOTE — TELEPHONE ENCOUNTER
Parveen Clifton daughter  is calling for a pain  medication refill that is not listed in his chart. Last office visit: 9/1/2020  Next office visit: 1/4/2021   Preferred Pharmacy: [facility]     Please call patient to clarify. Thank You.

## 2020-10-15 RX ORDER — HYDROCODONE BITARTRATE AND ACETAMINOPHEN 10; 325 MG/1; MG/1
1 TABLET ORAL EVERY 6 HOURS PRN
Qty: 120 TABLET | Refills: 0 | Status: SHIPPED | OUTPATIENT
Start: 2020-10-15 | End: 2020-11-12 | Stop reason: SDUPTHER

## 2020-11-11 NOTE — TELEPHONE ENCOUNTER
Cheli Wells. called to request a refill on his medication. Last office visit : 9/1/2020   Next office visit : 1/4/2021     Last UDS:   Amphetamine Screen, Urine   Date Value Ref Range Status   04/23/2018 Negative  Final     Barbiturate Screen, Urine   Date Value Ref Range Status   04/23/2018 Negative  Final     Benzodiazepine Screen, Urine   Date Value Ref Range Status   04/23/2018 Negative  Final     Cocaine Metabolite Screen, Urine   Date Value Ref Range Status   04/23/2018 Negative  Final     MDMA, Urine   Date Value Ref Range Status   04/23/2018 Negative  Final     Methamphetamine, Urine   Date Value Ref Range Status   04/23/2018 Negative  Final     Opiate Scrn, Ur   Date Value Ref Range Status   04/23/2018 Positive  Final     Oxycodone Screen, Ur   Date Value Ref Range Status   04/23/2018 Negative  Final     PCP Screen, Urine   Date Value Ref Range Status   04/23/2018 Negative  Final     Propoxyphene Screen, Urine   Date Value Ref Range Status   04/23/2018 Negative  Final     Tricyclic Antidepressants, Urine   Date Value Ref Range Status   04/23/2018 Negative  Final       Last Felisa Justin: 10/12/20  Medication Contract: needs update at next visit   Last Fill: 10/15/20    Requested Prescriptions     Pending Prescriptions Disp Refills    HYDROcodone-acetaminophen (NORCO)  MG per tablet 120 tablet 0     Sig: Take 1 tablet by mouth every 6 hours as needed for Pain for up to 30 days. Please approve or refuse this medication.    Malika Rankin

## 2020-11-12 RX ORDER — HYDROCODONE BITARTRATE AND ACETAMINOPHEN 10; 325 MG/1; MG/1
1 TABLET ORAL EVERY 6 HOURS PRN
Qty: 120 TABLET | Refills: 0 | Status: SHIPPED | OUTPATIENT
Start: 2020-11-12 | End: 2020-12-11 | Stop reason: SDUPTHER

## 2020-11-30 RX ORDER — LISINOPRIL 40 MG/1
TABLET ORAL
Qty: 90 TABLET | Refills: 1 | Status: ON HOLD | OUTPATIENT
Start: 2020-11-30 | End: 2021-02-07 | Stop reason: HOSPADM

## 2020-12-11 RX ORDER — HYDROCODONE BITARTRATE AND ACETAMINOPHEN 10; 325 MG/1; MG/1
1 TABLET ORAL EVERY 6 HOURS PRN
Qty: 120 TABLET | Refills: 0 | Status: SHIPPED | OUTPATIENT
Start: 2020-12-11 | End: 2021-01-13 | Stop reason: SDUPTHER

## 2020-12-11 NOTE — TELEPHONE ENCOUNTER
Cindy Santos. called to request a refill on his medication. Last office visit : 9/1/2020   Next office visit : 1/4/2021     Last UDS:   Amphetamine Screen, Urine   Date Value Ref Range Status   04/23/2018 Negative  Final     Barbiturate Screen, Urine   Date Value Ref Range Status   04/23/2018 Negative  Final     Benzodiazepine Screen, Urine   Date Value Ref Range Status   04/23/2018 Negative  Final     Cocaine Metabolite Screen, Urine   Date Value Ref Range Status   04/23/2018 Negative  Final     MDMA, Urine   Date Value Ref Range Status   04/23/2018 Negative  Final     Methamphetamine, Urine   Date Value Ref Range Status   04/23/2018 Negative  Final     Opiate Scrn, Ur   Date Value Ref Range Status   04/23/2018 Positive  Final     Oxycodone Screen, Ur   Date Value Ref Range Status   04/23/2018 Negative  Final     PCP Screen, Urine   Date Value Ref Range Status   04/23/2018 Negative  Final     Propoxyphene Screen, Urine   Date Value Ref Range Status   04/23/2018 Negative  Final     Tricyclic Antidepressants, Urine   Date Value Ref Range Status   04/23/2018 Negative  Final       Last Benron Luis: 10/12/20  Medication Contract: needs update at next visit   Last Fill: 11/12/20    Requested Prescriptions     Pending Prescriptions Disp Refills    HYDROcodone-acetaminophen (NORCO)  MG per tablet 120 tablet 0     Sig: Take 1 tablet by mouth every 6 hours as needed for Pain for up to 30 days. Please approve or refuse this medication.    Miguelito Zuniga

## 2021-01-04 ENCOUNTER — OFFICE VISIT (OUTPATIENT)
Dept: FAMILY MEDICINE CLINIC | Age: 76
End: 2021-01-04
Payer: MEDICARE

## 2021-01-04 VITALS
OXYGEN SATURATION: 98 % | TEMPERATURE: 98.2 F | WEIGHT: 225 LBS | BODY MASS INDEX: 34.21 KG/M2 | SYSTOLIC BLOOD PRESSURE: 132 MMHG | DIASTOLIC BLOOD PRESSURE: 82 MMHG | HEART RATE: 79 BPM

## 2021-01-04 DIAGNOSIS — I10 ESSENTIAL HYPERTENSION: Primary | ICD-10-CM

## 2021-01-04 DIAGNOSIS — R53.83 OTHER FATIGUE: ICD-10-CM

## 2021-01-04 DIAGNOSIS — F41.8 DEPRESSION WITH ANXIETY: ICD-10-CM

## 2021-01-04 DIAGNOSIS — M54.50 CHRONIC MIDLINE LOW BACK PAIN WITHOUT SCIATICA: ICD-10-CM

## 2021-01-04 DIAGNOSIS — M79.10 MYALGIA: ICD-10-CM

## 2021-01-04 DIAGNOSIS — G89.29 CHRONIC MIDLINE LOW BACK PAIN WITHOUT SCIATICA: ICD-10-CM

## 2021-01-04 DIAGNOSIS — R73.9 HYPERGLYCEMIA: ICD-10-CM

## 2021-01-04 DIAGNOSIS — E78.2 MIXED HYPERLIPIDEMIA: ICD-10-CM

## 2021-01-04 PROCEDURE — G8417 CALC BMI ABV UP PARAM F/U: HCPCS | Performed by: FAMILY MEDICINE

## 2021-01-04 PROCEDURE — 4040F PNEUMOC VAC/ADMIN/RCVD: CPT | Performed by: FAMILY MEDICINE

## 2021-01-04 PROCEDURE — 99214 OFFICE O/P EST MOD 30 MIN: CPT | Performed by: FAMILY MEDICINE

## 2021-01-04 PROCEDURE — 4004F PT TOBACCO SCREEN RCVD TLK: CPT | Performed by: FAMILY MEDICINE

## 2021-01-04 PROCEDURE — G8427 DOCREV CUR MEDS BY ELIG CLIN: HCPCS | Performed by: FAMILY MEDICINE

## 2021-01-04 PROCEDURE — G8484 FLU IMMUNIZE NO ADMIN: HCPCS | Performed by: FAMILY MEDICINE

## 2021-01-04 PROCEDURE — 1123F ACP DISCUSS/DSCN MKR DOCD: CPT | Performed by: FAMILY MEDICINE

## 2021-01-04 NOTE — PROGRESS NOTES
[facility] PHYSICIAN SERVICES  [facility] FAMILY MEDICINE  [id number] [address] [place] [address]  Dept: [phone]  Dept Fax: [phone]  Loc: [phone]    Vish Tripathi is a 66 y.o. male who presents today for his medical conditions/complaints as noted below. Vish Tripathi is here for 3 Month Follow-Up        HPI:   CC: Here today to discuss the following:    Hyperlipidemia  Tolerating current cholesterol medication without side effects. No body aches. Attempting to reduce processed sugar and cholesterol from diet. Depression with Anxiety  Compliant with medications. No adverse effects from medication. Depression and anxiety symptoms are stable today. No suicidal or homicidal ideation. Excessive worry, insomnia, and anxiousness are stable. Energy, concentration, and apathy are stable. Hypertension  Compliant with medications. No adverse effects from medication. No lightheadedness, palpitations, or chest pain. Lower Back Pain, Chronic/neck pain  Continues to suffer from chronic neck lower back and hip discomfort. He is currently not interested in pursuing more aggressive treatment with pain management or surgical intervention. He is concerned about more aggressive treatments would increase his risk due to his respiratory status. He does not want to be under anesthesia. Currently he is taking hydrocodone 10 mg 4 times a day. He could not tolerate the baclofen as it made him drowsy and caused increased urinary frequency.   He has Lyrica as well and he is unsure if it is helped      HPI    Past Medical History:   Diagnosis Date    AAA (abdominal aortic aneurysm) ([address]     Aortic dissection ([address] 10/10/2012    CAD (coronary artery disease)     Cancer (HCC)     lung&lymphnode    Carotid artery occlusion     Cerebrovascular accident (CVA) ([address]     Cerebrovascular accident (CVA) due to embolism ([address] 2/16/2016    Cerebrovascular accident (CVA) due to embolism of right middle cerebral artery ([facility]     Depression     HTN (hypertension)     Hyperlipidemia     MI (myocardial infarction) ([facility]     acute 5/13/2015    Non Hodgkin's lymphoma ([facility]     Sleep apnea     no c-pap    Unspecified sleep apnea       Past Surgical History:   Procedure Laterality Date    CARDIAC CATHETERIZATION  5/13/15  [address]    EF 40%. stent to LAD, stent to posterior descending branch of RCA.  CAROTID ENDARTERECTOMY Right 7/22/2016    Right carotid endarterectomy,carotid arteriograms. 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, repeat in 1 yr    UPPER GASTROINTESTINAL ENDOSCOPY  11/08/2019    Juan Manuel: normal    UPPER GASTROINTESTINAL ENDOSCOPY  04/13/2018    Dr Eagle Villarreal: normal, 2cm HH, small amount of food in stomach w/dark fluid,but not kathy blood    VASCULAR SURGERY  10/29/12 [facility]    open exposure of R common femoral artery; percutaneous cannulation of L  femoral artery with 7-Hungarian sheath; suprarenal abd aortagram with bilat iliofem arteriogram; endoluminal graft repair of AAA with endologix [id number] main body, 28x95 infrarenal cuff; balloon angioplasty of infrarenal abd aorta and endoluminal graft with coda balloon; completion abd aortogram w bilat iliofem arteriogram;     VASCULAR SURGERY  10/29/ S     (cont) open repair of right common femoral ateriotomy; perclose repair of left common femoral artery puncture site.         Family History   Problem Relation Age of Onset    High Blood Pressure Father     High Blood Pressure Mother     Diabetes Mother     Colon Polyps Brother     Colon Cancer Neg Hx        Social History     Tobacco Use    Smoking status: Current Every Day Smoker     Packs/day: 0.50     Years: 40.00     Pack years: 20.00    Smokeless tobacco: Never Used    Tobacco comment: quit smoking in nov 2017, restarted smoking since then   Substance Use Topics    Alcohol use: No     Current Outpatient Medications   Medication Sig Dispense Refill    HYDROcodone-acetaminophen (NORCO)  MG per tablet Take 1 tablet by mouth every 6 hours as needed for Pain for up to 30 days. 120 tablet 0    lisinopril (PRINIVIL;ZESTRIL) 40 MG tablet TAKE 1 TABLET BY MOUTH EVERY DAY 90 tablet 1    metoprolol succinate (TOPROL XL) 50 MG extended release tablet TAKE 1 TABLET BY MOUTH EVERY DAY 90 tablet 1    venlafaxine (EFFEXOR) 75 MG tablet TAKE ONE (1) TABLET EVERY DAY 90 tablet 2    atorvastatin (LIPITOR) 40 MG tablet TAKE ONE (1) TABLET EVERY DAY 90 tablet 1    clopidogrel (PLAVIX) 75 MG tablet TAKE ONE (1) TABLET EVERY DAY 90 tablet 1    famotidine (PEPCID) 20 MG tablet TAKE 1 TABLET TWICE DAILY 180 tablet 3    doxazosin (CARDURA) 2 MG tablet Take 1 tablet by mouth daily      aspirin 81 MG EC tablet Take 1 tablet by mouth daily 30 tablet 3    nitroGLYCERIN (NITROSTAT) 0.4 MG SL tablet Place 0.4 mg under the tongue every 5 minutes as needed for Chest pain      pregabalin (LYRICA) 50 MG capsule Take 1 capsule by mouth 3 times daily for 30 days. 90 capsule 5     No current facility-administered medications for this visit.       No Known Allergies    Health Maintenance   Topic Date Due    Hepatitis C screen  1945    Shingles Vaccine (1 of 2) 02/23/1993    Flu vaccine (1) 09/01/2020    DTaP/Tdap/Td vaccine (1 - Tdap) 02/27/2021 (Originally 2/23/1964)    Annual Wellness Visit (AWV)  02/27/2021    Lipid screen  01/04/2022    Potassium monitoring  01/04/2022    Creatinine monitoring  01/04/2022    Pneumococcal 65+ years Vaccine  Completed    Hepatitis A vaccine  Aged Out    Hepatitis B vaccine  Aged Out    Hib vaccine  Aged Out    Meningococcal (ACWY) vaccine  Aged Out       Subjective:      Review of Systems      SeeI for visit specific review of symptoms.   All others negative      Objective:   /82   Pulse 79   Temp 98.2 °F (36.8 °C)   Wt 225 lb (102.1 kg)   SpO2 98%   BMI 34.21 kg/m²   Physical Exam      Recent Results (from the past 672 hour(s))   Lipid Panel    Collection Time: 01/04/21  3:39 PM   Result Value Ref Range    Cholesterol, Total 111 (L) 160 - 199 mg/dL    Triglycerides 104 0 - 149 mg/dL    HDL 43 (L) 55 - 121 mg/dL    LDL Calculated 47 <100 mg/dL   CBC Auto Differential    Collection Time: 01/04/21  3:39 PM   Result Value Ref Range    WBC 9.6 4.8 - 10.8 K/uL    RBC 5.10 4.70 - 6.10 M/uL    Hemoglobin 14.3 14.0 - 18.0 g/dL    Hematocrit 45.9 42.0 - 52.0 %    MCV 90.0 80.0 - 94.0 fL    MCH 28.0 27.0 - 31.0 pg    MCHC 31.2 (L) 33.0 - 37.0 g/dL    RDW 13.8 11.5 - 14.5 %    Platelets 369 663 - 889 K/uL    MPV 12.2 9.4 - 12.4 fL    Neutrophils % 68.3 (H) 50.0 - 65.0 %    Lymphocytes % 23.1 20.0 - 40.0 %    Monocytes % 7.9 0.0 - 10.0 %    Eosinophils % 0.0 0.0 - 5.0 %    Basophils % 0.5 0.0 - 1.0 %    Neutrophils Absolute 6.5 1.5 - 7.5 K/uL    Immature Granulocytes # 0.0 K/uL    Lymphocytes Absolute 2.2 1.1 - 4.5 K/uL    Monocytes Absolute 0.80 0.00 - 0.90 K/uL    Eosinophils Absolute 0.00 0.00 - 0.60 K/uL    Basophils Absolute 0.10 0.00 - 0.20 K/uL   Comprehensive Metabolic Panel    Collection Time: 01/04/21  3:39 PM   Result Value Ref Range    Sodium 142 136 - 145 mmol/L    Potassium 4.7 3.5 - 5.0 mmol/L    Chloride 107 98 - 111 mmol/L    CO2 24 22 - 29 mmol/L    Anion Gap 11 7 - 19 mmol/L    Glucose 105 74 - 109 mg/dL    BUN 25 (H) 8 - 23 mg/dL    CREATININE 1.3 (H) 0.5 - 1.2 mg/dL    GFR Non- 53 (A) >60    GFR African American >59 >59    Calcium 11.2 (H) 8.8 - 10.2 mg/dL    Total Protein 7.2 6.6 - 8.7 g/dL    Alb 4.2 3.5 - 5.2 g/dL    Total Bilirubin 0.4 0.2 - 1.2 mg/dL    Alkaline Phosphatase 82 40 - 130 U/L    ALT 24 5 - 41 U/L    AST 17 5 - 40 U/L   Magnesium    Collection Time: 01/04/21  3:39 PM   Result Value Ref Range    Magnesium 1.7 1.6 - 2.4 mg/dL   TSH without Reflex    Collection Time: 01/04/21  3:39 PM   Result Value Ref Range    TSH 1.200 0.270 - 4.200 uIU/mL   T4, Free    Collection Time: 01/04/21  3:39 PM   Result Value Ref Range    T4 Free 1.25 0.93 - 1.70 ng/dL   Sedimentation Rate    Collection Time: 01/04/21  3:39 PM   Result Value Ref Range    Sed Rate 14 0 - 15 mm/Hr   Hemoglobin A1C    Collection Time: 01/04/21  3:39 PM   Result Value Ref Range    Hemoglobin A1C 6.0 4.0 - 6.0 %               Assessment & Plan: The following diagnoses and conditions are stable with no further orders unless indicated:  1. Essential hypertension  BP Readings from Last 3 Encounters:   01/04/21 132/82   02/27/20 120/80   02/27/20 128/82     Blood pressure stable  - CBC Auto Differential; Future    2. Mixed hyperlipidemia  Lab Results   Component Value Date    CHOL 111 (L) 01/04/2021    CHOL 120 (L) 10/02/2018    CHOL 101 (L) 09/28/2017     Lab Results   Component Value Date    TRIG 104 01/04/2021    TRIG 145 10/02/2018    TRIG 103 (L) 09/28/2017     Lab Results   Component Value Date    HDL 43 (L) 01/04/2021    HDL 38 (L) 10/02/2018    HDL 39 (L) 09/28/2017     Lab Results   Component Value Date    LDLCALC 47 01/04/2021    LDLCALC 53 10/02/2018    LDLCALC 41 09/28/2017     No results found for: LABVLDL, VLDL  No results found for: CHOLHDLRATIO  CholesterolStable  - Lipid Panel; Future    3. Hyperglycemia  Lab Results   Component Value Date    LABA1C 6.0 01/04/2021    LABA1C 6.2 (H) 10/02/2018    LABA1C 6.4 (H) 07/10/2018     Lab Results   Component Value Date    LDLCALC 47 01/04/2021    CREATININE 1.3 (H) 01/04/2021     Blood sugar stable  - Comprehensive Metabolic Panel; Future  - Hemoglobin A1C; Future    4. Depression with anxiety  Continue with Effexor    5. Myalgia    - Sedimentation Rate; Future  - Magnesium; Future    6. Other fatigue    - T4, Free; Future  - TSH without Reflex; Future    7.  Chronic midline low back pain without sciatica  Long discussion with him today regarding his options. Overall he feels like he is satisfied with staying the course. He is primarily concerned about his comorbidities contributed to complications with anesthesia. He is accurate and assuming that due to his age, COPD, general health he would be a high risk candidate for surgical intervention. He feels his pain is manageable            Return in about 3 months (around 4/4/2021) for Routine follow up - 15 minute visit. Discussed use, benefit, and side effects of prescribed medications. All patient questions answered. Pt voiced understanding. Reviewed health maintenance. Instructedto continue current medications, diet and exercise. Patient agreed with treatmentplan. Follow up as directed. Note dictated using Dragon Dictation software  Sometimes this dictation software makes erroneous transcriptions.

## 2021-01-12 DIAGNOSIS — G89.29 CHRONIC MIDLINE LOW BACK PAIN WITHOUT SCIATICA: ICD-10-CM

## 2021-01-12 DIAGNOSIS — M54.50 CHRONIC MIDLINE LOW BACK PAIN WITHOUT SCIATICA: ICD-10-CM

## 2021-01-12 NOTE — TELEPHONE ENCOUNTER
Dominique Henderson. called to request a refill on his medication. Last office visit : 1/4/2021   Next office visit : 4/15/2021     Last UDS:   Amphetamine Screen, Urine   Date Value Ref Range Status   04/23/2018 Negative  Final     Barbiturate Screen, Urine   Date Value Ref Range Status   04/23/2018 Negative  Final     Benzodiazepine Screen, Urine   Date Value Ref Range Status   04/23/2018 Negative  Final     Cocaine Metabolite Screen, Urine   Date Value Ref Range Status   04/23/2018 Negative  Final     MDMA, Urine   Date Value Ref Range Status   04/23/2018 Negative  Final     Methamphetamine, Urine   Date Value Ref Range Status   04/23/2018 Negative  Final     Opiate Scrn, Ur   Date Value Ref Range Status   04/23/2018 Positive  Final     Oxycodone Screen, Ur   Date Value Ref Range Status   04/23/2018 Negative  Final     PCP Screen, Urine   Date Value Ref Range Status   04/23/2018 Negative  Final     Propoxyphene Screen, Urine   Date Value Ref Range Status   04/23/2018 Negative  Final     Tricyclic Antidepressants, Urine   Date Value Ref Range Status   04/23/2018 Negative  Final       Last Steve Leeanna: 01/12/2021  Medication Contract:  04/23/2018  Last Fill: 12/11/2020    Requested Prescriptions     Pending Prescriptions Disp Refills    HYDROcodone-acetaminophen (NORCO)  MG per tablet 120 tablet 0     Sig: Take 1 tablet by mouth every 6 hours as needed for Pain for up to 30 days. Please approve or refuse this medication.    Maynor Holland

## 2021-01-13 RX ORDER — HYDROCODONE BITARTRATE AND ACETAMINOPHEN 10; 325 MG/1; MG/1
1 TABLET ORAL EVERY 6 HOURS PRN
Qty: 120 TABLET | Refills: 0 | Status: SHIPPED | OUTPATIENT
Start: 2021-01-13 | End: 2021-02-11 | Stop reason: SDUPTHER

## 2021-01-20 DIAGNOSIS — I25.10 CORONARY ARTERY DISEASE INVOLVING NATIVE HEART WITHOUT ANGINA PECTORIS, UNSPECIFIED VESSEL OR LESION TYPE: ICD-10-CM

## 2021-01-20 RX ORDER — CLOPIDOGREL BISULFATE 75 MG/1
TABLET ORAL
Qty: 90 TABLET | Refills: 1 | Status: SHIPPED | OUTPATIENT
Start: 2021-01-20 | End: 2022-02-20

## 2021-02-06 ENCOUNTER — HOSPITAL ENCOUNTER (INPATIENT)
Age: 76
LOS: 1 days | Discharge: HOME HEALTH | DRG: 682 | End: 2021-02-07
Attending: STUDENT IN AN ORGANIZED HEALTH CARE EDUCATION/TRAINING PROGRAM | Admitting: STUDENT IN AN ORGANIZED HEALTH CARE EDUCATION/TRAINING PROGRAM
Payer: MEDICARE

## 2021-02-06 ENCOUNTER — APPOINTMENT (OUTPATIENT)
Dept: CT IMAGING | Age: 76
DRG: 682 | End: 2021-02-06
Payer: MEDICARE

## 2021-02-06 ENCOUNTER — APPOINTMENT (OUTPATIENT)
Dept: ULTRASOUND IMAGING | Age: 76
DRG: 682 | End: 2021-02-06
Payer: MEDICARE

## 2021-02-06 ENCOUNTER — APPOINTMENT (OUTPATIENT)
Dept: GENERAL RADIOLOGY | Age: 76
DRG: 682 | End: 2021-02-06
Payer: MEDICARE

## 2021-02-06 DIAGNOSIS — M54.50 CHRONIC MIDLINE LOW BACK PAIN WITHOUT SCIATICA: ICD-10-CM

## 2021-02-06 DIAGNOSIS — J12.82 PNEUMONIA DUE TO COVID-19 VIRUS: Primary | ICD-10-CM

## 2021-02-06 DIAGNOSIS — U07.1 PNEUMONIA DUE TO COVID-19 VIRUS: Primary | ICD-10-CM
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
## 2021-02-06 DIAGNOSIS — G89.29 ACUTE EXACERBATION OF CHRONIC LOW BACK PAIN: ICD-10-CM

## 2021-02-06 DIAGNOSIS — G89.29 CHRONIC MIDLINE LOW BACK PAIN WITHOUT SCIATICA: ICD-10-CM

## 2021-02-06 DIAGNOSIS — N17.9 AKI (ACUTE KIDNEY INJURY) (HCC): ICD-10-CM

## 2021-02-06 DIAGNOSIS — M54.50 ACUTE EXACERBATION OF CHRONIC LOW BACK PAIN: ICD-10-CM

## 2021-02-06 LAB
ALBUMIN SERPL-MCNC: 3.6 G/DL (ref 3.5–5.2)
ALP BLD-CCNC: 69 U/L (ref 40–130)
ALT SERPL-CCNC: 24 U/L (ref 5–41)
ANION GAP SERPL CALCULATED.3IONS-SCNC: 12 MMOL/L (ref 7–19)
AST SERPL-CCNC: 104 U/L (ref 5–40)
BACTERIA: NEGATIVE /HPF
BASOPHILS ABSOLUTE: 0 K/UL (ref 0–0.2)
BASOPHILS RELATIVE PERCENT: 0.2 % (ref 0–1)
BILIRUB SERPL-MCNC: 0.4 MG/DL (ref 0.2–1.2)
BILIRUBIN URINE: NEGATIVE
BLOOD, URINE: ABNORMAL
BUN BLDV-MCNC: 45 MG/DL (ref 8–23)
CALCIUM SERPL-MCNC: 11.1 MG/DL (ref 8.8–10.2)
CHLORIDE BLD-SCNC: 107 MMOL/L (ref 98–111)
CLARITY: ABNORMAL
CO2: 20 MMOL/L (ref 22–29)
COLOR: YELLOW
CREAT SERPL-MCNC: 2.1 MG/DL (ref 0.5–1.2)
CREATININE URINE: 148.8 MG/DL (ref 4.2–622)
CRYSTALS, UA: ABNORMAL /HPF
EOSINOPHILS ABSOLUTE: 0 K/UL (ref 0–0.6)
EOSINOPHILS RELATIVE PERCENT: 0 % (ref 0–5)
EPITHELIAL CELLS, UA: 2 /HPF (ref 0–5)
GFR AFRICAN AMERICAN: 37
GFR NON-AFRICAN AMERICAN: 31
GLUCOSE BLD-MCNC: 113 MG/DL (ref 74–109)
GLUCOSE URINE: NEGATIVE MG/DL
HCT VFR BLD CALC: 42.5 % (ref 42–52)
HEMOGLOBIN: 13.6 G/DL (ref 14–18)
HYALINE CASTS: 5 /HPF (ref 0–8)
IMMATURE GRANULOCYTES #: 0.1 K/UL
KETONES, URINE: ABNORMAL MG/DL
LEUKOCYTE ESTERASE, URINE: NEGATIVE
LYMPHOCYTES ABSOLUTE: 0.8 K/UL (ref 1.1–4.5)
LYMPHOCYTES RELATIVE PERCENT: 6.8 % (ref 20–40)
MCH RBC QN AUTO: 27.9 PG (ref 27–31)
MCHC RBC AUTO-ENTMCNC: 32 G/DL (ref 33–37)
MCV RBC AUTO: 87.3 FL (ref 80–94)
MONOCYTES ABSOLUTE: 0.6 K/UL (ref 0–0.9)
MONOCYTES RELATIVE PERCENT: 4.7 % (ref 0–10)
NEUTROPHILS ABSOLUTE: 10.5 K/UL (ref 1.5–7.5)
NEUTROPHILS RELATIVE PERCENT: 87.8 % (ref 50–65)
NITRITE, URINE: NEGATIVE
PDW BLD-RTO: 13.9 % (ref 11.5–14.5)
PH UA: 5.5 (ref 5–8)
PLATELET # BLD: 179 K/UL (ref 130–400)
PMV BLD AUTO: 12.7 FL (ref 9.4–12.4)
POTASSIUM REFLEX MAGNESIUM: 4.5 MMOL/L (ref 3.5–5)
PRO-BNP: 1387 PG/ML (ref 0–1800)
PROTEIN UA: 300 MG/DL
RBC # BLD: 4.87 M/UL (ref 4.7–6.1)
RBC UA: 5 /HPF (ref 0–4)
SARS-COV-2, NAAT: DETECTED
SODIUM BLD-SCNC: 139 MMOL/L (ref 136–145)
SODIUM URINE: 65 MMOL/L
SPECIFIC GRAVITY UA: 1.02 (ref 1–1.03)
TOTAL PROTEIN: 7 G/DL (ref 6.6–8.7)
UROBILINOGEN, URINE: 1 E.U./DL
WBC # BLD: 12 K/UL (ref 4.8–10.8)
WBC UA: 4 /HPF (ref 0–5)

## 2021-02-06 PROCEDURE — 80053 COMPREHEN METABOLIC PANEL: CPT

## 2021-02-06 PROCEDURE — 73030 X-RAY EXAM OF SHOULDER: CPT

## 2021-02-06 PROCEDURE — 82570 ASSAY OF URINE CREATININE: CPT

## 2021-02-06 PROCEDURE — 99283 EMERGENCY DEPT VISIT LOW MDM: CPT

## 2021-02-06 PROCEDURE — 70450 CT HEAD/BRAIN W/O DYE: CPT

## 2021-02-06 PROCEDURE — 6370000000 HC RX 637 (ALT 250 FOR IP): Performed by: STUDENT IN AN ORGANIZED HEALTH CARE EDUCATION/TRAINING PROGRAM

## 2021-02-06 PROCEDURE — 1210000000 HC MED SURG R&B

## 2021-02-06 PROCEDURE — 84300 ASSAY OF URINE SODIUM: CPT

## 2021-02-06 PROCEDURE — 73502 X-RAY EXAM HIP UNI 2-3 VIEWS: CPT

## 2021-02-06 PROCEDURE — 72100 X-RAY EXAM L-S SPINE 2/3 VWS: CPT

## 2021-02-06 PROCEDURE — 36415 COLL VENOUS BLD VENIPUNCTURE: CPT

## 2021-02-06 PROCEDURE — 93005 ELECTROCARDIOGRAM TRACING: CPT

## 2021-02-06 PROCEDURE — 72125 CT NECK SPINE W/O DYE: CPT

## 2021-02-06 PROCEDURE — 96374 THER/PROPH/DIAG INJ IV PUSH: CPT

## 2021-02-06 PROCEDURE — 76770 US EXAM ABDO BACK WALL COMP: CPT

## 2021-02-06 PROCEDURE — 85025 COMPLETE CBC W/AUTO DIFF WBC: CPT

## 2021-02-06 PROCEDURE — 71045 X-RAY EXAM CHEST 1 VIEW: CPT

## 2021-02-06 PROCEDURE — 2580000003 HC RX 258: Performed by: STUDENT IN AN ORGANIZED HEALTH CARE EDUCATION/TRAINING PROGRAM

## 2021-02-06 PROCEDURE — 81001 URINALYSIS AUTO W/SCOPE: CPT

## 2021-02-06 PROCEDURE — 2580000003 HC RX 258: Performed by: NURSE PRACTITIONER

## 2021-02-06 PROCEDURE — 83880 ASSAY OF NATRIURETIC PEPTIDE: CPT

## 2021-02-06 PROCEDURE — 6360000002 HC RX W HCPCS: Performed by: NURSE PRACTITIONER

## 2021-02-06 PROCEDURE — 72072 X-RAY EXAM THORAC SPINE 3VWS: CPT

## 2021-02-06 PROCEDURE — 73070 X-RAY EXAM OF ELBOW: CPT

## 2021-02-06 PROCEDURE — U0002 COVID-19 LAB TEST NON-CDC: HCPCS

## 2021-02-06 PROCEDURE — 6360000002 HC RX W HCPCS: Performed by: STUDENT IN AN ORGANIZED HEALTH CARE EDUCATION/TRAINING PROGRAM

## 2021-02-06 RX ORDER — ASPIRIN 81 MG/1
81 TABLET ORAL DAILY
Status: DISCONTINUED | OUTPATIENT
Start: 2021-02-07 | End: 2021-02-07 | Stop reason: HOSPADM

## 2021-02-06 RX ORDER — VITAMIN B COMPLEX
2000 TABLET ORAL DAILY
Status: DISCONTINUED | OUTPATIENT
Start: 2021-02-06 | End: 2021-02-07 | Stop reason: HOSPADM

## 2021-02-06 RX ORDER — VENLAFAXINE 75 MG/1
75 TABLET ORAL DAILY
Status: DISCONTINUED | OUTPATIENT
Start: 2021-02-06 | End: 2021-02-07 | Stop reason: HOSPADM

## 2021-02-06 RX ORDER — SODIUM CHLORIDE 9 MG/ML
INJECTION, SOLUTION INTRAVENOUS CONTINUOUS
Status: DISCONTINUED | OUTPATIENT
Start: 2021-02-06 | End: 2021-02-07 | Stop reason: HOSPADM

## 2021-02-06 RX ORDER — HYDROCODONE BITARTRATE AND ACETAMINOPHEN 10; 325 MG/1; MG/1
1 TABLET ORAL EVERY 6 HOURS PRN
Status: DISCONTINUED | OUTPATIENT
Start: 2021-02-06 | End: 2021-02-07 | Stop reason: HOSPADM

## 2021-02-06 RX ORDER — POTASSIUM CHLORIDE 20 MEQ/1
40 TABLET, EXTENDED RELEASE ORAL PRN
Status: DISCONTINUED | OUTPATIENT
Start: 2021-02-06 | End: 2021-02-07 | Stop reason: HOSPADM

## 2021-02-06 RX ORDER — CLOPIDOGREL BISULFATE 75 MG/1
75 TABLET ORAL DAILY
Status: DISCONTINUED | OUTPATIENT
Start: 2021-02-07 | End: 2021-02-07 | Stop reason: HOSPADM

## 2021-02-06 RX ORDER — FAMOTIDINE 20 MG/1
10 TABLET, FILM COATED ORAL 2 TIMES DAILY
Status: DISCONTINUED | OUTPATIENT
Start: 2021-02-06 | End: 2021-02-07 | Stop reason: HOSPADM

## 2021-02-06 RX ORDER — ONDANSETRON 2 MG/ML
4 INJECTION INTRAMUSCULAR; INTRAVENOUS EVERY 6 HOURS PRN
Status: DISCONTINUED | OUTPATIENT
Start: 2021-02-06 | End: 2021-02-07 | Stop reason: HOSPADM

## 2021-02-06 RX ORDER — POTASSIUM CHLORIDE 7.45 MG/ML
10 INJECTION INTRAVENOUS PRN
Status: DISCONTINUED | OUTPATIENT
Start: 2021-02-06 | End: 2021-02-07 | Stop reason: HOSPADM

## 2021-02-06 RX ORDER — METOPROLOL SUCCINATE 25 MG/1
25 TABLET, EXTENDED RELEASE ORAL DAILY
Status: DISCONTINUED | OUTPATIENT
Start: 2021-02-07 | End: 2021-02-07 | Stop reason: HOSPADM

## 2021-02-06 RX ORDER — HYDROMORPHONE HYDROCHLORIDE 1 MG/ML
0.5 INJECTION, SOLUTION INTRAMUSCULAR; INTRAVENOUS; SUBCUTANEOUS ONCE
Status: COMPLETED | OUTPATIENT
Start: 2021-02-06 | End: 2021-02-06

## 2021-02-06 RX ORDER — SODIUM CHLORIDE, SODIUM LACTATE, POTASSIUM CHLORIDE, CALCIUM CHLORIDE 600; 310; 30; 20 MG/100ML; MG/100ML; MG/100ML; MG/100ML
1000 INJECTION, SOLUTION INTRAVENOUS ONCE
Status: COMPLETED | OUTPATIENT
Start: 2021-02-06 | End: 2021-02-06

## 2021-02-06 RX ORDER — ATORVASTATIN CALCIUM 20 MG/1
40 TABLET, FILM COATED ORAL NIGHTLY
Status: DISCONTINUED | OUTPATIENT
Start: 2021-02-06 | End: 2021-02-07 | Stop reason: HOSPADM

## 2021-02-06 RX ORDER — GUAIFENESIN/DEXTROMETHORPHAN 100-10MG/5
5 SYRUP ORAL EVERY 4 HOURS PRN
Status: DISCONTINUED | OUTPATIENT
Start: 2021-02-06 | End: 2021-02-07 | Stop reason: HOSPADM

## 2021-02-06 RX ADMIN — HYDROCODONE BITARTRATE AND ACETAMINOPHEN 1 TABLET: 10; 325 TABLET ORAL at 22:45

## 2021-02-06 RX ADMIN — SODIUM CHLORIDE, POTASSIUM CHLORIDE, SODIUM LACTATE AND CALCIUM CHLORIDE 1000 ML: 600; 310; 30; 20 INJECTION, SOLUTION INTRAVENOUS at 12:36

## 2021-02-06 RX ADMIN — SODIUM CHLORIDE: 9 INJECTION, SOLUTION INTRAVENOUS at 16:46

## 2021-02-06 RX ADMIN — ENOXAPARIN SODIUM 30 MG: 30 INJECTION SUBCUTANEOUS at 16:46

## 2021-02-06 RX ADMIN — Medication 2000 UNITS: at 16:45

## 2021-02-06 RX ADMIN — HYDROCODONE BITARTRATE AND ACETAMINOPHEN 1 TABLET: 10; 325 TABLET ORAL at 16:45

## 2021-02-06 RX ADMIN — ATORVASTATIN CALCIUM 40 MG: 20 TABLET, FILM COATED ORAL at 19:27

## 2021-02-06 RX ADMIN — HYDROMORPHONE HYDROCHLORIDE 0.5 MG: 1 INJECTION, SOLUTION INTRAMUSCULAR; INTRAVENOUS; SUBCUTANEOUS at 10:46

## 2021-02-06 RX ADMIN — VENLAFAXINE 75 MG: 75 TABLET ORAL at 16:45

## 2021-02-06 RX ADMIN — FAMOTIDINE 10 MG: 20 TABLET, FILM COATED ORAL at 19:27

## 2021-02-06 ASSESSMENT — ENCOUNTER SYMPTOMS
ABDOMINAL PAIN: 0
BACK PAIN: 1
SHORTNESS OF BREATH: 0

## 2021-02-06 ASSESSMENT — PAIN SCALES - GENERAL
PAINLEVEL_OUTOF10: 4
PAINLEVEL_OUTOF10: 10
PAINLEVEL_OUTOF10: 0

## 2021-02-06 ASSESSMENT — PAIN DESCRIPTION - DESCRIPTORS: DESCRIPTORS: ACHING

## 2021-02-06 NOTE — PROGRESS NOTES
Pts daughter states that he has been progressively been getting weaker over the past few years. However states that over the past few weeks his weakness has been even worse. Over the past 3 days has fallen multiple times. She states that she cannot take care of him at home in his current condition.  She is wishing to speak to someone about what she can do to care for him

## 2021-02-06 NOTE — H&P
Hospitalist: History and Physical    Date: 2021 Time: 12:44 PM    Name: Dylan Meng. : 1945 MRN: [id number]    Code Status: Prior No additional code details    PCP: Garland Hebert MD    Patient's Chief Complaint Is:   Fall    HPI:   49-year-old with history of stroke involving right MCA 2016, CKD, carotid artery occlusion, AAA, CAD, sleep apnea-not on CPAP, presents to the ED after falling out of bed earlier this a.m. but no loss of consciousness. He was unable to get off the floor due to back, shoulder and hip pain. He also has chronic pain at baseline. Family called area back to transfer the patient. He did sustain head trauma, however CT head negative for acute findings. Other trauma scans also negative. He incidentally tested positive for COVID-19 in the ED, however he denies any significant dyspnea, cough or sputum production, or any known exposures. Chest x-ray does show some faint bilateral infiltrates suggestive of some developing pulmonary edema however pneumonia due to Covid is also possible. proBNP borderline over 1300. Denies any orthopnea, PND or leg swelling. He is also found to have acute worsening of his underlying CKD with BUN 45, creatinine 2.1 (baseline creatinine around 1.3). Patient received 1 L of IV fluids in the ED. No evidence of hypoxia at this time. He denies any change in urine output. Admit for acute on chronic renal insufficiency.         Past Medical History:   Diagnosis Date    AAA (abdominal aortic aneurysm) ([address]     Aortic dissection ([address] 10/10/2012    CAD (coronary artery disease)     Cancer (HCC)     lung&lymphnode    Carotid artery occlusion     Cerebrovascular accident (CVA) ([address]     Cerebrovascular accident (CVA) due to embolism ([address] 2016    Cerebrovascular accident (CVA) due to embolism of right middle cerebral artery ([address]     Depression     HTN (hypertension)     Hyperlipidemia     MI (myocardial infarction) ([address] acute 5/13/2015    Non Hodgkin's lymphoma [facility]     Sleep apnea     no c-pap    Unspecified sleep apnea      Past Surgical History:   Procedure Laterality Date    CARDIAC CATHETERIZATION  5/13/15  [address]    EF 40%. stent to LAD, stent to posterior descending branch of RCA.  CAROTID ENDARTERECTOMY Right 7/22/2016    Right carotid endarterectomy,carotid arteriograms. 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, repeat in 1 yr    UPPER GASTROINTESTINAL ENDOSCOPY  11/08/2019    Juan Manuel: normal    UPPER GASTROINTESTINAL ENDOSCOPY  04/13/2018    Dr Tyrese Marques: normal, 2cm HH, small amount of food in stomach w/dark fluid,but not kathy blood    VASCULAR SURGERY  10/29/12 [facility]    open exposure of R common femoral artery; percutaneous cannulation of L  femoral artery with 7-Swedish sheath; suprarenal abd aortagram with bilat iliofem arteriogram; endoluminal graft repair of AAA with endologix [id number] main body, 28x95 infrarenal cuff; balloon angioplasty of infrarenal abd aorta and endoluminal graft with coda balloon; completion abd aortogram w bilat iliofem arteriogram;     VASCULAR SURGERY  10/29/ [facility]     (cont) open repair of right common femoral ateriotomy; perclose repair of left common femoral artery puncture site.       Family History   Problem Relation Age of Onset    High Blood Pressure Father     High Blood Pressure Mother     Diabetes Mother     Colon Polyps Brother     Colon Cancer Neg Hx      Social History     Socioeconomic History    Marital status:      Spouse name: Not on file    Number of children: Not on file    Years of education: Not on file    Highest education level: Not on file   Occupational History    Not on file   Social Needs    Financial resource strain: Not on file    Food insecurity     Worry: Not on file     Inability: Not on file    Transportation needs     Medical: Not on file     Non-medical: Not on file   Tobacco Use    Smoking status: Current Every Day Smoker     Packs/day: 0.50     Years: 40.00     Pack years: 20.00    Smokeless tobacco: Never Used    Tobacco comment: quit smoking in nov 2017, restarted smoking since then   Substance and Sexual Activity    Alcohol use: No    Drug use: No    Sexual activity: Not on file   Lifestyle    Physical activity     Days per week: Not on file     Minutes per session: Not on file    Stress: Not on file   Relationships    Social connections     Talks on phone: Not on file     Gets together: Not on file     Attends [employer] service: Not on file     Active member of club or organization: Not on file     Attends meetings of clubs or organizations: Not on file     Relationship status: Not on file    Intimate partner violence     Fear of current or ex partner: Not on file     Emotionally abused: Not on file     Physically abused: Not on file     Forced sexual activity: Not on file   Other Topics Concern    Not on file   Social History Narrative    Not on file     No Known Allergies  Prior to Admission medications    Medication Sig Start Date End Date Taking? Authorizing Provider   clopidogrel (PLAVIX) 75 MG tablet TAKE ONE (1) TABLET BY MOUTH EVERY DAY  1/20/21   Teri Pappas MD   HYDROcodone-acetaminophen (NORCO)  MG per tablet Take 1 tablet by mouth every 6 hours as needed for Pain for up to 30 days. 1/13/21 2/12/21  Teri Pappas MD   lisinopril (PRINIVIL;ZESTRIL) 40 MG tablet TAKE 1 TABLET BY MOUTH EVERY DAY 11/30/20   Teri Pappas MD   metoprolol succinate (TOPROL XL) 50 MG extended release tablet TAKE 1 TABLET BY MOUTH EVERY DAY 9/3/20   Teri Pappas MD   pregabalin (LYRICA) 50 MG capsule Take 1 capsule by mouth 3 times daily for 30 days.  9/1/20 1/4/21  Teri Pappas MD venlafaxine (EFFEXOR) 75 MG tablet TAKE ONE (1) TABLET EVERY DAY 8/28/20   Meño Ryan MD   atorvastatin (LIPITOR) 40 MG tablet TAKE ONE (1) TABLET EVERY DAY 8/19/20   Meño Ryan MD   famotidine (PEPCID) 20 MG tablet TAKE 1 TABLET TWICE DAILY 2/24/20   Meño Ryan MD   doxazosin (CARDURA) 2 MG tablet Take 1 tablet by mouth daily 6/14/19   Historical Provider, MD   aspirin 81 MG EC tablet Take 1 tablet by mouth daily 8/5/16   Kei Merlos MD   nitroGLYCERIN (NITROSTAT) 0.4 MG SL tablet Place 0.4 mg under the tongue every 5 minutes as needed for Chest pain    Historical Provider, MD DE LEÓN have reviewed all pertinent history. Prior medical records and laboratory evaluation reviewed. Imaging independently reviewed. Review of Systems:   As per HPI, otherwise all other ROS performed and found to be negative at this time. Physical Exam:  Vitals:    02/06/21 1220   BP:    Pulse:    Resp:    Temp:    SpO2: 94%     CONSTITUTIONAL: Chronically ill-appearing, no acute distress  PSYCH: Normal mood  EYES:  symmetrical. Conjunctiva are moist, non-icteric. Lids are normal  ENT: Ears- No scars, lesions or masses. Hearing is grossly normal.  NECK: Trachea is midline. LUNGS: Normal respiratory effort, no intercostal retractions or accessory muscle use. CARDIOVASCULAR: Regular rate and rhythm, peripheral pulses symmetric  GI/ABDOMEN: Soft, non-tender, non-distended, no guarding or rebound.  Bowel sounds are normal.   SKIN: No lesions or rash  NEUROLOGICAL: No dysarthria or aphasia  EXTREMITIES: No clubbing or cyanosis    Labs:   Recent Results (from the past 72 hour(s))   CBC Auto Differential    Collection Time: 02/06/21 11:23 AM   Result Value Ref Range    WBC 12.0 (H) 4.8 - 10.8 K/uL    RBC 4.87 4.70 - 6.10 M/uL    Hemoglobin 13.6 (L) 14.0 - 18.0 g/dL    Hematocrit 42.5 42.0 - 52.0 %    MCV 87.3 80.0 - 94.0 fL    MCH 27.9 27.0 - 31.0 pg    MCHC 32.0 (L) 33.0 - 37.0 g/dL    RDW 13.9 11.5 - 14.5 % 0 - 8 /HPF    WBC, UA 4 0 - 5 /HPF    RBC, UA 5 (H) 0 - 4 /HPF    Epithelial Cells, UA 2 0 - 5 /HPF   PROBNP, N-TERMINAL    Collection Time: 02/06/21 11:23 AM   Result Value Ref Range    Pro-BNP 1,387 0 - 1,800 pg/mL   COVID-19    Collection Time: 02/06/21 11:59 AM   Result Value Ref Range    SARS-CoV-2, NAAT DETECTED (AA) Not Detected       Imaging: Xr Thoracic Spine (3 Views)    Result Date: 2/6/2021  XR THORACIC SPINE (3 VIEWS) 2/6/2021 9:37 AM HISTORY: Fall, back pain COMPARISON: None FINDINGS: Frontal, lateral and swimmers lateral radiographs of the thoracic spine These views are quite limited. Thoracic kyphosis appears preserved. No obvious malalignment. Vertebral body heights appear to be well maintained. There is loss of disc height with endplate spurring at multiple levels. The posterior ribs are intact. Pedicles are intact. 1. No obvious fracture, however, these lateral projection images are quite limited. I do not believe these are sufficient to exclude compression fracture, and if there is focal back pain and/or high clinical concern for fracture I would recommend thoracic spine CT. Signed by Dr Andrew Johnson on 2/6/2021 11:44 AM    Xr Lumbar Spine (2-3 Views)    Result Date: 2/6/2021  XR LUMBAR SPINE (2-3 VIEWS) 2/6/2021 9:37 AM HISTORY: Fall, back pain Comparison: Radiographs dated 7/10/2018 Findings: Frontal, lateral, and coned-down lateral views of the lumbar spine are provided. There are presumed to be 5 lumbar vertebral bodies, with the inferior-most visualized disc space being designated as L5-S1 for the purpose of numbering. Lumbar lordosis is well-maintained. Slight anterior wedge deformity at L1 which is a chronic finding. Vertebral body heights are otherwise maintained. There is a very slight degenerative anterolisthesis at L4-L5. Notable L4-5 and L5-S1 level facet arthropathy. Prior EVAR. Impression: 1. No acute osseous injury identified in the lumbar spine.  Mild anterior wedge deformity at L1 is a chronic deformity. Signed by Dr Gigi Gary on 2/6/2021 11:55 AM    Xr Elbow Left (2 Views)    Result Date: 2/6/2021  XR ELBOW LEFT (2 VIEWS) 2/6/2021 9:37 AM HISTORY: Fall, elbow pain Comparison: None Findings: Frontal, lateral and oblique views of the left elbow were obtained. There is no fracture or dislocation. No significant joint space narrowing is noted. There is no significant joint effusion. No gross soft tissue abnormality is visualized. Impression: 1. Unremarkable radiographs of the left elbow. Signed by Dr Gigi Gary on 2/6/2021 11:38 AM    Ct Head Wo Contrast    Result Date: 2/6/2021  CT HEAD WO CONTRAST 2/6/2021 9:36 AM HISTORY: Fall, head injury COMPARISON: CT scan dated 7/20/2016 DOSE LENGTH PRODUCT: 896 mGy cm TECHNIQUE: Helical tomographic images of the brain were obtained without the use of intravenous contrast. Automated exposure control was also utilized to decrease patient radiation dose. FINDINGS: There is no evidence of evolving large vascular territory infarct. Underlying chronic small vessel ischemic changes throughout the white matter. No visualized intra-axial or extra-axial hemorrhage. No mass lesion is identified. Normal size and configuration of the ventricular system. The basal cisterns are symmetric. Posterior fossa structures are unremarkable. The included orbits and their contents are unremarkable. Prior paranasal sinus surgery with additional chronic mucosal thickening in the right maxillary sinus, bilateral ethmoids and right sphenoid sinus. There is a trace right mastoid effusion. No calvarial fracture. .     1. No acute intracranial process. 2. Underlying chronic small vessel ischemic changes. 3. Prior sinus surgeries with chronic mucosal thickening in the sinuses.  Signed by Dr Gigi Gary on 2/6/2021 11:04 AM    Ct Cervical Spine Wo Contrast    Result Date: 2/6/2021  CT CERVICAL SPINE WO CONTRAST 2/6/2021 9:36 AM HISTORY: Fall, neck pain COMPARISON: 571 DOSE LENGTH PRODUCT: 571 mGy cm TECHNIQUE: Serial helical tomographic images of the cervical spine were obtained without the use of intravenous contrast. Additionally, sagittal and coronal reformatted images were also provided for review. Automated exposure control was also utilized to decrease patient radiation dose. FINDINGS: There is no evidence of fracture or subluxation. The atlantooccipital and atlantoaxial articulations are intact. Likewise, the dens is intact. Flattened cervical lordosis. There is a slight degenerative retrolisthesis at C3-C4. Loss of intervertebral disc height with endplate spurring at multiple levels. Multilevel acquired spinal stenosis, appearing mild to moderate. Uncovertebral spurring with high-grade neuroforaminal narrowing at multiple levels. Posterior limits are intact. Multilevel facet arthropathy. 2.2 cm left apical groundglass opacity. Lung apices are otherwise clear. .     1. No evidence of acute osseous injury in the cervical spine. 2. Underlying advanced cervical spine degenerative change. This includes a slight degenerative retrolisthesis at C3-C4. 3. There is a 2.2 cm groundglass opacity in the left lung apex. This is likely inflammatory or postinflammatory. I would recommend three-month interval chest CT to ensure resolution/ stability. Signed by Dr Violeta Melendez on 2/6/2021 11:11 AM    Xr Shoulder Left (min 2 Views)    Result Date: 2/6/2021  XR SHOULDER LEFT (MIN 2 VIEWS) 2/6/2021 9:37 AM HISTORY: Fall, shoulder pain Comparison: None Findings: Internal rotation, external rotation and scapular Y views of the left shoulder are submitted. No visualized acute fracture. Mild acromioclavicular joint osteoarthritis. High riding humeral head suggesting underlying rotator cuff tear. Incidentally noted osteochondroma arising anteriorly from the proximal shaft of the humerus. Included portion of the chest wall is unremarkable. Impression: 1.  No acute osseous injury ultrasound  -Avoid nephrotoxic agents  -Avoid hypotension, BP on lower side of normal  -Hold lisinopril and doxazosin    COVID-19 positive, no hypoxia  -Supportive care  -Monitor labs    Fall, back shoulder and hip pain  -As needed pain control  -PT/OT    Home medications reconciled  Continue appropriate home medications for other chronic issues      DVT prophylaxis-Lovenox (switch to heparin if renal function worsens)    Elizabeth Osorio  2/6/2021 12:44 PM

## 2021-02-06 NOTE — ED NOTES
Bed: 01-A  Expected date:   Expected time:   Means of arrival:   Comments:  [address], RN  02/06/21 4765

## 2021-02-06 NOTE — ED TRIAGE NOTES
Pt here after falling out bed this morning. Pt noted chronic left sided pain is unable to note acute pain today.

## 2021-02-06 NOTE — ACP (ADVANCE CARE PLANNING)
Advance Care Planning     Advance Care Planning Activator (Inpatient)  Conversation Note      Date of ACP Conversation: 2/6/2021    [facility] Conducted with: Petty Reyez - patient's daughter - [phone]    ACP Activator: [address] Decision Maker:     Current Designated Health Care Decision Maker:     Primary Decision Maker: Petty Reyez - Child - [phone]    Secondary Decision Maker: Cheryl Stahl - Spouse - [phone]    Supplemental (Other) Decision Maker: Brii Gerard Child - [phone]      Care Preferences    Ventilation: \"If you were in your present state of health and suddenly became very ill and were unable to breathe on your own, what would your preference be about the use of a ventilator (breathing machine) if it were available to you? \"      Would the patient desire the use of ventilator (breathing machine)?:               Yes    \"If your health worsens and it becomes clear that your chance of recovery is unlikely, what would your preference be about the use of a ventilator (breathing machine) if it were available to you? \"     Would the patient desire the use of ventilator (breathing machine)?:                  Yes      Resuscitation     \"In the event your heart stopped as a result of an underlying serious health condition, would you want attempts to be made to restart your heart (answer \"yes\" for attempt to resuscitate) or would you prefer a natural death (answer \"no\" for do not attempt to resuscitate)? \"                         Yes          Conversation Outcomes:  [x] ACP discussion completed    Electronically signed by Eladio Go on 2/6/2021 at 5:56 PM

## 2021-02-06 NOTE — ED NOTES
Pt refused to ambulate and states \"i'm going to finish this goddamn water before I move anywhere after sitting here for 3 hours\"  Explained that we cannot ambulate pt until imaging results are back for pt safety and explained that talking to staff like that is not appropriate and will note be tolerated. Pt continued swearing and being verbally aggressive to staff. Pt states \" well I don't like how you've made me sit here and not given me any goddamn pain medications. \"  Explained to pt that as soon as pain medications were ordered we gave him medications. Again explained to pt that talking to staff like that was not tolerated and that if he continued this nurse would leave the room.      Pt told this nurse \"to get out of the room and stay out\"      Martina Lynch RN  02/06/21 9511

## 2021-02-06 NOTE — ED NOTES
Pt ambulated for PCA, pt ambulated with wheeled walked slowly with unsteady gait.  Pt 1-2 assist.      Rito Dandy, RN  02/06/21 3975

## 2021-02-06 NOTE — ED PROVIDER NOTES
[facility]  eMERGENCY dEPARTMENT eNCOUnter      Pt Name: Helen Claire. MRN: [id number]  Birthdate 1945  Date of evaluation: 2/6/2021  Provider: TREV Wharton    CHIEF COMPLAINT       Chief Complaint   Patient presents with    Fall         HISTORY OF PRESENT ILLNESS   (Location/Symptom, Timing/Onset,Context/Setting, Quality, Duration, Modifying Factors, Severity)  Note limiting factors. Cm Whitfield a 66 y.o. male who presents to the emergency department for evaluation of fall. Pt tells me that he fell out of bed today. He relates that he isn't for sure why he fell out of bed however fell onto hardwood. He tells me that he was unable to get out of floor due to back, shoulder and hip pain. He relates that family called [employer] who transported patient here. He tells me that he struck his head during fall and reports headache along back of head. He has had neck and back pain. He tells me that he has had problems in past with neck and back pain. He denies chest pain, abdominal pain. He denies fevers as well as constitutional symptoms of illness. [facility]    Nursing Notes were reviewed. REVIEW OF SYSTEMS    (2-9 systems for level 4, 10 or more for level 5)     Review of Systems   Constitutional: Negative. Respiratory: Negative for shortness of breath. Cardiovascular: Negative for chest pain. Gastrointestinal: Negative for abdominal pain. Musculoskeletal: Positive for back pain and neck pain. All other systems reviewed and are negative. A complete review of systems was performed and is negative except as noted above in the HPI.        PAST MEDICAL HISTORY     Past Medical History:   Diagnosis Date    AAA (abdominal aortic aneurysm) ([address]     Aortic dissection ([address] 10/10/2012    CAD (coronary artery disease)     Cancer (HCC)     lung&lymphnode    Carotid artery occlusion     Cerebrovascular accident (CVA) ([address]     Cerebrovascular accident [facility] to embolism [facility] 2/16/2016    Cerebrovascular accident (CVA) due to embolism of right middle cerebral artery ([facility]     Depression     HTN (hypertension)     Hyperlipidemia     MI (myocardial infarction) ([facility]     acute 5/13/2015    Non Hodgkin's lymphoma ([facility]     Sleep apnea     no c-pap    Unspecified sleep apnea          SURGICAL HISTORY       Past Surgical History:   Procedure Laterality Date    CARDIAC CATHETERIZATION  5/13/15  [address]    EF 40%. stent to LAD, stent to posterior descending branch of RCA.  CAROTID ENDARTERECTOMY Right 7/22/2016    Right carotid endarterectomy,carotid arteriograms. 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, repeat in 1 yr    UPPER GASTROINTESTINAL ENDOSCOPY  11/08/2019    Juan Manuel: normal    UPPER GASTROINTESTINAL ENDOSCOPY  04/13/2018    Dr Resendiz Gip: normal, 2cm HH, small amount of food in stomach w/dark fluid,but not kathy blood    VASCULAR SURGERY  10/29/12 [facility]    open exposure of R common femoral artery; percutaneous cannulation of L  femoral artery with 7-Albanian sheath; suprarenal abd aortagram with bilat iliofem arteriogram; endoluminal graft repair of AAA with endologix [id number] main body, 28x95 infrarenal cuff; balloon angioplasty of infrarenal abd aorta and endoluminal graft with coda balloon; completion abd aortogram w bilat iliofem arteriogram;     VASCULAR SURGERY  10/29/ S     (cont) open repair of right common femoral ateriotomy; perclose repair of left common femoral artery puncture site.           CURRENT MEDICATIONS       Previous Medications    ASPIRIN 81 MG EC TABLET    Take 1 tablet by mouth daily    ATORVASTATIN (LIPITOR) 40 MG TABLET    TAKE ONE (1) TABLET EVERY DAY    CLOPIDOGREL (PLAVIX) 75 MG TABLET    TAKE ONE (1) TABLET BY MOUTH EVERY DAY     DOXAZOSIN (CARDURA) 2 MG TABLET    Take 1 tablet by mouth daily    FAMOTIDINE (PEPCID) 20 MG TABLET    TAKE 1 TABLET TWICE DAILY    HYDROCODONE-ACETAMINOPHEN (NORCO)  MG PER TABLET    Take 1 tablet by mouth every 6 hours as needed for Pain for up to 30 days. LISINOPRIL (PRINIVIL;ZESTRIL) 40 MG TABLET    TAKE 1 TABLET BY MOUTH EVERY DAY    METOPROLOL SUCCINATE (TOPROL XL) 50 MG EXTENDED RELEASE TABLET    TAKE 1 TABLET BY MOUTH EVERY DAY    NITROGLYCERIN (NITROSTAT) 0.4 MG SL TABLET    Place 0.4 mg under the tongue every 5 minutes as needed for Chest pain    PREGABALIN (LYRICA) 50 MG CAPSULE    Take 1 capsule by mouth 3 times daily for 30 days. VENLAFAXINE (EFFEXOR) 75 MG TABLET    TAKE ONE (1) TABLET EVERY DAY       ALLERGIES     Patient has no known allergies.     FAMILY HISTORY       Family History   Problem Relation Age of Onset    High Blood Pressure Father     High Blood Pressure Mother     Diabetes Mother     Colon Polyps Brother     Colon Cancer Neg Hx           SOCIAL HISTORY       Social History     Socioeconomic History    Marital status:      Spouse name: Not on file    Number of children: Not on file    Years of education: Not on file    Highest education level: Not on file   Occupational History    Not on file   Social Needs    Financial resource strain: Not on file    Food insecurity     Worry: Not on file     Inability: Not on file    Transportation needs     Medical: Not on file     Non-medical: Not on file   Tobacco Use    Smoking status: Current Every Day Smoker     Packs/day: 0.50     Years: 40.00     Pack years: 20.00    Smokeless tobacco: Never Used    Tobacco comment: quit smoking in nov 2017, restarted smoking since then   Substance and Sexual Activity    Alcohol use: No    Drug use: No    Sexual activity: Not on file   Lifestyle    Physical activity     Days per week: Not on file     Minutes per session: Not on file    Stress: Not on file   Relationships    Social connections     Talks on phone: Not on file     Gets together: Not on file     Attends Worship service: Not on file     Active member of club or organization: Not on file     Attends meetings of clubs or organizations: Not on file     Relationship status: Not on file    Intimate partner violence     Fear of current or ex partner: Not on file     Emotionally abused: Not on file     Physically abused: Not on file     Forced sexual activity: Not on file   Other Topics Concern    Not on file   Social History Narrative    Not on file       SCREENINGS             PHYSICAL EXAM    (up to 7 for level 4, 8 or more for level 5)     ED Triage Vitals [02/06/21 0913]   BP Temp Temp src Pulse Resp SpO2 Height Weight   (!) 139/90 98.4 °F (36.9 °C) -- 98 18 97 % -- --       Physical Exam  Vitals signs reviewed. HENT:      Head: Normocephalic. Right Ear: External ear normal.      Left Ear: External ear normal.   Eyes:      Conjunctiva/sclera: Conjunctivae normal.      Pupils: Pupils are equal, round, and reactive to light. Neck:      Musculoskeletal: Normal range of motion. Comments: Mild ttp lower cervical spine  Cardiovascular:      Rate and Rhythm: Normal rate and regular rhythm. Heart sounds: Normal heart sounds. Pulmonary:      Effort: Pulmonary effort is normal.      Breath sounds: Normal breath sounds. Abdominal:      General: Bowel sounds are normal. There is no distension. Palpations: Abdomen is soft. Musculoskeletal: Normal range of motion. Comments: Diffuse ttp lower thoracic and lumbar spine  Moves extremities equally x4  Mild diffuse ttp left shoulder, left elbow  Superficial abrasion right forearm  Bruising to left dorsal hand without direct ttp     Skin:     General: Skin is warm and dry. Neurological:      Mental Status: He is alert and oriented to person, place, and time.          DIAGNOSTIC RESULTS     EKG: All EKG's are interpreted by the Emergency Department Physician who either signs or Co-signs this chart in the absence of acardiologist.    There is a regular rate and rhythm. SR w/RBBB  Prolonged OK interval at 240 sec. and normal P waves. Normal QRS interval. Normal QT interval. No obvious ST elevation or ST depression. RADIOLOGY:   Non-plain film images such as CT, Ultrasound andMRI are read by the radiologist. Plain radiographic images are visualized and preliminarily interpreted by the emergency physician with the below findings:        Interpretation per the Radiologist below, if available at the time of this note:    XR SHOULDER LEFT (MIN 2 VIEWS)   Final Result   Impression:    1. No acute osseous injury identified. 2. High riding humeral head suggesting underlying rotator cuff tear. Signed by Dr Akash Lim on 2/6/2021 11:28 AM      XR ELBOW LEFT (2 VIEWS)   Final Result   Impression:    1. Unremarkable radiographs of the left elbow. Signed by Dr Akash Lim on 2/6/2021 11:38 AM      XR HIP 2-3 VW W PELVIS LEFT   Final Result   Impression:    1. No acute osseous injury or malalignment. Signed by Dr Akash Lim on 2/6/2021 11:40 AM      XR CHEST PORTABLE   Final Result   1. There are faint infiltrates identified in both lung bases as well   as the lateral right upper lobe. Heart size is prominent. Primary   concern is volume overload with early pulmonary edema. Atypical   pneumonia such as Covid 19 pneumonia would be a second consideration. Signed by Dr Akash Lim on 2/6/2021 11:37 AM      XR THORACIC SPINE (3 VIEWS)   Final Result   1. No obvious fracture, however, these lateral projection images are   quite limited. I do not believe these are sufficient to exclude   compression fracture, and if there is focal back pain and/or high   clinical concern for fracture I would recommend thoracic spine CT. Signed by Dr Akash Lim on 2/6/2021 11:44 AM      XR LUMBAR SPINE (2-3 VIEWS)   Final Result   Impression:    1. No acute osseous injury identified in the lumbar spine. Mild   anterior wedge deformity at L1 is a chronic deformity. Signed by Dr Susana Sebastian on 2/6/2021 11:55 AM      CT HEAD WO CONTRAST   Final Result   1. No acute intracranial process. 2. Underlying chronic small vessel ischemic changes. 3. Prior sinus surgeries with chronic mucosal thickening in the   sinuses. Signed by Dr Susana Sebastian on 2/6/2021 11:04 AM      CT Cervical Spine WO Contrast   Final Result   1. No evidence of acute osseous injury in the cervical spine. 2. Underlying advanced cervical spine degenerative change. This   includes a slight degenerative retrolisthesis at C3-C4. 3. There is a 2.2 cm groundglass opacity in the left lung apex. This   is likely inflammatory or postinflammatory. I would recommend   three-month interval chest CT to ensure resolution/ stability.    Signed by Dr Susana Sebastian on 2/6/2021 11:11 AM      XR LUMBAR SPINE (2-3 VIEWS)    (Results Pending)   US RENAL COMPLETE    (Results Pending)         ED BEDSIDE ULTRASOUND:   Performed by ED Physician - none    LABS:  Labs Reviewed   CBC WITH AUTO DIFFERENTIAL - Abnormal; Notable for the following components:       Result Value    WBC 12.0 (*)     Hemoglobin 13.6 (*)     MCHC 32.0 (*)     MPV 12.7 (*)     Neutrophils % 87.8 (*)     Lymphocytes % 6.8 (*)     Neutrophils Absolute 10.5 (*)     Lymphocytes Absolute 0.8 (*)     All other components within normal limits   COMPREHENSIVE METABOLIC PANEL W/ REFLEX TO MG FOR LOW K - Abnormal; Notable for the following components:    CO2 20 (*)     Glucose 113 (*)     BUN 45 (*)     CREATININE 2.1 (*)     GFR Non- 31 (*)     GFR  37 (*)     Calcium 11.1 (*)      (*)     All other components within normal limits   URINE RT REFLEX TO CULTURE - Abnormal; Notable for the following components:    Clarity, UA CLOUDY (*)     Ketones, Urine TRACE (*)     Blood, Urine LARGE (*)     All other components within normal limits   MICROSCOPIC URINALYSIS - Abnormal; Notable for the following components:    Bacteria, UA NEGATIVE (*)     Crystals, UA NEG (*)     RBC, UA 5 (*)     All other components within normal limits   COVID-19 - Abnormal; Notable for the following components:    SARS-CoV-2, NAAT DETECTED (*)     All other components within normal limits   BRAIN NATRIURETIC PEPTIDE   SODIUM, URINE, RANDOM   CREATININE, RANDOM URINE       All other labs were within normal range or not returned as of this dictation. RE-ASSESSMENT     Discussed with Dr. Barbara Galindo who will admit patient to hospitalist service. EMERGENCY DEPARTMENT COURSE and DIFFERENTIALDIAGNOSIS/MDM:   Vitals:    Vitals:    02/06/21 1214 02/06/21 1218 02/06/21 1220 02/06/21 1233   BP:    124/72   Pulse:    79   Resp:       Temp:       SpO2: 93% 95% 94%        MDM      CONSULTS:  None    PROCEDURES:  Unless otherwise notedbelow, none     Procedures    FINAL IMPRESSION     1. Pneumonia due to COVID-19 virus    2. Chronic midline low back pain without sciatica    3. RAKESH (acute kidney injury) ([facility]    4. Fall from bed, initial encounter    5. Acute exacerbation of chronic low back pain          DISPOSITION/PLAN   DISPOSITION Admitted 02/06/2021 12:43:19 PM      PATIENT REFERRED TO:  No follow-up provider specified.     DISCHARGE MEDICATIONS:       Current Discharge Medication List          (Pleasenote that portions of this note were completed with a voice recognition program.  Efforts were made to edit the dictations but occasionally words are mis-transcribed.)              TREV Chiang  02/06/21 [id number]

## 2021-02-07 VITALS
DIASTOLIC BLOOD PRESSURE: 72 MMHG | TEMPERATURE: 97.4 F | SYSTOLIC BLOOD PRESSURE: 133 MMHG | RESPIRATION RATE: 18 BRPM | HEART RATE: 75 BPM | OXYGEN SATURATION: 92 % | WEIGHT: 227.19 LBS | BODY MASS INDEX: 33.65 KG/M2 | HEIGHT: 69 IN

## 2021-02-07 LAB
ALBUMIN SERPL-MCNC: 3.5 G/DL (ref 3.5–5.2)
ALP BLD-CCNC: 64 U/L (ref 40–130)
ALT SERPL-CCNC: 23 U/L (ref 5–41)
ANION GAP SERPL CALCULATED.3IONS-SCNC: 12 MMOL/L (ref 7–19)
APTT: 48.7 SEC (ref 26–36.2)
AST SERPL-CCNC: 98 U/L (ref 5–40)
BASOPHILS ABSOLUTE: 0 K/UL (ref 0–0.2)
BASOPHILS RELATIVE PERCENT: 0.3 % (ref 0–1)
BILIRUB SERPL-MCNC: 0.4 MG/DL (ref 0.2–1.2)
BUN BLDV-MCNC: 44 MG/DL (ref 8–23)
C-REACTIVE PROTEIN: 19.2 MG/DL (ref 0–0.5)
CALCIUM SERPL-MCNC: 10.9 MG/DL (ref 8.8–10.2)
CHLORIDE BLD-SCNC: 106 MMOL/L (ref 98–111)
CO2: 21 MMOL/L (ref 22–29)
CREAT SERPL-MCNC: 1.9 MG/DL (ref 0.5–1.2)
D DIMER: 3.82 UG/ML FEU (ref 0–0.48)
EOSINOPHILS ABSOLUTE: 0 K/UL (ref 0–0.6)
EOSINOPHILS RELATIVE PERCENT: 0 % (ref 0–5)
FERRITIN: 484.3 NG/ML (ref 30–400)
FIBRINOGEN: 515 MG/DL (ref 238–505)
GFR AFRICAN AMERICAN: 42
GFR NON-AFRICAN AMERICAN: 34
GLUCOSE BLD-MCNC: 90 MG/DL (ref 74–109)
HCT VFR BLD CALC: 40.7 % (ref 42–52)
HEMOGLOBIN: 12.7 G/DL (ref 14–18)
IMMATURE GRANULOCYTES #: 0.1 K/UL
INR BLD: 1.09 (ref 0.88–1.18)
LYMPHOCYTES ABSOLUTE: 0.9 K/UL (ref 1.1–4.5)
LYMPHOCYTES RELATIVE PERCENT: 9.4 % (ref 20–40)
MCH RBC QN AUTO: 27.4 PG (ref 27–31)
MCHC RBC AUTO-ENTMCNC: 31.2 G/DL (ref 33–37)
MCV RBC AUTO: 87.9 FL (ref 80–94)
MONOCYTES ABSOLUTE: 0.6 K/UL (ref 0–0.9)
MONOCYTES RELATIVE PERCENT: 5.9 % (ref 0–10)
NEUTROPHILS ABSOLUTE: 8.1 K/UL (ref 1.5–7.5)
NEUTROPHILS RELATIVE PERCENT: 83.9 % (ref 50–65)
PDW BLD-RTO: 14.2 % (ref 11.5–14.5)
PLATELET # BLD: 172 K/UL (ref 130–400)
PMV BLD AUTO: 12.8 FL (ref 9.4–12.4)
POTASSIUM REFLEX MAGNESIUM: 4.5 MMOL/L (ref 3.5–5)
PROCALCITONIN: 2.35 NG/ML (ref 0–0.09)
PROTHROMBIN TIME: 14.1 SEC (ref 12–14.6)
RBC # BLD: 4.63 M/UL (ref 4.7–6.1)
SODIUM BLD-SCNC: 139 MMOL/L (ref 136–145)
TOTAL PROTEIN: 6.1 G/DL (ref 6.6–8.7)
WBC # BLD: 9.6 K/UL (ref 4.8–10.8)

## 2021-02-07 PROCEDURE — 85379 FIBRIN DEGRADATION QUANT: CPT

## 2021-02-07 PROCEDURE — 84145 PROCALCITONIN (PCT): CPT

## 2021-02-07 PROCEDURE — 2580000003 HC RX 258: Performed by: STUDENT IN AN ORGANIZED HEALTH CARE EDUCATION/TRAINING PROGRAM

## 2021-02-07 PROCEDURE — 82728 ASSAY OF FERRITIN: CPT

## 2021-02-07 PROCEDURE — 85730 THROMBOPLASTIN TIME PARTIAL: CPT

## 2021-02-07 PROCEDURE — 6370000000 HC RX 637 (ALT 250 FOR IP): Performed by: HOSPITALIST

## 2021-02-07 PROCEDURE — 97116 GAIT TRAINING THERAPY: CPT

## 2021-02-07 PROCEDURE — 85384 FIBRINOGEN ACTIVITY: CPT

## 2021-02-07 PROCEDURE — 36415 COLL VENOUS BLD VENIPUNCTURE: CPT

## 2021-02-07 PROCEDURE — 6360000002 HC RX W HCPCS: Performed by: HOSPITALIST

## 2021-02-07 PROCEDURE — 85610 PROTHROMBIN TIME: CPT

## 2021-02-07 PROCEDURE — 86140 C-REACTIVE PROTEIN: CPT

## 2021-02-07 PROCEDURE — 97161 PT EVAL LOW COMPLEX 20 MIN: CPT

## 2021-02-07 PROCEDURE — 6370000000 HC RX 637 (ALT 250 FOR IP): Performed by: STUDENT IN AN ORGANIZED HEALTH CARE EDUCATION/TRAINING PROGRAM

## 2021-02-07 PROCEDURE — 85025 COMPLETE CBC W/AUTO DIFF WBC: CPT

## 2021-02-07 PROCEDURE — 80053 COMPREHEN METABOLIC PANEL: CPT

## 2021-02-07 RX ORDER — METHOCARBAMOL 500 MG/1
500 TABLET, FILM COATED ORAL NIGHTLY PRN
Qty: 10 TABLET | Refills: 0 | Status: SHIPPED | OUTPATIENT
Start: 2021-02-07 | End: 2021-02-17

## 2021-02-07 RX ORDER — HYDRALAZINE HYDROCHLORIDE 25 MG/1
25 TABLET, FILM COATED ORAL EVERY 4 HOURS PRN
Status: DISCONTINUED | OUTPATIENT
Start: 2021-02-07 | End: 2021-02-07 | Stop reason: HOSPADM

## 2021-02-07 RX ORDER — OXYCODONE HYDROCHLORIDE 5 MG/1
5 TABLET ORAL ONCE
Status: COMPLETED | OUTPATIENT
Start: 2021-02-07 | End: 2021-02-07

## 2021-02-07 RX ADMIN — ASPIRIN 81 MG: 81 TABLET, COATED ORAL at 08:40

## 2021-02-07 RX ADMIN — ENOXAPARIN SODIUM 50 MG: 60 INJECTION SUBCUTANEOUS at 08:41

## 2021-02-07 RX ADMIN — Medication 2000 UNITS: at 08:40

## 2021-02-07 RX ADMIN — METOPROLOL SUCCINATE 25 MG: 25 TABLET, EXTENDED RELEASE ORAL at 08:40

## 2021-02-07 RX ADMIN — VENLAFAXINE 75 MG: 75 TABLET ORAL at 08:40

## 2021-02-07 RX ADMIN — FAMOTIDINE 10 MG: 20 TABLET, FILM COATED ORAL at 08:40

## 2021-02-07 RX ADMIN — HYDRALAZINE HYDROCHLORIDE 25 MG: 25 TABLET, FILM COATED ORAL at 00:46

## 2021-02-07 RX ADMIN — HYDRALAZINE HYDROCHLORIDE 25 MG: 25 TABLET, FILM COATED ORAL at 05:07

## 2021-02-07 RX ADMIN — HYDROCODONE BITARTRATE AND ACETAMINOPHEN 1 TABLET: 10; 325 TABLET ORAL at 04:45

## 2021-02-07 RX ADMIN — SODIUM CHLORIDE: 9 INJECTION, SOLUTION INTRAVENOUS at 08:48

## 2021-02-07 RX ADMIN — CLOPIDOGREL BISULFATE 75 MG: 75 TABLET ORAL at 08:41

## 2021-02-07 RX ADMIN — HYDROCODONE BITARTRATE AND ACETAMINOPHEN 1 TABLET: 10; 325 TABLET ORAL at 11:02

## 2021-02-07 RX ADMIN — OXYCODONE 5 MG: 5 TABLET ORAL at 06:17

## 2021-02-07 ASSESSMENT — PAIN SCALES - GENERAL
PAINLEVEL_OUTOF10: 5
PAINLEVEL_OUTOF10: 10
PAINLEVEL_OUTOF10: 10

## 2021-02-07 ASSESSMENT — PAIN DESCRIPTION - LOCATION: LOCATION: HIP

## 2021-02-07 ASSESSMENT — PAIN DESCRIPTION - FREQUENCY: FREQUENCY: CONTINUOUS

## 2021-02-07 ASSESSMENT — PAIN DESCRIPTION - ONSET: ONSET: ON-GOING

## 2021-02-07 ASSESSMENT — PAIN DESCRIPTION - PROGRESSION: CLINICAL_PROGRESSION: NOT CHANGED

## 2021-02-07 NOTE — DISCHARGE SUMMARY
Discharge Summary    NAME: Barbara Henson. :  1945  MRN:  [id number]    Admit date:  2021  Discharge date:  2021    Admitting Physician:  Elizabeth Osorio MD    Advance Directive: Full Code    Primary Care Physician:  Gaye Hillman MD    Discharge Diagnoses:  Principal Problem:    Acute on chronic renal insufficiency    COVID-19    Sleep apnea    Carotid artery stenosis    Coronary artery disease involving native heart without angina pectoris    Essential hypertension    Depression with anxiety      Significant Diagnostic Studies:   Xr Thoracic Spine (3 Views)    Result Date: 2021  XR THORACIC SPINE (3 VIEWS) 2021 9:37 AM HISTORY: Fall, back pain COMPARISON: None FINDINGS: Frontal, lateral and swimmers lateral radiographs of the thoracic spine These views are quite limited. Thoracic kyphosis appears preserved. No obvious malalignment. Vertebral body heights appear to be well maintained. There is loss of disc height with endplate spurring at multiple levels. The posterior ribs are intact. Pedicles are intact. 1. No obvious fracture, however, these lateral projection images are quite limited. I do not believe these are sufficient to exclude compression fracture, and if there is focal back pain and/or high clinical concern for fracture I would recommend thoracic spine CT. Signed by Dr Amanda Smith on 2021 11:44 AM    Xr Lumbar Spine (2-3 Views)    Result Date: 2021  XR LUMBAR SPINE (2-3 VIEWS) 2021 9:37 AM HISTORY: Fall, back pain Comparison: Radiographs dated 7/10/2018 Findings: Frontal, lateral, and coned-down lateral views of the lumbar spine are provided. There are presumed to be 5 lumbar vertebral bodies, with the inferior-most visualized disc space being designated as L5-S1 for the purpose of numbering. Lumbar lordosis is well-maintained. Slight anterior wedge deformity at L1 which is a chronic finding. Vertebral body heights are otherwise maintained.  There is a very slight degenerative anterolisthesis at L4-L5. Notable L4-5 and L5-S1 level facet arthropathy. Prior EVAR. Impression: 1. No acute osseous injury identified in the lumbar spine. Mild anterior wedge deformity at L1 is a chronic deformity. Signed by Dr Michael Barba on 2/6/2021 11:55 AM    Xr Elbow Left (2 Views)    Result Date: 2/6/2021  XR ELBOW LEFT (2 VIEWS) 2/6/2021 9:37 AM HISTORY: Fall, elbow pain Comparison: None Findings: Frontal, lateral and oblique views of the left elbow were obtained. There is no fracture or dislocation. No significant joint space narrowing is noted. There is no significant joint effusion. No gross soft tissue abnormality is visualized. Impression: 1. Unremarkable radiographs of the left elbow. Signed by Dr Michael Barba on 2/6/2021 11:38 AM    Ct Head Wo Contrast    Result Date: 2/6/2021  CT HEAD WO CONTRAST 2/6/2021 9:36 AM HISTORY: Fall, head injury COMPARISON: CT scan dated 7/20/2016 DOSE LENGTH PRODUCT: 896 mGy cm TECHNIQUE: Helical tomographic images of the brain were obtained without the use of intravenous contrast. Automated exposure control was also utilized to decrease patient radiation dose. FINDINGS: There is no evidence of evolving large vascular territory infarct. Underlying chronic small vessel ischemic changes throughout the white matter. No visualized intra-axial or extra-axial hemorrhage. No mass lesion is identified. Normal size and configuration of the ventricular system. The basal cisterns are symmetric. Posterior fossa structures are unremarkable. The included orbits and their contents are unremarkable. Prior paranasal sinus surgery with additional chronic mucosal thickening in the right maxillary sinus, bilateral ethmoids and right sphenoid sinus. There is a trace right mastoid effusion. No calvarial fracture. .     1. No acute intracranial process. 2. Underlying chronic small vessel ischemic changes.  3. Prior sinus surgeries with chronic mucosal thickening in the sinuses. Signed by Dr Guilherme Spencer on 2/6/2021 11:04 AM    Ct Cervical Spine Wo Contrast    Result Date: 2/6/2021  CT CERVICAL SPINE WO CONTRAST 2/6/2021 9:36 AM HISTORY: Fall, neck pain COMPARISON: 571 DOSE LENGTH PRODUCT: 571 mGy cm TECHNIQUE: Serial helical tomographic images of the cervical spine were obtained without the use of intravenous contrast. Additionally, sagittal and coronal reformatted images were also provided for review. Automated exposure control was also utilized to decrease patient radiation dose. FINDINGS: There is no evidence of fracture or subluxation. The atlantooccipital and atlantoaxial articulations are intact. Likewise, the dens is intact. Flattened cervical lordosis. There is a slight degenerative retrolisthesis at C3-C4. Loss of intervertebral disc height with endplate spurring at multiple levels. Multilevel acquired spinal stenosis, appearing mild to moderate. Uncovertebral spurring with high-grade neuroforaminal narrowing at multiple levels. Posterior limits are intact. Multilevel facet arthropathy. 2.2 cm left apical groundglass opacity. Lung apices are otherwise clear. .     1. No evidence of acute osseous injury in the cervical spine. 2. Underlying advanced cervical spine degenerative change. This includes a slight degenerative retrolisthesis at C3-C4. 3. There is a 2.2 cm groundglass opacity in the left lung apex. This is likely inflammatory or postinflammatory. I would recommend three-month interval chest CT to ensure resolution/ stability. Signed by Dr Guilherme Spencer on 2/6/2021 11:11 AM    Us Renal Complete    Result Date: 2/6/2021  US RENAL COMPLETE 2/6/2021 12:08 PM HISTORY: Acute kidney injury COMPARISON: Ultrasound dated 6/20/2019  TECHNIQUE: Multiple longitudinal and transverse realtime sonographic images of the kidneys and urinary bladder are obtained. FINDINGS: The right kidney measures 10.9 x 5.4 x 5.1 cm.  The right kidney is normal in size, shape, contour and position. Diffuse cortical thinning. The central echo complex is compact with no evidence for hydronephrosis. Simple appearing anechoic exophytic cyst, measuring 1 cm. No nephrolithiasis or abnormal perinephric fluid collections are seen. No hydroureter. The left kidney measures 11.0 x 4.0 x 3.9 cm. The left kidney is normal in size, shape, contour and position. Diffuse cortical thinning. The central echo complex is compact with no evidence for hydronephrosis. There is a 1.4 cm simple anechoic cyst at the inferior pole. Left upper pole echogenic focus, perhaps urolithiasis or milk of calcium urine. No perinephric fluid collections are seen. No hydroureter. Scanning through the pelvis reveals the bladder to be moderately distended with anechoic urine. The wall thickness and contour are normal. There is no surrounding ascites. 1. No upper urinary tract obstruction/hydronephrosis. 2. Bilateral renal cortical thickening. 3. Bilateral small renal cysts. Possible solitary nonobstructing left upper pole stone. 4. Urinary bladder is unremarkable. Signed by Dr Sagar Alvarenga on 2/6/2021 1:53 PM    Xr Shoulder Left (min 2 Views)    Result Date: 2/6/2021  XR SHOULDER LEFT (MIN 2 VIEWS) 2/6/2021 9:37 AM HISTORY: Fall, shoulder pain Comparison: None Findings: Internal rotation, external rotation and scapular Y views of the left shoulder are submitted. No visualized acute fracture. Mild acromioclavicular joint osteoarthritis. High riding humeral head suggesting underlying rotator cuff tear. Incidentally noted osteochondroma arising anteriorly from the proximal shaft of the humerus. Included portion of the chest wall is unremarkable. Impression: 1. No acute osseous injury identified. 2. High riding humeral head suggesting underlying rotator cuff tear.  Signed by Dr Sagar Alvarenga on 2/6/2021 11:28 AM    Xr Chest Portable    Result Date: 2/6/2021  XR CHEST PORTABLE 2/6/2021 9:37 AM HISTORY: Fall COMPARISON: Chest exam dated 2/10/2016. FINDINGS: Shallow inspiration with low lung volumes. Faint bibasilar and right upper lobe infiltrates identified. Heart size is upper limit of normal. Posterior right fourth rib fracture is a chronic deformity. No acute fracture identified. No pleural effusion or pneumothorax. 1. There are faint infiltrates identified in both lung bases as well as the lateral right upper lobe. Heart size is prominent. Primary concern is volume overload with early pulmonary edema. Atypical pneumonia such as Covid 19 pneumonia would be a second consideration. Signed by Dr Sagar Alvarenga on 2/6/2021 11:37 AM    Xr Hip 2-3 Vw W Pelvis Left    Result Date: 2/6/2021  XR HIP 2-3 VW W PELVIS LEFT 2/6/2021 9:37 AM HISTORY: Fall, hip pain Comparison: None Findings: Frontal and lateral views of the left hip were obtained. Additional AP pelvis. No fracture or subluxation at the left hip joint. Pelvic ring is intact. The sacral arches are intact. Mild osteoarthritis changes at both hip joints. Incidentally noted vascular calcifications. Incidentally noted surgical clips projecting over the right groin. Prior EVAR. Impression: 1. No acute osseous injury or malalignment. Signed by Dr Sagar Alvarenga on 2/6/2021 11:40 AM      Pertinent Labs:   CBC:   Recent Labs     02/06/21  1123 02/07/21 0227   WBC 12.0* 9.6   HGB 13.6* 12.7*    172     BMP:    Recent Labs     02/06/21  1123 02/07/21 0227    139   K 4.5 4.5    106   CO2 20* 21*   BUN 45* 44*   CREATININE 2.1* 1.9*   GLUCOSE 113* 90     INR:   Recent Labs     02/07/21 0227   INR 1.09           Hospital Course:   66-year-old male with history of stroke involving right MCA 2016, CKD, carotid artery occlusion, CAD, sleep apnea not on CPAP, presented after falling out of bed on 2/6 but no loss of consciousness. He did sustain some head trauma however CT head negative and otherwise no evidence of significant trauma on other imaging.   He incidentally tested positive for COVID-19 however no hypoxia. He was admitted due to acute on chronic renal insufficiency with BUN 45/creatinine 2.1 above baseline creatinine 1.3. Chest x-ray showed questionable developing pneumonia. No evidence of significant volume overload. Renal function improved with IV fluids. Renal ultrasound without obstruction. Good urine output. Patient stable for discharge and will continue on home regimen for chronic pain with additional prescription for Robaxin to take at bedtime if needed. Provided with quarantine instructions-quarantine for 10-14 days from positive Covid test.  Instructed to obtain pulse oximeter to monitor SPO2 at home and instructed to return if any worsening dyspnea or if SPO2 drops below 90%. Physical Exam:  Vital Signs: /72   Pulse 75   Temp 97.4 °F (36.3 °C) (Temporal)   Resp 18   Ht 5' 9\" (1.753 m)   Wt 227 lb 3 oz (103.1 kg)   SpO2 92%   BMI 33.55 kg/m²   General appearance:. Alert and Cooperative   HEENT: Normocephalic. Chest: No accessory muscle use  Cardiac:regular rate and rhythm, S1, S2 normal.   Abdomen:soft, non-tender; normal bowel sounds  Extremities: No clubbing or cyanosis. Neurologic: Grossly intact. Discharge Medications:       Damian Fu. Home Medication Instructions [facility]:[id number]    Printed on:02/07/21 2648   Medication Information                      aspirin 81 MG EC tablet  Take 1 tablet by mouth daily             atorvastatin (LIPITOR) 40 MG tablet  TAKE ONE (1) TABLET EVERY DAY             clopidogrel (PLAVIX) 75 MG tablet  TAKE ONE (1) TABLET BY MOUTH EVERY DAY              doxazosin (CARDURA) 2 MG tablet  Take 1 tablet by mouth daily             famotidine (PEPCID) 20 MG tablet  TAKE 1 TABLET TWICE DAILY             HYDROcodone-acetaminophen (NORCO)  MG per tablet  Take 1 tablet by mouth every 6 hours as needed for Pain for up to 30 days.              methocarbamol (ROBAXIN) 500 MG tablet  Take 1 tablet by mouth nightly as needed (pain)             metoprolol succinate (TOPROL XL) 50 MG extended release tablet  TAKE 1 TABLET BY MOUTH EVERY DAY             nitroGLYCERIN (NITROSTAT) 0.4 MG SL tablet  Place 0.4 mg under the tongue every 5 minutes as needed for Chest pain             pregabalin (LYRICA) 50 MG capsule  Take 1 capsule by mouth 3 times daily for 30 days. venlafaxine (EFFEXOR) 75 MG tablet  TAKE ONE (1) TABLET EVERY DAY                 Discharge Instructions: Follow up with Trudi Torres MD.  Take medications as directed. Resume activity as tolerated. Diet: DIET CARDIAC; Disposition: Patient is medically stable and will be discharged home. Time spent on discharge over 30 minutes.     Signed:  Leslie Roldan MD  2/7/2021 9:17 AM

## 2021-02-07 NOTE — DISCHARGE INSTR - ACTIVITY
Preventing Falls: Care Instructions  Your Care Instructions     Getting around your home safely can be a challenge if you have injuries or health problems that make it easy for you to fall. Loose rugs and furniture in walkways are among the dangers for many older people who have problems walking or who have poor eyesight. People who have conditions such as arthritis, osteoporosis, or dementia also have to be careful not to fall. You can make your home safer with a few simple measures. Follow-up care is a key part of your treatment and safety. Be sure to make and go to all appointments, and call your doctor if you are having problems. It's also a good idea to know your test results and keep a list of the medicines you take. How can you care for yourself at home? Taking care of yourself  · You may get dizzy if you do not drink enough water. To prevent dehydration, drink plenty of fluids, enough so that your urine is light yellow or clear like water. Choose water and other caffeine-free clear liquids. If you have kidney, heart, or liver disease and have to limit fluids, talk with your doctor before you increase the amount of fluids you drink. · Exercise regularly to improve your strength, muscle tone, and balance. Walk if you can. Swimming may be a good choice if you cannot walk easily. · Have your vision and hearing checked each year or any time you notice a change. If you have trouble seeing and hearing, you might not be able to avoid objects and could lose your balance. · Know the side effects of the medicines you take. Ask your doctor or pharmacist whether the medicines you take can affect your balance. Sleeping pills or sedatives can affect your balance. · Limit the amount of alcohol you drink. Alcohol can impair your balance and other senses. · Ask your doctor whether calluses or corns on your feet need to be removed.  If you wear loose-fitting shoes because of calluses or corns, you can lose your balance and fall. · Talk to your doctor if you have numbness in your feet. Preventing falls at home  · Remove raised doorway thresholds, throw rugs, and clutter. Repair loose carpet or raised areas in the floor. · Move furniture and electrical cords to keep them out of walking paths. · Use nonskid floor wax, and wipe up spills right away, especially on ceramic tile floors. · If you use a walker or cane, put rubber tips on it. If you use crutches, clean the bottoms of them regularly with an abrasive pad, such as steel wool. · Keep your house well lit, especially [place], and outside walkways. Use night-lights in areas such as hallways and bathrooms. Add extra light switches or use remote switches (such as switches that go on or off when you clap your hands) to make it easier to turn lights on if you have to get up during the night. · Install sturdy handrails on stairways. · Move items in your cabinets so that the things you use a lot are on the lower shelves (about waist level). · Keep a cordless phone and a flashlight with new batteries by your bed. If possible, put a phone in each of the main rooms of your house, or carry a cell phone in case you fall and cannot reach a phone. Or, you can wear a device around your neck or wrist. You push a button that sends a signal for help. · Wear low-heeled shoes that fit well and give your feet good support. Use footwear with nonskid soles. Check the heels and soles of your shoes for wear. Repair or replace worn heels or soles. · Do not wear socks without shoes on wood floors. · Walk on the grass when the sidewalks are slippery. If you live in an area that gets snow and ice in the winter, sprinkle salt on slippery steps and sidewalks. Preventing falls in the bath  · Install grab bars and nonskid mats inside and outside your shower or tub and near the toilet and sinks. · Use shower chairs and bath benches.   · Use a hand-held shower head that will allow

## 2021-02-07 NOTE — PROGRESS NOTES
Walked pt in room with assist of a walker. Pt ambulated from bed to wall and back to bed. Pt tolerated well.

## 2021-02-07 NOTE — PROGRESS NOTES
PHYSICAL THERAPY  Daily Treatment Note    DATE:  2021  NAME:  Mekhi Park. :  1945  (66 y. o.,male)  MRN:  [id number]      HOME LIVING:       RESTRICTIONS/PRECAUTIONS:         OVERALL  ORIENTATION STATUS:  Overall Orientation Status: Within Functional Limits    STRENGTH  Strength RLE  Comment: Grossly 4-/5  Strength LLE  Comment: Grossly 4-/5    ROM   PROM RLE (degrees)  RLE PROM: WFL  PROM LLE (degrees)  LLE PROM: WFL     BALANCE  Balance  Posture: Fair  Sitting - Static: Good  Sitting - Dynamic: Good  Standing - Static: Fair  Standing - Dynamic: Fair    BED MOBILITY  Bed Mobility  Scooting: Minimal assistance    TRANSFERS  Transfers  Sit to Stand: Contact guard assistance  Stand to sit: Contact guard assistance  Bed to Chair: Contact guard assistance  Stand Pivot Transfers: Contact guard assistance    AMBULATION  Device: Rolling Walker  Assistance: Contact guard assistance  Distance: 20 feet    STAIRS         COMMENTS:  Returned to bed at patient request  Call light within reach  Bed alarm activated  Patient instructed to request assistance before getting up  Nursing updated        Electronically signed by Ruddy Colby PT on 2021 at 11:32 AM

## 2021-02-07 NOTE — PROGRESS NOTES
Pharmacy Consult      Merline Garcia is a 66 y.o. male for whom pharmacy has been consulted to dose intermediate anticoagulation in setting of COVID-19. Patient Active Problem List   Diagnosis    Depression    Sleep apnea    Carotid artery stenosis    AAA (abdominal aortic aneurysm) (HCC)    Aortic dissection (HCC)    Mixed hyperlipidemia    Cerebrovascular accident (CVA) due to embolism (HCC)    Carotid artery occlusion    Obstructive sleep apnea syndrome    CKD (chronic kidney disease) stage 3, GFR 30-59 ml/min    Coronary artery disease involving native heart without angina pectoris    Cerebrovascular accident (CVA) due to embolism of right middle cerebral artery ([address]    Essential hypertension    Stenosis of right carotid artery    Injury of right hypoglossal nerve    Depression with anxiety    Hyperglycemia    Gastroesophageal reflux disease without esophagitis    Simple chronic bronchitis (HCC)    Chronic midline low back pain without sciatica    Recurrent major depressive disorder in partial remission ([address]    Hyperparathyroidism ([address]    Atherosclerosis of native artery of both lower extremities ([address]    Bilateral carotid artery stenosis    Acute on chronic renal insufficiency    COVID-19       Allergies:  Patient has no known allergies. Recent Labs     02/06/21  1123 02/07/21  0227   CREATININE 2.1* 1.9*       Ht/Wt:   Ht Readings from Last 1 Encounters:   02/07/21 5' 9\" (1.753 m)        Wt Readings from Last 1 Encounters:   02/07/21 227 lb 3 oz (103.1 kg)         Estimated Creatinine Clearance: 39 mL/min (A) (based on SCr of 1.9 mg/dL (H)). Assessment/Plan:    Ordered Lovenox 0.5 mg/kg (50 mg) every 12 hours. Patient did qualify for intermediate-intensity anticoagulation dosing due to very elevated D-dimer (> 6-10 x ULN) and normal or elevated fibrinogen. Patient's D- dimer = 3.82 and fibrinogen = 515    Thank you for the consult.       Electronically signed by Anibal Russ, [facility] on 2/7/2021 at 7:32 AM

## 2021-02-07 NOTE — CARE COORDINATION
Spoke with patient regarding MD orders for [facility] services. Pt agreeable and chose         [facility]. Referral accepted and faxed. Please notify [facility] when patient discharges and Fax DC Summary,  DC med list and any new [facility] orders. P.  [phone]  F.  [phone]  The Patient and/or patient representative was provided with a choice of provider and agrees   with the discharge plan. [x] Yes [] No    Freedom of choice list was provided with basic dialogue that supports the patient's individualized plan of care/goals, treatment preferences and shares the quality data associated with the providers.  [x] Yes [] No  Electronically signed by Cornel Echols on 2/7/21 at 3:12 PM CST

## 2021-02-07 NOTE — PROGRESS NOTES
Physical Therapy    Facility/Department: [facility] 4 ONCOLOGY UNIT  Initial Assessment    NAME: George Talley. : 1945  MRN: [id number]    Date of Service: 2021    Discharge Recommendations:  Home with Home health PT        Assessment   Body structures, Functions, Activity limitations: Decreased functional mobility   Assessment: Patient will benefit from continuing skilled physical therapy to improve mobility  Treatment Diagnosis: Difficulty walking  Prognosis: Good  Decision Making: Low Complexity  REQUIRES PT FOLLOW UP: Yes  Activity Tolerance  Activity Tolerance: Patient Tolerated treatment well       Patient Diagnosis(es): The primary encounter diagnosis was Pneumonia due to COVID-19 virus. Diagnoses of Chronic midline low back pain without sciatica, RAKESH (acute kidney injury) ([address] Fall from bed, initial encounter, and Acute exacerbation of chronic low back pain were also pertinent to this visit. has a past medical history of AAA (abdominal aortic aneurysm) ([address] Aortic dissection ([address] CAD (coronary artery disease), Cancer ([address] Carotid artery occlusion, Cerebrovascular accident (CVA) ([address] Cerebrovascular accident (CVA) due to embolism ([address] Cerebrovascular accident (CVA) due to embolism of right middle cerebral artery ([address] Depression, HTN (hypertension), Hyperlipidemia, MI (myocardial infarction) ([address] Non Hodgkin's lymphoma ([address] Sleep apnea, and Unspecified sleep apnea. has a past surgical history that includes Lung cancer surgery; lymphadenectomy; Colonoscopy (2018); Rectal surgery; vascular surgery (10/29/12 [facility]); vascular surgery (10/29/ S ); Cardiac catheterization (5/13/15  [facility]); Carotid endarterectomy (Right, 2016); Upper gastrointestinal endoscopy (N/A, 2020); Upper gastrointestinal endoscopy (2019); and Upper gastrointestinal endoscopy (2018).     Restrictions     Vision/Hearing        Subjective  General  Chart Reviewed: Yes  Patient assessed for rehabilitation services?: Yes  Diagnosis: Renal failure  Follows Commands: Within Functional Limits  Subjective  Subjective: Agrees to work with therapy  Pain Screening  Patient Currently in Pain: Yes  Pain Assessment  Pain Assessment: 0-10  Pain Level: 10(Nursing notified)  Patient's Stated Pain Goal: No pain  Pain Type: Acute pain  Pain Location: Hip  Pain Orientation: Right;Left  Pain Descriptors: Constant  Pain Frequency: Continuous  Pain Onset: On-going  Clinical Progression: Not changed  Functional Pain Assessment: Prevents or interferes some active activities and ADLs  Non-Pharmaceutical Pain Intervention(s): Repositioned  Vital Signs  Patient Currently in Pain: Yes  Pre Treatment Pain Screening  Pain at present: 10  Scale Used: Numeric Score  Intervention List: Patient able to continue with treatment    Orientation  Orientation  Overall Orientation Status: Within Functional Limits  Social/Functional History     Cognition        Objective          PROM RLE (degrees)  RLE PROM: WFL  PROM LLE (degrees)  LLE PROM: WFL  Strength RLE  Comment: Grossly 4-/5  Strength LLE  Comment: Grossly 4-/5        Bed mobility  Supine to Sit: Minimal assistance  Sit to Supine: Minimal assistance  Scooting: Minimal assistance  Transfers  Sit to Stand: Contact guard assistance  Stand to sit: Contact guard assistance  Bed to Chair: Contact guard assistance  Stand Pivot Transfers: Contact guard assistance  Ambulation  Ambulation?: Yes  WB Status: WBAT  Ambulation 1  Device: Rolling Walker  Assistance: Contact guard assistance  Quality of Gait: Abnormal movement pattern  Gait Deviations: Deviated path; Slow Rosalie;Decreased step length;Decreased step height  Distance: 20 feet     Balance  Posture: Fair  Sitting - Static: Good  Sitting - Dynamic: Good  Standing - Static: Fair  Standing - Dynamic: [address]  Times per week: 7  Times per day: Daily  Plan weeks: 2  Current Treatment Recommendations: Strengthening, Balance Training, Functional Mobility Training, Gait Training, Transfer Training  Safety Devices  Type of devices: Bed alarm in place, Call light within reach, Left in bed, Nurse notified    G-Code       OutComes Score                                                  AM-PAC Score             Goals  Short term goals  Time Frame for Short term goals: 2 weeks  Short term goal 1: Independent with bed mobility  Short term goal 2: independent with transfers  Short term goal 3: ambulate 50 feet with assistive device and CGA       Therapy Time   Individual Concurrent Group Co-treatment   Time In           Time Out           Minutes                   Magdaleno Rivera PT       Electronically signed by Magdaleno Rivera PT on 2/7/2021 at 11:31 AM

## 2021-02-07 NOTE — CARE COORDINATION
SW has been in contact with [facility] in [place] re: walker.  Awaiting call back at this time

## 2021-02-08 ENCOUNTER — TELEPHONE (OUTPATIENT)
Dept: FAMILY MEDICINE CLINIC | Age: 76
End: 2021-02-08

## 2021-02-08 NOTE — TELEPHONE ENCOUNTER
Cami Luevano. called to request a refill on his medication. Last office visit : 1/4/2021   Next office visit : 4/15/2021     Last UDS:   Amphetamine Screen, Urine   Date Value Ref Range Status   04/23/2018 Negative  Final     Barbiturate Screen, Urine   Date Value Ref Range Status   04/23/2018 Negative  Final     Benzodiazepine Screen, Urine   Date Value Ref Range Status   04/23/2018 Negative  Final     Cocaine Metabolite Screen, Urine   Date Value Ref Range Status   04/23/2018 Negative  Final     MDMA, Urine   Date Value Ref Range Status   04/23/2018 Negative  Final     Methamphetamine, Urine   Date Value Ref Range Status   04/23/2018 Negative  Final     Opiate Scrn, Ur   Date Value Ref Range Status   04/23/2018 Positive  Final     Oxycodone Screen, Ur   Date Value Ref Range Status   04/23/2018 Negative  Final     PCP Screen, Urine   Date Value Ref Range Status   04/23/2018 Negative  Final     Propoxyphene Screen, Urine   Date Value Ref Range Status   04/23/2018 Negative  Final     Tricyclic Antidepressants, Urine   Date Value Ref Range Status   04/23/2018 Negative  Final       Last Linda Duncans: 2-8-21  Medication Contract: not on file    Last Fill: 1-13-21    Requested Prescriptions      No prescriptions requested or ordered in this encounter         Please approve or refuse this medication.    Lyle Daugherty MA

## 2021-02-09 ENCOUNTER — TELEPHONE (OUTPATIENT)
Dept: FAMILY MEDICINE CLINIC | Age: 76
End: 2021-02-09

## 2021-02-09 ENCOUNTER — VIRTUAL VISIT (OUTPATIENT)
Dept: FAMILY MEDICINE CLINIC | Age: 76
End: 2021-02-09
Payer: MEDICARE

## 2021-02-09 ENCOUNTER — TELEPHONE (OUTPATIENT)
Dept: INTERNAL MEDICINE | Age: 76
End: 2021-02-09

## 2021-02-09 DIAGNOSIS — I63.40 CEREBROVASCULAR ACCIDENT (CVA) DUE TO EMBOLISM OF CEREBRAL ARTERY (HCC): ICD-10-CM

## 2021-02-09 DIAGNOSIS — N18.30 STAGE 3 CHRONIC KIDNEY DISEASE, UNSPECIFIED WHETHER STAGE 3A OR 3B CKD (HCC): ICD-10-CM

## 2021-02-09 DIAGNOSIS — U07.1 COVID-19 VIRUS INFECTION: Primary | ICD-10-CM
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
## 2021-02-09 DIAGNOSIS — M79.605 LEFT LEG PAIN: ICD-10-CM

## 2021-02-09 PROCEDURE — 1111F DSCHRG MED/CURRENT MED MERGE: CPT | Performed by: NURSE PRACTITIONER

## 2021-02-09 PROCEDURE — 99496 TRANSJ CARE MGMT HIGH F2F 7D: CPT | Performed by: NURSE PRACTITIONER

## 2021-02-09 RX ORDER — METHYLPREDNISOLONE 4 MG/1
TABLET ORAL
Qty: 1 KIT | Refills: 0 | Status: ON HOLD | OUTPATIENT
Start: 2021-02-09 | End: 2021-02-15

## 2021-02-09 ASSESSMENT — ENCOUNTER SYMPTOMS
SORE THROAT: 0
DIARRHEA: 0
CHEST TIGHTNESS: 0
NAUSEA: 0
ABDOMINAL PAIN: 0
WHEEZING: 0
SHORTNESS OF BREATH: 0
COUGH: 0

## 2021-02-09 NOTE — PROGRESS NOTES
Post-Discharge Transitional Care Management Services or Hospital Follow Up      Zee Vera YOB: 1945    Date of Office Visit:  2/9/2021  Date of Hospital Admission: 2/6/21  Date of Hospital Discharge: 2/7/21  Readmission Risk Score(high >=14%. Medium >=10%):Readmission Risk Score: 14      Care management risk score Rising risk (score 2-5) and Complex Care (Scores >=6): 2     Non face to face  following discharge, date last encounter closed (first attempt may have been earlier): 2/9/2021 12:40 PM 2/9/2021 12:40 PM    Call initiated 2 business days of discharge: Yes     Patient Active Problem List   Diagnosis    Depression    Sleep apnea    Carotid artery stenosis    AAA (abdominal aortic aneurysm) ([address]    Aortic dissection ([address]    Mixed hyperlipidemia    Cerebrovascular accident (CVA) due to embolism ([address]    Carotid artery occlusion    Obstructive sleep apnea syndrome    CKD (chronic kidney disease) stage 3, GFR 30-59 ml/min    Coronary artery disease involving native heart without angina pectoris    Cerebrovascular accident (CVA) due to embolism of right middle cerebral artery ([address]    Essential hypertension    Stenosis of right carotid artery    Injury of right hypoglossal nerve    Depression with anxiety    Hyperglycemia    Gastroesophageal reflux disease without esophagitis    Simple chronic bronchitis (HCC)    Chronic midline low back pain without sciatica    Recurrent major depressive disorder in partial remission ([address]    Hyperparathyroidism ([address]    Atherosclerosis of native artery of both lower extremities ([address]    Bilateral carotid artery stenosis    Acute on chronic renal insufficiency    COVID-19       No Known Allergies    Medications listed as ordered at the time of [facility] from [facility]   Héctor Stahl.    Home Medication Instructions MARION:    Printed on:02/09/21 5890   Medication Information                      aspirin 81 MG EC tablet Take 1 tablet by mouth daily             atorvastatin (LIPITOR) 40 MG tablet  TAKE ONE (1) TABLET EVERY DAY             clopidogrel (PLAVIX) 75 MG tablet  TAKE ONE (1) TABLET BY MOUTH EVERY DAY              doxazosin (CARDURA) 2 MG tablet  Take 1 tablet by mouth daily             famotidine (PEPCID) 20 MG tablet  TAKE 1 TABLET TWICE DAILY             HYDROcodone-acetaminophen (NORCO)  MG per tablet  Take 1 tablet by mouth every 6 hours as needed for Pain for up to 30 days. methocarbamol (ROBAXIN) 500 MG tablet  Take 1 tablet by mouth nightly as needed (pain)             methylPREDNISolone (MEDROL DOSEPACK) 4 MG tablet  Take by mouth.             metoprolol succinate (TOPROL XL) 50 MG extended release tablet  TAKE 1 TABLET BY MOUTH EVERY DAY             nitroGLYCERIN (NITROSTAT) 0.4 MG SL tablet  Place 0.4 mg under the tongue every 5 minutes as needed for Chest pain             pregabalin (LYRICA) 50 MG capsule  Take 1 capsule by mouth 3 times daily for 30 days. venlafaxine (EFFEXOR) 75 MG tablet  TAKE ONE (1) TABLET EVERY DAY                   Medications marked \"taking\" at this time  Outpatient Medications Marked as Taking for the 2/9/21 encounter (Virtual Visit) with TREV Casillas   Medication Sig Dispense Refill    methylPREDNISolone (MEDROL DOSEPACK) 4 MG tablet Take by mouth. 1 kit 0    methocarbamol (ROBAXIN) 500 MG tablet Take 1 tablet by mouth nightly as needed (pain) 10 tablet 0    clopidogrel (PLAVIX) 75 MG tablet TAKE ONE (1) TABLET BY MOUTH EVERY DAY  90 tablet 1    HYDROcodone-acetaminophen (NORCO)  MG per tablet Take 1 tablet by mouth every 6 hours as needed for Pain for up to 30 days. 120 tablet 0    metoprolol succinate (TOPROL XL) 50 MG extended release tablet TAKE 1 TABLET BY MOUTH EVERY DAY 90 tablet 1    pregabalin (LYRICA) 50 MG capsule Take 1 capsule by mouth 3 times daily for 30 days.  90 capsule 5  venlafaxine (EFFEXOR) 75 MG tablet TAKE ONE (1) TABLET EVERY DAY 90 tablet 2    atorvastatin (LIPITOR) 40 MG tablet TAKE ONE (1) TABLET EVERY DAY 90 tablet 1    famotidine (PEPCID) 20 MG tablet TAKE 1 TABLET TWICE DAILY 180 tablet 3    doxazosin (CARDURA) 2 MG tablet Take 1 tablet by mouth daily      aspirin 81 MG EC tablet Take 1 tablet by mouth daily 30 tablet 3    nitroGLYCERIN (NITROSTAT) 0.4 MG SL tablet Place 0.4 mg under the tongue every 5 minutes as needed for Chest pain          Medications patient taking as of now reconciled against medications ordered at time of hospital discharge: Yes    Chief Complaint   Patient presents with    Follow-Up from Hospital       HPI    Inpatient course: Discharge summary reviewed- see chart. Interval history/Current status:     He was [facility] following a fall out of bed at home. No LOC. He had multiple xrays all negative for fracture. CT head was negative for acute findings. He has some muscle and joint pain from the fall particularly the L leg which is chronic but exacerbated from the fall. He was positive for covid. He has had no significant symptoms. He has chronic sob which seems to be fairly stable. No fever or chills. His daughter is positive as well. Pt has not really been out of the house at all for months. CKD III, RAKESH when admitted. Cr 2.1 on admission, baseline around 1.3. Renal function improved with IV fluids. Cr 1.9 at d/c. Renal US with no acute findings. Home health is following. [facility]  out of [place], [place]. He will start PT per [place] tomorrow. His daughter requests a lift chair. He has great difficulty getting up out of a chair due to chronic health issues, joint pain. He also has a remote history of CVA. No new recent neurologic symptoms. He had his first dose of moderna covid vaccine 3 weeks ago at the health dept in [place] where he lives.     Review of Systems Constitutional: Negative for chills and fever. HENT: Negative for congestion, ear pain and sore throat. Respiratory: Negative for cough, chest tightness, shortness of breath and wheezing. Cardiovascular: Negative for chest pain. Gastrointestinal: Negative for abdominal pain, diarrhea and nausea. Musculoskeletal: Positive for arthralgias. Negative for myalgias. Skin: Negative for rash. Neurological: Positive for weakness. Negative for dizziness and light-headedness. There were no vitals filed for this visit. There is no height or weight on file to calculate BMI. Wt Readings from Last 3 Encounters:   02/07/21 227 lb 3 oz (103.1 kg)   01/04/21 225 lb (102.1 kg)   02/27/20 225 lb (102.1 kg)     BP Readings from Last 3 Encounters:   02/07/21 133/72   01/04/21 132/82   02/27/20 120/80     Telephone visit, unable to do PE    Physical Exam  Vitals signs reviewed. Constitutional:       Appearance: He is well-developed. Neck:      Thyroid: No thyromegaly. Vascular: No JVD. Trachea: No tracheal deviation. Assessment/Plan:  1. COVID-19 virus infection  Stable, no significant respiratory symptoms. Continue to monitor per home health. 2. Fall, subsequent encounter  Stable, he will start PT tomorrow per home health. Aries Thornton would benefit from a lift chair due to his chronic medical issues, joint pain, weakness. His daughter will research places near their home in [place]. Advised that we can send an order when she decides where she wants to purchase this. 3. Cerebrovascular accident (CVA) due to embolism of cerebral artery (HCC)  History of CVA, no new neurologic symptoms. Continue current medication. 4. Stage 3 chronic kidney disease, unspecified whether stage 3a or 3b CKD  Improved, continue adequate daily hydration. We will see if home health can check lab next week to follow-up on renal function    5.  Left leg pain May be having some mild radicular symptoms. Treat with Medrol Dosepak.         Medical Decision Making: high complexity

## 2021-02-09 NOTE — TELEPHONE ENCOUNTER
Please see if [facility] can check a cbc, bmp next week.   His daughter states [facility] is located in [place], [place]

## 2021-02-10 LAB
EKG P AXIS: -77 DEGREES
EKG P-R INTERVAL: 170 MS
EKG Q-T INTERVAL: 332 MS
EKG QRS DURATION: 136 MS
EKG QTC CALCULATION (BAZETT): 391 MS
EKG T AXIS: 50 DEGREES

## 2021-02-15 ENCOUNTER — HOSPITAL ENCOUNTER (INPATIENT)
Age: 76
LOS: 2 days | Discharge: NURSING, REHAB OR LONG-TERM CARE | DRG: 310 | End: 2021-02-17
Attending: INTERNAL MEDICINE
Payer: MEDICARE

## 2021-02-15 ENCOUNTER — TELEPHONE (OUTPATIENT)
Dept: FAMILY MEDICINE CLINIC | Age: 76
End: 2021-02-15

## 2021-02-15 DIAGNOSIS — G89.29 CHRONIC MIDLINE LOW BACK PAIN WITHOUT SCIATICA: ICD-10-CM

## 2021-02-15 DIAGNOSIS — M54.50 CHRONIC MIDLINE LOW BACK PAIN WITHOUT SCIATICA: ICD-10-CM

## 2021-02-15 PROCEDURE — 93005 ELECTROCARDIOGRAM TRACING: CPT | Performed by: INTERNAL MEDICINE

## 2021-02-15 PROCEDURE — 2580000003 HC RX 258: Performed by: INTERNAL MEDICINE

## 2021-02-15 PROCEDURE — 1210000000 HC MED SURG R&B

## 2021-02-15 PROCEDURE — 6360000002 HC RX W HCPCS: Performed by: INTERNAL MEDICINE

## 2021-02-15 PROCEDURE — 6370000000 HC RX 637 (ALT 250 FOR IP): Performed by: PHYSICIAN ASSISTANT

## 2021-02-15 PROCEDURE — 6360000002 HC RX W HCPCS: Performed by: HOSPITALIST

## 2021-02-15 RX ORDER — ACETAMINOPHEN 650 MG/1
650 SUPPOSITORY RECTAL EVERY 6 HOURS PRN
Status: DISCONTINUED | OUTPATIENT
Start: 2021-02-15 | End: 2021-02-17 | Stop reason: HOSPADM

## 2021-02-15 RX ORDER — METOPROLOL SUCCINATE 50 MG/1
50 TABLET, EXTENDED RELEASE ORAL DAILY
Status: DISCONTINUED | OUTPATIENT
Start: 2021-02-15 | End: 2021-02-15

## 2021-02-15 RX ORDER — HYDROCODONE BITARTRATE AND ACETAMINOPHEN 10; 325 MG/1; MG/1
1 TABLET ORAL EVERY 6 HOURS PRN
Status: DISCONTINUED | OUTPATIENT
Start: 2021-02-15 | End: 2021-02-17 | Stop reason: HOSPADM

## 2021-02-15 RX ORDER — VENLAFAXINE 75 MG/1
75 TABLET ORAL DAILY
Status: DISCONTINUED | OUTPATIENT
Start: 2021-02-16 | End: 2021-02-17 | Stop reason: HOSPADM

## 2021-02-15 RX ORDER — LISINOPRIL 20 MG/1
40 TABLET ORAL DAILY
Status: DISCONTINUED | OUTPATIENT
Start: 2021-02-16 | End: 2021-02-17 | Stop reason: HOSPADM

## 2021-02-15 RX ORDER — MORPHINE SULFATE 4 MG/ML
4 INJECTION, SOLUTION INTRAMUSCULAR; INTRAVENOUS ONCE
Status: COMPLETED | OUTPATIENT
Start: 2021-02-15 | End: 2021-02-15

## 2021-02-15 RX ORDER — CLOPIDOGREL BISULFATE 75 MG/1
75 TABLET ORAL DAILY
Status: DISCONTINUED | OUTPATIENT
Start: 2021-02-16 | End: 2021-02-17 | Stop reason: HOSPADM

## 2021-02-15 RX ORDER — ATORVASTATIN CALCIUM 40 MG/1
40 TABLET, FILM COATED ORAL DAILY
Status: DISCONTINUED | OUTPATIENT
Start: 2021-02-15 | End: 2021-02-17 | Stop reason: HOSPADM

## 2021-02-15 RX ORDER — ACETAMINOPHEN 325 MG/1
650 TABLET ORAL EVERY 6 HOURS PRN
Status: DISCONTINUED | OUTPATIENT
Start: 2021-02-15 | End: 2021-02-17 | Stop reason: HOSPADM

## 2021-02-15 RX ORDER — LISINOPRIL 40 MG/1
40 TABLET ORAL DAILY
COMMUNITY
End: 2021-05-02

## 2021-02-15 RX ORDER — METHOCARBAMOL 500 MG/1
500 TABLET, FILM COATED ORAL NIGHTLY PRN
Status: DISCONTINUED | OUTPATIENT
Start: 2021-02-15 | End: 2021-02-17 | Stop reason: HOSPADM

## 2021-02-15 RX ORDER — ASPIRIN 81 MG/1
81 TABLET ORAL DAILY
Status: DISCONTINUED | OUTPATIENT
Start: 2021-02-15 | End: 2021-02-17 | Stop reason: HOSPADM

## 2021-02-15 RX ORDER — ONDANSETRON 2 MG/ML
4 INJECTION INTRAMUSCULAR; INTRAVENOUS EVERY 6 HOURS PRN
Status: DISCONTINUED | OUTPATIENT
Start: 2021-02-15 | End: 2021-02-17 | Stop reason: HOSPADM

## 2021-02-15 RX ORDER — SODIUM CHLORIDE 0.9 % (FLUSH) 0.9 %
10 SYRINGE (ML) INJECTION PRN
Status: DISCONTINUED | OUTPATIENT
Start: 2021-02-15 | End: 2021-02-17 | Stop reason: HOSPADM

## 2021-02-15 RX ORDER — PROMETHAZINE HYDROCHLORIDE 12.5 MG/1
12.5 TABLET ORAL EVERY 6 HOURS PRN
Status: DISCONTINUED | OUTPATIENT
Start: 2021-02-15 | End: 2021-02-17 | Stop reason: HOSPADM

## 2021-02-15 RX ORDER — SODIUM CHLORIDE 0.9 % (FLUSH) 0.9 %
10 SYRINGE (ML) INJECTION EVERY 12 HOURS SCHEDULED
Status: DISCONTINUED | OUTPATIENT
Start: 2021-02-15 | End: 2021-02-17 | Stop reason: HOSPADM

## 2021-02-15 RX ORDER — DOXAZOSIN MESYLATE 4 MG/1
4 TABLET ORAL NIGHTLY
Status: DISCONTINUED | OUTPATIENT
Start: 2021-02-15 | End: 2021-02-17 | Stop reason: HOSPADM

## 2021-02-15 RX ADMIN — Medication 81 MG: at 20:47

## 2021-02-15 RX ADMIN — SODIUM CHLORIDE, PRESERVATIVE FREE 10 ML: 5 INJECTION INTRAVENOUS at 20:48

## 2021-02-15 RX ADMIN — MORPHINE SULFATE 4 MG: 4 INJECTION, SOLUTION INTRAMUSCULAR; INTRAVENOUS at 23:03

## 2021-02-15 RX ADMIN — HYDROCODONE BITARTRATE AND ACETAMINOPHEN 1 TABLET: 10; 325 TABLET ORAL at 21:20

## 2021-02-15 RX ADMIN — ATORVASTATIN CALCIUM 40 MG: 40 TABLET, FILM COATED ORAL at 20:47

## 2021-02-15 RX ADMIN — DOXAZOSIN 4 MG: 4 TABLET ORAL at 20:47

## 2021-02-15 RX ADMIN — ENOXAPARIN SODIUM 40 MG: 40 INJECTION SUBCUTANEOUS at 20:47

## 2021-02-15 ASSESSMENT — PAIN DESCRIPTION - PAIN TYPE: TYPE: CHRONIC PAIN

## 2021-02-15 ASSESSMENT — ENCOUNTER SYMPTOMS
NAUSEA: 0
VOMITING: 0
VOICE CHANGE: 0
DIARRHEA: 0
COLOR CHANGE: 0
SHORTNESS OF BREATH: 0
BACK PAIN: 0
CONSTIPATION: 0

## 2021-02-15 ASSESSMENT — PAIN DESCRIPTION - FREQUENCY: FREQUENCY: CONTINUOUS

## 2021-02-15 ASSESSMENT — PAIN SCALES - GENERAL
PAINLEVEL_OUTOF10: 8
PAINLEVEL_OUTOF10: 9

## 2021-02-15 ASSESSMENT — PAIN DESCRIPTION - LOCATION: LOCATION: HIP;BACK

## 2021-02-15 NOTE — PROGRESS NOTES
Patient was noted to have a 2nd degree heart block on tele. Patient states he just feels very tired.  Dr. Aneesh Márquez notified

## 2021-02-15 NOTE — TELEPHONE ENCOUNTER
Riaz requests that [facility] nurse return their call. The best time to reach him is Anytime. Pt is at [facility] in [address] [place] [address]. Pt is not able to walk. Pt daughter is wanting him to go to [facility]. Pt is needing a doctors order for pt to go to the nursing home. Thank you.

## 2021-02-16 ENCOUNTER — APPOINTMENT (OUTPATIENT)
Dept: CT IMAGING | Age: 76
DRG: 310 | End: 2021-02-16
Attending: INTERNAL MEDICINE
Payer: MEDICARE

## 2021-02-16 ENCOUNTER — APPOINTMENT (OUTPATIENT)
Dept: GENERAL RADIOLOGY | Age: 76
DRG: 310 | End: 2021-02-16
Attending: INTERNAL MEDICINE
Payer: MEDICARE

## 2021-02-16 PROBLEM — Z51.5 PALLIATIVE CARE PATIENT: Status: ACTIVE | Noted: 2021-02-16

## 2021-02-16 LAB
ALBUMIN SERPL-MCNC: 3.2 G/DL (ref 3.5–5.2)
ALP BLD-CCNC: 65 U/L (ref 40–130)
ALT SERPL-CCNC: 37 U/L (ref 5–41)
ANION GAP SERPL CALCULATED.3IONS-SCNC: 8 MMOL/L (ref 7–19)
AST SERPL-CCNC: 21 U/L (ref 5–40)
BASOPHILS ABSOLUTE: 0.1 K/UL (ref 0–0.2)
BASOPHILS RELATIVE PERCENT: 0.5 % (ref 0–1)
BILIRUB SERPL-MCNC: 0.6 MG/DL (ref 0.2–1.2)
BUN BLDV-MCNC: 39 MG/DL (ref 8–23)
CALCIUM SERPL-MCNC: 11.5 MG/DL (ref 8.8–10.2)
CHLORIDE BLD-SCNC: 107 MMOL/L (ref 98–111)
CHOLESTEROL, TOTAL: 87 MG/DL (ref 160–199)
CO2: 25 MMOL/L (ref 22–29)
CREAT SERPL-MCNC: 1.7 MG/DL (ref 0.5–1.2)
EOSINOPHILS ABSOLUTE: 0 K/UL (ref 0–0.6)
EOSINOPHILS RELATIVE PERCENT: 0 % (ref 0–5)
GFR AFRICAN AMERICAN: 47
GFR NON-AFRICAN AMERICAN: 39
GLUCOSE BLD-MCNC: 120 MG/DL (ref 74–109)
HBA1C MFR BLD: 6.3 % (ref 4–6)
HCT VFR BLD CALC: 42.6 % (ref 42–52)
HDLC SERPL-MCNC: 25 MG/DL (ref 55–121)
HEMOGLOBIN: 13.1 G/DL (ref 14–18)
IMMATURE GRANULOCYTES #: 0.2 K/UL
LDL CHOLESTEROL CALCULATED: 20 MG/DL
LYMPHOCYTES ABSOLUTE: 3 K/UL (ref 1.1–4.5)
LYMPHOCYTES RELATIVE PERCENT: 22.9 % (ref 20–40)
MAGNESIUM: 1.7 MG/DL (ref 1.6–2.4)
MCH RBC QN AUTO: 27.2 PG (ref 27–31)
MCHC RBC AUTO-ENTMCNC: 30.8 G/DL (ref 33–37)
MCV RBC AUTO: 88.6 FL (ref 80–94)
MONOCYTES ABSOLUTE: 1 K/UL (ref 0–0.9)
MONOCYTES RELATIVE PERCENT: 7.5 % (ref 0–10)
NEUTROPHILS ABSOLUTE: 8.7 K/UL (ref 1.5–7.5)
NEUTROPHILS RELATIVE PERCENT: 67.8 % (ref 50–65)
PDW BLD-RTO: 14.6 % (ref 11.5–14.5)
PLATELET # BLD: 345 K/UL (ref 130–400)
PMV BLD AUTO: 10.9 FL (ref 9.4–12.4)
POTASSIUM SERPL-SCNC: 4.5 MMOL/L (ref 3.5–5)
RBC # BLD: 4.81 M/UL (ref 4.7–6.1)
SODIUM BLD-SCNC: 140 MMOL/L (ref 136–145)
TOTAL PROTEIN: 6.2 G/DL (ref 6.6–8.7)
TRIGL SERPL-MCNC: 208 MG/DL (ref 0–149)
WBC # BLD: 12.9 K/UL (ref 4.8–10.8)

## 2021-02-16 PROCEDURE — 6360000002 HC RX W HCPCS: Performed by: INTERNAL MEDICINE

## 2021-02-16 PROCEDURE — 1210000000 HC MED SURG R&B

## 2021-02-16 PROCEDURE — 83735 ASSAY OF MAGNESIUM: CPT

## 2021-02-16 PROCEDURE — 99223 1ST HOSP IP/OBS HIGH 75: CPT | Performed by: INTERNAL MEDICINE

## 2021-02-16 PROCEDURE — 85025 COMPLETE CBC W/AUTO DIFF WBC: CPT

## 2021-02-16 PROCEDURE — 2580000003 HC RX 258: Performed by: INTERNAL MEDICINE

## 2021-02-16 PROCEDURE — 70450 CT HEAD/BRAIN W/O DYE: CPT

## 2021-02-16 PROCEDURE — 6370000000 HC RX 637 (ALT 250 FOR IP): Performed by: PHYSICIAN ASSISTANT

## 2021-02-16 PROCEDURE — 80061 LIPID PANEL: CPT

## 2021-02-16 PROCEDURE — 36415 COLL VENOUS BLD VENIPUNCTURE: CPT

## 2021-02-16 PROCEDURE — 80053 COMPREHEN METABOLIC PANEL: CPT

## 2021-02-16 PROCEDURE — 83036 HEMOGLOBIN GLYCOSYLATED A1C: CPT

## 2021-02-16 PROCEDURE — 71045 X-RAY EXAM CHEST 1 VIEW: CPT

## 2021-02-16 RX ORDER — SODIUM CHLORIDE 9 MG/ML
INJECTION, SOLUTION INTRAVENOUS CONTINUOUS
Status: DISCONTINUED | OUTPATIENT
Start: 2021-02-16 | End: 2021-02-17 | Stop reason: HOSPADM

## 2021-02-16 RX ORDER — CALCITONIN SALMON 200 [USP'U]/ML
100 INJECTION, SOLUTION INTRAMUSCULAR; SUBCUTANEOUS DAILY
Status: COMPLETED | OUTPATIENT
Start: 2021-02-16 | End: 2021-02-17

## 2021-02-16 RX ORDER — MORPHINE SULFATE 4 MG/ML
2 INJECTION, SOLUTION INTRAMUSCULAR; INTRAVENOUS EVERY 4 HOURS PRN
Status: DISCONTINUED | OUTPATIENT
Start: 2021-02-16 | End: 2021-02-17 | Stop reason: HOSPADM

## 2021-02-16 RX ADMIN — Medication 81 MG: at 08:22

## 2021-02-16 RX ADMIN — VENLAFAXINE 75 MG: 75 TABLET ORAL at 08:21

## 2021-02-16 RX ADMIN — DOXAZOSIN 4 MG: 4 TABLET ORAL at 19:40

## 2021-02-16 RX ADMIN — MORPHINE SULFATE 2 MG: 4 INJECTION, SOLUTION INTRAMUSCULAR; INTRAVENOUS at 23:15

## 2021-02-16 RX ADMIN — CLOPIDOGREL BISULFATE 75 MG: 75 TABLET ORAL at 08:22

## 2021-02-16 RX ADMIN — HYDROCODONE BITARTRATE AND ACETAMINOPHEN 1 TABLET: 10; 325 TABLET ORAL at 08:22

## 2021-02-16 RX ADMIN — ENOXAPARIN SODIUM 40 MG: 40 INJECTION SUBCUTANEOUS at 17:22

## 2021-02-16 RX ADMIN — MORPHINE SULFATE 2 MG: 4 INJECTION, SOLUTION INTRAMUSCULAR; INTRAVENOUS at 15:03

## 2021-02-16 RX ADMIN — HYDROCODONE BITARTRATE AND ACETAMINOPHEN 1 TABLET: 10; 325 TABLET ORAL at 17:25

## 2021-02-16 RX ADMIN — SODIUM CHLORIDE, PRESERVATIVE FREE 10 ML: 5 INJECTION INTRAVENOUS at 08:23

## 2021-02-16 RX ADMIN — SODIUM CHLORIDE, PRESERVATIVE FREE 10 ML: 5 INJECTION INTRAVENOUS at 19:40

## 2021-02-16 RX ADMIN — CALCITONIN SALMON 100 UNITS: 200 INJECTION, SOLUTION INTRAMUSCULAR; SUBCUTANEOUS at 12:13

## 2021-02-16 RX ADMIN — MORPHINE SULFATE 2 MG: 4 INJECTION, SOLUTION INTRAMUSCULAR; INTRAVENOUS at 18:36

## 2021-02-16 RX ADMIN — LISINOPRIL 40 MG: 20 TABLET ORAL at 08:21

## 2021-02-16 RX ADMIN — SODIUM CHLORIDE: 9 INJECTION, SOLUTION INTRAVENOUS at 12:13

## 2021-02-16 RX ADMIN — ATORVASTATIN CALCIUM 40 MG: 40 TABLET, FILM COATED ORAL at 08:21

## 2021-02-16 ASSESSMENT — ENCOUNTER SYMPTOMS
NAUSEA: 0
VOMITING: 0
SHORTNESS OF BREATH: 0
RESPIRATORY NEGATIVE: 1
EYES NEGATIVE: 1
GASTROINTESTINAL NEGATIVE: 1
DIARRHEA: 0

## 2021-02-16 ASSESSMENT — PAIN SCALES - GENERAL
PAINLEVEL_OUTOF10: 8
PAINLEVEL_OUTOF10: 7

## 2021-02-16 NOTE — CONSULTS
[facility]  Cardiology Consult      Requesting MD:  Lorin Medeiros MD   Admit Status:  Inpatient [101]       History obtained from:   [] Patient  [] Other (specify):     Patient:  Kathryn Storm  [id number]     Chief Complaint: No chief complaint on file. HPI: Mr. Pradeep Patel is a 66 y.o. male with a history of hypertension chronic kidney disease hyperlipidemia previous stroke 3 years ago who fell after tripping seen at an outside hospital possible bradycardia at that facility transferred here no bradycardia since admission cardiology now consulted. History of a myocardial infarction with stent placement about 3 years ago. Denies anginal chest pain since that time denies unusual limiting dyspnea does not take nitroglycerin at home. Home medication list includes metoprolol 50 mg daily but this is a chronic medication with no previous difficulty reported. Review of Systems:  Review of Systems   Constitutional: Negative. Negative for chills, fever and unexpected weight change. HENT: Negative. Eyes: Negative. Respiratory: Negative. Negative for shortness of breath. Cardiovascular: Negative. Negative for chest pain. Gastrointestinal: Negative. Negative for diarrhea, nausea and vomiting. Endocrine: Negative. Genitourinary: Negative. Musculoskeletal: Negative. Skin: Negative. Neurological: Negative. All other systems reviewed and are negative.       Cardiac Specific Data:  Specialty Problems        Cardiology Problems    Carotid artery stenosis        AAA (abdominal aortic aneurysm) (HCC)        Aortic dissection (HCC)        Mixed hyperlipidemia        Cerebrovascular accident (CVA) due to embolism ([address]        Carotid artery occlusion        Cerebrovascular accident (CVA) due to embolism of right middle cerebral artery ([address]        Coronary artery disease involving native heart without angina pectoris        Essential hypertension        Stenosis of right carotid artery        Atherosclerosis of native artery of both lower extremities (HCC)        Bilateral carotid artery stenosis        Symptomatic bradycardia              Past Medical History:  Past Medical History:   Diagnosis Date    AAA (abdominal aortic aneurysm) ([address]     Aortic dissection ([address] 10/10/2012    CAD (coronary artery disease)     Cancer (HCC)     lung&lymphnode    Carotid artery occlusion     Cerebrovascular accident (CVA) ([address]     Cerebrovascular accident (CVA) due to embolism ([address] 02/16/2016    Cerebrovascular accident (CVA) due to embolism of right middle cerebral artery ([address]     Depression     HTN (hypertension)     Hyperlipidemia     MI (myocardial infarction) ([address]     acute 5/13/2015    Non Hodgkin's lymphoma ([address]     Palliative care patient 02/16/2021    Sleep apnea     no c-pap    Unspecified sleep apnea         Past Surgical History:  Past Surgical History:   Procedure Laterality Date    CARDIAC CATHETERIZATION  5/13/15  [address]    EF 40%. stent to LAD, stent to posterior descending branch of RCA.  CAROTID ENDARTERECTOMY Right 7/22/2016    Right carotid endarterectomy,carotid arteriograms. [facility]    COLONOSCOPY  04/09/2018    Lizzie Rene LUNG CANCER SURGERY      right side    LYMPHADENECTOMY      removed lymphnodes     RECTAL SURGERY      fistula     UPPER GASTROINTESTINAL ENDOSCOPY N/A 1/8/2020    Dr Mattehw Marmolejo (Dr Gwendolyn Galan pt) w/EUS-Small low grade GIST in the stomach, repeat in 1 yr    UPPER GASTROINTESTINAL ENDOSCOPY  11/08/2019    Juan Manuel: normal    UPPER GASTROINTESTINAL ENDOSCOPY  04/13/2018    Dr Gordon Apt: normal, 2cm HH, small amount of food in stomach w/dark fluid,but not kathy blood    VASCULAR SURGERY  10/29/12 [facility]    open exposure of R common femoral artery; percutaneous cannulation of L  femoral artery with 7-Italian sheath; suprarenal abd aortagram with bilat iliofem arteriogram; endoluminal graft repair of AAA with endologix [id number] main body, 28x95 infrarenal cuff; balloon angioplasty of infrarenal abd aorta and endoluminal graft with coda balloon; completion abd aortogram w bilat iliofem arteriogram;     VASCULAR SURGERY  10/29/ [facility]     (cont) open repair of right common femoral ateriotomy; perclose repair of left common femoral artery puncture site.         Past Family History:  Family History   Problem Relation Age of Onset    High Blood Pressure Father     High Blood Pressure Mother     Diabetes Mother     Colon Polyps Brother     Colon Cancer Neg Hx        Past Social History:  Social History     Socioeconomic History    Marital status:      Spouse name: Not on file    Number of children: Not on file    Years of education: Not on file    Highest education level: Not on file   Occupational History    Not on file   Social Needs    Financial resource strain: Not on file    Food insecurity     Worry: Not on file     Inability: Not on file    Transportation needs     Medical: Not on file     Non-medical: Not on file   Tobacco Use    Smoking status: Current Every Day Smoker     Packs/day: 0.50     Years: 40.00     Pack years: 20.00    Smokeless tobacco: Never Used    Tobacco comment: quit smoking in nov 2017, restarted smoking since then   Substance and Sexual Activity    Alcohol use: No    Drug use: No    Sexual activity: Not on file   Lifestyle    Physical activity     Days per week: Not on file     Minutes per session: Not on file    Stress: Not on file   Relationships    Social connections     Talks on phone: Not on file     Gets together: Not on file     Attends Voodoo service: Not on file     Active member of club or organization: Not on file     Attends meetings of clubs or organizations: Not on file     Relationship status: Not on file    Intimate partner violence     Fear of current or ex partner: Not on file     Emotionally abused: Not on file     Physically abused: Not on file     Forced sexual activity: Not on file Other Topics Concern    Not on file   Social History Narrative     50+ yearsHe has 1 son and 1 daughterWorks for a telephone companyPreviously [address] active duty 4-1/2 yearsHe does not go to the [employer] 2 years of [employer] alcohol or substance usage he does smoke 1 pack/dayPhysically sedentary       Allergies:  No Known Allergies    Home Meds:  Prior to Admission medications    Medication Sig Start Date End Date Taking? Authorizing Provider   lisinopril (PRINIVIL;ZESTRIL) 40 MG tablet Take 40 mg by mouth daily   Yes Historical Provider, MD   HYDROcodone-acetaminophen (NORCO)  MG per tablet Take 1 tablet by mouth every 6 hours as needed for Pain for up to 30 days.  2/12/21 3/14/21 Yes Gloria Rodriguez MD   clopidogrel (PLAVIX) 75 MG tablet TAKE ONE (1) TABLET BY MOUTH EVERY DAY   Patient taking differently: Take 75 mg by mouth daily  1/20/21  Yes Gloria Rodriguez MD   metoprolol succinate (TOPROL XL) 50 MG extended release tablet TAKE 1 TABLET BY MOUTH EVERY DAY  Patient taking differently: Take 50 mg by mouth daily  9/3/20  Yes Gloria Rodriguez MD   venlafaxine (EFFEXOR) 75 MG tablet TAKE ONE (1) TABLET EVERY DAY  Patient taking differently: Take 75 mg by mouth daily TAKE ONE (1) TABLET EVERY DAY 8/28/20  Yes Gloria Rodriguez MD   atorvastatin (LIPITOR) 40 MG tablet TAKE ONE (1) TABLET EVERY DAY  Patient taking differently: Take 40 mg by mouth daily  8/19/20  Yes Gloria Rodriguez MD   famotidine (PEPCID) 20 MG tablet TAKE 1 TABLET TWICE DAILY  Patient taking differently: Take 20 mg by mouth 2 times daily  2/24/20  Yes Gloria Rodriguez MD   doxazosin (CARDURA) 2 MG tablet Take 4 mg by mouth nightly  6/14/19  Yes Historical Provider, MD   aspirin 81 MG EC tablet Take 1 tablet by mouth daily 8/5/16  Yes Roxana Crowell MD   methocarbamol (ROBAXIN) 500 MG tablet Take 1 tablet by mouth nightly as needed (pain) 2/7/21 2/17/21  Flex Crowe MD   pregabalin (LYRICA) 50 MG capsule Take 1 capsule by mouth 3 times daily for 30 days. 9/1/20 2/9/21  Michael Irwin MD   nitroGLYCERIN (NITROSTAT) 0.4 MG SL tablet Place 0.4 mg under the tongue every 5 minutes as needed for Chest pain    Historical Provider, MD       Current Meds:   calcitonin  100 Units Intramuscular Daily    sodium chloride flush  10 mL Intravenous 2 times per day    enoxaparin  40 mg Subcutaneous Daily    atorvastatin  40 mg Oral Daily    clopidogrel  75 mg Oral Daily    doxazosin  4 mg Oral Nightly    lisinopril  40 mg Oral Daily    venlafaxine  75 mg Oral Daily    aspirin  81 mg Oral Daily       Current Infused Meds:   sodium chloride 100 mL/hr at 02/16/21 1213       Physical Exam:  Vitals:    02/16/21 1151   BP: (!) 108/59   Pulse: 71   Resp: 15   Temp: 97.2 °F (36.2 °C)   SpO2: 92%       Intake/Output Summary (Last 24 hours) at 2/16/2021 1537  Last data filed at 2/15/2021 2255  Gross per 24 hour   Intake --   Output 200 ml   Net -200 ml     Estimated body mass index is 35.37 kg/m² as calculated from the following:    Height as of 2/7/21: 5' 9\" (1.753 m). Weight as of this encounter: 239 lb 8 oz (108.6 kg). Physical Exam  Vitals signs reviewed. Constitutional:       General: He is not in acute distress. Appearance: Normal appearance. He is well-developed. He is obese. He is not ill-appearing, toxic-appearing or diaphoretic. HENT:      Head: Normocephalic and atraumatic. Mouth/Throat:      Mouth: Mucous membranes are moist.      Pharynx: Oropharynx is clear. Eyes:      General: No scleral icterus. Extraocular Movements: Extraocular movements intact. Pupils: Pupils are equal, round, and reactive to light. Neck:      Musculoskeletal: Normal range of motion and neck supple. No neck rigidity or muscular tenderness. Vascular: No carotid bruit or JVD.    Cardiovascular:      Rate and Rhythm: Normal rate and regular limited. Thoracic kyphosis appears preserved. No obvious malalignment. Vertebral body heights appear to be well maintained. There is loss of disc height with endplate spurring at multiple levels. The posterior ribs are intact. Pedicles are intact. 1. No obvious fracture, however, these lateral projection images are quite limited. I do not believe these are sufficient to exclude compression fracture, and if there is focal back pain and/or high clinical concern for fracture I would recommend thoracic spine CT. Signed by Dr Merlinda Allan on 2/6/2021 11:44 AM    Xr Lumbar Spine (2-3 Views)    Result Date: 2/6/2021  XR LUMBAR SPINE (2-3 VIEWS) 2/6/2021 9:37 AM HISTORY: Fall, back pain Comparison: Radiographs dated 7/10/2018 Findings: Frontal, lateral, and coned-down lateral views of the lumbar spine are provided. There are presumed to be 5 lumbar vertebral bodies, with the inferior-most visualized disc space being designated as L5-S1 for the purpose of numbering. Lumbar lordosis is well-maintained. Slight anterior wedge deformity at L1 which is a chronic finding. Vertebral body heights are otherwise maintained. There is a very slight degenerative anterolisthesis at L4-L5. Notable L4-5 and L5-S1 level facet arthropathy. Prior EVAR. Impression: 1. No acute osseous injury identified in the lumbar spine. Mild anterior wedge deformity at L1 is a chronic deformity. Signed by Dr Merlinda Allan on 2/6/2021 11:55 AM    Xr Elbow Left (2 Views)    Result Date: 2/6/2021  XR ELBOW LEFT (2 VIEWS) 2/6/2021 9:37 AM HISTORY: Fall, elbow pain Comparison: None Findings: Frontal, lateral and oblique views of the left elbow were obtained. There is no fracture or dislocation. No significant joint space narrowing is noted. There is no significant joint effusion. No gross soft tissue abnormality is visualized. Impression: 1. Unremarkable radiographs of the left elbow.  Signed by Dr Merlinda Allan on 2/6/2021 11:38 AM    Ct Head Wo Contrast    Result Date: 2/16/2021  EXAM: CT HEAD WO CONTRAST -- 2/16/2021 10:38 AM HISTORY: 75 years, Male, fall, corona virus positive COMPARISON: 2/6/2016 DLP: 675 mGy cm. Automated exposure control was utilized to minimize patient radiation dose. TECHNIQUE: Unenhanced axial CT images obtained from vertex to skull base with coronal and sagittal reformats. FINDINGS: No acute intracranial hemorrhage, midline shift, mass effect or large territory cortical infarct. Mild periventricular and subcortical white matter hypodensities, most likely due to chronic microvascular disease. Ventricles, sulci, basilar cisterns are normal in size and configuration for the patient's age. Density in the bilateral external auditory canals, most commonly cerumen. No mastoid effusion. Globes and retrobulbar soft tissues appear within normal limits. Similar paranasal sinus mucosal thickening and fluid suggesting sinusitis. Prior sinus surgery. Punctate density in the slightly left nasal bridge subcutaneous tissues, which appears to be a chronic foreign body. Overlying soft tissues otherwise within normal limits. Calvarium appears intact. 1. No acute intracranial findings. 2. Mild chronic microvascular changes. 3. Similar paranasal sinus mucosal thickening and fluid suggesting sinusitis. Correlate with symptoms. Signed by Dr Lisset Samano on 2/16/2021 11:43 AM    Ct Head Wo Contrast    Result Date: 2/6/2021  CT HEAD WO CONTRAST 2/6/2021 9:36 AM HISTORY: Fall, head injury COMPARISON: CT scan dated 7/20/2016 DOSE LENGTH PRODUCT: 896 mGy cm TECHNIQUE: Helical tomographic images of the brain were obtained without the use of intravenous contrast. Automated exposure control was also utilized to decrease patient radiation dose. FINDINGS: There is no evidence of evolving large vascular territory infarct. Underlying chronic small vessel ischemic changes throughout the white matter. No visualized intra-axial or extra-axial hemorrhage.  No mass lesion is identified. Normal size and configuration of the ventricular system. The basal cisterns are symmetric. Posterior fossa structures are unremarkable. The included orbits and their contents are unremarkable. Prior paranasal sinus surgery with additional chronic mucosal thickening in the right maxillary sinus, bilateral ethmoids and right sphenoid sinus. There is a trace right mastoid effusion. No calvarial fracture. .     1. No acute intracranial process. 2. Underlying chronic small vessel ischemic changes. 3. Prior sinus surgeries with chronic mucosal thickening in the sinuses. Signed by Dr Guilherme Spencer on 2/6/2021 11:04 AM    Ct Cervical Spine Wo Contrast    Result Date: 2/6/2021  CT CERVICAL SPINE WO CONTRAST 2/6/2021 9:36 AM HISTORY: Fall, neck pain COMPARISON: 571 DOSE LENGTH PRODUCT: 571 mGy cm TECHNIQUE: Serial helical tomographic images of the cervical spine were obtained without the use of intravenous contrast. Additionally, sagittal and coronal reformatted images were also provided for review. Automated exposure control was also utilized to decrease patient radiation dose. FINDINGS: There is no evidence of fracture or subluxation. The atlantooccipital and atlantoaxial articulations are intact. Likewise, the dens is intact. Flattened cervical lordosis. There is a slight degenerative retrolisthesis at C3-C4. Loss of intervertebral disc height with endplate spurring at multiple levels. Multilevel acquired spinal stenosis, appearing mild to moderate. Uncovertebral spurring with high-grade neuroforaminal narrowing at multiple levels. Posterior limits are intact. Multilevel facet arthropathy. 2.2 cm left apical groundglass opacity. Lung apices are otherwise clear. .     1. No evidence of acute osseous injury in the cervical spine. 2. Underlying advanced cervical spine degenerative change. This includes a slight degenerative retrolisthesis at C3-C4.  3. There is a 2.2 cm groundglass opacity in the left lung visualized acute fracture. Mild acromioclavicular joint osteoarthritis. High riding humeral head suggesting underlying rotator cuff tear. Incidentally noted osteochondroma arising anteriorly from the proximal shaft of the humerus. Included portion of the chest wall is unremarkable. Impression: 1. No acute osseous injury identified. 2. High riding humeral head suggesting underlying rotator cuff tear. Signed by Dr Sagar Alvarenga on 2/6/2021 11:28 AM    Xr Chest Portable    Result Date: 2/16/2021  EXAMINATION: XR CHEST PORTABLE 2/16/2021 2:15 PM HISTORY: Dyspnea COMPARISON: 1/6/2021 FINDINGS: Heart is final contours are stable. A hiatal hernia is suspected. Groundglass airspace opacities are seen predominantly in the lung bases. Airspace disease in the right upper lobe appears improved. The pulmonary vasculature remains indistinct. There has been prior granulomatous exposure. Bilateral airspace opacities may represent an infectious/inflammatory process or pulmonary edema. Signed by Dr Fernie Mazariegos on 2/16/2021 2:17 PM    Xr Chest Portable    Result Date: 2/6/2021  XR CHEST PORTABLE 2/6/2021 9:37 AM HISTORY: Fall COMPARISON: Chest exam dated 2/10/2016. FINDINGS: Shallow inspiration with low lung volumes. Faint bibasilar and right upper lobe infiltrates identified. Heart size is upper limit of normal. Posterior right fourth rib fracture is a chronic deformity. No acute fracture identified. No pleural effusion or pneumothorax. 1. There are faint infiltrates identified in both lung bases as well as the lateral right upper lobe. Heart size is prominent. Primary concern is volume overload with early pulmonary edema. Atypical pneumonia such as Covid 19 pneumonia would be a second consideration.  Signed by Dr Sagar Alvarenga on 2/6/2021 11:37 AM    Xr Hip 2-3 Vw W Pelvis Left    Result Date: 2/6/2021  XR HIP 2-3 VW W PELVIS LEFT 2/6/2021 9:37 AM HISTORY: Fall, hip pain Comparison: None Findings: Frontal and lateral views of the left hip were obtained. Additional AP pelvis. No fracture or subluxation at the left hip joint. Pelvic ring is intact. The sacral arches are intact. Mild osteoarthritis changes at both hip joints. Incidentally noted vascular calcifications. Incidentally noted surgical clips projecting over the right groin. Prior EVAR. Impression: 1. No acute osseous injury or malalignment. Signed by Dr Jennifer Parkinson on 2/6/2021 11:40 AM      Assessment:  1. History of bradycardia outside hospital not seen here yet  2. Covid-19 positive  3. Abnormal chest x-ray bilateral infiltrates  4. Chronic kidney disease  5. Hypertension  6. Depression with anxiety  7. Hyperparathyroidism  8. Sleep apnea  9. Carotid artery stenosis  10. History of abdominal aortic aneurysm  11. Hyperlipidemia  12. History of stroke 3 years ago with left-sided weakness  13. History of injury right hypoglossal nerve  14. Gastroesophageal reflux disease without esophagitis  15. Chronic back pain  16. Peripheral arterial disease  17. History of recurrent falls  18. CT of the head today no acute findings mild chronic microvascular changes similar paranasal sinus mucosal thickening and fluid suggesting sinusitis  19. Carotid Doppler 8/28/2020 less than 50% stenosis internal carotid arteries bilaterally vertebral flow antegrade bilaterally  20. 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  CT of the chest 6/20/2019 no acute findings evidence of previous right chest wall trauma multiple right-sided healing old rib fractures and mild scarring pleural thickening right lower lobe ectasia thoracic aorta 3.9 cm heavy coronary artery atherosclerotic plaque is present  22. Coronary artery disease  23. Echocardiogram 7/15/2016 ejection fraction 55 to 60% aortic valve sclerosis mild TR mitral valve sclerosis   24. Cardiac catheterization 5/13/2015 Dr. Jam Lopez anteroseptal hypokinesis EF 40% severe two-vessel disease right posterior descending artery and mid LAD underwent drug-eluting stent placement x2         Recommendations:  1. Continue current medications and monitor on telemetry  2.  Further comments to follow

## 2021-02-16 NOTE — PROGRESS NOTES
Palliative Care/Spiritual Care: Spoke with pt's wife to initiate palliative care. Pt's wife says pt had a heart attack, stroke, aneurysm during the last year. Pt is also Covid positive. Advance Directives: Pt does not have an AD/LW, wife says pt doesn't want to do one. Pt has his daughter, wife and son listed as decision makers. Pt is a full-code. SEE ACP NOTE. Pain/other symptoms: Pt's wife says pt takes pain medication for chronic pain. Social/Spiritual: Pt is Yazdanism.       Pt/family discussion r/t goals: Pt lived at home with his wife but pt's wife says pt is total care and they just can't take care of him any longer. Pt needed assistance with all daily living skills. Goal is for pt to go to therapy and future goals are to be determined. Provided spiritual care with sustaining presence, nurtured hope, and prayer. Pt's wife expressed gratitude for spiritual care.     Electronically signed by Santosh Swain on 2/16/2021 at 1:32 PM

## 2021-02-16 NOTE — TELEPHONE ENCOUNTER
Thank you. No need for the lab orders at this point. It looks like he was transferred to [facility].

## 2021-02-16 NOTE — CARE COORDINATION
Qing Del Cid, admissions at [facility], she stated that pt has family that works at their facility and administration has approved for pt to dc to their COVID unit. Spoke with Ruthann Layton, pt's primary decision maker, she agreed with this dc plan. Pt will dc to [facility] when medically stable.    [facility] (formerly [address] [place] [address])    N   F  Electronically signed by Panchito Hernandez on 2/16/2021 at 9:01 AM

## 2021-02-16 NOTE — TELEPHONE ENCOUNTER
I called and spoke to charlie she states he went to the hospital and was supposed to go to a skilled facility she states she will tracking him down today and will call me back on my cell whenever she finds out where he is exactly . .. she did take the orders to be done . ..

## 2021-02-16 NOTE — PROGRESS NOTES
[facility]        Hospitalist Progress Note  2/16/2021 10:56 AM  Subjective:   Admit Date: 2/15/2021  PCP: Charis Sanderson MD    Chief Complaint: Lethargy, falls    Subjective: Patient seen and examined at bedside. Diffuse body pains. Saturating well on room air. Cumulative Hospital History: 66year old M with a PMH of COVID-19 (original positive 2/6/2021), Hyperparathyroidism, CKD IIIb, HTN, Anxiety/Depression, chronic back pain presenting to outside facility for fall at home with reported head trauma and diffuse body aches noted to have episodes of bradycardia into the 20s as per outside hospital documentation transferred to our facility for escalation of care. Cardiology consulted. Patient noted to have hypercalcemia of 11.7 on initial labs; chart review shows hypercalcemia at least since 2018 with elevated intact PTH, elevated PTH-rp, vitamin d deficiency (25-OH) and normal 1,25 vitamin D. Patient will require outpatient follow-up for hypercalcemia including routine age-related cancer screenings and evaluation for possible parathyroidectomy. ROS: 14 point review of systems is negative except as specifically addressed above. DIET GENERAL;     Intake/Output Summary (Last 24 hours) at 2/16/2021 1056  Last data filed at 2/15/2021 2255  Gross per 24 hour   Intake --   Output 200 ml   Net -200 ml     Medications:   sodium chloride       Current Facility-Administered Medications   Medication Dose Route Frequency Provider Last Rate Last Admin    0.9 % sodium chloride infusion   Intravenous Continuous Lorin Medeiros MD        calcitonin (MIACALCIN) injection 100 Units  100 Units Intramuscular Daily Lorin Medeiros MD        sodium chloride flush 0.9 % injection 10 mL  10 mL Intravenous 2 times per day Kolby Llanos DO   10 mL at 02/16/21 0823    sodium chloride flush 0.9 % injection 10 mL  10 mL Intravenous PRN Kolby Llanos DO        enoxaparin (LOVENOX) injection 40 mg  40 mg 72 hours. INR: No results for input(s): INR in the last 72 hours. Lactic Acid: No results for input(s): LACTA in the last 72 hours. Objective:   Vitals: /73   Pulse 84   Temp 99.5 °F (37.5 °C) (Temporal)   Resp 16   Wt 239 lb 8 oz (108.6 kg)   SpO2 91%   BMI 35.37 kg/m²   24HR INTAKE/OUTPUT:      Intake/Output Summary (Last 24 hours) at 2/16/2021 1056  Last data filed at 2/15/2021 2255  Gross per 24 hour   Intake --   Output 200 ml   Net -200 ml     General appearance: alert and cooperative with exam  HEENT: AT/NC  Lungs: no increased work of breathing, BLAE  Heart: RRR  Abdomen: BS+  Extremities: pulses+  Neurologic: Alert, gross motor function intact  Skin: warm    Assessment and Plan: Active Problems:    COVID-19    CKD (chronic kidney disease) stage 3, GFR 30-59 ml/min    Essential hypertension    Depression with anxiety    Hyperparathyroidism ([facility] [address]    Symptomatic bradycardia    Palliative care patient  Resolved Problems:    * No resolved hospital problems. *    COVID-19: Not requiring oxygen therapy. Supportive management. Bradycardia: Cardiology consulted. AV jarrell blockers held. Currently NSR. Monitor on telemetry. Hypercalcemia/Hyperparathyroidism: This has been worked up as an outpatient with known elevated intact PTH and PTH-rp. IVF. Calcitonin. Monitor Ca levels. CKD IIIb: Stable. Monitor BMP. HTN: Monitor BP and adjust medications PRN. Recurrent falls: SNF placement on discharge. Follow-up CT head. Supportive management. PT/OT.     Advance Directive: Full Code    DVT prophylaxis: Lovenox    Discharge planning: TBD      Signed:  Lorin Medeiros MD 2/16/2021 10:56 AM  Rounding Hospitalist

## 2021-02-16 NOTE — CARE COORDINATION
Eliquis coupon for pt --       Co-Pay Card printable  [id number]  To the Pharmacist  For processing assistance, please call [address] at [phone].     Please dispense the 30-day supply of ELIQUIS product at no co-pay for the patient  Transmit claims to [place] [facility] [address] [facility] requires valid Prescriber ID#, Patient Name, , and [address] for claim adjudication  This card must be accompanied by a prescription for [facility] and can only be used by 1 patient  This card is good for the first fill only  Group: [id number]  ID: [id number]

## 2021-02-17 VITALS
WEIGHT: 223.31 LBS | HEART RATE: 72 BPM | DIASTOLIC BLOOD PRESSURE: 77 MMHG | OXYGEN SATURATION: 91 % | TEMPERATURE: 97.1 F | BODY MASS INDEX: 32.98 KG/M2 | SYSTOLIC BLOOD PRESSURE: 148 MMHG | RESPIRATION RATE: 16 BRPM

## 2021-02-17 LAB
ALBUMIN SERPL-MCNC: 3.1 G/DL (ref 3.5–5.2)
ALP BLD-CCNC: 67 U/L (ref 40–130)
ALT SERPL-CCNC: 27 U/L (ref 5–41)
ANION GAP SERPL CALCULATED.3IONS-SCNC: 9 MMOL/L (ref 7–19)
AST SERPL-CCNC: 13 U/L (ref 5–40)
BASOPHILS ABSOLUTE: 0.1 K/UL (ref 0–0.2)
BASOPHILS RELATIVE PERCENT: 0.6 % (ref 0–1)
BILIRUB SERPL-MCNC: 0.4 MG/DL (ref 0.2–1.2)
BILIRUBIN URINE: NEGATIVE
BLOOD, URINE: NEGATIVE
BUN BLDV-MCNC: 34 MG/DL (ref 8–23)
CALCIUM SERPL-MCNC: 10.2 MG/DL (ref 8.8–10.2)
CHLORIDE BLD-SCNC: 109 MMOL/L (ref 98–111)
CLARITY: CLEAR
CO2: 23 MMOL/L (ref 22–29)
COLOR: YELLOW
CREAT SERPL-MCNC: 1.4 MG/DL (ref 0.5–1.2)
EKG P AXIS: -75 DEGREES
EKG P-R INTERVAL: 224 MS
EKG Q-T INTERVAL: 400 MS
EKG QRS DURATION: 154 MS
EKG QTC CALCULATION (BAZETT): 440 MS
EKG T AXIS: 74 DEGREES
EOSINOPHILS ABSOLUTE: 0 K/UL (ref 0–0.6)
EOSINOPHILS RELATIVE PERCENT: 0 % (ref 0–5)
GFR AFRICAN AMERICAN: >59
GFR NON-AFRICAN AMERICAN: 49
GLUCOSE BLD-MCNC: 103 MG/DL (ref 74–109)
GLUCOSE URINE: NEGATIVE MG/DL
HCT VFR BLD CALC: 38.6 % (ref 42–52)
HEMOGLOBIN: 12.1 G/DL (ref 14–18)
IMMATURE GRANULOCYTES #: 0.1 K/UL
KETONES, URINE: NEGATIVE MG/DL
LEUKOCYTE ESTERASE, URINE: NEGATIVE
LYMPHOCYTES ABSOLUTE: 2.4 K/UL (ref 1.1–4.5)
LYMPHOCYTES RELATIVE PERCENT: 19.2 % (ref 20–40)
MAGNESIUM: 1.6 MG/DL (ref 1.6–2.4)
MCH RBC QN AUTO: 27.8 PG (ref 27–31)
MCHC RBC AUTO-ENTMCNC: 31.3 G/DL (ref 33–37)
MCV RBC AUTO: 88.5 FL (ref 80–94)
MONOCYTES ABSOLUTE: 0.9 K/UL (ref 0–0.9)
MONOCYTES RELATIVE PERCENT: 7.2 % (ref 0–10)
NEUTROPHILS ABSOLUTE: 9 K/UL (ref 1.5–7.5)
NEUTROPHILS RELATIVE PERCENT: 72.3 % (ref 50–65)
NITRITE, URINE: NEGATIVE
PDW BLD-RTO: 14.4 % (ref 11.5–14.5)
PH UA: 5.5 (ref 5–8)
PLATELET # BLD: 338 K/UL (ref 130–400)
PMV BLD AUTO: 10.8 FL (ref 9.4–12.4)
POTASSIUM SERPL-SCNC: 4.6 MMOL/L (ref 3.5–5)
PROTEIN UA: ABNORMAL MG/DL
RBC # BLD: 4.36 M/UL (ref 4.7–6.1)
SODIUM BLD-SCNC: 141 MMOL/L (ref 136–145)
SPECIFIC GRAVITY UA: 1.01 (ref 1–1.03)
TOTAL PROTEIN: 5.4 G/DL (ref 6.6–8.7)
UROBILINOGEN, URINE: 0.2 E.U./DL
WBC # BLD: 12.5 K/UL (ref 4.8–10.8)

## 2021-02-17 PROCEDURE — 2580000003 HC RX 258: Performed by: INTERNAL MEDICINE

## 2021-02-17 PROCEDURE — 97162 PT EVAL MOD COMPLEX 30 MIN: CPT

## 2021-02-17 PROCEDURE — 85025 COMPLETE CBC W/AUTO DIFF WBC: CPT

## 2021-02-17 PROCEDURE — 81003 URINALYSIS AUTO W/O SCOPE: CPT

## 2021-02-17 PROCEDURE — 83735 ASSAY OF MAGNESIUM: CPT

## 2021-02-17 PROCEDURE — 36415 COLL VENOUS BLD VENIPUNCTURE: CPT

## 2021-02-17 PROCEDURE — 93010 ELECTROCARDIOGRAM REPORT: CPT | Performed by: INTERNAL MEDICINE

## 2021-02-17 PROCEDURE — 97530 THERAPEUTIC ACTIVITIES: CPT

## 2021-02-17 PROCEDURE — 80053 COMPREHEN METABOLIC PANEL: CPT

## 2021-02-17 PROCEDURE — 6360000002 HC RX W HCPCS: Performed by: INTERNAL MEDICINE

## 2021-02-17 PROCEDURE — 6370000000 HC RX 637 (ALT 250 FOR IP): Performed by: PHYSICIAN ASSISTANT

## 2021-02-17 RX ORDER — HYDROCODONE BITARTRATE AND ACETAMINOPHEN 10; 325 MG/1; MG/1
1 TABLET ORAL EVERY 6 HOURS PRN
Qty: 12 TABLET | Refills: 0 | Status: SHIPPED | OUTPATIENT
Start: 2021-02-17 | End: 2021-02-17 | Stop reason: HOSPADM

## 2021-02-17 RX ORDER — AMOXICILLIN AND CLAVULANATE POTASSIUM 875; 125 MG/1; MG/1
1 TABLET, FILM COATED ORAL 2 TIMES DAILY
Qty: 14 TABLET | Refills: 0 | Status: SHIPPED | OUTPATIENT
Start: 2021-02-17 | End: 2021-02-24

## 2021-02-17 RX ADMIN — CLOPIDOGREL BISULFATE 75 MG: 75 TABLET ORAL at 08:43

## 2021-02-17 RX ADMIN — MORPHINE SULFATE 2 MG: 4 INJECTION, SOLUTION INTRAMUSCULAR; INTRAVENOUS at 09:57

## 2021-02-17 RX ADMIN — ATORVASTATIN CALCIUM 40 MG: 40 TABLET, FILM COATED ORAL at 08:36

## 2021-02-17 RX ADMIN — MORPHINE SULFATE 2 MG: 4 INJECTION, SOLUTION INTRAMUSCULAR; INTRAVENOUS at 04:59

## 2021-02-17 RX ADMIN — LISINOPRIL 40 MG: 20 TABLET ORAL at 08:36

## 2021-02-17 RX ADMIN — SODIUM CHLORIDE, PRESERVATIVE FREE 10 ML: 5 INJECTION INTRAVENOUS at 08:40

## 2021-02-17 RX ADMIN — CALCITONIN SALMON 100 UNITS: 200 INJECTION, SOLUTION INTRAMUSCULAR; SUBCUTANEOUS at 08:35

## 2021-02-17 RX ADMIN — SODIUM CHLORIDE 1500 MG: 900 INJECTION INTRAVENOUS at 09:56

## 2021-02-17 RX ADMIN — MORPHINE SULFATE 2 MG: 4 INJECTION, SOLUTION INTRAMUSCULAR; INTRAVENOUS at 14:01

## 2021-02-17 RX ADMIN — VENLAFAXINE 75 MG: 75 TABLET ORAL at 08:35

## 2021-02-17 RX ADMIN — HYDROCODONE BITARTRATE AND ACETAMINOPHEN 1 TABLET: 10; 325 TABLET ORAL at 08:36

## 2021-02-17 RX ADMIN — Medication 81 MG: at 08:35

## 2021-02-17 ASSESSMENT — PAIN DESCRIPTION - DESCRIPTORS: DESCRIPTORS: SHARP

## 2021-02-17 ASSESSMENT — PAIN SCALES - GENERAL
PAINLEVEL_OUTOF10: 8
PAINLEVEL_OUTOF10: 10
PAINLEVEL_OUTOF10: 10
PAINLEVEL_OUTOF10: 7
PAINLEVEL_OUTOF10: 8

## 2021-02-17 ASSESSMENT — PAIN DESCRIPTION - PAIN TYPE: TYPE: ACUTE PAIN

## 2021-02-17 ASSESSMENT — PAIN DESCRIPTION - FREQUENCY: FREQUENCY: CONTINUOUS

## 2021-02-17 ASSESSMENT — PAIN DESCRIPTION - LOCATION: LOCATION: HIP

## 2021-02-17 NOTE — PROGRESS NOTES
Patient refusing a bath at this time.       Electronically signed by Maureen Powers RN on 2/17/2021 at 11:10 AM

## 2021-02-17 NOTE — PROGRESS NOTES
Physical Therapy    Facility/Department: [facility] ONCOLOGY UNIT  Initial Assessment    NAME: Anna Hunter : 1945  MRN: [id number]    Date of Service: 2021    Discharge Recommendations:  Continue to assess pending progress, Patient would benefit from continued therapy after discharge        Assessment   Body structures, Functions, Activity limitations: Decreased functional mobility ; Decreased ADL status; Decreased ROM; Decreased strength;Decreased endurance;Decreased balance;Decreased posture; Increased pain  Assessment: WEAK OVERALL, C/O PAIN IN LEFT HIP MOSTLY BUT HAS OTHER GENERALIZED PAIN AS WELL. ABLE TO GET TO EOB WITH ASSIST BUT MAINTAIN POSITION WITH ONLY SUPERVISION. STOOD AT RW AND TOOK 2-3 SMALL SIDE STEPS WITHOUT GREAT DIFFICULTY. WILL PROGRESS WITH TRANSFERS AND GT AS TOLERATED. PT Education: PT Role;Plan of Care  REQUIRES PT FOLLOW UP: Yes  Activity Tolerance  Activity Tolerance: Patient Tolerated treatment well       Patient Diagnosis(es): There were no encounter diagnoses. has a past medical history of AAA (abdominal aortic aneurysm) ([address] Aortic dissection ([address] CAD (coronary artery disease), Cancer ([address] Carotid artery occlusion, Cerebrovascular accident (CVA) ([address] Cerebrovascular accident (CVA) due to embolism ([address] Cerebrovascular accident (CVA) due to embolism of right middle cerebral artery ([address] Depression, HTN (hypertension), Hyperlipidemia, MI (myocardial infarction) ([address] Non Hodgkin's lymphoma ([address] Palliative care patient, Sleep apnea, and Unspecified sleep apnea. has a past surgical history that includes Lung cancer surgery; lymphadenectomy; Colonoscopy (2018); Rectal surgery; vascular surgery (10/29/12 [facility]); vascular surgery (10/29/ [facility] ); Cardiac catheterization (5/13/15  [facility]); Carotid endarterectomy (Right, 2016); Upper gastrointestinal endoscopy (N/A, 2020);  Upper gastrointestinal endoscopy (2019); and Upper gastrointestinal endoscopy (04/13/2018). Restrictions  Restrictions/Precautions  Restrictions/Precautions: Fall Risk, Isolation  Vision/Hearing  Vision: Within Functional Limits  Hearing: Within functional limits     Subjective  General  Diagnosis: COVID, BRADYCARDIA  Subjective  Subjective: Pt WILLING TO PARTICIPATE. QUESTIONS WHETHER HE CAN SIT UP.   Pain Screening  Patient Currently in Pain: Yes  Pain Assessment  Pain Assessment: 0-10  Pain Level: 10  Pain Type: Acute pain  Pain Location: Hip(LEFT)  Pain Descriptors: Sharp  Pain Frequency: Continuous(WORSENS WITH CERTAIN MOVEMENTS)  Vital Signs  Patient Currently in Pain: Yes       Orientation  Orientation  Overall Orientation Status: Within Normal Limits  Social/Functional History  Social/Functional History  Lives With: Spouse, Family  Type of Home: House  Home Layout: One level  Home Access: Stairs to enter with rails  Home Equipment: Rolling walker, Cane  Receives Help From: Family  ADL Assistance: Needs assistance  Homemaking Assistance: Needs assistance  Ambulation Assistance: Needs assistance  Transfer Assistance: Needs assistance  Active : No  Additional Comments: decline in last 2 wks, was mostly Independent prior, used cane but performed adl's  Cognition   Cognition  Overall Cognitive Status: WNL    Objective     Observation/Palpation  Edema: JEANNA FEET    PROM RLE (degrees)  RLE PROM: WFL  PROM LLE (degrees)  LLE PROM: WFL  Strength RLE  Comment: GROSSLY +3/5  Strength LLE  Comment: GROSSLY +3/5        Bed mobility  Supine to Sit: Moderate assistance  Sit to Supine: Minimal assistance  Transfers  Sit to Stand: Minimal Assistance  Stand to sit: Minimal Assistance  Comment: STOOD EOB WITH RW, ABLE TO TAKE 2-3 SMALL SIDE STEPS, DIFFICULTY LIFTING FEET  Ambulation  Ambulation?: No     Balance  Sitting - Dynamic: Fair;+  Standing - Dynamic: Fair;-        Plan   Plan  Times per week: AT LEAST 4-6  Current Treatment Recommendations: Strengthening, ROM, Balance Training, Functional Mobility Training, Transfer Training, Gait Training, Patient/Caregiver Education & Training, Safety Education & Training  Safety Devices  Type of devices: Bed alarm in place, Call light within reach    G-Code       OutComes Score                                                  AM-PAC Score             Goals  Short term goals  Time Frame for Short term goals: 14 DAYS  Short term goal 1: BED MOB CG-MIN  Short term goal 2: TRANSFERS CGA  Short term goal 3: ' RW CGA       Therapy Time   Individual Concurrent Group Co-treatment   Time In           Time Out           Minutes                   Jeff Bowman, PT

## 2021-02-17 NOTE — DISCHARGE SUMMARY
Discharge Summary      Jersey Ryan :  1945  MRN:  [id number]    Admit date:  2/15/2021  Discharge date:      Admitting Physician:  No admitting provider for patient encounter. Advance Directive: Full Code    Consults: cardiology    Primary Care Physician:  Milton Augustin MD    Discharge Diagnoses:  Principal Problem:    COVID-19  Active Problems:    CKD (chronic kidney disease) stage 3, GFR 30-59 ml/min    Essential hypertension    Depression with anxiety    Hyperparathyroidism ([facility] [address]    Symptomatic bradycardia    Palliative care patient  Resolved Problems:    * No resolved hospital problems. *      Significant Diagnostic Studies:   Ct Head Wo Contrast    Result Date: 2021  EXAM: CT HEAD WO CONTRAST -- 2021 10:38 AM HISTORY: 75 years, Male, fall, corona virus positive COMPARISON: 2016 DLP: 675 mGy cm. Automated exposure control was utilized to minimize patient radiation dose. TECHNIQUE: Unenhanced axial CT images obtained from vertex to skull base with coronal and sagittal reformats. FINDINGS: No acute intracranial hemorrhage, midline shift, mass effect or large territory cortical infarct. Mild periventricular and subcortical white matter hypodensities, most likely due to chronic microvascular disease. Ventricles, sulci, basilar cisterns are normal in size and configuration for the patient's age. Density in the bilateral external auditory canals, most commonly cerumen. No mastoid effusion. Globes and retrobulbar soft tissues appear within normal limits. Similar paranasal sinus mucosal thickening and fluid suggesting sinusitis. Prior sinus surgery. Punctate density in the slightly left nasal bridge subcutaneous tissues, which appears to be a chronic foreign body. Overlying soft tissues otherwise within normal limits. Calvarium appears intact. 1. No acute intracranial findings. 2. Mild chronic microvascular changes.  3. Similar paranasal sinus mucosal thickening and fluid suggesting sinusitis. Correlate with symptoms. Signed by Dr Soni Currie on 2/16/2021 11:43 AM    Xr Chest Portable    Result Date: 2/16/2021  EXAMINATION: XR CHEST PORTABLE 2/16/2021 2:15 PM HISTORY: Dyspnea COMPARISON: 1/6/2021 FINDINGS: Heart is final contours are stable. A hiatal hernia is suspected. Groundglass airspace opacities are seen predominantly in the lung bases. Airspace disease in the right upper lobe appears improved. The pulmonary vasculature remains indistinct. There has been prior granulomatous exposure. Bilateral airspace opacities may represent an infectious/inflammatory process or pulmonary edema. Signed by Dr Cece Breen on 2/16/2021 2:17 PM      Pertinent Labs:   CBC:   Recent Labs     02/16/21  0850 02/17/21  0532   WBC 12.9* 12.5*   HGB 13.1* 12.1*    338     BMP:    Recent Labs     02/16/21  0850 02/17/21  0532    141   K 4.5 4.6    109   CO2 25 23   BUN 39* 34*   CREATININE 1.7* 1.4*   GLUCOSE 120* 103     INR: No results for input(s): INR in the last 72 hours. ABGs:No results for input(s): PH, PO2, PCO2, HCO3, BE, O2SAT in the last 72 hours. Lactic Acid:No results for input(s): LACTA in the last 72 hours. Procedures: None  Hospital Course: 66year old M with a PMH of COVID-19 (original positive 2/6/2021), Hyperparathyroidism, CKD IIIb, HTN, Anxiety/Depression, chronic back pain presenting to outside facility for fall at home with reported head trauma and diffuse body aches noted to have episodes of bradycardia into the 20s as per outside hospital documentation transferred to our facility for escalation of care. Cardiology consulted. Patient without episodes of bradycardia while inpatient. Patient noted to have hypercalcemia of 11.7 on initial labs; chart review shows hypercalcemia at least since 2018 with elevated intact PTH, elevated PTH-rp, vitamin d deficiency (25-OH) and normal 1,25 vitamin D.  Patient will require outpatient follow-up for hypercalcemia including routine age-related cancer screenings and evaluation for possible parathyroidectomy. Calcium levels improved with IV hydration and calcitonin. CT head performed during admission showing sinusitis. Patient is currently hemodynamically stable for discharge to SNF to follow-up with PCP, cardiology as an outpatient. ZIO patch placed prior to discharge and he will need outpatient follow-up for this. Physical Exam:  Vitals: BP (!) 148/77   Pulse 72   Temp 97.1 °F (36.2 °C) (Temporal)   Resp 16   Wt 223 lb 5 oz (101.3 kg)   SpO2 91%   BMI 32.98 kg/m²   24HR INTAKE/OUTPUT:      Intake/Output Summary (Last 24 hours) at 2/17/2021 1411  Last data filed at 2/17/2021 6124  Gross per 24 hour   Intake --   Output 750 ml   Net -750 ml     General appearance: alert and cooperative with exam  HEENT: AT/NC  Lungs: no increased work of breathing, BLAE  Heart: RRR  Abdomen: BS+  Extremities: pulses+  Neurologic: Alert, gross motor function intact  Skin: warm    Discharge Medications:       Reny Cintron.    Home Medication Instructions TJN:[id number]    Printed on:02/17/21 1411   Medication Information                      amoxicillin-clavulanate (AUGMENTIN) 875-125 MG per tablet  Take 1 tablet by mouth 2 times daily for 7 days             aspirin 81 MG EC tablet  Take 1 tablet by mouth daily             atorvastatin (LIPITOR) 40 MG tablet  TAKE ONE (1) TABLET EVERY DAY             clopidogrel (PLAVIX) 75 MG tablet  TAKE ONE (1) TABLET BY MOUTH EVERY DAY              doxazosin (CARDURA) 2 MG tablet  Take 4 mg by mouth nightly              famotidine (PEPCID) 20 MG tablet  TAKE 1 TABLET TWICE DAILY             lisinopril (PRINIVIL;ZESTRIL) 40 MG tablet  Take 40 mg by mouth daily             methocarbamol (ROBAXIN) 500 MG tablet  Take 1 tablet by mouth nightly as needed (pain)             nitroGLYCERIN (NITROSTAT) 0.4 MG SL tablet  Place 0.4 mg under the tongue every 5 minutes as needed for Chest pain

## 2021-02-17 NOTE — PROGRESS NOTES
Per cardiology patient needs to be sent home with a zio patch and is cleared for discharge.     Electronically signed by Modesta Molina RN on 2/17/2021 at 11:13 AM

## 2021-02-18 ENCOUNTER — TELEPHONE (OUTPATIENT)
Dept: INTERNAL MEDICINE | Age: 76
End: 2021-02-18

## 2021-02-18 NOTE — PROGRESS NOTES
Zio patch was not placed on patient at discharge.   Will follow up in the am.    Electronically signed by Salazar Fong RN on 2/17/2021 at 7:01 PM

## 2021-02-18 NOTE — TELEPHONE ENCOUNTER
SKILLED NURSING FACILITY:   INITIAL CALL POST-HOSPITAL DISCHARGE    SNF: [facility]     PHONE NUMBER: [phone]     THERAPY: PT/OT     ANTICIPATED LENGTH OF STAY: unknown    Per Benita, Mr Kashmir Saldana was admitted for short term rehab. He seems to be adjusting well to being there. He is currently quarantined on the covid unit. Therapy will work with him for strengthening. There are no plans for discharge at this time.

## 2021-02-22 ENCOUNTER — TELEPHONE (OUTPATIENT)
Dept: CARDIOLOGY CLINIC | Age: 76
End: 2021-02-22

## 2021-02-22 NOTE — TELEPHONE ENCOUNTER
Elsy with [facility] unit called and left a voice message requesting a return call regarding Zio patch. Patient was consulted by Dr. Monica Saunders on 2/16/21 and patient has a follow up on 4/2/21 with EVI Welch for zio results.

## 2021-02-24 ENCOUNTER — TELEPHONE (OUTPATIENT)
Dept: INTERNAL MEDICINE | Age: 76
End: 2021-02-24

## 2021-02-25 NOTE — PROGRESS NOTES
Physician Progress Note      Kyra Magallon  [facility] #:                  [id number]  :                       1945  ADMIT DATE:       2/15/2021 4:43 PM  Brad Mercado DATE:        2021 2:58 PM  RESPONDING  PROVIDER #:        Mandy Wren MD          QUERY TEXT:    Patient admitted with Bradycardia. Patient with recent Covid infection and   noted positive Covid test on 2021. If possible, please document in   progress notes and discharge summary if patient has an active Covid-19   infection or if patient is testing positive for Covid-19 without a current   active infection: The medical record reflects the following:  Risk Factors: Hx of COVID, Bradycardia  Clinical Indicators:  Sars CoV - 2 NAAT , adult failure to thrive, Weakness,   dizziness. Treatment:  Supportive care, SS consult,    Thank you    Virgil Romano RN, BSN, [facility]  [phone]  Options provided:  -- Active Covid-19 infection is being treated and/or evaluated on current   encounter  -- Positive Covid test result without an active current Covid-19 infection  -- Other - I will add my own diagnosis  -- Disagree - Not applicable / Not valid  -- Disagree - Clinically unable to determine / Unknown  -- Refer to Clinical Documentation Reviewer    PROVIDER RESPONSE TEXT:    Patient is testing positive for Covid without an active Covid-19 infection.     Query created by: Rich Galeana on 2021 9:05 AM      Electronically signed by:  Mandy Wren MD 2021 3:27 PM

## 2021-03-03 ENCOUNTER — TELEPHONE (OUTPATIENT)
Dept: INTERNAL MEDICINE | Age: 76
End: 2021-03-03

## 2021-03-03 NOTE — TELEPHONE ENCOUNTER
[address] [facility] [phone]      Per Allegra Barreto, nursing staff at [facility], patient is doing well. Patient continues to participate in therapy. No known discharge plans at this time.

## 2021-03-10 ENCOUNTER — TELEPHONE (OUTPATIENT)
Dept: INTERNAL MEDICINE | Age: 76
End: 2021-03-10

## 2021-03-10 DIAGNOSIS — M54.50 CHRONIC MIDLINE LOW BACK PAIN WITHOUT SCIATICA: ICD-10-CM

## 2021-03-10 DIAGNOSIS — G89.29 CHRONIC MIDLINE LOW BACK PAIN WITHOUT SCIATICA: ICD-10-CM

## 2021-03-10 RX ORDER — HYDROCODONE BITARTRATE AND ACETAMINOPHEN 10; 325 MG/1; MG/1
1 TABLET ORAL EVERY 6 HOURS PRN
Qty: 12 TABLET | Refills: 0 | Status: SHIPPED | OUTPATIENT
Start: 2021-03-10 | End: 2021-03-22 | Stop reason: SDUPTHER

## 2021-03-10 NOTE — TELEPHONE ENCOUNTER
[address] [facility] [phone]      Per Andres Score, nursing staff at [facility], patient is doing well. He is up in the chair today. He has stopped participating in therapy. He does not want to go long term care. Social serviced will discuss options with family. There are no plans for discharge at this time.

## 2021-03-10 NOTE — TELEPHONE ENCOUNTER
Mekhi Park. called to request a refill on his medication. Last office visit : 2/9/2021   Next office visit : 4/15/2021     Last UDS:   Amphetamine Screen, Urine   Date Value Ref Range Status   04/23/2018 Negative  Final     Barbiturate Screen, Urine   Date Value Ref Range Status   04/23/2018 Negative  Final     Benzodiazepine Screen, Urine   Date Value Ref Range Status   04/23/2018 Negative  Final     Cocaine Metabolite Screen, Urine   Date Value Ref Range Status   04/23/2018 Negative  Final     MDMA, Urine   Date Value Ref Range Status   04/23/2018 Negative  Final     Methamphetamine, Urine   Date Value Ref Range Status   04/23/2018 Negative  Final     Opiate Scrn, Ur   Date Value Ref Range Status   04/23/2018 Positive  Final     Oxycodone Screen, Ur   Date Value Ref Range Status   04/23/2018 Negative  Final     PCP Screen, Urine   Date Value Ref Range Status   04/23/2018 Negative  Final     Propoxyphene Screen, Urine   Date Value Ref Range Status   04/23/2018 Negative  Final     Tricyclic Antidepressants, Urine   Date Value Ref Range Status   04/23/2018 Negative  Final       Last Jackolyn Delay: 2-8-21  Medication Contract: not on file   Last Fill: 2-17-21    Requested Prescriptions     Pending Prescriptions Disp Refills    HYDROcodone-acetaminophen (NORCO)  MG per tablet 12 tablet 0     Sig: Take 1 tablet by mouth every 6 hours as needed for Pain for up to 3 days. Please approve or refuse this medication.    Josephine Leyva MA

## 2021-03-17 ENCOUNTER — TELEPHONE (OUTPATIENT)
Dept: INTERNAL MEDICINE | Age: 76
End: 2021-03-17

## 2021-03-17 NOTE — TELEPHONE ENCOUNTER
[address] [facility] [phone]      Per , nursing staff at [facility], patient is planning to discharge on Friday, 03/19/21

## 2021-03-19 ENCOUNTER — TELEPHONE (OUTPATIENT)
Dept: CARDIOLOGY CLINIC | Age: 76
End: 2021-03-19

## 2021-03-19 ENCOUNTER — TELEPHONE (OUTPATIENT)
Dept: FAMILY MEDICINE CLINIC | Age: 76
End: 2021-03-19

## 2021-03-22 ENCOUNTER — TELEPHONE (OUTPATIENT)
Dept: INTERNAL MEDICINE | Age: 76
End: 2021-03-22

## 2021-03-22 DIAGNOSIS — M54.50 CHRONIC MIDLINE LOW BACK PAIN WITHOUT SCIATICA: ICD-10-CM

## 2021-03-22 DIAGNOSIS — G89.29 CHRONIC MIDLINE LOW BACK PAIN WITHOUT SCIATICA: ICD-10-CM

## 2021-03-22 RX ORDER — HYDROCODONE BITARTRATE AND ACETAMINOPHEN 10; 325 MG/1; MG/1
1 TABLET ORAL EVERY 6 HOURS PRN
Qty: 12 TABLET | Refills: 0 | Status: SHIPPED | OUTPATIENT
Start: 2021-03-22 | End: 2021-04-08 | Stop reason: SDUPTHER

## 2021-03-22 NOTE — TELEPHONE ENCOUNTER
[facility] Transitions Initial Follow Up Call    Outreach made within 2 business days of discharge: Yes    Patient: Chavez Caba. Patient : 1945         MRN: [id number]   Reason for Admission: Admitted 02/15/21 for Covid-19   Discharge Date: 21 from [place], then admitted to [facility] for rehab. Discharged 21     Discharge Diagnoses:  Principal Problem:    COVID-19  Active Problems:    CKD (chronic kidney disease) stage 3, GFR 30-59 ml/min    Essential hypertension    Depression with anxiety    Hyperparathyroidism ([address]    Symptomatic bradycardia    Palliative care patient     Spoke with: patient's wife     Discharge department/facility: [facility]     TCM Interactive Patient Contact:  Was patient able to fill all prescriptions: Yes  Was patient instructed to bring all medications to the follow-up visit: Yes  Is patient taking all medications as directed in the discharge summary? Yes  Does patient understand their discharge instructions: Yes  Does patient have questions or concerns that need addressed prior to 7-14 day follow up office visit: no    I spoke with Raji Marinelli, patient's wife. She states Mr. Yvette Friend is glad to be home. He is getting around well with a walker. She states he gets around good. His appetite is good, he ate a big breakfast and lunch today. She states the nursing home got him all of his medications before he went home. He is taking them all as directed. He is not short of breath. She states he is not coughing either. His bowels and bladder are moving ok. She denies any needs for him at this time. She did request I move his appointment to an in office visit for tomorrow as she also has to come to [facility] to see Dr. Vignesh Nix tomorrow. I have moved this appointment, she states she will find them a ride in.      Scheduled appointment with PCP within 7-14 days    Follow Up  Future Appointments   Date Time Provider Kasi Vargas   3/24/2021  1:00 PM Uriah Amado Fernando, APRN [facility]   4/2/2021  9:30 AM Bright Patient, APRN - CNP [facility]   4/15/2021  3:00 PM Gloria Rodriguez MD [facility]       Kiera [place], [place]

## 2021-03-22 NOTE — TELEPHONE ENCOUNTER
Anna Bueno. called to request a refill on his medication. Last office visit : 2/9/2021   Next office visit : 3/24/2021     Last UDS:   Amphetamine Screen, Urine   Date Value Ref Range Status   04/23/2018 Negative  Final     Barbiturate Screen, Urine   Date Value Ref Range Status   04/23/2018 Negative  Final     Benzodiazepine Screen, Urine   Date Value Ref Range Status   04/23/2018 Negative  Final     Cocaine Metabolite Screen, Urine   Date Value Ref Range Status   04/23/2018 Negative  Final     MDMA, Urine   Date Value Ref Range Status   04/23/2018 Negative  Final     Methamphetamine, Urine   Date Value Ref Range Status   04/23/2018 Negative  Final     Opiate Scrn, Ur   Date Value Ref Range Status   04/23/2018 Positive  Final     Oxycodone Screen, Ur   Date Value Ref Range Status   04/23/2018 Negative  Final     PCP Screen, Urine   Date Value Ref Range Status   04/23/2018 Negative  Final     Propoxyphene Screen, Urine   Date Value Ref Range Status   04/23/2018 Negative  Final     Tricyclic Antidepressants, Urine   Date Value Ref Range Status   04/23/2018 Negative  Final       Last Maninder Nuñez: 02/08/21  Medication Contract: 04/23/2018  Last Fill: 3/10/21    Requested Prescriptions     Pending Prescriptions Disp Refills    HYDROcodone-acetaminophen (NORCO)  MG per tablet 12 tablet 0     Sig: Take 1 tablet by mouth every 6 hours as needed for Pain for up to 3 days. Please approve or refuse this medication.    Liliana Crump MA

## 2021-03-23 ENCOUNTER — OFFICE VISIT (OUTPATIENT)
Dept: FAMILY MEDICINE CLINIC | Age: 76
End: 2021-03-23
Payer: MEDICARE

## 2021-03-23 ENCOUNTER — TELEPHONE (OUTPATIENT)
Dept: FAMILY MEDICINE CLINIC | Age: 76
End: 2021-03-23

## 2021-03-23 VITALS
OXYGEN SATURATION: 97 % | WEIGHT: 230 LBS | RESPIRATION RATE: 18 BRPM | HEART RATE: 80 BPM | BODY MASS INDEX: 33.97 KG/M2 | TEMPERATURE: 97.1 F | SYSTOLIC BLOOD PRESSURE: 128 MMHG | DIASTOLIC BLOOD PRESSURE: 74 MMHG

## 2021-03-23 DIAGNOSIS — R00.1 BRADYCARDIA: ICD-10-CM

## 2021-03-23 DIAGNOSIS — E21.3 HYPERPARATHYROIDISM (HCC): ICD-10-CM

## 2021-03-23 DIAGNOSIS — R53.1 GENERALIZED WEAKNESS: ICD-10-CM

## 2021-03-23 DIAGNOSIS — N18.30 STAGE 3 CHRONIC KIDNEY DISEASE, UNSPECIFIED WHETHER STAGE 3A OR 3B CKD (HCC): ICD-10-CM

## 2021-03-23 DIAGNOSIS — I10 ESSENTIAL HYPERTENSION: Primary | ICD-10-CM

## 2021-03-23 DIAGNOSIS — R22.43 LOCALIZED SWELLING OF BOTH LOWER LEGS: ICD-10-CM

## 2021-03-23 PROCEDURE — 99496 TRANSJ CARE MGMT HIGH F2F 7D: CPT | Performed by: NURSE PRACTITIONER

## 2021-03-23 PROCEDURE — 1111F DSCHRG MED/CURRENT MED MERGE: CPT | Performed by: NURSE PRACTITIONER

## 2021-03-23 RX ORDER — METOPROLOL SUCCINATE 50 MG/1
50 TABLET, EXTENDED RELEASE ORAL DAILY
COMMUNITY
End: 2021-03-23 | Stop reason: ALTCHOICE

## 2021-03-23 RX ORDER — FUROSEMIDE 20 MG/1
20 TABLET ORAL DAILY
Qty: 5 TABLET | Refills: 0 | Status: SHIPPED | OUTPATIENT
Start: 2021-03-23 | End: 2021-07-19 | Stop reason: SDUPTHER

## 2021-03-23 RX ORDER — GABAPENTIN 100 MG/1
CAPSULE ORAL
Status: ON HOLD | COMMUNITY
Start: 2021-03-18 | End: 2021-07-29 | Stop reason: SDUPTHER

## 2021-03-23 RX ORDER — CHOLECALCIFEROL (VITAMIN D3) 1250 MCG
CAPSULE ORAL
Status: ON HOLD | COMMUNITY
End: 2022-01-19

## 2021-03-23 ASSESSMENT — ENCOUNTER SYMPTOMS
SORE THROAT: 0
CHEST TIGHTNESS: 0
DIARRHEA: 0
NAUSEA: 0
ABDOMINAL PAIN: 0
COUGH: 0
WHEEZING: 0
SHORTNESS OF BREATH: 0

## 2021-03-23 NOTE — TELEPHONE ENCOUNTER
[facility] if [facility] in [place], [place] can see him this week to recheck bp and check labs - cbc, cmp, pth. He is established with them.

## 2021-03-23 NOTE — TELEPHONE ENCOUNTER
Spoke with wife who said patient is very hard headed about doctors appointments. She did not want to bring him into the office any this week because she doesn't want to take off work. Explained to her the importance and severity of the results. She said she will talk with him and call me back.

## 2021-03-23 NOTE — PROGRESS NOTES
Post-Discharge Transitional Care Management Services or Hospital Follow Up      Macario BurrisAbdoulaye YOB: 1945    Date of Office Visit:  3/23/2021  Date of Hospital Admission: 2/15/21  Date of Hospital Discharge: 2/17/21  Readmission Risk Score(high >=14%. Medium >=10%):Readmission Risk Score: 16      Care management risk score Rising risk (score 2-5) and Complex Care (Scores >=6): 6     Non face to face  following discharge, date last encounter closed (first attempt may have been earlier): 3/22/2021  2:22 PM 3/22/2021  2:22 PM    Call initiated 2 business days of discharge: Yes     Patient Active Problem List   Diagnosis    Depression    Sleep apnea    Carotid artery stenosis    AAA (abdominal aortic aneurysm) ([address]    Aortic dissection ([address]    Mixed hyperlipidemia    Cerebrovascular accident (CVA) due to embolism ([address]    Carotid artery occlusion    Obstructive sleep apnea syndrome    CKD (chronic kidney disease) stage 3, GFR 30-59 ml/min    Coronary artery disease involving native heart without angina pectoris    Cerebrovascular accident (CVA) due to embolism of right middle cerebral artery ([address]    Essential hypertension    Stenosis of right carotid artery    Injury of right hypoglossal nerve    Depression with anxiety    Hyperglycemia    Gastroesophageal reflux disease without esophagitis    Simple chronic bronchitis (HCC)    Chronic midline low back pain without sciatica    Recurrent major depressive disorder in partial remission ([address]    Hyperparathyroidism ([address]    Atherosclerosis of native artery of both lower extremities ([address]    Bilateral carotid artery stenosis    Acute on chronic renal insufficiency    COVID-19    Symptomatic bradycardia    Palliative care patient       No Known Allergies    Medications listed as ordered at the time of discharge from [facility]   Michael Stahl.    Home Medication Instructions MARION:    Printed on:03/23/21 [phone] Medication Information                      aspirin 81 MG EC tablet  Take 1 tablet by mouth daily             atorvastatin (LIPITOR) 40 MG tablet  TAKE ONE (1) TABLET EVERY DAY             Cholecalciferol (VITAMIN D3) 1.25 MG (77778 UT) CAPS  Take by mouth             clopidogrel (PLAVIX) 75 MG tablet  TAKE ONE (1) TABLET BY MOUTH EVERY DAY              doxazosin (CARDURA) 2 MG tablet  Take 4 mg by mouth nightly              famotidine (PEPCID) 20 MG tablet  TAKE 1 TABLET TWICE DAILY             furosemide (LASIX) 20 MG tablet  Take 1 tablet by mouth daily             gabapentin (NEURONTIN) 100 MG capsule               HYDROcodone-acetaminophen (NORCO)  MG per tablet  Take 1 tablet by mouth every 6 hours as needed for Pain for up to 3 days. lisinopril (PRINIVIL;ZESTRIL) 40 MG tablet  Take 40 mg by mouth daily             nitroGLYCERIN (NITROSTAT) 0.4 MG SL tablet  Place 0.4 mg under the tongue every 5 minutes as needed for Chest pain             venlafaxine (EFFEXOR) 75 MG tablet  TAKE ONE (1) TABLET EVERY DAY                   Medications marked \"taking\" at this time  Outpatient Medications Marked as Taking for the 3/23/21 encounter (Office Visit) with TREV Hennessy   Medication Sig Dispense Refill    gabapentin (NEURONTIN) 100 MG capsule       Cholecalciferol (VITAMIN D3) 1.25 MG (44677 UT) CAPS Take by mouth      furosemide (LASIX) 20 MG tablet Take 1 tablet by mouth daily 5 tablet 0    HYDROcodone-acetaminophen (NORCO)  MG per tablet Take 1 tablet by mouth every 6 hours as needed for Pain for up to 3 days.  12 tablet 0    lisinopril (PRINIVIL;ZESTRIL) 40 MG tablet Take 40 mg by mouth daily      clopidogrel (PLAVIX) 75 MG tablet TAKE ONE (1) TABLET BY MOUTH EVERY DAY  (Patient taking differently: Take 75 mg by mouth daily ) 90 tablet 1    venlafaxine (EFFEXOR) 75 MG tablet TAKE ONE (1) TABLET EVERY DAY (Patient taking differently: Take 75 mg by mouth daily TAKE ONE (1) TABLET EVERY DAY) 90 tablet 2    atorvastatin (LIPITOR) 40 MG tablet TAKE ONE (1) TABLET EVERY DAY (Patient taking differently: Take 40 mg by mouth daily ) 90 tablet 1    famotidine (PEPCID) 20 MG tablet TAKE 1 TABLET TWICE DAILY (Patient taking differently: Take 20 mg by mouth 2 times daily ) 180 tablet 3    doxazosin (CARDURA) 2 MG tablet Take 4 mg by mouth nightly       aspirin 81 MG EC tablet Take 1 tablet by mouth daily 30 tablet 3    nitroGLYCERIN (NITROSTAT) 0.4 MG SL tablet Place 0.4 mg under the tongue every 5 minutes as needed for Chest pain          Medications patient taking as of now reconciled against medications ordered at time of hospital discharge: Yes    Chief Complaint   Patient presents with    Follow-Up from Hospital       HPI    Inpatient course: Discharge summary reviewed- see chart. Interval history/Current status:  He was readmitted to [facility] on 2/15 following progressive weakness, dizziness, falls at home. He had been admitted less than 2 weeks prior to this for symptoms as noted as well as Covid infection. In this setting he was initially seen in an ER in [place] and found to have bradycardia with heart rate in the 20s reportedly. He was transferred to [facility]. He was seen by cardiology. No significant bradycardia while admitted. Zio patch was placed and has since been removed. His daughter states that they received a call today that HR was running in the teens at times. He will be scheduled with cardiology to be seen this week. Of note he is currently taking metoprolol. He was transferred to [facility] for rehab, skilled nursing following discharge from the hospital.  He was discharged from [facility] last week. Home health will be following him but has not seen him yet. He has had no further falls at home. He does continue to have generalized weakness. He has had some swelling in his legs as well. No significant shortness of breath.     Chronic history of hyperparathyroidism. Calcium level was elevated in the hospital up to 11.5 but did return to baseline, 10.2 at discharge. CKD III, baseline appears to be 1.4-1.5. He has been followed by nephrology in the past but has not seen them recently. Cr was 1.4 at discharge, GFR 49 which appears to be his baseline. Review of Systems   Constitutional: Negative for chills and fever. HENT: Negative for congestion, ear pain and sore throat. Respiratory: Negative for cough, chest tightness, shortness of breath and wheezing. Cardiovascular: Negative for chest pain. Gastrointestinal: Negative for abdominal pain, diarrhea and nausea. Musculoskeletal: Negative for arthralgias and myalgias. Skin: Negative for rash. Neurological: Positive for weakness. Vitals:    03/23/21 1308   BP: 128/74   Pulse: 80   Resp: 18   Temp: 97.1 °F (36.2 °C)   SpO2: 97%   Weight: 230 lb (104.3 kg)     Body mass index is 33.97 kg/m². Wt Readings from Last 3 Encounters:   03/23/21 230 lb (104.3 kg)   02/17/21 223 lb 5 oz (101.3 kg)   02/07/21 227 lb 3 oz (103.1 kg)     BP Readings from Last 3 Encounters:   03/23/21 128/74   02/17/21 (!) 148/77   02/07/21 133/72       Physical Exam  Vitals signs reviewed. Constitutional:       General: He is not in acute distress. Appearance: Normal appearance. He is well-developed. HENT:      Head: Normocephalic. Eyes:      Conjunctiva/sclera: Conjunctivae normal.      Pupils: Pupils are equal, round, and reactive to light. Neck:      Musculoskeletal: Normal range of motion and neck supple. Thyroid: No thyromegaly. Vascular: No carotid bruit or JVD. Trachea: No tracheal deviation. Cardiovascular:      Rate and Rhythm: Normal rate and regular rhythm. Heart sounds: Normal heart sounds. No murmur. Pulmonary:      Effort: Pulmonary effort is normal. No respiratory distress. Breath sounds: Normal breath sounds.    Musculoskeletal: Normal range of motion. Lymphadenopathy:      Cervical: No cervical adenopathy. Skin:     General: Skin is warm and dry. Findings: No rash. Neurological:      Mental Status: He is alert. Psychiatric:         Mood and Affect: Mood normal.         Behavior: Behavior normal.         Thought Content: Thought content normal.             Assessment/Plan:  1. Bradycardia  -He is not bradycardic in office today. Zio patch results are not in chart. He does continue to take metoprolol. I advised him to stop taking this. He will monitor BP at home. Can consider amlodipine in place of metoprolol. Monitor BP for now. Continue lisinopril for hypertension.  -Cardiology is following. They are scheduling him for an appointment this week. 2. Essential hypertension  -Stable, continue lisinopril as above. 3. Stage 3 chronic kidney disease, unspecified whether stage 3a or 3b CKD  -Stable, appears to be at baseline. Advised patient to call nephrology to make a follow-up appointment    4. Hyperparathyroidism ([facility]  -Stable, we will see if home health can check lab this week or next week to recheck calcium and PTH. 5. Generalized weakness  -Continue with PT per home health    6.  Localized swelling of both lower legs  -Short-term Rx for furosemide 20 mg daily x5 days        Medical Decision Making: high complexity

## 2021-03-23 NOTE — TELEPHONE ENCOUNTER
Spoke with Lesa Giron and the patient has \"bed bugs\" in his home and they are unable to go in until 3/31/2021.  Please re order labs to be drawn on 3/31/2021 if ok

## 2021-03-26 ENCOUNTER — TELEPHONE (OUTPATIENT)
Dept: CARDIOLOGY CLINIC | Age: 76
End: 2021-03-26

## 2021-03-26 NOTE — TELEPHONE ENCOUNTER
The patient has called four times this morning and left four voicemails. I will call the patient back when I am able to return call. I have INR clinic this morning.

## 2021-03-26 NOTE — TELEPHONE ENCOUNTER
Spoke with wife asked if she could bring him in the office MOnday at 36 wife said she really didn't want to until the end of next week. Advised again the abnormalities on his heart monitor and I do not recommend putting this off any longer to be evaluated by cardiologist. Pravin Avilez she would try her hardest to bring him Monday at 1130. If she is not able to bring him she will call me back.

## 2021-03-29 ENCOUNTER — OFFICE VISIT (OUTPATIENT)
Dept: CARDIOLOGY CLINIC | Age: 76
End: 2021-03-29
Payer: MEDICARE

## 2021-03-29 DIAGNOSIS — R00.1 SYMPTOMATIC BRADYCARDIA: Primary | ICD-10-CM

## 2021-03-29 DIAGNOSIS — I25.10 CORONARY ARTERY DISEASE INVOLVING NATIVE CORONARY ARTERY OF NATIVE HEART WITHOUT ANGINA PECTORIS: ICD-10-CM

## 2021-03-29 DIAGNOSIS — I44.2 COMPLETE ATRIOVENTRICULAR BLOCK (HCC): ICD-10-CM

## 2021-03-29 DIAGNOSIS — E78.2 MIXED HYPERLIPIDEMIA: ICD-10-CM

## 2021-03-29 DIAGNOSIS — I10 ESSENTIAL HYPERTENSION: ICD-10-CM

## 2021-03-29 PROCEDURE — 4040F PNEUMOC VAC/ADMIN/RCVD: CPT | Performed by: INTERNAL MEDICINE

## 2021-03-29 PROCEDURE — G8417 CALC BMI ABV UP PARAM F/U: HCPCS | Performed by: INTERNAL MEDICINE

## 2021-03-29 PROCEDURE — G8484 FLU IMMUNIZE NO ADMIN: HCPCS | Performed by: INTERNAL MEDICINE

## 2021-03-29 PROCEDURE — 93248 EXT ECG>7D<15D REV&INTERPJ: CPT | Performed by: INTERNAL MEDICINE

## 2021-03-29 PROCEDURE — 1123F ACP DISCUSS/DSCN MKR DOCD: CPT | Performed by: INTERNAL MEDICINE

## 2021-03-29 PROCEDURE — 99214 OFFICE O/P EST MOD 30 MIN: CPT | Performed by: INTERNAL MEDICINE

## 2021-03-29 PROCEDURE — 4004F PT TOBACCO SCREEN RCVD TLK: CPT | Performed by: INTERNAL MEDICINE

## 2021-03-29 PROCEDURE — G8427 DOCREV CUR MEDS BY ELIG CLIN: HCPCS | Performed by: INTERNAL MEDICINE

## 2021-03-29 ASSESSMENT — ENCOUNTER SYMPTOMS
SHORTNESS OF BREATH: 0
DIARRHEA: 0
VOMITING: 0
GASTROINTESTINAL NEGATIVE: 1
NAUSEA: 0
RESPIRATORY NEGATIVE: 1
EYES NEGATIVE: 1

## 2021-03-29 NOTE — PROCEDURES
Cardiac event monitor report    Dates of recording 2/23/2021 through 3/9/2021    Summary impressions:    1. Sinus rhythm minimal heart rate 64 average 65 maximal 144    2. 1 episode of ventricular tachycardia noted 4 beats maximal rate 150 average 145    3. 2424 episodes of ventricular block was recorded some of which may be bradycardia and/or third-degree heart block Mobitz type I Wenckebach block was also present    4. No pauses greater than 3.0 seconds were recorded    5. No episodes of atrial fibrillation were noted    6. No episodes of supraventricular tachycardia were noted    7. 6 triggered events 0 diary events PVCs and Wenckebach block noted during these recordings    8.  Occasional PVCs otherwise rare ectopy noted; 34.2 seconds trigeminy also recorded

## 2021-03-29 NOTE — PROGRESS NOTES
Inability: Not on file    Transportation needs     Medical: Not on file     Non-medical: Not on file   Tobacco Use    Smoking status: Current Every Day Smoker     Packs/day: 0.50     Years: 40.00     Pack years: 20.00    Smokeless tobacco: Never Used    Tobacco comment: quit smoking in nov 2017, restarted smoking since then   Substance and Sexual Activity    Alcohol use: No    Drug use: No    Sexual activity: Not on file   Lifestyle    Physical activity     Days per week: Not on file     Minutes per session: Not on file    Stress: Not on file   Relationships    Social connections     Talks on phone: Not on file     Gets together: Not on file     Attends Tenriism service: Not on file     Active member of club or organization: Not on file     Attends meetings of clubs or organizations: Not on file     Relationship status: Not on file    Intimate partner violence     Fear of current or ex partner: Not on file     Emotionally abused: Not on file     Physically abused: Not on file     Forced sexual activity: Not on file   Other Topics Concern    Not on file   Social History Narrative     50+ yearsHe has 1 son and 1 daughterWorks for a telephone companyPreviously [address] [employer] active duty 4-1/2 yearsHe does not go to the [employer] high [employer] 2 years of [employer] hunting and fishingDenies alcohol or substance usage he does smoke 1 pack/dayPhysically sedentary       Physical Examination:  There were no vitals taken for this visit. Physical Exam  Vitals signs reviewed. Constitutional:       Appearance: He is well-developed. Neck:      Vascular: No carotid bruit or JVD. Cardiovascular:      Rate and Rhythm: Normal rate and regular rhythm. Heart sounds: Normal heart sounds. No murmur. No friction rub. No gallop. Pulmonary:      Effort: Pulmonary effort is normal. No respiratory distress. Breath sounds: Normal breath sounds. No wheezing or rales. Abdominal:      General: There is no distension. Tenderness: There is no abdominal tenderness. Lymphadenopathy:      Cervical: No cervical adenopathy. Skin:     General: Skin is warm and dry. ASSESSMENT:     Diagnosis Orders   1. Symptomatic bradycardia     2. Coronary artery disease involving native coronary artery of native heart without angina pectoris     3. Mixed hyperlipidemia     4. Essential hypertension     5. Complete atrioventricular block (HCC)         PLAN:  No orders of the defined types were placed in this encounter. No orders of the defined types were placed in this encounter. 1. Continue present medications  2. Recommend dual-chamber pacemaker implantation advise indication alternatives benefits and risk patient agreeable we will arrange as soon as feasible. I have discussed with the patient regarding indications for the proposed procedure ICD/ Pacemaker implantation along with possible alternatives benefits and risks including but not limited to risks of death, myocardial infarction, stroke, contrast induced nephropathy which in some cases may lead to acute kidney failure requiring dialysis, allergic reactions, infections which may require treatment with antibiotics or removal of the device, bleeding requiring blood transfusion,  cardiac arrhthymias, respiratory failure which may require placing the patient on respiratory support such as a ventilator or breathing machine,risk of complications which may require vascular surgery,risks of collapsing the lung with development of a pneumothorax which may require placement of a chest tube, and risks of lead dislodgement which may require follow up surgery and repositioning of the leads. In addition there are long term risks including infection, and device or component failure which may require removal and/or replacement of various components. Risk of wound nonhealing t subsequent erosion etc. also discussed.   We also

## 2021-03-29 NOTE — PATIENT INSTRUCTIONS
[place] at the [address] [facility] and [address] located on the first floor of [place] [address] through hospital main entrance and turn immediately to your left. Procedure-4/6/21 arrive 1130AM then 1PM  COVID 4/5/21 before 2PM at [facility]      Patient's contact number:  [phone] (home)     Preoperative work-up:  CBC, BMP and Chest x-ray. (preoperative cardiovascular exam)    Pacemaker (overnight)         Definition   This is a procedure to insert an artificial pacemaker. A pacemaker is a small, battery-operated device. It helps maintain a normal heartbeat by sending electrical impulses to the heart. · This procedure requires an overnight stay at the hospital.     · You can have a very light breakfast before 9AM. This helps to prevent nausea. ater with your morning medications. · Make arrangements with a friend or family member to drive you to and from the hospital.  You will not be permitted to drive home after the procedure, since you may be sedated. · Follow up appointment will be made following your procedure. You will be unable to raise your arm up over your head for three days. _________________________________________    Pacemaker Placement: What to Expect at [address] [place] [address]     Pacemaker placement is surgery to put a pacemaker in your chest. A pacemaker is a small, battery-powered device that sends electrical signals to the heart to keep the heartbeat steady. Thin wires, called leads, carry the signals from the pacemaker to the heart. A pacemaker can prevent or reduce dizziness, fainting, and shortness of breath caused by a slow or unsteady heartbeat. Your chest may be sore where the doctor made the cut (incision) and put in the pacemaker. You also may have a bruise and mild swelling. These symptoms usually get better in 1 to 2 weeks. You may feel a hard ridge along the incision. This usually gets softer in the months after surgery.  You may be able to see or feel the outline of the pacemaker under your skin. You will probably be able to go back to work or your usual routine 1 to 2 weeks after surgery. Pacemaker batteries usually last 5 to 15 years. Your doctor will talk to you about how often you will need to have your pacemaker checked. When you have a pacemaker, it is important to avoid electrical devices that can stop your pacemaker from working right. Check with your doctor about what you need to stay away from, what you need to use with care, and what is okay to use. You will need to stay away from things with strong magnetic and electrical fields such as an MRI machine (unless your pacemaker is safe for an MRI), welding equipment, and power generators. You can use a cell phone, but keep it at least 6 inches away from your pacemaker. You can safely use most household and office electronics such as kitchen appliances, electric power tools, and computers. This care sheet gives you a general idea about how long it will take for you to recover. But each person recovers at a different pace. Follow the steps below to get better as quickly as possible. If for any reason you are unable to keep this appointment, please contact [facility], [phone], as soon as possible to reschedule.

## 2021-04-05 ENCOUNTER — TELEPHONE (OUTPATIENT)
Dept: CARDIOLOGY CLINIC | Age: 76
End: 2021-04-05

## 2021-04-05 NOTE — TELEPHONE ENCOUNTER
Patients son left voicemail requesting a call back to discuss covid test. He states they live 80 miles away and patient was due to come in today for covid test for pacemaker tomorrow. He states that pt has had the virus within 90 days and wants to know if he needs to be tested?  He can be reached at [phone]

## 2021-04-05 NOTE — TELEPHONE ENCOUNTER
Yes he still has to be tested. Spoke with patient son and explained he voiced understanding. Offered him to be able to come tomorrow for rapid that was approved through [employer] but patient son is not sure patient can wait and sit for two hours in the car. Either he will come today for BD max before noon or rapid tomorrow before procedure.

## 2021-04-06 ENCOUNTER — HOSPITAL ENCOUNTER (OUTPATIENT)
Dept: CARDIAC CATH/INVASIVE PROCEDURES | Age: 76
Discharge: HOME | End: 2021-04-07
Attending: INTERNAL MEDICINE | Admitting: INTERNAL MEDICINE
Payer: MEDICARE

## 2021-04-06 ENCOUNTER — APPOINTMENT (OUTPATIENT)
Dept: GENERAL RADIOLOGY | Age: 76
End: 2021-04-06
Attending: INTERNAL MEDICINE
Payer: MEDICARE

## 2021-04-06 ENCOUNTER — OFFICE VISIT (OUTPATIENT)
Age: 76
End: 2021-04-06

## 2021-04-06 ENCOUNTER — TELEPHONE (OUTPATIENT)
Dept: CARDIOLOGY | Age: 76
End: 2021-04-06

## 2021-04-06 DIAGNOSIS — Z11.59 SCREENING FOR VIRAL DISEASE: Primary | ICD-10-CM

## 2021-04-06 PROBLEM — R00.1 BRADYCARDIA: Status: ACTIVE | Noted: 2021-04-06

## 2021-04-06 LAB
ALBUMIN SERPL-MCNC: 4.2 G/DL (ref 3.5–5.2)
ALP BLD-CCNC: 83 U/L (ref 40–130)
ALT SERPL-CCNC: 10 U/L (ref 5–41)
ANION GAP SERPL CALCULATED.3IONS-SCNC: 10 MMOL/L (ref 7–19)
AST SERPL-CCNC: 11 U/L (ref 5–40)
BILIRUB SERPL-MCNC: <0.2 MG/DL (ref 0.2–1.2)
BUN BLDV-MCNC: 27 MG/DL (ref 8–23)
CALCIUM SERPL-MCNC: 10.9 MG/DL (ref 8.8–10.2)
CHLORIDE BLD-SCNC: 105 MMOL/L (ref 98–111)
CO2: 26 MMOL/L (ref 22–29)
CREAT SERPL-MCNC: 1.7 MG/DL (ref 0.5–1.2)
EKG P AXIS: NORMAL DEGREES
EKG P-R INTERVAL: NORMAL MS
EKG Q-T INTERVAL: 416 MS
EKG QRS DURATION: 146 MS
EKG QTC CALCULATION (BAZETT): 426 MS
EKG T AXIS: 52 DEGREES
GFR AFRICAN AMERICAN: 47
GFR NON-AFRICAN AMERICAN: 39
GLUCOSE BLD-MCNC: 106 MG/DL (ref 74–109)
HCT VFR BLD CALC: 34.2 % (ref 42–52)
HEMOGLOBIN: 9.8 G/DL (ref 14–18)
MCH RBC QN AUTO: 25.1 PG (ref 27–31)
MCHC RBC AUTO-ENTMCNC: 28.7 G/DL (ref 33–37)
MCV RBC AUTO: 87.5 FL (ref 80–94)
PDW BLD-RTO: 15.9 % (ref 11.5–14.5)
PLATELET # BLD: 406 K/UL (ref 130–400)
PMV BLD AUTO: 10.9 FL (ref 9.4–12.4)
POTASSIUM REFLEX MAGNESIUM: 5.1 MMOL/L (ref 3.5–5)
PRO-BNP: 1070 PG/ML (ref 0–1800)
RBC # BLD: 3.91 M/UL (ref 4.7–6.1)
SARS-COV-2, NAAT: NOT DETECTED
SODIUM BLD-SCNC: 141 MMOL/L (ref 136–145)
TOTAL PROTEIN: 7.5 G/DL (ref 6.6–8.7)
WBC # BLD: 10.1 K/UL (ref 4.8–10.8)

## 2021-04-06 PROCEDURE — 2580000003 HC RX 258: Performed by: INTERNAL MEDICINE

## 2021-04-06 PROCEDURE — 83880 ASSAY OF NATRIURETIC PEPTIDE: CPT

## 2021-04-06 PROCEDURE — 6360000002 HC RX W HCPCS

## 2021-04-06 PROCEDURE — 6360000004 HC RX CONTRAST MEDICATION: Performed by: INTERNAL MEDICINE

## 2021-04-06 PROCEDURE — 93971 EXTREMITY STUDY: CPT

## 2021-04-06 PROCEDURE — 6360000002 HC RX W HCPCS: Performed by: INTERNAL MEDICINE

## 2021-04-06 PROCEDURE — C1769 GUIDE WIRE: HCPCS

## 2021-04-06 PROCEDURE — 2780000010 HC IMPLANT OTHER

## 2021-04-06 PROCEDURE — C1898 LEAD, PMKR, OTHER THAN TRANS: HCPCS

## 2021-04-06 PROCEDURE — 6370000000 HC RX 637 (ALT 250 FOR IP): Performed by: INTERNAL MEDICINE

## 2021-04-06 PROCEDURE — 99024 POSTOP FOLLOW-UP VISIT: CPT | Performed by: INTERNAL MEDICINE

## 2021-04-06 PROCEDURE — 93970 EXTREMITY STUDY: CPT

## 2021-04-06 PROCEDURE — 36415 COLL VENOUS BLD VENIPUNCTURE: CPT

## 2021-04-06 PROCEDURE — 71045 X-RAY EXAM CHEST 1 VIEW: CPT

## 2021-04-06 PROCEDURE — 80053 COMPREHEN METABOLIC PANEL: CPT

## 2021-04-06 PROCEDURE — 99153 MOD SED SAME PHYS/QHP EA: CPT

## 2021-04-06 PROCEDURE — C1785 PMKR, DUAL, RATE-RESP: HCPCS

## 2021-04-06 PROCEDURE — 93010 ELECTROCARDIOGRAM REPORT: CPT | Performed by: INTERNAL MEDICINE

## 2021-04-06 PROCEDURE — 93005 ELECTROCARDIOGRAM TRACING: CPT | Performed by: INTERNAL MEDICINE

## 2021-04-06 PROCEDURE — 33208 INSRT HEART PM ATRIAL & VENT: CPT | Performed by: INTERNAL MEDICINE

## 2021-04-06 PROCEDURE — 99152 MOD SED SAME PHYS/QHP 5/>YRS: CPT | Performed by: INTERNAL MEDICINE

## 2021-04-06 PROCEDURE — 85027 COMPLETE CBC AUTOMATED: CPT

## 2021-04-06 PROCEDURE — 99999 PR OFFICE/OUTPT VISIT,PROCEDURE ONLY: CPT | Performed by: NURSE PRACTITIONER

## 2021-04-06 PROCEDURE — 99152 MOD SED SAME PHYS/QHP 5/>YRS: CPT

## 2021-04-06 PROCEDURE — 33208 INSRT HEART PM ATRIAL & VENT: CPT

## 2021-04-06 PROCEDURE — 2500000003 HC RX 250 WO HCPCS

## 2021-04-06 RX ORDER — ATORVASTATIN CALCIUM 40 MG/1
40 TABLET, FILM COATED ORAL DAILY
Status: DISCONTINUED | OUTPATIENT
Start: 2021-04-07 | End: 2021-04-07 | Stop reason: HOSPADM

## 2021-04-06 RX ORDER — SODIUM CHLORIDE 9 MG/ML
INJECTION, SOLUTION INTRAVENOUS CONTINUOUS
Status: DISCONTINUED | OUTPATIENT
Start: 2021-04-06 | End: 2021-04-07 | Stop reason: HOSPADM

## 2021-04-06 RX ORDER — FAMOTIDINE 20 MG/1
20 TABLET, FILM COATED ORAL 2 TIMES DAILY
Status: DISCONTINUED | OUTPATIENT
Start: 2021-04-06 | End: 2021-04-07 | Stop reason: HOSPADM

## 2021-04-06 RX ORDER — CHLORHEXIDINE GLUCONATE 4 G/100ML
SOLUTION TOPICAL ONCE
Status: COMPLETED | OUTPATIENT
Start: 2021-04-06 | End: 2021-04-06

## 2021-04-06 RX ORDER — LISINOPRIL 20 MG/1
40 TABLET ORAL DAILY
Status: DISCONTINUED | OUTPATIENT
Start: 2021-04-07 | End: 2021-04-07 | Stop reason: HOSPADM

## 2021-04-06 RX ORDER — ONDANSETRON 2 MG/ML
4 INJECTION INTRAMUSCULAR; INTRAVENOUS EVERY 6 HOURS PRN
Status: DISCONTINUED | OUTPATIENT
Start: 2021-04-06 | End: 2021-04-07 | Stop reason: HOSPADM

## 2021-04-06 RX ORDER — DOXAZOSIN MESYLATE 1 MG/1
4 TABLET ORAL NIGHTLY
Status: DISCONTINUED | OUTPATIENT
Start: 2021-04-06 | End: 2021-04-07 | Stop reason: HOSPADM

## 2021-04-06 RX ORDER — FUROSEMIDE 20 MG/1
20 TABLET ORAL DAILY
Status: DISCONTINUED | OUTPATIENT
Start: 2021-04-07 | End: 2021-04-07 | Stop reason: HOSPADM

## 2021-04-06 RX ORDER — VENLAFAXINE 75 MG/1
75 TABLET ORAL DAILY
Status: DISCONTINUED | OUTPATIENT
Start: 2021-04-07 | End: 2021-04-07 | Stop reason: HOSPADM

## 2021-04-06 RX ORDER — HYDROCODONE BITARTRATE AND ACETAMINOPHEN 10; 325 MG/1; MG/1
1 TABLET ORAL EVERY 6 HOURS PRN
Status: DISCONTINUED | OUTPATIENT
Start: 2021-04-06 | End: 2021-04-07 | Stop reason: HOSPADM

## 2021-04-06 RX ORDER — CLOPIDOGREL BISULFATE 75 MG/1
75 TABLET ORAL DAILY
Status: DISCONTINUED | OUTPATIENT
Start: 2021-04-07 | End: 2021-04-07 | Stop reason: HOSPADM

## 2021-04-06 RX ORDER — HYDROCODONE BITARTRATE AND ACETAMINOPHEN 10; 325 MG/1; MG/1
1 TABLET ORAL EVERY 6 HOURS PRN
COMMUNITY
End: 2021-04-08 | Stop reason: SDUPTHER

## 2021-04-06 RX ORDER — NITROGLYCERIN 0.4 MG/1
0.4 TABLET SUBLINGUAL EVERY 5 MIN PRN
Status: DISCONTINUED | OUTPATIENT
Start: 2021-04-06 | End: 2021-04-07 | Stop reason: HOSPADM

## 2021-04-06 RX ORDER — ASPIRIN 81 MG/1
81 TABLET ORAL DAILY
Status: DISCONTINUED | OUTPATIENT
Start: 2021-04-07 | End: 2021-04-07 | Stop reason: HOSPADM

## 2021-04-06 RX ADMIN — HYDROCODONE BITARTRATE AND ACETAMINOPHEN 1 TABLET: 10; 325 TABLET ORAL at 20:39

## 2021-04-06 RX ADMIN — SODIUM CHLORIDE: 9 INJECTION, SOLUTION INTRAVENOUS at 12:17

## 2021-04-06 RX ADMIN — FAMOTIDINE 20 MG: 20 TABLET ORAL at 20:27

## 2021-04-06 RX ADMIN — IOPAMIDOL 30 ML: 612 INJECTION, SOLUTION INTRAVENOUS at 17:03

## 2021-04-06 RX ADMIN — MUPIROCIN: 20 OINTMENT TOPICAL at 12:16

## 2021-04-06 RX ADMIN — DOXAZOSIN 4 MG: 1 TABLET ORAL at 20:26

## 2021-04-06 RX ADMIN — CHLORHEXIDINE GLUCONATE: 213 SOLUTION TOPICAL at 12:17

## 2021-04-06 RX ADMIN — CEFAZOLIN 2000 MG: 1 INJECTION, POWDER, FOR SOLUTION INTRAMUSCULAR; INTRAVENOUS at 20:26

## 2021-04-06 ASSESSMENT — PAIN DESCRIPTION - ORIENTATION: ORIENTATION: LEFT

## 2021-04-06 ASSESSMENT — PAIN DESCRIPTION - LOCATION: LOCATION: FOOT

## 2021-04-06 ASSESSMENT — PAIN SCALES - GENERAL: PAINLEVEL_OUTOF10: 6

## 2021-04-06 NOTE — TELEPHONE ENCOUNTER
Called pt and made him aware that his Covid results were negative and that he can come on into the hospital and not have to wait in his car any more.

## 2021-04-06 NOTE — H&P
Office Visit    3/29/2021  [facility] MD  Interventional Cardiology  Symptomatic bradycardia +4 more  Dx  Check-Up  Reason for Visit   Progress Notes    [facility]                              Date:                3/29/2021  Patient:            Claudeen Greaser. Age:                 64 y.o., 1945        Reason for evaluation:            SUBJECTIVE:    Returns today follow-up assessment. Overall feeling very poorly. Some in the hospital back around February 15. Denies anginal chest pain he does have some dyspnea. Recent event recorder for monitoring 2/23/2021 through 3/9/2021 showed sinus rhythm average heart rate 1 651 episode of ventricular tachycardia, 2424 episodes of atrioventricular block recorded some of which may be high-grade or third-degree heart block. He also has obstructive sleep apnea and cannot wear a mask due to severe claustrophobia and other factors.     Review of Systems   Constitutional: Negative. Negative for chills, fever and unexpected weight change. HENT: Negative. Eyes: Negative. Respiratory: Negative. Negative for shortness of breath. Cardiovascular: Negative. Negative for chest pain. Gastrointestinal: Negative. Negative for diarrhea, nausea and vomiting. Endocrine: Negative. Genitourinary: Negative. Musculoskeletal: Negative. Skin: Negative. Neurological: Negative. All other systems reviewed and are negative.           OBJECTIVE:     There were no vitals taken for this visit.     Labs:   CBC: No results for input(s): WBC, HGB, HCT, PLT in the last 72 hours. BMP:No results for input(s): NA, K, CO2, BUN, CREATININE, LABGLOM, GLUCOSE in the last 72 hours. BNP: No results for input(s): BNP in the last 72 hours. PT/INR: No results for input(s): PROTIME, INR in the last 72 hours. APTT:No results for input(s): APTT in the last 72 hours.   CARDIAC ENZYMES:No results for input(s): CKTOTAL, CKMB, CKMBINDEX, TROPONINI in the last 72 hours. FASTING LIPID PANEL:        Lab Results   Component Value Date     HDL 25 02/16/2021     LDLDIRECT 71 02/11/2016     [address] 20 02/16/2021     TRIG 208 02/16/2021      LIVER PROFILE:No results for input(s): AST, ALT, LABALBU in the last 72 hours.         Past Medical History        Past Medical History:   Diagnosis Date    AAA (abdominal aortic aneurysm) ([address]      Aortic dissection ([address] 10/10/2012    CAD (coronary artery disease)      Cancer (HCC)       lung&lymphnode    Carotid artery occlusion      Cerebrovascular accident (CVA) ([address]      Cerebrovascular accident (CVA) due to embolism ([address] 02/16/2016    Cerebrovascular accident (CVA) due to embolism of right middle cerebral artery ([address]      Depression      HTN (hypertension)      Hyperlipidemia      MI (myocardial infarction) ([address]       acute 5/13/2015    Non Hodgkin's lymphoma ([address]      Palliative care patient 02/16/2021    Sleep apnea       no c-pap    Unspecified sleep apnea           Past Surgical History         Past Surgical History:   Procedure Laterality Date    CARDIAC CATHETERIZATION   5/13/15  [facility]     EF 40%. stent to LAD, stent to posterior descending branch of RCA.  CAROTID ENDARTERECTOMY Right 7/22/2016     Right carotid endarterectomy,carotid arteriograms. 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, repeat in 1 yr    UPPER GASTROINTESTINAL ENDOSCOPY   11/08/2019     [facility]: normal    UPPER GASTROINTESTINAL ENDOSCOPY   04/13/2018     Dr Dalal Putt: normal, 2cm HH, small amount of food in stomach w/dark fluid,but not kathy blood    VASCULAR SURGERY   10/29/12 [facility]     open exposure of R common femoral artery; percutaneous cannulation of L  femoral artery with 7-Mohawk sheath; suprarenal abd aortagram with bilat iliofem arteriogram; endoluminal graft repair of AAA with endologix [id number] main body, 28x95 infrarenal cuff; balloon angioplasty of infrarenal abd aorta and endoluminal graft with coda balloon; completion abd aortogram w bilat iliofem arteriogram;     VASCULAR SURGERY   10/29/ [facility]      (cont) open repair of right common femoral ateriotomy; perclose repair of left common femoral artery puncture site.          Family History         Family History   Problem Relation Age of Onset    High Blood Pressure Father      High Blood Pressure Mother      Diabetes Mother      Colon Polyps Brother      Colon Cancer Neg Hx           No Known Allergies  Current Facility-Administered Medications          Current Outpatient Medications   Medication Sig Dispense Refill    gabapentin (NEURONTIN) 100 MG capsule          Cholecalciferol (VITAMIN D3) 1.25 MG (69981 UT) CAPS Take by mouth        furosemide (LASIX) 20 MG tablet Take 1 tablet by mouth daily 5 tablet 0    lisinopril (PRINIVIL;ZESTRIL) 40 MG tablet Take 40 mg by mouth daily        clopidogrel (PLAVIX) 75 MG tablet TAKE ONE (1) TABLET BY MOUTH EVERY DAY  (Patient taking differently: Take 75 mg by mouth daily ) 90 tablet 1    venlafaxine (EFFEXOR) 75 MG tablet TAKE ONE (1) TABLET EVERY DAY (Patient taking differently: Take 75 mg by mouth daily TAKE ONE (1) TABLET EVERY DAY) 90 tablet 2    atorvastatin (LIPITOR) 40 MG tablet TAKE ONE (1) TABLET EVERY DAY (Patient taking differently: Take 40 mg by mouth daily ) 90 tablet 1    famotidine (PEPCID) 20 MG tablet TAKE 1 TABLET TWICE DAILY (Patient taking differently: Take 20 mg by mouth 2 times daily ) 180 tablet 3    doxazosin (CARDURA) 2 MG tablet Take 4 mg by mouth nightly         aspirin 81 MG EC tablet Take 1 tablet by mouth daily 30 tablet 3    nitroGLYCERIN (NITROSTAT) 0.4 MG SL tablet Place 0.4 mg under the tongue every 5 minutes as needed for Chest pain          No current facility-administered medications for this visit.          Social History               Socioeconomic History    Marital status:        Spouse name: Not on file    Number of children: Not on file    Years of education: Not on file    Highest education level: Not on file   Occupational History    Not on file   Social Needs    Financial resource strain: Not on file    Food insecurity       Worry: Not on file       Inability: Not on file    Transportation needs       Medical: Not on file       Non-medical: Not on file   Tobacco Use    Smoking status: Current Every Day Smoker       Packs/day: 0.50       Years: 40.00       Pack years: 20.00    Smokeless tobacco: Never Used    Tobacco comment: quit smoking in nov 2017, restarted smoking since then   Substance and Sexual Activity    Alcohol use: No    Drug use: No    Sexual activity: Not on file   Lifestyle    Physical activity       Days per week: Not on file       Minutes per session: Not on file    Stress: Not on file   Relationships    Social connections       Talks on phone: Not on file       Gets together: Not on file       Attends Anabaptist service: Not on file       Active member of club or organization: Not on file       Attends meetings of clubs or organizations: Not on file       Relationship status: Not on file    Intimate partner violence       Fear of current or ex partner: Not on file       Emotionally abused: Not on file       Physically abused: Not on file       Forced sexual activity: Not on file   Other Topics Concern    Not on file   Social History Narrative      50+ yearsHe has 1 son and 1 daughterWorks for a [employer] companyPreviously [facility] active duty 4-1/2 yearsHe does not go to the [employer] presentlyEducation high school 2 years of collegeEnjoys hunting and fishingDenies alcohol or substance usage he does smoke 1 pack/dayPhysically sedentary            Physical Examination:  There were no vitals taken for this visit. Physical Exam  Vitals signs reviewed. Constitutional:       Appearance: He is well-developed. Neck:      Vascular: No carotid bruit or JVD. Cardiovascular:      Rate and Rhythm: Normal rate and regular rhythm. Heart sounds: Normal heart sounds. No murmur. No friction rub. No gallop. Pulmonary:      Effort: Pulmonary effort is normal. No respiratory distress. Breath sounds: Normal breath sounds. No wheezing or rales. Abdominal:      General: There is no distension. Tenderness: There is no abdominal tenderness. Lymphadenopathy:      Cervical: No cervical adenopathy. Skin:     General: Skin is warm and dry.                ASSESSMENT:       Diagnosis Orders   1. Symptomatic bradycardia      2. Coronary artery disease involving native coronary artery of native heart without angina pectoris      3. Mixed hyperlipidemia      4. Essential hypertension      5. Complete atrioventricular block (HCC)            PLAN:  No orders of the defined types were placed in this encounter.       Encounter Medications    No orders of the defined types were placed in this encounter.            1. Continue present medications  2. Recommend dual-chamber pacemaker implantation advise indication alternatives benefits and risk patient agreeable we will arrange as soon as feasible.    I have discussed with the patient regarding indications for the proposed procedure ICD/ Pacemaker implantation along with possible alternatives benefits and risks including but not limited to risks of death, myocardial infarction, stroke, contrast induced nephropathy which in some cases may lead to acute kidney failure requiring dialysis, allergic reactions, infections which may require treatment with antibiotics or removal of the device, bleeding requiring blood transfusion,  cardiac arrhthymias, respiratory failure which may

## 2021-04-07 VITALS
TEMPERATURE: 97.3 F | OXYGEN SATURATION: 92 % | SYSTOLIC BLOOD PRESSURE: 130 MMHG | RESPIRATION RATE: 18 BRPM | WEIGHT: 228.56 LBS | HEART RATE: 81 BPM | HEIGHT: 68 IN | DIASTOLIC BLOOD PRESSURE: 78 MMHG | BODY MASS INDEX: 34.64 KG/M2

## 2021-04-07 LAB
ANION GAP SERPL CALCULATED.3IONS-SCNC: 9 MMOL/L (ref 7–19)
BUN BLDV-MCNC: 26 MG/DL (ref 8–23)
CALCIUM SERPL-MCNC: 10.5 MG/DL (ref 8.8–10.2)
CHLORIDE BLD-SCNC: 104 MMOL/L (ref 98–111)
CO2: 25 MMOL/L (ref 22–29)
CREAT SERPL-MCNC: 1.6 MG/DL (ref 0.5–1.2)
GFR AFRICAN AMERICAN: 51
GFR NON-AFRICAN AMERICAN: 42
GLUCOSE BLD-MCNC: 90 MG/DL (ref 74–109)
HCT VFR BLD CALC: 30.2 % (ref 42–52)
HEMOGLOBIN: 8.6 G/DL (ref 14–18)
MCH RBC QN AUTO: 25 PG (ref 27–31)
MCHC RBC AUTO-ENTMCNC: 28.5 G/DL (ref 33–37)
MCV RBC AUTO: 87.8 FL (ref 80–94)
PDW BLD-RTO: 16 % (ref 11.5–14.5)
PLATELET # BLD: 348 K/UL (ref 130–400)
PMV BLD AUTO: 11.4 FL (ref 9.4–12.4)
POTASSIUM SERPL-SCNC: 4.8 MMOL/L (ref 3.5–5)
RBC # BLD: 3.44 M/UL (ref 4.7–6.1)
SODIUM BLD-SCNC: 138 MMOL/L (ref 136–145)
WBC # BLD: 8.4 K/UL (ref 4.8–10.8)

## 2021-04-07 PROCEDURE — 85027 COMPLETE CBC AUTOMATED: CPT

## 2021-04-07 PROCEDURE — 99024 POSTOP FOLLOW-UP VISIT: CPT | Performed by: INTERNAL MEDICINE

## 2021-04-07 PROCEDURE — 80048 BASIC METABOLIC PNL TOTAL CA: CPT

## 2021-04-07 PROCEDURE — 6370000000 HC RX 637 (ALT 250 FOR IP): Performed by: INTERNAL MEDICINE

## 2021-04-07 PROCEDURE — 36415 COLL VENOUS BLD VENIPUNCTURE: CPT

## 2021-04-07 RX ADMIN — FAMOTIDINE 20 MG: 20 TABLET ORAL at 08:43

## 2021-04-07 RX ADMIN — HYDROCODONE BITARTRATE AND ACETAMINOPHEN 1 TABLET: 10; 325 TABLET ORAL at 02:30

## 2021-04-07 RX ADMIN — LISINOPRIL 40 MG: 20 TABLET ORAL at 08:44

## 2021-04-07 RX ADMIN — VENLAFAXINE 75 MG: 75 TABLET ORAL at 08:43

## 2021-04-07 RX ADMIN — ATORVASTATIN CALCIUM 40 MG: 40 TABLET, FILM COATED ORAL at 08:43

## 2021-04-07 RX ADMIN — ASPIRIN 81 MG: 81 TABLET, COATED ORAL at 08:43

## 2021-04-07 RX ADMIN — CLOPIDOGREL BISULFATE 75 MG: 75 TABLET ORAL at 08:43

## 2021-04-07 RX ADMIN — FUROSEMIDE 20 MG: 20 TABLET ORAL at 08:43

## 2021-04-07 RX ADMIN — HYDROCODONE BITARTRATE AND ACETAMINOPHEN 1 TABLET: 10; 325 TABLET ORAL at 08:44

## 2021-04-07 ASSESSMENT — PAIN DESCRIPTION - PAIN TYPE: TYPE: CHRONIC PAIN

## 2021-04-07 ASSESSMENT — PAIN SCALES - GENERAL
PAINLEVEL_OUTOF10: 7
PAINLEVEL_OUTOF10: 8

## 2021-04-07 ASSESSMENT — PAIN DESCRIPTION - ORIENTATION: ORIENTATION: LEFT

## 2021-04-07 NOTE — PROGRESS NOTES
Patient is educated regarding dc instructions/pamphlet for pacemaker, follow up appointments, and medications. Patient and spouse verbalized understanding.

## 2021-04-07 NOTE — DISCHARGE SUMMARY
Discharge Summary    Quince Kussmaul. :  1945  MRN:  [id number]    Admit date:  2021  Discharge date:      Admitting Physician:  Blane Sow MD    Advance Directive: Full Code    Consults: none    Primary Care Physician:  Nithya Meneses MD    Discharge Diagnoses: Active Problems:    Bradycardia  Resolved Problems:    * No resolved hospital problems. *      Cardiology Specific Data:  Specialty Problems        Cardiology Problems    Bradycardia        Carotid artery stenosis        AAA (abdominal aortic aneurysm) (HCC)        Aortic dissection (HCC)        Mixed hyperlipidemia        Cerebrovascular accident (CVA) due to embolism ([address]        Carotid artery occlusion        Cerebrovascular accident (CVA) due to embolism of right middle cerebral artery ([address]        Coronary artery disease involving native heart without angina pectoris        Essential hypertension        Stenosis of right carotid artery        Atherosclerosis of native artery of both lower extremities (HCC)        Bilateral carotid artery stenosis        Symptomatic bradycardia              Significant Diagnostic Studies:   Xr Chest Portable    Result Date: 2021  EXAMINATION: XR CHEST PORTABLE 2021 5:26 PM HISTORY: Pacemaker placement, clinical concern for pneumothorax. Report: A portable upright frontal view the chest was obtained. COMPARISON: Chest x-ray 2021. The lungs are grossly hypoaerated and there is central and basilar vascular congestion with mild cardiomegaly. Moderate thoracic aortic ectasia is present as before. There is a new right subclavian transvenous pacemaker with twin leads, which appear to be in satisfactory position. No pneumothorax is identified. There is no lung consolidation. The osseous structures are unremarkable. Gross hypoaeration of the lungs with mild cardiomegaly and central/basilar vascular congestion, probably exaggerated by the level of inspiration.  Mild volume overload is not excluded. There is a new right subclavian pacemaker, in good position without pneumothorax. Signed by Dr Giselle Horton. Vicki on 4/6/2021 5:27 PM    Vl Dup Upper Extremity Venous Bilateral    Result Date: 4/7/2021  Vascular Upper Extremities PVR Procedure  Demographics   Patient Name  Sravan Lane Age               64                JR.   Patient       [id number]           Gender            Male  Number   Visit Number  [id number]        Interpreting      Nataliya Marquez MD                                 Physician   Date of Birth 1945       Referring         Jonathan Roth MD                                 Physician   Accession     [id number]       Sonographer       Jenni Martinez [address] [facility]  Number                                           RVS, RCS  Procedure Type of Study:   Extremities Arteries:Upper Extremities PVR, Upper Extremity Bilateral  Venous Duplex. Indications for Study:Pacemaker. Risk Factors   - The patient's last creatinine was 1.7 mg/dl. Allergies   - No known allergies. Impression   Images from subclavian vein approach, see opertive report for details. Signature   ----------------------------------------------------------------  Electronically signed by Nataliya Marquez MD(Interpreting  physician) on 04/07/2021 06:22 AM  ----------------------------------------------------------------        Pertinent Labs:   CBC:   Recent Labs     04/06/21  1119 04/07/21  0231   WBC 10.1 8.4   HGB 9.8* 8.6*   * 348     BMP:    Recent Labs     04/06/21  1119 04/07/21  0231    138   K 5.1* 4.8    104   CO2 26 25   BUN 27* 26*   CREATININE 1.7* 1.6*   GLUCOSE 106 90     INR: No results for input(s): INR in the last 72 hours. Lipids: No results for input(s): CHOL, HDL in the last 72 hours. Invalid input(s): LDLCALCU  ABGs:No results for input(s): PHART, CQG1UPB, PO2ART, XVY9NMU, BEART, HGBAE, Q2KZQVDT, CARBOXHGBART, 02THERAPY in the last 72 hours.   HgBA1c:  No results for input(s): LABA1C in the last 72 hours. Procedures: Dual-chamber pacemaker implantation    Hospital Course: Admitted for elective dual-chamber pacemaker implantation due to recent high-grade or third-degree atrioventricular block noted on event recorder. He was brought to the Cath Lab initially we obtained access on the left chest under ultrasound guidance but during passage of the wire we identified the presence of a persistent left superior vena cava confirmed by contrast injection. Subsequently we abandon the left side and changed to a right-sided approach we obtained access on the right side again under ultrasound guidance and the procedure proceeded from there uneventfully. Patient was kept overnight today doing well wound healing appropriately no hematoma. Device interrogated functioning appropriately. Postprocedural chest x-ray shows no evidence of pneumothorax or other complications. I personally gave him wound care instructions. He will return to the pacemaker clinic in approximately 8 days for wound check and removal of staples. Now discharged in stable condition. Physical Exam:    Vital Signs: /78   Pulse 81   Temp 97.3 °F (36.3 °C) (Temporal)   Resp 18   Ht 5' 8\" (1.727 m)   Wt 228 lb 9 oz (103.7 kg)   SpO2 92%   BMI 34.75 kg/m²     Physical Exam      Discharge Medications:       Meghna Piedra.    Home Medication Instructions [facility]:[id number]    Printed on:04/07/21 1235   Medication Information                      aspirin 81 MG EC tablet  Take 1 tablet by mouth daily             atorvastatin (LIPITOR) 40 MG tablet  TAKE ONE (1) TABLET EVERY DAY             Cholecalciferol (VITAMIN D3) 1.25 MG (34144 UT) CAPS  Take by mouth             clopidogrel (PLAVIX) 75 MG tablet  TAKE ONE (1) TABLET BY MOUTH EVERY DAY              doxazosin (CARDURA) 2 MG tablet  Take 4 mg by mouth nightly              famotidine (PEPCID) 20 MG tablet  TAKE 1 TABLET TWICE DAILY             furosemide (LASIX) 20 MG tablet  Take 1 tablet by mouth daily             gabapentin (NEURONTIN) 100 MG capsule               HYDROcodone-acetaminophen (NORCO)  MG per tablet  Take 1 tablet by mouth every 6 hours as needed for Pain. lisinopril (PRINIVIL;ZESTRIL) 40 MG tablet  Take 40 mg by mouth daily             nitroGLYCERIN (NITROSTAT) 0.4 MG SL tablet  Place 0.4 mg under the tongue every 5 minutes as needed for Chest pain             venlafaxine (EFFEXOR) 75 MG tablet  TAKE ONE (1) TABLET EVERY DAY                 Discharge Instructions:   [facility]  [address] [place] [address],[address], [address] [address] [phone]  On 4/16/2021  1:30 pm Wound Check     Lesia Curry 25    On 5/20/2021  3:45 pm     Michael Tristan MD  [address]  [phone]    On 4/15/2021  Hospital follow-up appointment is scheduled for 4/15 at 3pm.        Take medications as directed. Resume activity as tolerated. Diet: DIET CARDIAC;      Disposition: Patient is medically stable and will be discharged *    Joyce Dsouza MD, 4/7/2021 12:39 PM

## 2021-04-07 NOTE — PLAN OF CARE
Problem: Falls - Risk of:  Goal: Will remain free from falls  Description: Will remain free from falls  4/7/2021 0855 by Tino Roberts RN  Outcome: Ongoing  4/6/2021 2346 by Amelia Olivares RN  Outcome: Ongoing  Goal: Absence of physical injury  Description: Absence of physical injury  4/7/2021 0855 by Tino Roberts RN  Outcome: Ongoing  4/6/2021 2346 by Amelia Olivares RN  Outcome: Ongoing     Problem: Infection:  Goal: Will remain free from infection  Description: Will remain free from infection  4/7/2021 0855 by Tino Roberts RN  Outcome: Ongoing  4/6/2021 2346 by Amelia Olivares RN  Outcome: Ongoing     Problem: Safety:  Goal: Free from accidental physical injury  Description: Free from accidental physical injury  4/7/2021 0855 by Tino Roberts RN  Outcome: Ongoing  4/6/2021 2346 by Amelia Alaniz RN  Outcome: Ongoing  Goal: Free from intentional harm  Description: Free from intentional harm  4/7/2021 0855 by Tino Roberts RN  Outcome: Ongoing  4/6/2021 2346 by Amelia Olivares RN  Outcome: Ongoing     Problem: Daily Care:  Goal: Daily care needs are met  Description: Daily care needs are met  4/7/2021 0855 by Tino Roberts RN  Outcome: Ongoing  4/6/2021 2346 by Amelia Olivares RN  Outcome: Ongoing     Problem: Pain:  Goal: Patient's pain/discomfort is manageable  Description: Patient's pain/discomfort is manageable  4/7/2021 0855 by Tino Roberts RN  Outcome: Ongoing  4/6/2021 2346 by Amelia Alaniz RN  Outcome: Ongoing  Goal: Pain level will decrease  Description: Pain level will decrease  Outcome: Ongoing  Goal: Control of acute pain  Description: Control of acute pain  Outcome: Ongoing  Goal: Control of chronic pain  Description: Control of chronic pain  Outcome: Ongoing     Problem: Skin Integrity:  Goal: Skin integrity will stabilize  Description: Skin integrity will stabilize  4/7/2021 0855 by Tino Roberts RN  Outcome: Ongoing  4/6/2021 2346 by Amelia Olivares RN  Outcome: Ongoing     Problem:

## 2021-04-08 ENCOUNTER — TELEPHONE (OUTPATIENT)
Dept: INTERNAL MEDICINE | Age: 76
End: 2021-04-08

## 2021-04-08 DIAGNOSIS — M54.50 CHRONIC MIDLINE LOW BACK PAIN WITHOUT SCIATICA: ICD-10-CM

## 2021-04-08 DIAGNOSIS — G89.29 CHRONIC MIDLINE LOW BACK PAIN WITHOUT SCIATICA: ICD-10-CM

## 2021-04-08 RX ORDER — HYDROCODONE BITARTRATE AND ACETAMINOPHEN 10; 325 MG/1; MG/1
1 TABLET ORAL EVERY 6 HOURS PRN
Qty: 12 TABLET | Refills: 0 | Status: SHIPPED | OUTPATIENT
Start: 2021-04-08 | End: 2021-04-15 | Stop reason: SDUPTHER

## 2021-04-08 NOTE — TELEPHONE ENCOUNTER
[address] [facility] Transitions Initial Follow Up Call    Outreach made within 2 business days of discharge: Yes    Patient: Soledad Lenz. Patient : 1945      MRN: [id number]    Reason for Admission: Admitted 21   Discharge Date: 21    Discharge Diagnoses: Active Problems:    Bradycardia     Spoke with: patient    Discharge department/facility: [facility]     TCM Interactive Patient Contact:  Was patient able to fill all prescriptions: Yes  Was patient instructed to bring all medications to the follow-up visit: Yes  Is patient taking all medications as directed in the discharge summary? Yes  Does patient understand their discharge instructions: Yes  Does patient have questions or concerns that need addressed prior to 7-14 day follow up office visit: no    I spoke with Mr. Marta Pelayo, he states he got his pacemaker yesterday. He states he is sore today, but feeling good. He reports he was not started on any new medications. He states he gets pain medication from Dr. Marylouise Brunner and has been taking it as needed to help with post op pain. His bowels and bladder are moving ok. His appetite is good. He denies any needs at this time and will follow up next week as scheduled.      Scheduled appointment with PCP within 7-14 days    Follow Up  Future Appointments   Date Time Provider Kasi Vargas   2021  1:30 PM SCHEDULE, CORKY CARDIAC DEVICE [facility]ORACIO [facility] [place]   2021  2:30 PM TREV ChandraY [facility]   2021  3:45 PM Teresa Ritter MD N [facility] Card[facility]       Yola Cowart MA

## 2021-04-08 NOTE — TELEPHONE ENCOUNTER
Sindy Wagner. called to request a refill on his medication. Last office visit : 3/23/2021   Next office visit : 4/15/2021     Last UDS:   Amphetamine Screen, Urine   Date Value Ref Range Status   04/23/2018 Negative  Final     Barbiturate Screen, Urine   Date Value Ref Range Status   04/23/2018 Negative  Final     Benzodiazepine Screen, Urine   Date Value Ref Range Status   04/23/2018 Negative  Final     Cocaine Metabolite Screen, Urine   Date Value Ref Range Status   04/23/2018 Negative  Final     MDMA, Urine   Date Value Ref Range Status   04/23/2018 Negative  Final     Methamphetamine, Urine   Date Value Ref Range Status   04/23/2018 Negative  Final     Opiate Scrn, Ur   Date Value Ref Range Status   04/23/2018 Positive  Final     Oxycodone Screen, Ur   Date Value Ref Range Status   04/23/2018 Negative  Final     PCP Screen, Urine   Date Value Ref Range Status   04/23/2018 Negative  Final     Propoxyphene Screen, Urine   Date Value Ref Range Status   04/23/2018 Negative  Final     Tricyclic Antidepressants, Urine   Date Value Ref Range Status   04/23/2018 Negative  Final       Last [facility] Neer: 2-8-21  Medication Contract: 4-23-18   Last Fill: 3-22-21    Requested Prescriptions     Pending Prescriptions Disp Refills    HYDROcodone-acetaminophen (NORCO)  MG per tablet 12 tablet 0     Sig: Take 1 tablet by mouth every 6 hours as needed for Pain for up to 3 days. Please approve or refuse this medication.    Diandra Lee MA

## 2021-04-09 DIAGNOSIS — F41.8 DEPRESSION WITH ANXIETY: ICD-10-CM

## 2021-04-09 RX ORDER — VENLAFAXINE 75 MG/1
TABLET ORAL
Qty: 90 TABLET | Refills: 2 | Status: SHIPPED | OUTPATIENT
Start: 2021-04-09 | End: 2022-04-26

## 2021-04-13 ENCOUNTER — TELEPHONE (OUTPATIENT)
Dept: FAMILY MEDICINE CLINIC | Age: 76
End: 2021-04-13

## 2021-04-15 DIAGNOSIS — G89.29 CHRONIC MIDLINE LOW BACK PAIN WITHOUT SCIATICA: ICD-10-CM

## 2021-04-15 DIAGNOSIS — M54.50 CHRONIC MIDLINE LOW BACK PAIN WITHOUT SCIATICA: ICD-10-CM

## 2021-04-15 RX ORDER — HYDROCODONE BITARTRATE AND ACETAMINOPHEN 10; 325 MG/1; MG/1
1 TABLET ORAL EVERY 6 HOURS PRN
Qty: 12 TABLET | Refills: 0 | Status: SHIPPED | OUTPATIENT
Start: 2021-04-15 | End: 2021-04-16

## 2021-04-15 NOTE — TELEPHONE ENCOUNTER
Mekhi Hernandez. called to request a refill on his medication. Last office visit : 3/23/2021   Next office visit : 4/16/2021     Last UDS:   Amphetamine Screen, Urine   Date Value Ref Range Status   04/23/2018 Negative  Final     Barbiturate Screen, Urine   Date Value Ref Range Status   04/23/2018 Negative  Final     Benzodiazepine Screen, Urine   Date Value Ref Range Status   04/23/2018 Negative  Final     Cocaine Metabolite Screen, Urine   Date Value Ref Range Status   04/23/2018 Negative  Final     MDMA, Urine   Date Value Ref Range Status   04/23/2018 Negative  Final     Methamphetamine, Urine   Date Value Ref Range Status   04/23/2018 Negative  Final     Opiate Scrn, Ur   Date Value Ref Range Status   04/23/2018 Positive  Final     Oxycodone Screen, Ur   Date Value Ref Range Status   04/23/2018 Negative  Final     PCP Screen, Urine   Date Value Ref Range Status   04/23/2018 Negative  Final     Propoxyphene Screen, Urine   Date Value Ref Range Status   04/23/2018 Negative  Final     Tricyclic Antidepressants, Urine   Date Value Ref Range Status   04/23/2018 Negative  Final       Last [facility]: 2-8-21  Medication Contract: 4-23-18   Last Fill: 3-22-21    Requested Prescriptions     Pending Prescriptions Disp Refills    HYDROcodone-acetaminophen (NORCO)  MG per tablet 12 tablet 0     Sig: Take 1 tablet by mouth every 6 hours as needed for Pain for up to 3 days. Please approve or refuse this medication.    Daisy Lira MA

## 2021-04-16 ENCOUNTER — TELEPHONE (OUTPATIENT)
Dept: FAMILY MEDICINE CLINIC | Age: 76
End: 2021-04-16

## 2021-04-16 ENCOUNTER — OFFICE VISIT (OUTPATIENT)
Dept: FAMILY MEDICINE CLINIC | Age: 76
End: 2021-04-16
Payer: MEDICARE

## 2021-04-16 ENCOUNTER — NURSE ONLY (OUTPATIENT)
Dept: CARDIOLOGY CLINIC | Age: 76
End: 2021-04-16

## 2021-04-16 VITALS
TEMPERATURE: 97 F | DIASTOLIC BLOOD PRESSURE: 84 MMHG | RESPIRATION RATE: 18 BRPM | WEIGHT: 228 LBS | OXYGEN SATURATION: 96 % | BODY MASS INDEX: 34.67 KG/M2 | SYSTOLIC BLOOD PRESSURE: 132 MMHG | HEART RATE: 71 BPM

## 2021-04-16 DIAGNOSIS — D64.9 ANEMIA, UNSPECIFIED TYPE: Primary | ICD-10-CM

## 2021-04-16 DIAGNOSIS — G89.29 CHRONIC MIDLINE LOW BACK PAIN WITHOUT SCIATICA: ICD-10-CM

## 2021-04-16 DIAGNOSIS — R00.1 SYMPTOMATIC BRADYCARDIA: ICD-10-CM

## 2021-04-16 DIAGNOSIS — M54.50 CHRONIC MIDLINE LOW BACK PAIN WITHOUT SCIATICA: ICD-10-CM

## 2021-04-16 DIAGNOSIS — Z51.89 VISIT FOR WOUND CHECK: Primary | ICD-10-CM

## 2021-04-16 DIAGNOSIS — K21.9 GASTROESOPHAGEAL REFLUX DISEASE WITHOUT ESOPHAGITIS: ICD-10-CM

## 2021-04-16 PROCEDURE — 1111F DSCHRG MED/CURRENT MED MERGE: CPT | Performed by: NURSE PRACTITIONER

## 2021-04-16 PROCEDURE — 99024 POSTOP FOLLOW-UP VISIT: CPT | Performed by: NURSE PRACTITIONER

## 2021-04-16 PROCEDURE — 99496 TRANSJ CARE MGMT HIGH F2F 7D: CPT | Performed by: NURSE PRACTITIONER

## 2021-04-16 RX ORDER — FAMOTIDINE 20 MG/1
20 TABLET, FILM COATED ORAL 2 TIMES DAILY
Qty: 180 TABLET | Refills: 2 | Status: SHIPPED | OUTPATIENT
Start: 2021-04-16 | End: 2022-05-19 | Stop reason: SDUPTHER

## 2021-04-16 RX ORDER — HYDROCODONE BITARTRATE AND ACETAMINOPHEN 10; 325 MG/1; MG/1
1 TABLET ORAL EVERY 6 HOURS PRN
Qty: 120 TABLET | Refills: 0 | Status: SHIPPED | OUTPATIENT
Start: 2021-04-16 | End: 2021-05-10 | Stop reason: SDUPTHER

## 2021-04-16 ASSESSMENT — ENCOUNTER SYMPTOMS
NAUSEA: 0
SHORTNESS OF BREATH: 0
SORE THROAT: 0
ABDOMINAL PAIN: 0
DIARRHEA: 0
WHEEZING: 0
COUGH: 0
BACK PAIN: 1
CHEST TIGHTNESS: 0

## 2021-04-16 NOTE — PROGRESS NOTES
Post-Discharge Transitional Care Management Services or Hospital Follow Up      Cristel Mclain YOB: 1945    Date of Office Visit:  4/16/2021  Date of Hospital Admission: 4/6/21  Date of Hospital Discharge: 4/7/21  Readmission Risk Score(high >=14%. Medium >=10%):Readmission Risk Score: 16      Care management risk score Rising risk (score 2-5) and Complex Care (Scores >=6): 6     Non face to face  following discharge, date last encounter closed (first attempt may have been earlier): 4/8/2021  1:50 PM 4/8/2021  1:50 PM    Call initiated 2 business days of discharge: Yes     Patient Active Problem List   Diagnosis    Depression    Sleep apnea    Carotid artery stenosis    AAA (abdominal aortic aneurysm) ([address]    Aortic dissection ([address]    Mixed hyperlipidemia    Cerebrovascular accident (CVA) due to embolism ([address]    Carotid artery occlusion    Obstructive sleep apnea syndrome    CKD (chronic kidney disease) stage 3, GFR 30-59 ml/min    Coronary artery disease involving native heart without angina pectoris    Cerebrovascular accident (CVA) due to embolism of right middle cerebral artery ([address]    Essential hypertension    Stenosis of right carotid artery    Injury of right hypoglossal nerve    Depression with anxiety    Hyperglycemia    Gastroesophageal reflux disease without esophagitis    Simple chronic bronchitis (HCC)    Chronic midline low back pain without sciatica    Recurrent major depressive disorder in partial remission ([address]    Hyperparathyroidism ([address]    Atherosclerosis of native artery of both lower extremities ([address]    Bilateral carotid artery stenosis    Acute on chronic renal insufficiency    COVID-19    Symptomatic bradycardia    Palliative care patient    Bradycardia       No Known Allergies    Medications listed as ordered at the time of discharge from [facility]   Masood Stahl.    Home Medication Instructions MARION:    Printed on:04/16/21 1506   Medication Information                      aspirin 81 MG EC tablet  Take 1 tablet by mouth daily             atorvastatin (LIPITOR) 40 MG tablet  TAKE ONE (1) TABLET EVERY DAY             Cholecalciferol (VITAMIN D3) 1.25 MG (24725 UT) CAPS  Take by mouth             clopidogrel (PLAVIX) 75 MG tablet  TAKE ONE (1) TABLET BY MOUTH EVERY DAY              doxazosin (CARDURA) 2 MG tablet  Take 4 mg by mouth nightly              famotidine (PEPCID) 20 MG tablet  Take 1 tablet by mouth 2 times daily             furosemide (LASIX) 20 MG tablet  Take 1 tablet by mouth daily             gabapentin (NEURONTIN) 100 MG capsule               HYDROcodone-acetaminophen (NORCO)  MG per tablet  Take 1 tablet by mouth every 6 hours as needed for Pain for up to 30 days. lisinopril (PRINIVIL;ZESTRIL) 40 MG tablet  Take 40 mg by mouth daily             nitroGLYCERIN (NITROSTAT) 0.4 MG SL tablet  Place 0.4 mg under the tongue every 5 minutes as needed for Chest pain             venlafaxine (EFFEXOR) 75 MG tablet  TAKE ONE (1) TABLET BY MOUTH EVERY DAY                   Medications marked \"taking\" at this time  Outpatient Medications Marked as Taking for the 4/16/21 encounter (Office Visit) with TREV Mendoza   Medication Sig Dispense Refill    HYDROcodone-acetaminophen (NORCO)  MG per tablet Take 1 tablet by mouth every 6 hours as needed for Pain for up to 30 days.  120 tablet 0    famotidine (PEPCID) 20 MG tablet Take 1 tablet by mouth 2 times daily 180 tablet 2    venlafaxine (EFFEXOR) 75 MG tablet TAKE ONE (1) TABLET BY MOUTH EVERY DAY 90 tablet 2    gabapentin (NEURONTIN) 100 MG capsule       furosemide (LASIX) 20 MG tablet Take 1 tablet by mouth daily 5 tablet 0    lisinopril (PRINIVIL;ZESTRIL) 40 MG tablet Take 40 mg by mouth daily      clopidogrel (PLAVIX) 75 MG tablet TAKE ONE (1) TABLET BY MOUTH EVERY DAY  (Patient taking differently: Take 75 mg by mouth daily ) 90 tablet 1    atorvastatin (LIPITOR) 40 MG tablet TAKE ONE (1) TABLET EVERY DAY (Patient taking differently: Take 40 mg by mouth daily ) 90 tablet 1    doxazosin (CARDURA) 2 MG tablet Take 4 mg by mouth nightly       aspirin 81 MG EC tablet Take 1 tablet by mouth daily 30 tablet 3        Medications patient taking as of now reconciled against medications ordered at time of hospital discharge: Yes    Chief Complaint   Patient presents with    Follow-Up from Hospital       HPI    Inpatient course: Discharge summary reviewed- see chart. Interval history/Current status:     He underwent pacemaker placement per Dr. 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 No complications. No medication changes. Metoprolol was discontinued prior to surgery. BP has been stable on current medication. He was seen at cardiology's office today, staples removed. He has had fatigue which is stable. He has chronic anemia which worsened slightly following surgery. Hgb 8.6 at d/c.    CKD III stable, cr 1.6, GFR 42. He has chronic lower back pain. He is taking hydrocodone 10 mg qid. He tolerates well without adverse effect. He has had discussions about back surgery but would not be an optimal candidate due to age, ZION, COPD. He has not been to pain mgt in the past.   Needs hydrocodone refilled. Recent rx only sent for 3 days. He has not received his usual 30 day supply. GERD is stable on pepcid, needs refill. Review of Systems   Constitutional: Positive for fatigue. Negative for chills and fever. HENT: Negative for congestion, ear pain and sore throat. Respiratory: Negative for cough, chest tightness, shortness of breath and wheezing. Cardiovascular: Negative for chest pain. Gastrointestinal: Negative for abdominal pain, diarrhea and nausea. Musculoskeletal: Positive for back pain (chronic, stable). Negative for arthralgias and myalgias. Skin: Negative for rash.        Vitals:    04/16/21 1434   BP: 132/84   Pulse: 71 Resp: 18   Temp: 97 °F (36.1 °C)   TempSrc: Temporal   SpO2: 96%   Weight: 228 lb (103.4 kg)     Body mass index is 34.67 kg/m². Wt Readings from Last 3 Encounters:   04/16/21 228 lb (103.4 kg)   04/07/21 228 lb 9 oz (103.7 kg)   03/23/21 230 lb (104.3 kg)     BP Readings from Last 3 Encounters:   04/16/21 132/84   04/07/21 130/78   03/23/21 128/74       Physical Exam  Vitals signs reviewed. Constitutional:       General: He is not in acute distress. Appearance: Normal appearance. He is well-developed. HENT:      Head: Normocephalic. Eyes:      Conjunctiva/sclera: Conjunctivae normal.      Pupils: Pupils are equal, round, and reactive to light. Neck:      Musculoskeletal: Normal range of motion and neck supple. Thyroid: No thyromegaly. Vascular: No carotid bruit or JVD. Trachea: No tracheal deviation. Cardiovascular:      Rate and Rhythm: Normal rate and regular rhythm. Heart sounds: Normal heart sounds. No murmur. Pulmonary:      Effort: Pulmonary effort is normal. No respiratory distress. Breath sounds: Normal breath sounds. Musculoskeletal: Normal range of motion. Lymphadenopathy:      Cervical: No cervical adenopathy. Skin:     General: Skin is warm and dry. Findings: No rash. Neurological:      Mental Status: He is alert. Psychiatric:         Mood and Affect: Mood normal.         Behavior: Behavior normal.         Thought Content: Thought content normal.             Assessment/Plan:  1. Symptomatic bradycardia  -s/p pacemaker, continue routine f/u with cardiology    2. Chronic midline low back pain without sciatica  -Stable, refill usual 30 day supply of hydrocodone today. Discussed that he is not an optimal surgical candidate. Discussed pain mgt briefly, possible injections. He is not wanting to do anything right now. He will discuss further with Dr. Gayathri Jang at next visit.   - HYDROcodone-acetaminophen ([address])  MG per tablet;  Take 1 tablet by mouth every 6 hours as needed for Pain for up to 30 days. Dispense: 120 tablet; Refill: 0    3. Gastroesophageal reflux disease without esophagitis  -Stable on pepcid, continue current dose. - famotidine (PEPCID) 20 MG tablet; Take 1 tablet by mouth 2 times daily  Dispense: 180 tablet; Refill: 2    4. Anemia, unspecified type  -Chronic, slightly lower following surgery.   We will have [facility] check a cbc and bmp in 2 weeks (Yas GARCIA in [facility])    F/U with Dr. Portillo Naranjo in 3 months        Medical Decision Making: high complexity

## 2021-04-16 NOTE — TELEPHONE ENCOUNTER
Forgot to have you look in his ear today he has a stopped up ear and was wondering if there is anything you can send in for him. Please advise?

## 2021-04-16 NOTE — TELEPHONE ENCOUNTER
Please see if HH can check a cbc and bmp in 2 weeks. [facility] in [place], [place] is seeing him.

## 2021-04-16 NOTE — TELEPHONE ENCOUNTER
Without knowing what is going on we don't really want to send drops or an antibiotic. It could be wax build up as well.

## 2021-04-19 NOTE — PROCEDURES
Cardiac Dual Chamber Insert - 28200  Placement Operative Report    [facility]  [id number]  4/19/2021    Surgeon: Chi Stevens    Anesthesia: Intravenous sedation and local anesthetic    Procedure(s):   1. Dual Chamber Insert - [id number] Flouroscopy  2. Monitoring of conscious sedation      Indications:  1. Complete atrioventricular block      Procedure Details  The risks, benefits, complications, treatment options, and expected outcomes were discussed with the patient. The patient and/or family concurred with the proposed plan, giving informed consent. Patient was prepped and draped in the usual strict sterile fashion. After the antibiotic was completely infused, 30 cc 2% xylocaine was infiltrated into the left Infraclavicular area. Venous access obtained utilizing a micropuncture needle under ultrasonographic guidance and the micropuncture wire was advanced followed by the dilator. The wire was then exchanged for a standard 0.0.35 wire and then the dilator was removed. Next, an incision was made adjacent to the wire entry site. Utilizing sharp and blunt dissection a pocket was then created down to the prepectoralis fascia. The guidewire was identified, freed from the surrounding subcutaneous tissue, and delivered into the operative field. Next an 7french sheath and dilator was advanced over the wire then the dilator was removed. Another similar wire was advanced into the sheath then the sheath was removed leaving two wires in place in the subclavian vein. Next, the 7french sheath and dilators were advanced over their respective wires into the subclavian vein. The dilator and wires were then removed. Next the atrial and ventricular leads were inserted into their respective sheaths   And advanced into the right atrium and ventricle where the leads were positioned under fluoroscopic guidance. The sheaths were peeled away and removed. Appropriate sensing and thresholds were obtained.  No diaphragmatic pacing occurred at 10 V and 1.5 ms. The active fixation mechanisms were extended. Next, the leads were then sutured utilizing 2-0 ethibond sutures. Lead measurements were then rechecked. After having assured adequate hemostasis the leads were connected to the pulse generator and the pulse generator and leads were placed in the pocket. The pocket was copiously irrigated utilizing antibiotic solution. The pacemaker and leads system were visualized under fluoroscopy. Appropriate redundancy/slack in the leads were noted. The pins of the leads were beyond the set screws. Hemostasis was reverified. The pocket was then closed using 2-0 Vicryl for the deep layer and 3-0 Vicryl for the middle layer. Surgical staples were then applied to the skin and sterile dressing was applied. At the end of the procedure instrument, needle, and sponge counts were correct. An arm immobilizer was applied at this point. An independent trained observer administered medications under my direction. The patient was continuously monitored with respect to level of consciousness, and vital signs/physiologic status throughout the case. For further details regarding specific medications and doses please refer to Cath Lab procedural notes. Anesthesia start time:1422  Anesthesia stop time:1615      Pacemaker Data:     Atrial lead   Medtronic  Model   8725-31   serial #   [id number]  Volt    1.0 V    PW    0.4 ms    Current   NA   ma       Impedance:   456   ohms        Slew rate:   NA   V/sec  P wave:   2.6 mv    Right Ventricular lead    Medtronic  Model   2415-70   serial #   [id number]  Volt    0.75    V     PW    0.4 ms     Current   NA   ma    I  Impedance:   703   ohms       Slew rate:   NA   V/sec  R wave:   7.8 mv      Generator  Medtronic  Model   [id number]  Serial   [id number]      Estimated Blood Loss:  Minimal         Complications:  None; patient tolerated the procedure well.            Disposition: Admitted to progressive care unit           Condition: stable      Tran Carmichael MD 4/19/2021 8:42 AM

## 2021-04-22 ENCOUNTER — TELEPHONE (OUTPATIENT)
Dept: FAMILY MEDICINE CLINIC | Age: 76
End: 2021-04-22

## 2021-04-26 DIAGNOSIS — Z95.0 PACEMAKER: Primary | ICD-10-CM

## 2021-04-26 DIAGNOSIS — R00.1 SYMPTOMATIC BRADYCARDIA: ICD-10-CM

## 2021-04-26 DIAGNOSIS — I44.2 COMPLETE ATRIOVENTRICULAR BLOCK (HCC): ICD-10-CM

## 2021-05-02 RX ORDER — LISINOPRIL 40 MG/1
TABLET ORAL
Qty: 90 TABLET | Refills: 1 | Status: ON HOLD | OUTPATIENT
Start: 2021-05-02 | End: 2021-07-29 | Stop reason: HOSPADM

## 2021-05-10 DIAGNOSIS — G89.29 CHRONIC MIDLINE LOW BACK PAIN WITHOUT SCIATICA: ICD-10-CM

## 2021-05-10 DIAGNOSIS — M54.50 CHRONIC MIDLINE LOW BACK PAIN WITHOUT SCIATICA: ICD-10-CM

## 2021-05-10 RX ORDER — HYDROCODONE BITARTRATE AND ACETAMINOPHEN 10; 325 MG/1; MG/1
1 TABLET ORAL EVERY 6 HOURS PRN
Qty: 120 TABLET | Refills: 0 | Status: SHIPPED | OUTPATIENT
Start: 2021-05-10 | End: 2021-06-07 | Stop reason: SDUPTHER

## 2021-05-10 NOTE — TELEPHONE ENCOUNTER
Amberly . called to request a refill on his medication. Last office visit : 4/16/2021   Next office visit : 7/19/2021     Last UDS:   Amphetamine Screen, Urine   Date Value Ref Range Status   04/23/2018 Negative  Final     Barbiturate Screen, Urine   Date Value Ref Range Status   04/23/2018 Negative  Final     Benzodiazepine Screen, Urine   Date Value Ref Range Status   04/23/2018 Negative  Final     Cocaine Metabolite Screen, Urine   Date Value Ref Range Status   04/23/2018 Negative  Final     MDMA, Urine   Date Value Ref Range Status   04/23/2018 Negative  Final     Methamphetamine, Urine   Date Value Ref Range Status   04/23/2018 Negative  Final     Opiate Scrn, Ur   Date Value Ref Range Status   04/23/2018 Positive  Final     Oxycodone Screen, Ur   Date Value Ref Range Status   04/23/2018 Negative  Final     PCP Screen, Urine   Date Value Ref Range Status   04/23/2018 Negative  Final     Propoxyphene Screen, Urine   Date Value Ref Range Status   04/23/2018 Negative  Final     Tricyclic Antidepressants, Urine   Date Value Ref Range Status   04/23/2018 Negative  Final       Last Akash Davon: 5-10-21  Medication Contract: 4-23-18  Last Fill: 4-16-21    Requested Prescriptions     Pending Prescriptions Disp Refills    HYDROcodone-acetaminophen (NORCO)  MG per tablet 120 tablet 0     Sig: Take 1 tablet by mouth every 6 hours as needed for Pain for up to 30 days. Please approve or refuse this medication.    Dang Dewitt MA

## 2021-05-12 DIAGNOSIS — E78.01 FAMILIAL HYPERCHOLESTEROLEMIA: ICD-10-CM

## 2021-05-13 RX ORDER — ATORVASTATIN CALCIUM 40 MG/1
TABLET, FILM COATED ORAL
Qty: 90 TABLET | Refills: 3 | Status: SHIPPED | OUTPATIENT
Start: 2021-05-13 | End: 2022-06-06

## 2021-05-20 ENCOUNTER — OFFICE VISIT (OUTPATIENT)
Dept: CARDIOLOGY CLINIC | Age: 76
End: 2021-05-20

## 2021-05-20 VITALS
DIASTOLIC BLOOD PRESSURE: 80 MMHG | HEIGHT: 68 IN | HEART RATE: 70 BPM | WEIGHT: 220 LBS | BODY MASS INDEX: 33.34 KG/M2 | SYSTOLIC BLOOD PRESSURE: 130 MMHG

## 2021-05-20 DIAGNOSIS — Q26.1 PERSISTENT LEFT SUPERIOR VENA CAVA: ICD-10-CM

## 2021-05-20 DIAGNOSIS — I10 ESSENTIAL HYPERTENSION: ICD-10-CM

## 2021-05-20 DIAGNOSIS — E78.2 MIXED HYPERLIPIDEMIA: ICD-10-CM

## 2021-05-20 DIAGNOSIS — Z95.0 PACEMAKER: ICD-10-CM

## 2021-05-20 DIAGNOSIS — I44.2 COMPLETE ATRIOVENTRICULAR BLOCK (HCC): Primary | ICD-10-CM

## 2021-05-20 DIAGNOSIS — I25.10 CORONARY ARTERY DISEASE INVOLVING NATIVE CORONARY ARTERY OF NATIVE HEART WITHOUT ANGINA PECTORIS: ICD-10-CM

## 2021-05-20 DIAGNOSIS — R00.1 BRADYCARDIA: ICD-10-CM

## 2021-05-20 PROCEDURE — 4040F PNEUMOC VAC/ADMIN/RCVD: CPT | Performed by: INTERNAL MEDICINE

## 2021-05-20 PROCEDURE — 99024 POSTOP FOLLOW-UP VISIT: CPT | Performed by: INTERNAL MEDICINE

## 2021-05-20 PROCEDURE — 1123F ACP DISCUSS/DSCN MKR DOCD: CPT | Performed by: INTERNAL MEDICINE

## 2021-05-20 PROCEDURE — G8427 DOCREV CUR MEDS BY ELIG CLIN: HCPCS | Performed by: INTERNAL MEDICINE

## 2021-05-20 PROCEDURE — 4004F PT TOBACCO SCREEN RCVD TLK: CPT | Performed by: INTERNAL MEDICINE

## 2021-05-20 PROCEDURE — G8417 CALC BMI ABV UP PARAM F/U: HCPCS | Performed by: INTERNAL MEDICINE

## 2021-05-20 ASSESSMENT — ENCOUNTER SYMPTOMS
EYES NEGATIVE: 1
DIARRHEA: 0
RESPIRATORY NEGATIVE: 1
SHORTNESS OF BREATH: 0
GASTROINTESTINAL NEGATIVE: 1
NAUSEA: 0
VOMITING: 0

## 2021-05-20 NOTE — PROGRESS NOTES
[facility] Progress Note                            Date:  5/20/2021  Patient: Noemi Machuca. Age:  64 y.o., 1945      Reason for evaluation:         SUBJECTIVE:    Recently admitted 4/6/2021 for elective dual-chamber pacemaker implantation implanted from the right side due to the fact that he had a persistent left superior vena cava. The wound is healed uneventfully. Returns today for follow-up assessment pacemaker interrogation functioning appropriately. Denies chest pain or dyspnea he has good days and bad days suffering from a head cold at the present time. Cardiac wise seems to be doing reasonably well. Blood pressure 130/80 heart 70. No other issues or complaints reported. Review of Systems   Constitutional: Negative. Negative for chills, fever and unexpected weight change. HENT: Negative. Eyes: Negative. Respiratory: Negative. Negative for shortness of breath. Cardiovascular: Negative. Negative for chest pain. Gastrointestinal: Negative. Negative for diarrhea, nausea and vomiting. Endocrine: Negative. Genitourinary: Negative. Musculoskeletal: Negative. Skin: Negative. Neurological: Negative. All other systems reviewed and are negative. OBJECTIVE:     /80   Pulse 70   Ht 5' 8\" (1.727 m)   Wt 220 lb (99.8 kg)   BMI 33.45 kg/m²     Labs:   CBC: No results for input(s): WBC, HGB, HCT, PLT in the last 72 hours. BMP:No results for input(s): NA, K, CO2, BUN, CREATININE, LABGLOM, GLUCOSE in the last 72 hours. BNP: No results for input(s): BNP in the last 72 hours. PT/INR: No results for input(s): PROTIME, INR in the last 72 hours. APTT:No results for input(s): APTT in the last 72 hours. CARDIAC ENZYMES:No results for input(s): CKTOTAL, CKMB, CKMBINDEX, TROPONINI in the last 72 hours.   FASTING LIPID PANEL:  Lab Results   Component Value Date    HDL 25 02/16/2021    LDLDIRECT 71 02/11/2016    LDLCALC 20 02/16/2021    TRIG 208 balloon; completion abd aortogram w bilat iliofem arteriogram;     VASCULAR SURGERY  10/29/ [facility]     (cont) open repair of right common femoral ateriotomy; perclose repair of left common femoral artery puncture site. Family History   Problem Relation Age of Onset    High Blood Pressure Father     High Blood Pressure Mother     Diabetes Mother     Colon Polyps Brother     Cancer Brother     Colon Cancer Neg Hx      No Known Allergies  Current Outpatient Medications   Medication Sig Dispense Refill    atorvastatin (LIPITOR) 40 MG tablet TAKE ONE (1) TABLET EVERY DAY 90 tablet 3    HYDROcodone-acetaminophen (NORCO)  MG per tablet Take 1 tablet by mouth every 6 hours as needed for Pain for up to 30 days. 120 tablet 0    lisinopril (PRINIVIL;ZESTRIL) 40 MG tablet TAKE 1 TABLET BY MOUTH EVERY DAY  90 tablet 1    famotidine (PEPCID) 20 MG tablet Take 1 tablet by mouth 2 times daily 180 tablet 2    venlafaxine (EFFEXOR) 75 MG tablet TAKE ONE (1) TABLET BY MOUTH EVERY DAY 90 tablet 2    gabapentin (NEURONTIN) 100 MG capsule       Cholecalciferol (VITAMIN D3) 1.25 MG (66065 UT) CAPS Take by mouth      furosemide (LASIX) 20 MG tablet Take 1 tablet by mouth daily 5 tablet 0    clopidogrel (PLAVIX) 75 MG tablet TAKE ONE (1) TABLET BY MOUTH EVERY DAY  (Patient taking differently: Take 75 mg by mouth daily ) 90 tablet 1    doxazosin (CARDURA) 2 MG tablet Take 4 mg by mouth nightly       aspirin 81 MG EC tablet Take 1 tablet by mouth daily 30 tablet 3    nitroGLYCERIN (NITROSTAT) 0.4 MG SL tablet Place 0.4 mg under the tongue every 5 minutes as needed for Chest pain       No current facility-administered medications for this visit.      Social History     Socioeconomic History    Marital status:      Spouse name: Not on file    Number of children: Not on file    Years of education: Not on file    Highest education level: Not on file   Occupational History    Not on file   Tobacco Use    Smoking status: Current Every Day Smoker     Packs/day: 0.50     Years: 40.00     Pack years: 20.00    Smokeless tobacco: Never Used    Tobacco comment: quit smoking in nov 2017, restarted smoking since then   Vaping Use    Vaping Use: Never used   Substance and Sexual Activity    Alcohol use: No    Drug use: No    Sexual activity: Not on file   Other Topics Concern    Not on file   Social History Narrative     50+ yearsHe has 1 son and 1 daughterWorks for a careersmore companyPreviously [address] active duty 4-1/2 yearsHe does not go to the [employer] high school 2 years of collegeEn[employer] hunting and [employer] alcohol or substance usage he does smoke 1 pack/dayPhysically sedentary     Social Determinants of Health     Financial Resource Strain:     Difficulty of Paying Living Expenses:    Food Insecurity:     Worried About Running Out of Food in the Last Year:     Ran Out of Food in the Last Year:    Transportation Needs:     Lack of Transportation (Medical):  Lack of Transportation (Non-Medical):    Physical Activity:     Days of Exercise per Week:     Minutes of Exercise per Session:    Stress:     Feeling of Stress :    Social Connections:     Frequency of Communication with Friends and Family:     Frequency of Social Gatherings with Friends and Family:     Attends Islam Services:     Active Member of Clubs or Organizations:     Attends Club or Organization Meetings:     Marital Status:    Intimate Partner Violence:     Fear of Current or Ex-Partner:     Emotionally Abused:     Physically Abused:     Sexually Abused:        Physical Examination:  /80   Pulse 70   Ht 5' 8\" (1.727 m)   Wt 220 lb (99.8 kg)   BMI 33.45 kg/m²   Physical Exam  Constitutional:       Appearance: He is well-developed. Neck:      Vascular: No carotid bruit or JVD. Cardiovascular:      Rate and Rhythm: Normal rate and regular rhythm.       Heart sounds: Normal heart sounds. No murmur heard. No friction rub. No gallop. Pulmonary:      Effort: Pulmonary effort is normal. No respiratory distress. Breath sounds: Normal breath sounds. No wheezing or rales. Abdominal:      General: There is no distension. Tenderness: There is no abdominal tenderness. Lymphadenopathy:      Cervical: No cervical adenopathy. Skin:     General: Skin is warm and dry. ASSESSMENT:     Diagnosis Orders   1. Complete atrioventricular block ([address]     2. Bradycardia     3. Mixed hyperlipidemia     4. Essential hypertension     5. Coronary artery disease involving native coronary artery of native heart without angina pectoris     6. Persistent left superior vena cava     7. Pacemaker         PLAN:  No orders of the defined types were placed in this encounter. No orders of the defined types were placed in this encounter. 1. Continue present medications  2. Recommend follow-up assessment in 6 months    Return for return to Dr. Shante Nix only. Yulissa Crawford MD 5/20/2021 4:02 PM CDT    [facility]      Thisdictation was generated by voice recognition computer software. Although all attempts are made to edit the dictation for accuracy, there may be errors in the transcription that are not intended.

## 2021-06-02 NOTE — TELEPHONE ENCOUNTER
----- Message from Ruth Wellington MD sent at 4/11/2018 12:03 PM CDT -----  Please let patient know that the polyps removed from the colon did not have any cancer. They no longer pose a threat to you since there were completely removed.  However, in the future, it is possible that additional polyps might grow.  For this reason, we will repeat colonoscopy in 5 years as planned.    If you have any questions, please call our office.    Sincerely,        Ruth Wellington MD   PROCEDURE  CriticalCare Time  Performed by: Jackelin Arroyo MD  Authorized by: Jackelin Arroyo MD     Critical care provider statement:     Critical care time (minutes):  35    Critical care time was exclusive of:  Separately billable procedures and treating other patients and teaching time    Critical care was necessary to treat or prevent imminent or life-threatening deterioration of the following conditions:  Sepsis    Critical care was time spent personally by me on the following activities:  Blood draw for specimens, obtaining history from patient or surrogate, development of treatment plan with patient or surrogate, discussions with consultants, evaluation of patient's response to treatment, examination of patient, interpretation of cardiac output measurements, ordering and performing treatments and interventions, ordering and review of laboratory studies, ordering and review of radiographic studies, re-evaluation of patient's condition and review of old Barrington Tatum MD  06/02/21 1916

## 2021-06-07 DIAGNOSIS — M54.50 CHRONIC MIDLINE LOW BACK PAIN WITHOUT SCIATICA: ICD-10-CM

## 2021-06-07 DIAGNOSIS — G89.29 CHRONIC MIDLINE LOW BACK PAIN WITHOUT SCIATICA: ICD-10-CM

## 2021-06-07 RX ORDER — HYDROCODONE BITARTRATE AND ACETAMINOPHEN 10; 325 MG/1; MG/1
1 TABLET ORAL EVERY 6 HOURS PRN
Qty: 120 TABLET | Refills: 0 | Status: SHIPPED | OUTPATIENT
Start: 2021-06-07 | End: 2021-07-08 | Stop reason: SDUPTHER

## 2021-07-08 DIAGNOSIS — M54.50 CHRONIC MIDLINE LOW BACK PAIN WITHOUT SCIATICA: ICD-10-CM

## 2021-07-08 DIAGNOSIS — G89.29 CHRONIC MIDLINE LOW BACK PAIN WITHOUT SCIATICA: ICD-10-CM

## 2021-07-08 RX ORDER — HYDROCODONE BITARTRATE AND ACETAMINOPHEN 10; 325 MG/1; MG/1
1 TABLET ORAL EVERY 6 HOURS PRN
Qty: 120 TABLET | Refills: 0 | Status: ON HOLD | OUTPATIENT
Start: 2021-07-08 | End: 2021-07-29 | Stop reason: SDUPTHER

## 2021-07-08 NOTE — TELEPHONE ENCOUNTER
[address] Way. called to request a refill on his medication. Last office visit : 4/16/2021   Next office visit : 7/19/2021     Last UDS:   Amphetamine Screen, Urine   Date Value Ref Range Status   04/23/2018 Negative  Final     Barbiturate Screen, Urine   Date Value Ref Range Status   04/23/2018 Negative  Final     Benzodiazepine Screen, Urine   Date Value Ref Range Status   04/23/2018 Negative  Final     Cocaine Metabolite Screen, Urine   Date Value Ref Range Status   04/23/2018 Negative  Final     MDMA, Urine   Date Value Ref Range Status   04/23/2018 Negative  Final     Methamphetamine, Urine   Date Value Ref Range Status   04/23/2018 Negative  Final     Opiate Scrn, Ur   Date Value Ref Range Status   04/23/2018 Positive  Final     Oxycodone Screen, Ur   Date Value Ref Range Status   04/23/2018 Negative  Final     PCP Screen, Urine   Date Value Ref Range Status   04/23/2018 Negative  Final     Propoxyphene Screen, Urine   Date Value Ref Range Status   04/23/2018 Negative  Final     Tricyclic Antidepressants, Urine   Date Value Ref Range Status   04/23/2018 Negative  Final       Last Anne Moya: 5-10-21  Medication Contract: Due   Last Fill: 6-7-21    Requested Prescriptions     Pending Prescriptions Disp Refills    HYDROcodone-acetaminophen (NORCO)  MG per tablet 120 tablet 0     Sig: Take 1 tablet by mouth every 6 hours as needed for Pain for up to 30 days. Please approve or refuse this medication.    Hafsa Ruano MA

## 2021-07-19 ENCOUNTER — OFFICE VISIT (OUTPATIENT)
Dept: FAMILY MEDICINE CLINIC | Age: 76
End: 2021-07-19
Payer: MEDICARE

## 2021-07-19 ENCOUNTER — HOSPITAL ENCOUNTER (OUTPATIENT)
Dept: GENERAL RADIOLOGY | Age: 76
Discharge: HOME | End: 2021-07-19
Payer: MEDICARE

## 2021-07-19 VITALS
WEIGHT: 227 LBS | SYSTOLIC BLOOD PRESSURE: 132 MMHG | HEART RATE: 88 BPM | TEMPERATURE: 97 F | OXYGEN SATURATION: 99 % | DIASTOLIC BLOOD PRESSURE: 86 MMHG | BODY MASS INDEX: 34.52 KG/M2

## 2021-07-19 DIAGNOSIS — I65.23 BILATERAL CAROTID ARTERY STENOSIS: ICD-10-CM

## 2021-07-19 DIAGNOSIS — I25.10 CORONARY ARTERY DISEASE INVOLVING NATIVE CORONARY ARTERY OF NATIVE HEART WITHOUT ANGINA PECTORIS: ICD-10-CM

## 2021-07-19 DIAGNOSIS — E83.52 HYPERCALCEMIA: ICD-10-CM

## 2021-07-19 DIAGNOSIS — J01.00 ACUTE NON-RECURRENT MAXILLARY SINUSITIS: ICD-10-CM

## 2021-07-19 DIAGNOSIS — R53.83 OTHER FATIGUE: ICD-10-CM

## 2021-07-19 DIAGNOSIS — R05.9 COUGH: ICD-10-CM

## 2021-07-19 DIAGNOSIS — K21.9 GASTROESOPHAGEAL REFLUX DISEASE WITHOUT ESOPHAGITIS: ICD-10-CM

## 2021-07-19 DIAGNOSIS — I70.203 ATHEROSCLEROSIS OF NATIVE ARTERY OF BOTH LOWER EXTREMITIES, WITH UNSPECIFIED PRESENCE OF CLINICAL MANIFESTATION (HCC): ICD-10-CM

## 2021-07-19 DIAGNOSIS — R73.9 HYPERGLYCEMIA: ICD-10-CM

## 2021-07-19 DIAGNOSIS — M54.50 CHRONIC MIDLINE LOW BACK PAIN WITHOUT SCIATICA: ICD-10-CM

## 2021-07-19 DIAGNOSIS — E78.2 MIXED HYPERLIPIDEMIA: ICD-10-CM

## 2021-07-19 DIAGNOSIS — I71.40 ABDOMINAL AORTIC ANEURYSM (AAA) WITHOUT RUPTURE: ICD-10-CM

## 2021-07-19 DIAGNOSIS — F33.41 RECURRENT MAJOR DEPRESSIVE DISORDER IN PARTIAL REMISSION (HCC): ICD-10-CM

## 2021-07-19 DIAGNOSIS — D64.9 ANEMIA, UNSPECIFIED TYPE: ICD-10-CM

## 2021-07-19 DIAGNOSIS — J41.0 SIMPLE CHRONIC BRONCHITIS (HCC): ICD-10-CM

## 2021-07-19 DIAGNOSIS — I63.40 CEREBROVASCULAR ACCIDENT (CVA) DUE TO EMBOLISM OF CEREBRAL ARTERY (HCC): ICD-10-CM

## 2021-07-19 DIAGNOSIS — Z00.00 ANNUAL PHYSICAL EXAM: Primary | ICD-10-CM

## 2021-07-19 DIAGNOSIS — C49.A2 GASTROINTESTINAL STROMAL TUMOR OF STOMACH (HCC): ICD-10-CM

## 2021-07-19 DIAGNOSIS — E21.3 HYPERPARATHYROIDISM (HCC): ICD-10-CM

## 2021-07-19 DIAGNOSIS — G89.29 CHRONIC MIDLINE LOW BACK PAIN WITHOUT SCIATICA: ICD-10-CM

## 2021-07-19 DIAGNOSIS — F41.8 DEPRESSION WITH ANXIETY: ICD-10-CM

## 2021-07-19 DIAGNOSIS — I10 ESSENTIAL HYPERTENSION: ICD-10-CM

## 2021-07-19 DIAGNOSIS — R00.1 SYMPTOMATIC BRADYCARDIA: ICD-10-CM

## 2021-07-19 DIAGNOSIS — N18.30 STAGE 3 CHRONIC KIDNEY DISEASE, UNSPECIFIED WHETHER STAGE 3A OR 3B CKD (HCC): ICD-10-CM

## 2021-07-19 PROCEDURE — G0439 PPPS, SUBSEQ VISIT: HCPCS | Performed by: FAMILY MEDICINE

## 2021-07-19 PROCEDURE — 96372 THER/PROPH/DIAG INJ SC/IM: CPT | Performed by: FAMILY MEDICINE

## 2021-07-19 PROCEDURE — 3023F SPIROM DOC REV: CPT | Performed by: FAMILY MEDICINE

## 2021-07-19 PROCEDURE — 99214 OFFICE O/P EST MOD 30 MIN: CPT | Performed by: FAMILY MEDICINE

## 2021-07-19 PROCEDURE — 71046 X-RAY EXAM CHEST 2 VIEWS: CPT

## 2021-07-19 PROCEDURE — 4004F PT TOBACCO SCREEN RCVD TLK: CPT | Performed by: FAMILY MEDICINE

## 2021-07-19 PROCEDURE — 1123F ACP DISCUSS/DSCN MKR DOCD: CPT | Performed by: FAMILY MEDICINE

## 2021-07-19 PROCEDURE — G8926 SPIRO NO PERF OR DOC: HCPCS | Performed by: FAMILY MEDICINE

## 2021-07-19 PROCEDURE — 4040F PNEUMOC VAC/ADMIN/RCVD: CPT | Performed by: FAMILY MEDICINE

## 2021-07-19 PROCEDURE — G8417 CALC BMI ABV UP PARAM F/U: HCPCS | Performed by: FAMILY MEDICINE

## 2021-07-19 PROCEDURE — G8427 DOCREV CUR MEDS BY ELIG CLIN: HCPCS | Performed by: FAMILY MEDICINE

## 2021-07-19 RX ORDER — FLUTICASONE PROPIONATE 50 MCG
1 SPRAY, SUSPENSION (ML) NASAL DAILY PRN
COMMUNITY

## 2021-07-19 RX ORDER — FUROSEMIDE 40 MG/1
40 TABLET ORAL DAILY
Qty: 5 TABLET | Refills: 0 | Status: ON HOLD | OUTPATIENT
Start: 2021-07-19 | End: 2021-07-29 | Stop reason: HOSPADM

## 2021-07-19 RX ORDER — CEFTRIAXONE 1 G/1
1000 INJECTION, POWDER, FOR SOLUTION INTRAMUSCULAR; INTRAVENOUS ONCE
Status: COMPLETED | OUTPATIENT
Start: 2021-07-19 | End: 2021-07-19

## 2021-07-19 RX ADMIN — CEFTRIAXONE 1000 MG: 1 INJECTION, POWDER, FOR SOLUTION INTRAMUSCULAR; INTRAVENOUS at 16:19

## 2021-07-19 ASSESSMENT — LIFESTYLE VARIABLES
HOW OFTEN DURING THE LAST YEAR HAVE YOU NEEDED AN ALCOHOLIC DRINK FIRST THING IN THE MORNING TO GET YOURSELF GOING AFTER A NIGHT OF HEAVY DRINKING: 0
HOW OFTEN DURING THE LAST YEAR HAVE YOU FAILED TO DO WHAT WAS NORMALLY EXPECTED FROM YOU BECAUSE OF DRINKING: 0
HOW MANY STANDARD DRINKS CONTAINING ALCOHOL DO YOU HAVE ON A TYPICAL DAY: 0
AUDIT TOTAL SCORE: 1
HOW OFTEN DURING THE LAST YEAR HAVE YOU FOUND THAT YOU WERE NOT ABLE TO STOP DRINKING ONCE YOU HAD STARTED: 0
HAS A RELATIVE, FRIEND, DOCTOR, OR ANOTHER HEALTH PROFESSIONAL EXPRESSED CONCERN ABOUT YOUR DRINKING OR SUGGESTED YOU CUT DOWN: 0
AUDIT-C TOTAL SCORE: 1
HOW OFTEN DURING THE LAST YEAR HAVE YOU BEEN UNABLE TO REMEMBER WHAT HAPPENED THE NIGHT BEFORE BECAUSE YOU HAD BEEN DRINKING: 0
HOW OFTEN DURING THE LAST YEAR HAVE YOU HAD A FEELING OF GUILT OR REMORSE AFTER DRINKING: 0
HOW OFTEN DO YOU HAVE A DRINK CONTAINING ALCOHOL: 1
HOW OFTEN DO YOU HAVE SIX OR MORE DRINKS ON ONE OCCASION: 0
HAVE YOU OR SOMEONE ELSE BEEN INJURED AS A RESULT OF YOUR DRINKING: 0

## 2021-07-19 ASSESSMENT — PATIENT HEALTH QUESTIONNAIRE - PHQ9
SUM OF ALL RESPONSES TO PHQ9 QUESTIONS 1 & 2: 0
SUM OF ALL RESPONSES TO PHQ QUESTIONS 1-9: 0
2. FEELING DOWN, DEPRESSED OR HOPELESS: 0
1. LITTLE INTEREST OR PLEASURE IN DOING THINGS: 0

## 2021-07-19 NOTE — PROGRESS NOTES
Medicare Annual Wellness Visit - Subsequent    Name: Elke Sanches. [id number] Date: 2021   MRN: [id number] Sex: Male   Age: 64 y.o. Ethnicity: Non-/Non    : 1945 Race: Ephraim Landa. is here for   Chief Complaint   Patient presents with   Ardyth Moritz Medicare AWV    Sinus Problem     just finished cefdinir        Screenings for behavioral, psychosocial and functional/safety risks, and cognitive dysfunction are all negative except as indicated below. These results, as well as other patient data from the [address] form, are documented in Flowsheets linked to this Encounter. No Known Allergies    Prior to Visit Medications    Medication Sig Taking? Authorizing Provider   fluticasone (FLONASE) 50 MCG/ACT nasal spray 1 spray by Each Nostril route daily Yes Historical Provider, MD   HYDROcodone-acetaminophen (NORCO)  MG per tablet Take 1 tablet by mouth every 6 hours as needed for Pain for up to 30 days.  Yes TREV Mcgraw   atorvastatin (LIPITOR) 40 MG tablet TAKE ONE (1) TABLET EVERY DAY Yes Raji Nicholson MD   lisinopril (PRINIVIL;ZESTRIL) 40 MG tablet TAKE 1 TABLET BY MOUTH EVERY DAY  Yes Raji Nicholson MD   famotidine (PEPCID) 20 MG tablet Take 1 tablet by mouth 2 times daily Yes TREV Monae   venlafaxine (EFFEXOR) 75 MG tablet TAKE ONE (1) TABLET BY MOUTH EVERY DAY Yes Raji Nicholson MD   clopidogrel (PLAVIX) 75 MG tablet TAKE ONE (1) TABLET BY MOUTH EVERY DAY   Patient taking differently: Take 75 mg by mouth daily  Yes Raji Nicholson MD   aspirin 81 MG EC tablet Take 1 tablet by mouth daily Yes Noemi Barton MD   nitroGLYCERIN (NITROSTAT) 0.4 MG SL tablet Place 0.4 mg under the tongue every 5 minutes as needed for Chest pain Yes Historical Provider, MD   gabapentin (NEURONTIN) 100 MG capsule   Historical Provider, MD   Cholecalciferol (VITAMIN D3) 1.25 MG (54492 UT) CAPS Take by mouth  Patient not taking: Reported on 7/19/2021  Historical Provider, MD   furosemide (LASIX) 20 MG tablet Take 1 tablet by mouth daily  TREV Shine   doxazosin (CARDURA) 2 MG tablet Take 4 mg by mouth nightly   Patient not taking: Reported on 7/19/2021  Historical Provider, MD       Past Medical History:   Diagnosis Date    AAA (abdominal aortic aneurysm) ([address]     Aortic dissection ([address] 10/10/2012    CAD (coronary artery disease)     Cancer ([address]     lung&lymphnode    Carotid artery occlusion     Cerebrovascular accident (CVA) ([address]     Cerebrovascular accident (CVA) due to embolism ([address] 02/16/2016    Cerebrovascular accident (CVA) due to embolism of right middle cerebral artery ([address]     Depression     HTN (hypertension)     Hyperlipidemia     MI (myocardial infarction) ([address]     acute 5/13/2015    Non Hodgkin's lymphoma ([address]     Palliative care patient 02/16/2021    Sleep apnea     no c-pap    Unspecified sleep apnea        Past Surgical History:   Procedure Laterality Date    CARDIAC CATHETERIZATION  5/13/15  [facility]    EF 40%. stent to LAD, stent to posterior descending branch of RCA.  CAROTID ENDARTERECTOMY Right 7/22/2016    Right carotid endarterectomy,carotid arteriograms. 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, repeat in 1 yr    UPPER GASTROINTESTINAL ENDOSCOPY  11/08/2019    Juan Manuel: normal    UPPER GASTROINTESTINAL ENDOSCOPY  04/13/2018    Dr Kang Vega: normal, 2cm HH, small amount of food in stomach w/dark fluid,but not kathy blood    VASCULAR SURGERY  10/29/12 [facility]    open exposure of R common femoral artery; percutaneous cannulation of L  femoral artery with 7-Albanian sheath; suprarenal abd aortagram with bilat iliofem arteriogram; file    Number of children: Not on file    Years of education: Not on file    Highest education level: Not on file   Occupational History    Not on file   Tobacco Use    Smoking status: Current Every Day Smoker     Packs/day: 0.50     Years: 40.00     Pack years: 20.00    Smokeless tobacco: Never Used    Tobacco comment: quit smoking in nov 2017, restarted smoking since then   Vaping Use    Vaping Use: Never used   Substance and Sexual Activity    Alcohol use: No    Drug use: No    Sexual activity: Not on file   Other Topics Concern    Not on file   Social History Narrative     50+ yearsHe has 1 son and 1 daughterWorks for a telephone companyPreviously [address] active duty 4-1/2 yearsHe does not go to the [employer] presentlyEducation high school 2 years of collegeEnjoys hunting and fishingDenies alcohol or substance usage he does smoke 1 pack/dayPhysically sedentary     Social Determinants of Health     Financial Resource Strain:     Difficulty of Paying Living Expenses:    Food Insecurity:     Worried About Running Out of Food in the Last Year:     Ran Out of Food in the Last Year:    Transportation Needs:     Lack of Transportation (Medical):  Lack of Transportation (Non-Medical):    Physical Activity:     Days of Exercise per Week:     Minutes of Exercise per Session:    Stress:     Feeling of Stress :    Social Connections:     Frequency of Communication with Friends and Family:     Frequency of Social Gatherings with Friends and Family:     Attends Anglican Services:     Active Member of Clubs or Organizations:     Attends Club or Organization Meetings:     Marital Status:    Intimate Partner Violence:     Fear of Current or Ex-Partner:     Emotionally Abused:     Physically Abused:     Sexually Abused:       Audit Questionnaire: Screen for Alcohol Misuse  How often do you have a drink containing alcohol?: Monthly or less  How many standard drinks containing alcohol do you have on a typical day when drinking?: One or two  How often do you have six or more drinks on one occasion?: Never  Audit-C Score: 1  During the past year, how often have you found that you were not able to stop drinking once you had started?: Never  During the past year, how often have you failed to do what was normally expected of you because of drinking?: Never  During the past year, how often have you needed a drink in the morning to get yourself going after a heavy drinking session?: Never  During the past year, how often have you had a feeling of guilt or remorse after drinking?: Never  During the past year, have you been unable to remember what happened the night before because you had been drinking?: Never  Have you or someone else been injured as a result of your drinking?: No  Has a relative or friend, doctor or health worker been concerned about your drinking or suggested you cut down?: No  Total Score: 1  Substance Abuse Interventions:  · Not indicated     Health Risk Assessment:     General  In general, how would you say your health is?: Good  In the past 7 days, have you experienced any of the following? New or Increased Pain, New or Increased Fatigue, Loneliness, Social Isolation, Stress or Anger?: None of These  Do you get the social and emotional support that you need?: Yes  Do you have a Living Will?: Yes  General Health Risk Interventions:  · Not indicated     Health Habits/Nutrition  Do you exercise for at least 20 minutes 2-3 times per week?: (!) No  Have you lost any weight without trying in the past 3 months?: No  Do you eat only one meal per day?: No  Have you seen the dentist within the past year?: (!) No  Body mass index is 34.52 kg/m². Health Habits/Nutrition Interventions:  Recommended at least 150 minutes of exercise per week and resistance training 2 days a week. Discussed healthy diet habits.  Offered suggestions for calorie counting. ·   ·     Hearing/Vision  Do you or your family notice any trouble with your hearing that hasn't been managed with hearing aids?: No  Do you have difficulty driving, watching TV, or doing any of your daily activities because of your eyesight?: No  Have you had an eye exam within the past year?: (!) No  Hearing/Vision Interventions:  · Recommend eye exam    Safety  Do you have working smoke detectors?: Yes  Have all throw rugs been removed or fastened?: Yes  Do you have non-slip mats or surfaces in all bathtubs/showers?: Yes  Are your doorways, halls and stairs free of clutter?: Yes  Do you always fasten your seatbelt when you are in a car?: Yes  Safety Interventions:  · Not indicated     ADLs  In the past 7 days, did you need help from others to perform any of the following everyday activities? Eating, dressing, grooming, bathing, toileting, or walking/balance?: None  In the past 7 days, did you need help from others to take care of any of the following?  Laundry, housekeeping, banking/finances, shopping, telephone use, food preparation, transportation, or taking medications?: None  ADL Interventions:  · Not indicated     Personalized Preventive Plan   Current Health Maintenance Status  Immunization History   Administered Date(s) Administered    Influenza, High Dose (Fluzone 65 yrs and older) 09/28/2017, 10/02/2018    Influenza, Triv, inactivated, subunit, adjuvanted, IM (Fluad 65 yrs and older) 10/24/2019    Pneumococcal Polysaccharide ([id number]) 12/29/2008, 01/09/2018        Health Maintenance   Topic Date Due    COVID-19 Vaccine (1) Never done   [employer] Visit (AWV)  Never done    Low dose CT lung screening  06/20/2020    DTaP/Tdap/Td vaccine (1 - Tdap) 08/09/2021 (Originally 2/23/1964)    Shingles Vaccine (1 of 2) 07/19/2022 (Originally 2/23/1993)    Flu vaccine (1) 09/01/2021    Potassium monitoring  04/07/2022    Creatinine monitoring  04/07/2022    Pneumococcal 65+ years Vaccine  Completed    Hepatitis A vaccine  Aged Out    Hepatitis B vaccine  Aged Out    Hib vaccine  Aged Out    Meningococcal (ACWY) vaccine  Aged Out       Recommendations for [place] Due: see orders.   Recommended screening schedule for the next 5-10 years is provided to the patient in written form: see Patient Instructions/AVS.

## 2021-07-19 NOTE — PROGRESS NOTES
[facility] PHYSICIAN SERVICES  [facility] FAMILY MEDICINE  [address] [address]  [address] [address]  Dept: [phone]  Dept Fax: [phone]  Loc: [phone]    Candice Wetzel is a 64 y.o. male who presents today for his medical conditions/complaints as noted below. Candice Wetzel is here for Medicare AWV and Sinus Problem (just finished cefdinir)        HPI:   CC: Here today to discuss the following:    His last esophagoduodenoscopy was January 2020 which was reported as normal.  The lesion of concern in the proximal stomach was biopsied. 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 They did, however suggest he follow-up with them in 1 year. Last saw his cardiologist in May. He was admitted in April for elective dual-chamber pacemaker implantation. He has a history of complete AV block with bradycardia. COPD  Compliant with medications. No adverse effects from medication. Symptoms are stable today. Has chronic shortness of breath and cough but at baseline today. No wheezing or chest tightness. Depression with Anxiety  Compliant with medications. No adverse effects from medication. Depression and anxiety symptoms are stable today. No suicidal or homicidal ideation. Excessive worry, insomnia, and anxiousness are stable. Energy, concentration, and apathy are stable. Lower Back Pain, Chronic  Compliant with medications. No adverse effects from medication. Symptoms continue to be stable. Lower back pain is described as a dull ache and occasionally sharp and stabbing. No radiation. Relieved with his current pain medication. No numbness or weakness in lower extremities. Currently satisfied with treatment regimen as it provides some relief. He has been on omnicef for sinus issues.   He just completed the antibiotics without resolution       HPI    Past Medical History:   Diagnosis Date    AAA [facility]ab[facility]) [facility]     Aortic dissection ([facility] 10/10/2012    CAD (coronary artery disease)     Cancer (HCC)     lung&lymphnode    Carotid artery occlusion     Cerebrovascular accident (CVA) ([address]     Cerebrovascular accident (CVA) due to embolism ([address] 02/16/2016    Cerebrovascular accident (CVA) due to embolism of right middle cerebral artery ([address]     COVID-19 2/6/2021    Depression     HTN (hypertension)     Hyperlipidemia     MI (myocardial infarction) ([address]     acute 5/13/2015    Non Hodgkin's lymphoma ([facility] [facility]     Palliative care patient 02/16/2021    Sleep apnea     no c-pap    Unspecified sleep apnea       Past Surgical History:   Procedure Laterality Date    CARDIAC CATHETERIZATION  5/13/15  [address]    EF 40%. stent to LAD, stent to posterior descending branch of RCA.  CAROTID ENDARTERECTOMY Right 7/22/2016    Right carotid endarterectomy,carotid arteriograms. [facility]    COLONOSCOPY  04/09/2018    Juan Manuel    ENDOSCOPY, COLON, DIAGNOSTIC      LUNG CANCER SURGERY      right side    LYMPHADENECTOMY      removed lymphnodes     RECTAL SURGERY      fistula     UPPER GASTROINTESTINAL ENDOSCOPY N/A 1/8/2020    Dr Kenney Gr (Dr Hugo Mattson pt) w/EUS-Small low grade GIST in the stomach, repeat in 1 yr    UPPER GASTROINTESTINAL ENDOSCOPY  11/08/2019    Juan Manuel: normal    UPPER GASTROINTESTINAL ENDOSCOPY  04/13/2018    Dr Ethan Gurrola: normal, 2cm HH, small amount of food in stomach w/dark fluid,but not kathy blood    VASCULAR SURGERY  10/29/12 [facility]    open exposure of R common femoral artery; percutaneous cannulation of L  femoral artery with 7-Maori sheath; suprarenal abd aortagram with bilat iliofem arteriogram; endoluminal graft repair of AAA with endologix [id number] main body, 28x95 infrarenal cuff; balloon angioplasty of infrarenal abd aorta and endoluminal graft with coda balloon; completion abd aortogram w bilat iliofem arteriogram;     VASCULAR SURGERY  10/29/ S     (cont) open repair of right common femoral ateriotomy; perclose repair of left common femoral artery puncture site. Family History   Problem Relation Age of Onset    High Blood Pressure Father     High Blood Pressure Mother     Diabetes Mother     Colon Polyps Brother     Cancer Brother     Colon Cancer Neg Hx        Social History     Tobacco Use    Smoking status: Current Every Day Smoker     Packs/day: 0.50     Years: 40.00     Pack years: 20.00    Smokeless tobacco: Never Used    Tobacco comment: quit smoking in nov 2017, restarted smoking since then   Substance Use Topics    Alcohol use: No     Current Outpatient Medications   Medication Sig Dispense Refill    fluticasone (FLONASE) 50 MCG/ACT nasal spray 1 spray by Each Nostril route daily      furosemide (LASIX) 40 MG tablet Take 1 tablet by mouth daily 5 tablet 0    HYDROcodone-acetaminophen (NORCO)  MG per tablet Take 1 tablet by mouth every 6 hours as needed for Pain for up to 30 days.  120 tablet 0    atorvastatin (LIPITOR) 40 MG tablet TAKE ONE (1) TABLET EVERY DAY 90 tablet 3    lisinopril (PRINIVIL;ZESTRIL) 40 MG tablet TAKE 1 TABLET BY MOUTH EVERY DAY  90 tablet 1    famotidine (PEPCID) 20 MG tablet Take 1 tablet by mouth 2 times daily 180 tablet 2    venlafaxine (EFFEXOR) 75 MG tablet TAKE ONE (1) TABLET BY MOUTH EVERY DAY 90 tablet 2    clopidogrel (PLAVIX) 75 MG tablet TAKE ONE (1) TABLET BY MOUTH EVERY DAY  (Patient taking differently: Take 75 mg by mouth daily ) 90 tablet 1    aspirin 81 MG EC tablet Take 1 tablet by mouth daily 30 tablet 3    nitroGLYCERIN (NITROSTAT) 0.4 MG SL tablet Place 0.4 mg under the tongue every 5 minutes as needed for Chest pain      ferrous sulfate (IRON 325) 325 (65 Fe) MG tablet Take 1 tablet by mouth daily (with breakfast) 90 tablet 1    gabapentin (NEURONTIN) 100 MG capsule       Cholecalciferol (VITAMIN D3) 1.25 MG (87568 UT) CAPS Take by mouth (Patient not taking: Reported on 7/19/2021)      doxazosin (CARDURA) 2 MG tablet Take 4 mg by mouth nightly  (Patient not taking: Reported on 7/19/2021)       No current facility-administered medications for this visit. No Known Allergies    Health Maintenance   Topic Date Due    COVID-19 Vaccine (1) Never done    Annual Wellness Visit (AWV)  Never done    Low dose CT lung screening  06/20/2020    DTaP/Tdap/Td vaccine (1 - Tdap) 08/09/2021 (Originally 2/23/1964)    Shingles Vaccine (1 of 2) 07/19/2022 (Originally 2/23/1993)    Flu vaccine (1) 09/01/2021    Potassium monitoring  07/19/2022    Creatinine monitoring  07/19/2022    Pneumococcal 65+ years Vaccine  Completed    Hepatitis A vaccine  Aged Out    Hepatitis B vaccine  Aged Out    Hib vaccine  Aged Out    Meningococcal (ACWY) vaccine  Aged Out       Subjective:      Review of Systems      SeeHPI for visit specific review of symptoms.   All others negative      Objective:   /86   Pulse 88   Temp 97 °F (36.1 °C)   Wt 227 lb (103 kg)   SpO2 99%   BMI 34.52 kg/m²   Physical Exam      Recent Results (from the past 672 hour(s))   Folate    Collection Time: 07/19/21  4:07 PM   Result Value Ref Range    Folate 8.1 4.5 - 32.2 ng/mL   Ferritin    Collection Time: 07/19/21  4:07 PM   Result Value Ref Range    Ferritin 36.3 30.0 - 400.0 ng/mL   Vitamin B12    Collection Time: 07/19/21  4:07 PM   Result Value Ref Range    Vitamin B-12 458 211 - 946 pg/mL   Iron    Collection Time: 07/19/21  4:07 PM   Result Value Ref Range    Iron 13 (L) 59 - 158 ug/dL   T4, Free    Collection Time: 07/19/21  4:07 PM   Result Value Ref Range    T4 Free 1.12 0.93 - 1.70 ng/dL   TSH without Reflex    Collection Time: 07/19/21  4:07 PM   Result Value Ref Range    TSH 1.210 0.270 - 4.200 uIU/mL   Lipid Panel    Collection Time: 07/19/21  4:07 PM   Result Value Ref Range    Cholesterol, Total 91 (L) 160 - 199 mg/dL    Triglycerides 106 0 - 149 mg/dL    HDL 36 (L) 55 - 121 mg/dL    LDL Calculated 34 <100 mg/dL   Comprehensive Metabolic Panel    Collection Time: 07/19/21  4:07 PM   Result Value Ref Range    Sodium 140 136 - 145 mmol/L    Potassium 4.3 3.5 - 5.0 mmol/L    Chloride 106 98 - 111 mmol/L    CO2 21 (L) 22 - 29 mmol/L    Anion Gap 13 7 - 19 mmol/L    Glucose 101 74 - 109 mg/dL    BUN 30 (H) 8 - 23 mg/dL    CREATININE 1.7 (H) 0.5 - 1.2 mg/dL    GFR Non-African American 39 (A) >60    GFR  47 (L) >59    Calcium 11.1 (H) 8.8 - 10.2 mg/dL    Total Protein 7.1 6.6 - 8.7 g/dL    Albumin 4.0 3.5 - 5.2 g/dL    Total Bilirubin 0.5 0.2 - 1.2 mg/dL    Alkaline Phosphatase 85 40 - 130 U/L    ALT 29 5 - 41 U/L    AST 27 5 - 40 U/L   CBC Auto Differential    Collection Time: 07/19/21  4:07 PM   Result Value Ref Range    WBC 9.6 4.8 - 10.8 K/uL    RBC 4.39 (L) 4.70 - 6.10 M/uL    Hemoglobin 9.1 (L) 14.0 - 18.0 g/dL    Hematocrit 33.3 (L) 42.0 - 52.0 %    MCV 75.9 (L) 80.0 - 94.0 fL    MCH 20.7 (L) 27.0 - 31.0 pg    MCHC 27.3 (L) 33.0 - 37.0 g/dL    RDW 19.2 (H) 11.5 - 14.5 %    Platelets 277 663 - 882 K/uL    MPV 11.1 9.4 - 12.4 fL    PLATELET SLIDE REVIEW Adequate     Neutrophils % 64.8 50.0 - 65.0 %    Lymphocytes % 24.2 20.0 - 40.0 %    Monocytes % 9.0 0.0 - 10.0 %    Eosinophils % 0.0 0.0 - 5.0 %    Basophils % 1.7 (H) 0.0 - 1.0 %    Neutrophils Absolute 6.2 1.5 - 7.5 K/uL    Immature Granulocytes # 0.0 K/uL    Lymphocytes Absolute 2.3 1.1 - 4.5 K/uL    Monocytes Absolute 0.90 0.00 - 0.90 K/uL    Eosinophils Absolute 0.00 0.00 - 0.60 K/uL    Basophils Absolute 0.20 0.00 - 0.20 K/uL    Polychromasia 1+ (A)     Hypochromia 2+ (A)                Assessment & Plan: The following diagnoses and conditions are stable with no further orders unless indicated:  1. Annual physical exam  Discussed lifestyle changes such as diet and exercise. Recommended eliminate processed food from diet such as sugar and fried foods. Recommended exercising at least 150 minutes/week. Try to do full body resistance training twice a week as well.   Offered suggestions for calorie counting such as phone apps and online resources such as My fitness pal and Lose it. Discussed healthy weight. 2. Atherosclerosis of native artery of both lower extremities, with unspecified presence of clinical manifestation ([facility]      Impression        Based on ankle brachial indices and doppler waveforms, the patient has    mildly diminished flow to the bilateral lower extremity arterial system at   [facility]        Signature        ----------------------------------------------------------------    Electronically signed by Reza Liang MD(Interpreting    physician) on 08/28/2020 04:44 PM    ----------------------------------------------------------------         3. Hyperparathyroidism ([facility]  Has been evaluated by Dr. Dez Smart, ENT, at [id number] [place] [address] regarding his hyperparathyroidism. Options were discussed which include continue monitoring his calcium versus surgical intervention. Due to his multiple medical issues the plan was to continue monitoring. 4. Coronary artery disease involving native coronary artery of native heart without angina pectoris  I have discussed cardiovascular risk reduction  Continue with Plavix 75 mg daily  Atorvastatin 40 mg daily  Lisinopril 40 mg  He cannot tolerate beta-blockers due to bradycardia. He has no chest pain or palpitations. He had a dual-chamber pacemaker implantation placed on April 6. Continues to be followed by cardiology at [address]  Has nitroglycerin at home for as needed use  5. Cerebrovascular accident (CVA) due to embolism of cerebral artery [facility])  He was hospitalized in 2016 for CVA due to embolism. Continue to be followed by vascular as well. Remains on the listed above cardiovascular risk reduction    6.  Bilateral carotid artery stenosis  Continues to be followed by vascular surgery  Impression       Chao Alan is mixed plaque visualized in the bilateral internal carotid    arteries.        There is less than 50% stenosis in the right internal carotid artery.        There is less than 50% stenosis of the left internal carotid artery.        There is normal antegrade flow in the bilateral vertebral arteries.        Signature        ----------------------------------------------------------------    Electronically signed by Anitha Taylor MD(Interpreting    physician) on 08/28/2020 04:45 PM    ----------------------------------------------------------------           7. Mixed hyperlipidemia  Continue with high intensity statin therapy  - Comprehensive Metabolic Panel; Future  - Lipid Panel; Future    8. Gastrointestinal stromal tumor of stomach (HCC)  Biopsy of his gastrointestinal stromal tumor was benign however sample size was small. Gastroenterology recommended seeing him in a year. I advised him contact him for follow-up    9. Gastroesophageal reflux disease without esophagitis  In the meantime, continue with Pepcid    10. Simple chronic bronchitis (HCC)  Symptoms are stable. He does have albuterol at home for as needed use    11. Acute non-recurrent maxillary sinusitis  He is reporting maxillary sinusitis. He states Rocephin shots have helped in the past  Continue with Flonase 1 spray each nostril once a day  - cefTRIAXone (ROCEPHIN) injection 1,000 mg    12. Cough  XR CHEST (2 VW)    Result Date: 7/19/2021  HISTORY: R05 CXR: 2 views of the chest are obtained. COMPARISON: 4/6/2021. FINDINGS: Lungs remain hyperinflated. Increased blunting of right costophrenic sulcus may be secondary to small pleural effusion. Questionable trace left pleural effusion. Right basilar atelectasis. Similar positioning of the right subclavian cardiac pacer device. Stable cardiomegaly. Interstitial densities favoring edema. No pneumothorax. Moderate degenerative change of the thoracic spine. 1. Stable cardiomegaly with similar positioning of the right subclavian cardiac pacer device. Interstitial edema and small right greater than left pleural effusions favoring CHF.  Signed by Dr Ebony Salmeron      - XR CHEST (2 VW); Future    13. Depression with anxiety  We will    14. Recurrent major depressive disorder in partial remission ([facility]  He has a longstanding history of depression but overall satisfied with Effexor    15. Anemia, unspecified type  His iron is low suggest for 25 mg daily of iron  Denies any hematochezia or melena. - CBC Auto Differential; Future  - Iron; Future  - Vitamin B12; Future  - Ferritin; Future  - Folate; Future    16. Other fatigue    - TSH without Reflex; Future  - T4, Free; Future    17. Stage 3 chronic kidney disease, unspecified whether stage 3a or 3b CKD ([facility]  Lab Results   Component Value Date    CREATININE 1.7 (H) 07/19/2021     Lab Results   Component Value Date    BUN 30 (H) 07/19/2021         Reinforced goals of adequate hydration. Recommended he avoid NSAIDs such as naproxen and ibuprofen. He has not followed up with nephrology. Suggested he get in touch with his nephrologist to continue monitoring his electrolytes as well  18. Hypercalcemia  He has been seen by ENT for hyperparathyroidism as well as nephrology. Advised him to contact each of them to help manage this. He is asymptomatic today    19. Essential hypertension  BP Readings from Last 3 Encounters:   07/19/21 132/86   05/20/21 130/80   04/16/21 132/84     Blood pressure stable    20. Hyperglycemia  Lab Results   Component Value Date    LABA1C 6.3 (H) 02/16/2021    LABA1C 6.0 01/04/2021    LABA1C 6.2 (H) 10/02/2018     Lab Results   Component Value Date    LDLCALC 34 07/19/2021    CREATININE 1.7 (H) 07/19/2021     Stable  Discussed lifestyle changes such as diet and exercise. Recommended eliminate processed food from diet such as sugar and fried foods. Recommended exercising at least 150 minutes/week. Try to do full body resistance training twice a week as well. Offered suggestions for calorie counting such as phone apps and online resources such as My fitness pal and Lose it. Discussed healthy weight. 21. Chronic midline low back pain without sciatica  Unfortunately continues to be a chronic issue for him. He is satisfied with taking hydrocodone and gabapentin. He did not do well with muscle relaxants as it made him very drowsy. He cannot take NSAIDs due to being on Plavix  Relies on Tylenol as well    22. Symptomatic bradycardia  Pacemaker placed in April 23. Abdominal aortic aneurysm (AAA) without rupture (HCC)     Impression   Atheromatous changes of the abdominal aorta and iliac   arteries. An aortoiliac endograft in place. The patency of the graft   and any possibility of endoleak is not evaluated due to lack of   contrast enhancement. A mild increase in size of the left internal iliac aneurysm which now   measures 2.9 cm. It previously measured 2.5 cm   A 2 cm exophytic solid nodule from the lesser curvature of the stomach   which is stable since the previous study. The diverticulosis of the colon. No evidence for diverticulitis. Signed by Dr Ariadne Oleary on 8/28/2020 1:13 PM       Continues to be followed by vascular surgery            Return in about 3 months (around 10/19/2021) for Routine follow up - 15 minute visit. Discussed use, benefit, and side effects of prescribed medications. All patient questions answered. Pt voiced understanding. Reviewed health maintenance. Instructedto continue current medications, diet and exercise. Patient agreed with treatmentplan. Follow up as directed. Note dictated using Dragon Dictation software  Sometimes this dictation software makes erroneous transcriptions.

## 2021-07-21 PROBLEM — U07.1 COVID-19: Status: RESOLVED | Noted: 2021-02-06 | Resolved: 2021-07-21

## 2021-07-21 RX ORDER — FERROUS SULFATE 325(65) MG
325 TABLET ORAL
Qty: 90 TABLET | Refills: 1 | Status: SHIPPED | OUTPATIENT
Start: 2021-07-21 | End: 2021-10-28 | Stop reason: ALTCHOICE

## 2021-07-26 ENCOUNTER — HOSPITAL ENCOUNTER (INPATIENT)
Age: 76
LOS: 3 days | Discharge: NURSING, REHAB OR LONG-TERM CARE | DRG: 291 | End: 2021-07-29
Attending: EMERGENCY MEDICINE | Admitting: INTERNAL MEDICINE
Payer: MEDICARE

## 2021-07-26 ENCOUNTER — APPOINTMENT (OUTPATIENT)
Dept: GENERAL RADIOLOGY | Age: 76
DRG: 291 | End: 2021-07-26
Payer: MEDICARE

## 2021-07-26 DIAGNOSIS — J96.01 ACUTE RESPIRATORY FAILURE WITH HYPOXIA (HCC): ICD-10-CM

## 2021-07-26 DIAGNOSIS — I50.43 ACUTE ON CHRONIC COMBINED SYSTOLIC AND DIASTOLIC CHF (CONGESTIVE HEART FAILURE) (HCC): ICD-10-CM

## 2021-07-26 DIAGNOSIS — M54.50 CHRONIC MIDLINE LOW BACK PAIN WITHOUT SCIATICA: ICD-10-CM

## 2021-07-26 DIAGNOSIS — R06.02 SHORTNESS OF BREATH: Primary | ICD-10-CM

## 2021-07-26 DIAGNOSIS — G89.29 CHRONIC MIDLINE LOW BACK PAIN WITHOUT SCIATICA: ICD-10-CM

## 2021-07-26 DIAGNOSIS — G62.9 NEUROPATHY: ICD-10-CM

## 2021-07-26 PROBLEM — R09.02 HYPOXEMIA: Status: ACTIVE | Noted: 2021-07-26

## 2021-07-26 LAB
ADENOVIRUS BY PCR: NOT DETECTED
ALBUMIN SERPL-MCNC: 3.8 G/DL (ref 3.5–5.2)
ALP BLD-CCNC: 94 U/L (ref 40–130)
ALT SERPL-CCNC: 78 U/L (ref 5–41)
ANION GAP SERPL CALCULATED.3IONS-SCNC: 9 MMOL/L (ref 7–19)
ANISOCYTOSIS: ABNORMAL
APTT: 36.5 SEC (ref 26–36.2)
AST SERPL-CCNC: 59 U/L (ref 5–40)
BASOPHILS ABSOLUTE: 0.1 K/UL (ref 0–0.2)
BASOPHILS RELATIVE PERCENT: 1.4 % (ref 0–1)
BILIRUB SERPL-MCNC: 0.6 MG/DL (ref 0.2–1.2)
BORDETELLA PARAPERTUSSIS BY PCR: NOT DETECTED
BORDETELLA PERTUSSIS BY PCR: NOT DETECTED
BUN BLDV-MCNC: 29 MG/DL (ref 8–23)
CALCIUM SERPL-MCNC: 10.7 MG/DL (ref 8.8–10.2)
CHLAMYDOPHILIA PNEUMONIAE BY PCR: NOT DETECTED
CHLORIDE BLD-SCNC: 109 MMOL/L (ref 98–111)
CO2: 23 MMOL/L (ref 22–29)
CORONAVIRUS 229E BY PCR: NOT DETECTED
CORONAVIRUS HKU1 BY PCR: NOT DETECTED
CORONAVIRUS NL63 BY PCR: NOT DETECTED
CORONAVIRUS OC43 BY PCR: NOT DETECTED
CREAT SERPL-MCNC: 1.6 MG/DL (ref 0.5–1.2)
EOSINOPHILS ABSOLUTE: 0 K/UL (ref 0–0.6)
EOSINOPHILS RELATIVE PERCENT: 0.1 % (ref 0–5)
GFR AFRICAN AMERICAN: 51
GFR NON-AFRICAN AMERICAN: 42
GLUCOSE BLD-MCNC: 117 MG/DL (ref 74–109)
HCT VFR BLD CALC: 32.7 % (ref 42–52)
HEMOGLOBIN: 8.7 G/DL (ref 14–18)
HUMAN METAPNEUMOVIRUS BY PCR: NOT DETECTED
HUMAN RHINOVIRUS/ENTEROVIRUS BY PCR: NOT DETECTED
HYPOCHROMIA: ABNORMAL
IMMATURE GRANULOCYTES #: 0 K/UL
INFLUENZA A BY PCR: NOT DETECTED
INFLUENZA B BY PCR: NOT DETECTED
INR BLD: 1.21 (ref 0.88–1.18)
LYMPHOCYTES ABSOLUTE: 1.9 K/UL (ref 1.1–4.5)
LYMPHOCYTES RELATIVE PERCENT: 18.7 % (ref 20–40)
MCH RBC QN AUTO: 19.5 PG (ref 27–31)
MCHC RBC AUTO-ENTMCNC: 26.6 G/DL (ref 33–37)
MCV RBC AUTO: 73.2 FL (ref 80–94)
MONOCYTES ABSOLUTE: 1 K/UL (ref 0–0.9)
MONOCYTES RELATIVE PERCENT: 9.5 % (ref 0–10)
MYCOPLASMA PNEUMONIAE BY PCR: NOT DETECTED
NEUTROPHILS ABSOLUTE: 7 K/UL (ref 1.5–7.5)
NEUTROPHILS RELATIVE PERCENT: 69.9 % (ref 50–65)
OVALOCYTES: ABNORMAL
PARAINFLUENZA VIRUS 1 BY PCR: NOT DETECTED
PARAINFLUENZA VIRUS 2 BY PCR: NOT DETECTED
PARAINFLUENZA VIRUS 3 BY PCR: NOT DETECTED
PARAINFLUENZA VIRUS 4 BY PCR: NOT DETECTED
PDW BLD-RTO: 19.5 % (ref 11.5–14.5)
PLATELET # BLD: 328 K/UL (ref 130–400)
PLATELET SLIDE REVIEW: ADEQUATE
PMV BLD AUTO: 11 FL (ref 9.4–12.4)
POLYCHROMASIA: ABNORMAL
POTASSIUM REFLEX MAGNESIUM: 4.5 MMOL/L (ref 3.5–5)
PRO-BNP: 7565 PG/ML (ref 0–1800)
PROTHROMBIN TIME: 15.5 SEC (ref 12–14.6)
RBC # BLD: 4.47 M/UL (ref 4.7–6.1)
RESPIRATORY SYNCYTIAL VIRUS BY PCR: NOT DETECTED
SARS-COV-2, NAAT: NOT DETECTED
SARS-COV-2, PCR: NOT DETECTED
SODIUM BLD-SCNC: 141 MMOL/L (ref 136–145)
TOTAL PROTEIN: 6.7 G/DL (ref 6.6–8.7)
TROPONIN: 0.04 NG/ML (ref 0–0.03)
WBC # BLD: 10 K/UL (ref 4.8–10.8)

## 2021-07-26 PROCEDURE — 93005 ELECTROCARDIOGRAM TRACING: CPT | Performed by: EMERGENCY MEDICINE

## 2021-07-26 PROCEDURE — 1210000000 HC MED SURG R&B

## 2021-07-26 PROCEDURE — 6360000002 HC RX W HCPCS: Performed by: EMERGENCY MEDICINE

## 2021-07-26 PROCEDURE — 96374 THER/PROPH/DIAG INJ IV PUSH: CPT

## 2021-07-26 PROCEDURE — 83880 ASSAY OF NATRIURETIC PEPTIDE: CPT

## 2021-07-26 PROCEDURE — 36415 COLL VENOUS BLD VENIPUNCTURE: CPT

## 2021-07-26 PROCEDURE — 85025 COMPLETE CBC W/AUTO DIFF WBC: CPT

## 2021-07-26 PROCEDURE — 85610 PROTHROMBIN TIME: CPT

## 2021-07-26 PROCEDURE — 71045 X-RAY EXAM CHEST 1 VIEW: CPT

## 2021-07-26 PROCEDURE — 87635 SARS-COV-2 COVID-19 AMP PRB: CPT

## 2021-07-26 PROCEDURE — 80053 COMPREHEN METABOLIC PANEL: CPT

## 2021-07-26 PROCEDURE — 99284 EMERGENCY DEPT VISIT MOD MDM: CPT

## 2021-07-26 PROCEDURE — 84484 ASSAY OF TROPONIN QUANT: CPT

## 2021-07-26 PROCEDURE — 6370000000 HC RX 637 (ALT 250 FOR IP): Performed by: EMERGENCY MEDICINE

## 2021-07-26 PROCEDURE — 85730 THROMBOPLASTIN TIME PARTIAL: CPT

## 2021-07-26 PROCEDURE — 0202U NFCT DS 22 TRGT SARS-COV-2: CPT

## 2021-07-26 RX ORDER — FUROSEMIDE 10 MG/ML
60 INJECTION INTRAMUSCULAR; INTRAVENOUS ONCE
Status: COMPLETED | OUTPATIENT
Start: 2021-07-26 | End: 2021-07-26

## 2021-07-26 RX ORDER — HYDROCODONE BITARTRATE AND ACETAMINOPHEN 10; 325 MG/1; MG/1
1 TABLET ORAL ONCE
Status: COMPLETED | OUTPATIENT
Start: 2021-07-26 | End: 2021-07-26

## 2021-07-26 RX ADMIN — HYDROCODONE BITARTRATE AND ACETAMINOPHEN 1 TABLET: 10; 325 TABLET ORAL at 20:34

## 2021-07-26 RX ADMIN — FUROSEMIDE 60 MG: 10 INJECTION, SOLUTION INTRAMUSCULAR; INTRAVENOUS at 21:14

## 2021-07-26 ASSESSMENT — ENCOUNTER SYMPTOMS
WHEEZING: 1
BACK PAIN: 1
RHINORRHEA: 1
ABDOMINAL PAIN: 0
EYE PAIN: 0
VOICE CHANGE: 0
GASTROINTESTINAL NEGATIVE: 1
EYE REDNESS: 0
DIARRHEA: 0
COUGH: 1
VOMITING: 0
EYES NEGATIVE: 1
SHORTNESS OF BREATH: 1

## 2021-07-26 ASSESSMENT — PAIN SCALES - GENERAL: PAINLEVEL_OUTOF10: 10

## 2021-07-27 ENCOUNTER — APPOINTMENT (OUTPATIENT)
Dept: ULTRASOUND IMAGING | Age: 76
DRG: 291 | End: 2021-07-27
Payer: MEDICARE

## 2021-07-27 LAB
ANION GAP SERPL CALCULATED.3IONS-SCNC: 10 MMOL/L (ref 7–19)
BUN BLDV-MCNC: 30 MG/DL (ref 8–23)
CALCIUM SERPL-MCNC: 10.9 MG/DL (ref 8.8–10.2)
CHLORIDE BLD-SCNC: 103 MMOL/L (ref 98–111)
CO2: 23 MMOL/L (ref 22–29)
CREAT SERPL-MCNC: 1.8 MG/DL (ref 0.5–1.2)
EKG P AXIS: NORMAL DEGREES
EKG P-R INTERVAL: NORMAL MS
EKG Q-T INTERVAL: 396 MS
EKG QRS DURATION: 162 MS
EKG QTC CALCULATION (BAZETT): 453 MS
EKG T AXIS: -27 DEGREES
GFR AFRICAN AMERICAN: 44
GFR NON-AFRICAN AMERICAN: 37
GLUCOSE BLD-MCNC: 172 MG/DL (ref 74–109)
IRON SATURATION: 5 % (ref 14–50)
IRON: 18 UG/DL (ref 59–158)
LV EF: 30 %
LVEF MODALITY: NORMAL
MAGNESIUM: 2.1 MG/DL (ref 1.6–2.4)
POTASSIUM SERPL-SCNC: 4.6 MMOL/L (ref 3.5–5)
SODIUM BLD-SCNC: 136 MMOL/L (ref 136–145)
TOTAL IRON BINDING CAPACITY: 353 UG/DL (ref 250–400)
TROPONIN: 0.02 NG/ML (ref 0–0.03)
TROPONIN: 0.03 NG/ML (ref 0–0.03)

## 2021-07-27 PROCEDURE — 99223 1ST HOSP IP/OBS HIGH 75: CPT | Performed by: INTERNAL MEDICINE

## 2021-07-27 PROCEDURE — 76770 US EXAM ABDO BACK WALL COMP: CPT

## 2021-07-27 PROCEDURE — 2500000003 HC RX 250 WO HCPCS: Performed by: FAMILY MEDICINE

## 2021-07-27 PROCEDURE — 6370000000 HC RX 637 (ALT 250 FOR IP): Performed by: INTERNAL MEDICINE

## 2021-07-27 PROCEDURE — 2700000000 HC OXYGEN THERAPY PER DAY

## 2021-07-27 PROCEDURE — 80048 BASIC METABOLIC PNL TOTAL CA: CPT

## 2021-07-27 PROCEDURE — 1210000000 HC MED SURG R&B

## 2021-07-27 PROCEDURE — 84484 ASSAY OF TROPONIN QUANT: CPT

## 2021-07-27 PROCEDURE — 83550 IRON BINDING TEST: CPT

## 2021-07-27 PROCEDURE — 83540 ASSAY OF IRON: CPT

## 2021-07-27 PROCEDURE — 93010 ELECTROCARDIOGRAM REPORT: CPT | Performed by: INTERNAL MEDICINE

## 2021-07-27 PROCEDURE — 83735 ASSAY OF MAGNESIUM: CPT

## 2021-07-27 PROCEDURE — 2500000003 HC RX 250 WO HCPCS: Performed by: INTERNAL MEDICINE

## 2021-07-27 PROCEDURE — 6360000002 HC RX W HCPCS: Performed by: INTERNAL MEDICINE

## 2021-07-27 PROCEDURE — 36415 COLL VENOUS BLD VENIPUNCTURE: CPT

## 2021-07-27 PROCEDURE — 93306 TTE W/DOPPLER COMPLETE: CPT

## 2021-07-27 RX ORDER — LANOLIN ALCOHOL/MO/W.PET/CERES
3 CREAM (GRAM) TOPICAL NIGHTLY PRN
Status: DISCONTINUED | OUTPATIENT
Start: 2021-07-27 | End: 2021-07-29 | Stop reason: HOSPADM

## 2021-07-27 RX ORDER — ALBUTEROL SULFATE 2.5 MG/3ML
2.5 SOLUTION RESPIRATORY (INHALATION) EVERY 6 HOURS PRN
Status: DISCONTINUED | OUTPATIENT
Start: 2021-07-27 | End: 2021-07-29 | Stop reason: HOSPADM

## 2021-07-27 RX ORDER — GABAPENTIN 100 MG/1
200 CAPSULE ORAL 3 TIMES DAILY
Status: DISCONTINUED | OUTPATIENT
Start: 2021-07-27 | End: 2021-07-29 | Stop reason: HOSPADM

## 2021-07-27 RX ORDER — SPIRONOLACTONE 25 MG/1
25 TABLET ORAL DAILY
Status: DISCONTINUED | OUTPATIENT
Start: 2021-07-27 | End: 2021-07-29 | Stop reason: HOSPADM

## 2021-07-27 RX ORDER — ACETAMINOPHEN 325 MG/1
650 TABLET ORAL EVERY 4 HOURS PRN
Status: DISCONTINUED | OUTPATIENT
Start: 2021-07-27 | End: 2021-07-29 | Stop reason: HOSPADM

## 2021-07-27 RX ORDER — BUMETANIDE 0.25 MG/ML
1 INJECTION, SOLUTION INTRAMUSCULAR; INTRAVENOUS ONCE
Status: COMPLETED | OUTPATIENT
Start: 2021-07-27 | End: 2021-07-27

## 2021-07-27 RX ORDER — ATORVASTATIN CALCIUM 20 MG/1
20 TABLET, FILM COATED ORAL NIGHTLY
Status: DISCONTINUED | OUTPATIENT
Start: 2021-07-27 | End: 2021-07-29 | Stop reason: HOSPADM

## 2021-07-27 RX ORDER — NITROGLYCERIN 0.4 MG/1
0.4 TABLET SUBLINGUAL EVERY 5 MIN PRN
Status: DISCONTINUED | OUTPATIENT
Start: 2021-07-27 | End: 2021-07-29 | Stop reason: HOSPADM

## 2021-07-27 RX ORDER — ONDANSETRON 2 MG/ML
4 INJECTION INTRAMUSCULAR; INTRAVENOUS EVERY 6 HOURS PRN
Status: DISCONTINUED | OUTPATIENT
Start: 2021-07-27 | End: 2021-07-29 | Stop reason: HOSPADM

## 2021-07-27 RX ORDER — CARVEDILOL 3.12 MG/1
3.12 TABLET ORAL 2 TIMES DAILY WITH MEALS
Status: DISCONTINUED | OUTPATIENT
Start: 2021-07-27 | End: 2021-07-29 | Stop reason: HOSPADM

## 2021-07-27 RX ORDER — FAMOTIDINE 20 MG/1
20 TABLET, FILM COATED ORAL 2 TIMES DAILY
Status: DISCONTINUED | OUTPATIENT
Start: 2021-07-27 | End: 2021-07-27

## 2021-07-27 RX ORDER — ALBUTEROL SULFATE 90 UG/1
2 AEROSOL, METERED RESPIRATORY (INHALATION) EVERY 6 HOURS PRN
Status: DISCONTINUED | OUTPATIENT
Start: 2021-07-27 | End: 2021-07-27 | Stop reason: CLARIF

## 2021-07-27 RX ORDER — VENLAFAXINE 37.5 MG/1
37.5 TABLET ORAL DAILY
Status: DISCONTINUED | OUTPATIENT
Start: 2021-07-27 | End: 2021-07-29 | Stop reason: HOSPADM

## 2021-07-27 RX ORDER — FAMOTIDINE 20 MG/1
20 TABLET, FILM COATED ORAL DAILY
Status: DISCONTINUED | OUTPATIENT
Start: 2021-07-27 | End: 2021-07-29 | Stop reason: HOSPADM

## 2021-07-27 RX ORDER — FLUTICASONE PROPIONATE 50 MCG
1 SPRAY, SUSPENSION (ML) NASAL DAILY
Status: DISCONTINUED | OUTPATIENT
Start: 2021-07-27 | End: 2021-07-29 | Stop reason: HOSPADM

## 2021-07-27 RX ORDER — ASPIRIN 81 MG/1
81 TABLET ORAL DAILY
Status: DISCONTINUED | OUTPATIENT
Start: 2021-07-27 | End: 2021-07-29 | Stop reason: HOSPADM

## 2021-07-27 RX ORDER — CLOPIDOGREL BISULFATE 75 MG/1
75 TABLET ORAL DAILY
Status: DISCONTINUED | OUTPATIENT
Start: 2021-07-27 | End: 2021-07-29 | Stop reason: HOSPADM

## 2021-07-27 RX ORDER — HYDROCODONE BITARTRATE AND ACETAMINOPHEN 10; 325 MG/1; MG/1
1 TABLET ORAL EVERY 4 HOURS PRN
Status: DISCONTINUED | OUTPATIENT
Start: 2021-07-27 | End: 2021-07-29 | Stop reason: HOSPADM

## 2021-07-27 RX ORDER — DOXAZOSIN MESYLATE 4 MG/1
4 TABLET ORAL NIGHTLY
Status: DISCONTINUED | OUTPATIENT
Start: 2021-07-27 | End: 2021-07-29 | Stop reason: HOSPADM

## 2021-07-27 RX ORDER — BUMETANIDE 0.25 MG/ML
1 INJECTION, SOLUTION INTRAMUSCULAR; INTRAVENOUS 2 TIMES DAILY
Status: DISCONTINUED | OUTPATIENT
Start: 2021-07-27 | End: 2021-07-29

## 2021-07-27 RX ADMIN — VENLAFAXINE 37.5 MG: 37.5 TABLET ORAL at 01:14

## 2021-07-27 RX ADMIN — ENOXAPARIN SODIUM 40 MG: 40 INJECTION SUBCUTANEOUS at 06:10

## 2021-07-27 RX ADMIN — FAMOTIDINE 20 MG: 20 TABLET, FILM COATED ORAL at 01:14

## 2021-07-27 RX ADMIN — BUMETANIDE 1 MG: 0.25 INJECTION, SOLUTION INTRAMUSCULAR; INTRAVENOUS at 01:14

## 2021-07-27 RX ADMIN — BUMETANIDE 1 MG: 0.25 INJECTION, SOLUTION INTRAMUSCULAR; INTRAVENOUS at 17:43

## 2021-07-27 RX ADMIN — CARVEDILOL 3.12 MG: 3.12 TABLET, FILM COATED ORAL at 17:43

## 2021-07-27 RX ADMIN — SPIRONOLACTONE 25 MG: 25 TABLET ORAL at 08:46

## 2021-07-27 RX ADMIN — FLUTICASONE PROPIONATE 1 SPRAY: 50 SPRAY, METERED NASAL at 08:45

## 2021-07-27 RX ADMIN — HYDROCODONE BITARTRATE AND ACETAMINOPHEN 1 TABLET: 10; 325 TABLET ORAL at 17:44

## 2021-07-27 RX ADMIN — HYDROCODONE BITARTRATE AND ACETAMINOPHEN 1 TABLET: 10; 325 TABLET ORAL at 23:52

## 2021-07-27 RX ADMIN — GABAPENTIN 200 MG: 100 CAPSULE ORAL at 01:14

## 2021-07-27 RX ADMIN — VENLAFAXINE 37.5 MG: 37.5 TABLET ORAL at 08:46

## 2021-07-27 RX ADMIN — CLOPIDOGREL BISULFATE 75 MG: 75 TABLET ORAL at 08:46

## 2021-07-27 RX ADMIN — CARVEDILOL 3.12 MG: 3.12 TABLET, FILM COATED ORAL at 08:46

## 2021-07-27 RX ADMIN — ATORVASTATIN CALCIUM 20 MG: 20 TABLET, FILM COATED ORAL at 23:51

## 2021-07-27 RX ADMIN — ASPIRIN 81 MG: 81 TABLET, COATED ORAL at 08:46

## 2021-07-27 RX ADMIN — DOXAZOSIN 4 MG: 4 TABLET ORAL at 23:53

## 2021-07-27 RX ADMIN — ATORVASTATIN CALCIUM 20 MG: 20 TABLET, FILM COATED ORAL at 01:14

## 2021-07-27 RX ADMIN — GABAPENTIN 200 MG: 100 CAPSULE ORAL at 14:10

## 2021-07-27 RX ADMIN — FAMOTIDINE 20 MG: 20 TABLET, FILM COATED ORAL at 23:54

## 2021-07-27 RX ADMIN — GABAPENTIN 200 MG: 100 CAPSULE ORAL at 23:52

## 2021-07-27 RX ADMIN — DOXAZOSIN 4 MG: 4 TABLET ORAL at 01:14

## 2021-07-27 RX ADMIN — GABAPENTIN 200 MG: 100 CAPSULE ORAL at 08:46

## 2021-07-27 ASSESSMENT — PAIN SCALES - GENERAL
PAINLEVEL_OUTOF10: 0
PAINLEVEL_OUTOF10: 4
PAINLEVEL_OUTOF10: 6

## 2021-07-27 ASSESSMENT — ENCOUNTER SYMPTOMS
DIARRHEA: 0
SHORTNESS OF BREATH: 0
RESPIRATORY NEGATIVE: 1
EYES NEGATIVE: 1
GASTROINTESTINAL NEGATIVE: 1
VOMITING: 0
NAUSEA: 0

## 2021-07-27 NOTE — H&P
HISTORY AND PHYSICAL             Date: 7/26/2021        Patient Name: Tressa Baca. YOB: 1945      Age:  64 y.o. Chief Complaint     Chief Complaint   Patient presents with    Shortness of Breath     progressive worsening for the past 3 months    Chest Pain     progressive worsening for the past 3 months; given 325 mg Aspirin PTA per [facility]      Weakness and lethargy and shortness of breath     History Obtained From   Patient and chart     History of Present Illness   63 yo male who has been declining overall for the last few months. He has been taking diuretics and being monitored and seeing dr. Maury Mares. He comes in today, because, was more uncomfortable and working hard to breathe    Evaluated in er   Noted to have pulmonary edema and interstitial lung disease     And being admitted for acute on chronic chf exacerbation with sx that are unrelieved by additional lasix. Admit for sx management     Past Medical History     Past Medical History:   Diagnosis Date    AAA (abdominal aortic aneurysm) (HCC)     Acute on chronic systolic CHF (congestive heart failure) ([address] 7/26/2021    Aortic dissection ([address] 10/10/2012    CAD (coronary artery disease)     Cancer (HCC)     lung&lymphnode    Carotid artery occlusion     Cerebrovascular accident (CVA) ([address]     Cerebrovascular accident (CVA) due to embolism ([address] 02/16/2016    Cerebrovascular accident (CVA) due to embolism of right middle cerebral artery ([address]     COVID-19 2/6/2021    Depression     HTN (hypertension)     Hyperlipidemia     MI (myocardial infarction) ([address]     acute 5/13/2015    Non Hodgkin's lymphoma ([address]     Palliative care patient 02/16/2021    Sleep apnea     no c-pap    Unspecified sleep apnea         Past Surgical History     Past Surgical History:   Procedure Laterality Date    CARDIAC CATHETERIZATION  5/13/15  [address]    EF 40%. stent to LAD, stent to posterior descending branch of RCA.     CAROTID ENDARTERECTOMY Right 7/22/2016    Right carotid endarterectomy,carotid arteriograms. [facility]    COLONOSCOPY  04/09/2018    Juan Manuel    ENDOSCOPY, COLON, DIAGNOSTIC      LUNG CANCER SURGERY      right side    LYMPHADENECTOMY      removed lymphnodes     RECTAL SURGERY      fistula     UPPER GASTROINTESTINAL ENDOSCOPY N/A 1/8/2020    Dr Lyndsay Henderson (Dr Andrez Og pt) w/EUS-Small low grade GIST in the stomach, repeat in 1 yr    UPPER GASTROINTESTINAL ENDOSCOPY  11/08/2019    Juan Manuel: normal    UPPER GASTROINTESTINAL ENDOSCOPY  04/13/2018    Dr Sarah Zee: normal, 2cm HH, small amount of food in stomach w/dark fluid,but not kathy blood    VASCULAR SURGERY  10/29/12 [facility]    open exposure of R common femoral artery; percutaneous cannulation of L  femoral artery with 7-french sheath; suprarenal abd aortagram with bilat iliofem arteriogram; endoluminal graft repair of AAA with endologix [id number] main body, 28x95 infrarenal cuff; balloon angioplasty of infrarenal abd aorta and endoluminal graft with coda balloon; completion abd aortogram w bilat iliofem arteriogram;     VASCULAR SURGERY  10/29/ [facility]     (cont) open repair of right common femoral ateriotomy; perclose repair of left common femoral artery puncture site. Medications Prior to Admission     Prior to Admission medications    Medication Sig Start Date End Date Taking? Authorizing Provider   fluticasone (FLONASE) 50 MCG/ACT nasal spray 1 spray by Each Nostril route daily   Yes Historical Provider, MD   HYDROcodone-acetaminophen (NORCO)  MG per tablet Take 1 tablet by mouth every 6 hours as needed for Pain for up to 30 days.  7/8/21 8/7/21 Yes Don People, APRN   atorvastatin (LIPITOR) 40 MG tablet TAKE ONE (1) TABLET EVERY DAY 5/13/21  Yes Marie Pruitt MD   lisinopril (PRINIVIL;ZESTRIL) 40 MG tablet TAKE 1 TABLET BY MOUTH EVERY DAY  5/2/21  Yes Marie Pruitt MD   famotidine (PEPCID) 20 MG tablet Take 1 tablet by mouth 2 times daily 4/16/21  Yes TREV Connelly   venlafaxine (EFFEXOR) 75 MG tablet TAKE ONE (1) TABLET BY MOUTH EVERY DAY 4/9/21  Yes Ryan Thomason MD   gabapentin (NEURONTIN) 100 MG capsule  3/18/21  Yes Historical Provider, MD   clopidogrel (PLAVIX) 75 MG tablet TAKE ONE (1) TABLET BY MOUTH EVERY DAY   Patient taking differently: Take 75 mg by mouth daily  1/20/21  Yes Ryan Thomason MD   doxazosin (CARDURA) 2 MG tablet Take 4 mg by mouth nightly  6/14/19  Yes Historical Provider, MD   aspirin 81 MG EC tablet Take 1 tablet by mouth daily 8/5/16  Yes Alvin Bryant MD   ferrous sulfate (IRON 325) 325 (65 Fe) MG tablet Take 1 tablet by mouth daily (with breakfast) 7/21/21   Ryan Thomason MD   furosemide (LASIX) 40 MG tablet Take 1 tablet by mouth daily 7/19/21   Ryan Thomason MD   Cholecalciferol (VITAMIN D3) 1.25 MG (58287 UT) CAPS Take by mouth  Patient not taking: Reported on 7/19/2021    Historical Provider, MD   nitroGLYCERIN (NITROSTAT) 0.4 MG SL tablet Place 0.4 mg under the tongue every 5 minutes as needed for Chest pain    Historical Provider, MD        Allergies   Patient has no known allergies. Social History     Social History     Tobacco History     Smoking Status  Current Every Day Smoker Smoking Frequency  0.5 packs/day for 40 years (20 pk yrs)    Smokeless Tobacco Use  Never Used    Tobacco Comment  quit smoking in nov 2017, restarted smoking since then          Alcohol History     Alcohol Use Status  No          Drug Use     Drug Use Status  No          Sexual Activity     Sexually Active  Not Asked                Family History     Family History   Problem Relation Age of Onset    High Blood Pressure Father     High Blood Pressure Mother     Diabetes Mother     Colon Polyps Brother     Cancer Brother     Colon Cancer Neg Hx        Review of Systems   Review of Systems   Constitutional: Positive for activity change. HENT: Positive for congestion. Eyes: Negative.     Respiratory: Positive for cough, shortness of breath and wheezing. Cardiovascular: Positive for palpitations and leg swelling. Gastrointestinal: Negative. Endocrine: Negative. Genitourinary: Positive for urgency. Musculoskeletal: Positive for arthralgias, joint swelling and myalgias. Neurological: Positive for dizziness. Physical Exam   BP (!) 154/91   Pulse 86   Temp 97.9 °F (36.6 °C) (Oral)   Resp 12   Ht 5' 8\" (1.727 m)   Wt 227 lb (103 kg)   SpO2 98%   BMI 34.52 kg/m²     Physical Exam  Vitals and nursing note reviewed. Constitutional:       Appearance: He is obese. He is ill-appearing. HENT:      Head: Normocephalic and atraumatic. Mouth/Throat:      Mouth: Mucous membranes are dry. Pharynx: Oropharynx is clear. Eyes:      Extraocular Movements: Extraocular movements intact. Pupils: Pupils are equal, round, and reactive to light. Cardiovascular:      Rate and Rhythm: Regular rhythm. Tachycardia present. Pulses: Normal pulses. Heart sounds: Normal heart sounds. Pulmonary:      Breath sounds: Wheezing and rales present. Chest:      Chest wall: Tenderness present. Abdominal:      Comments: morbildy obese   Non distended and nontender    Musculoskeletal:      Cervical back: Normal range of motion. Right lower leg: Edema present. Left lower leg: Edema present. Comments: Decreased pulses   And increased swelling noted.    Discomfort with ambulation    Neurological:      Comments: Slight difficulty with being oriented to time and place          Labs      Recent Results (from the past 24 hour(s))   COVID-19, Rapid    Collection Time: 07/26/21  7:53 PM    Specimen: Nasopharyngeal Swab   Result Value Ref Range    SARS-CoV-2, NAAT Not Detected Not Detected   CBC Auto Differential    Collection Time: 07/26/21  7:55 PM   Result Value Ref Range    WBC 10.0 4.8 - 10.8 K/uL    RBC 4.47 (L) 4.70 - 6.10 M/uL    Hemoglobin 8.7 (L) 14.0 - 18.0 g/dL    Hematocrit 32.7 (L) 42.0 - 52.0 %    MCV 73.2 (L) 80.0 - 94.0 fL    MCH 19.5 (L) 27.0 - 31.0 pg    MCHC 26.6 (L) 33.0 - 37.0 g/dL    RDW 19.5 (H) 11.5 - 14.5 %    Platelets 394 361 - 939 K/uL    MPV 11.0 9.4 - 12.4 fL    PLATELET SLIDE REVIEW Adequate     Neutrophils % 69.9 (H) 50.0 - 65.0 %    Lymphocytes % 18.7 (L) 20.0 - 40.0 %    Monocytes % 9.5 0.0 - 10.0 %    Eosinophils % 0.1 0.0 - 5.0 %    Basophils % 1.4 (H) 0.0 - 1.0 %    Neutrophils Absolute 7.0 1.5 - 7.5 K/uL    Immature Granulocytes # 0.0 K/uL    Lymphocytes Absolute 1.9 1.1 - 4.5 K/uL    Monocytes Absolute 1.00 (H) 0.00 - 0.90 K/uL    Eosinophils Absolute 0.00 0.00 - 0.60 K/uL    Basophils Absolute 0.10 0.00 - 0.20 K/uL    Anisocytosis 1+ (A)     Polychromasia 2+ (A)     Hypochromia 2+ (A)     Ovalocytes Occasional (A)    Comprehensive Metabolic Panel w/ Reflex to MG    Collection Time: 07/26/21  7:55 PM   Result Value Ref Range    Sodium 141 136 - 145 mmol/L    Potassium reflex Magnesium 4.5 3.5 - 5.0 mmol/L    Chloride 109 98 - 111 mmol/L    CO2 23 22 - 29 mmol/L    Anion Gap 9 7 - 19 mmol/L    Glucose 117 (H) 74 - 109 mg/dL    BUN 29 (H) 8 - 23 mg/dL    CREATININE 1.6 (H) 0.5 - 1.2 mg/dL    GFR Non-African American 42 (A) >60    GFR  51 (L) >59    Calcium 10.7 (H) 8.8 - 10.2 mg/dL    Total Protein 6.7 6.6 - 8.7 g/dL    Albumin 3.8 3.5 - 5.2 g/dL    Total Bilirubin 0.6 0.2 - 1.2 mg/dL    Alkaline Phosphatase 94 40 - 130 U/L    ALT 78 (H) 5 - 41 U/L    AST 59 (H) 5 - 40 U/L   Troponin    Collection Time: 07/26/21  7:55 PM   Result Value Ref Range    Troponin 0.04 (H) 0.00 - 0.03 ng/mL   Brain Natriuretic Peptide    Collection Time: 07/26/21  7:55 PM   Result Value Ref Range    Pro-BNP 7,565 (H) 0 - 1,800 pg/mL   Protime-INR    Collection Time: 07/26/21  7:55 PM   Result Value Ref Range    Protime 15.5 (H) 12.0 - 14.6 sec    INR 1.21 (H) 0.88 - 1.18   APTT    Collection Time: 07/26/21  7:55 PM   Result Value Ref Range    aPTT 36.5 (H) 26.0 - 36.2 sec Respiratory Panel, Molecular, with COVID-19 (Restricted: peds pts or suitable admitted adults)    Collection Time: 07/26/21  8:35 PM    Specimen: Nasopharyngeal   Result Value Ref Range    Adenovirus by PCR Not Detected Not Detected    Bordetella parapertussis by PCR Not Detected Not Detected    Bordetella pertussis by PCR Not Detected Not Detected    Chlamydophilia pneumoniae by PCR Not Detected Not Detected    Coronavirus 229E by PCR Not Detected Not Detected    Coronavirus HKU1 by PCR Not Detected Not Detected    Coronavirus NL63 by PCR Not Detected Not Detected    Coronavirus OC43 by PCR Not Detected Not Detected    SARS-CoV-2, PCR Not Detected Not Detected    Human Metapneumovirus by PCR Not Detected Not Detected    Human Rhinovirus/Enterovirus by PCR Not Detected Not Detected    Influenza A by PCR Not Detected Not Detected    Influenza B by PCR Not Detected Not Detected    Mycoplasma pneumoniae by PCR Not Detected Not Detected    Parainfluenza Virus 1 by PCR Not Detected Not Detected    Parainfluenza Virus 2 by PCR Not Detected Not Detected    Parainfluenza Virus 3 by PCR Not Detected Not Detected    Parainfluenza Virus 4 by PCR Not Detected Not Detected    Respiratory Syncytial Virus by PCR Not Detected Not Detected        Imaging/Diagnostics Last 24 Hours   XR CHEST PORTABLE    Result Date: 7/26/2021  XR CHEST PORTABLE 7/26/2021 8:20 PM HISTORY:   Chest pain and shortness of air  Single view. COMPARISONS:  7/19/2021 and 4/6/2021 FINDINGS: There is cardiomegaly. There is bronchovascular interstitial prominence with patchy airspace opacities particularly in the right lower lobe with right-sided pleural reaction likely. Permanent cardiac pacemaker observed. Differential considerations include pulmonary edema and heart failure. 1. Cardiomegaly with bilateral interstitial infiltrates with patchy right airspace opacities with probable right pleural effusion.  Differential considerations include pulmonary edema and heart failure. Signed by Dr Bre Burroughs    * (Principal) Acute on chronic systolic CHF (congestive heart failure) ([address] 7/26/2021 Yes    AAA (abdominal aortic aneurysm) ([address] 7/26/2021 Yes    Mixed hyperlipidemia 7/26/2021 Yes    Cerebrovascular accident (CVA) due to embolism ([address] 7/26/2021 Yes    Obstructive sleep apnea syndrome 7/26/2021 Yes    CKD (chronic kidney disease) stage 3, GFR 30-59 ml/min ([address] 7/26/2021 Yes    Coronary artery disease involving native heart without angina pectoris 7/26/2021 Yes    Cerebrovascular accident (CVA) due to embolism of right middle cerebral artery ([address] 7/26/2021 Yes    Simple chronic bronchitis ([address] 7/26/2021 Yes    Recurrent major depressive disorder in partial remission ([address] 7/26/2021 Yes    Hyperparathyroidism ([address] 7/26/2021 Yes    Atherosclerosis of native artery of both lower extremities ([address] 7/26/2021 Yes    Bilateral carotid artery stenosis 7/26/2021 Yes    Shortness of breath 7/26/2021 Yes    PND (paroxysmal nocturnal dyspnea) 7/26/2021 Yes    Hypoxemia 7/26/2021 Yes          Plan   1. Being admitted to hospital due to decompensation from chf systolic heart failure   Acute on chronic decompensation   Admit   Tele   And diurese   And monitor response to therapy  Trend troponin   And monitor creatinine and electrolytes  Echo to check lv function     2. Peripheral edema due to above     3. Hypertension on meds. 4.  Morbid obesity     5. Limited ambulation due to weakness    6. Tobacco abuse and copd     7. Supplemental oxygen for maintaining oxygen saturation    8.  ckd stable  Stage III B     9.   Consult dr. Michaeline Kawasaki    Consultations Ordered:  IP CONSULT TO HEART FAILURE NURSE/COORDINATOR  IP CONSULT TO DIETITIAN  IP CONSULT TO CARDIOLOGY    Electronically signed by Leigh Lacey MD on 7/26/21 at 11:26 PM CDT

## 2021-07-27 NOTE — PROGRESS NOTES
Alejandro Phillips. arrived to room # 315. Presented with: SOB   Mental Status: Patient is oriented and alert. Vitals:    07/27/21 0044   BP: (!) 147/93   Pulse: 96   Resp: 16   Temp: 96.5 °F (35.8 °C)   SpO2: 94%     Patient safety contract and falls prevention contract reviewed with patient Yes. Oriented Patient to room. Call light within reach. Yes.   Needs, issues or concerns expressed at this time: no.      Electronically signed by Lalita Paz RN on 7/27/2021 at 2:09 AM

## 2021-07-27 NOTE — CONSULTS
Palliative Care:   Pt known to palliative care team.  Pt presents to ED via air evac from [facility] with c/o SOA and CP worsening x 3 months. Past Medical History:        Past Medical History:   Diagnosis Date    AAA (abdominal aortic aneurysm) (HCC)     Acute on chronic systolic CHF (congestive heart failure) ([address] 7/26/2021    Aortic dissection ([address] 10/10/2012    CAD (coronary artery disease)     Cancer (HCC)     lung&lymphnode    Carotid artery occlusion     Cerebrovascular accident (CVA) ([address]     Cerebrovascular accident (CVA) due to embolism ([address] 02/16/2016    Cerebrovascular accident (CVA) due to embolism of right middle cerebral artery ([address]     COVID-19 2/6/2021    Depression     HTN (hypertension)     Hyperlipidemia     [facility]     acute 5/13/2015    Non Hodgkin's lymphoma ([facility]     Palliative care patient 02/16/2021    Sleep apnea     no c-pap    Unspecified sleep apnea        Advance Directives:    Not on file. Pt is full code per son. Pain/Other Symptoms:   Pt appears comfortable, asleep at current time. Activity:     As cortez        Psychological/Spiritual:    Good family support. Dtr, primary HCS lives  With pt and spouse. Does not have spiritual support per son. Plan: Telemetry, diurese, monitor troponin, ECHO, cardiology consult      Patient/family discussion r/t goals: Attempted to call primary HCS, no answer x 2, no VM set up. Attempted to speak with spouse, she was not feeling well. Call made to pt son, Jaylin Guthrie. Pt has not been feeling well for 3 months. Son tells me pt was here in Feb with COVID and dc'd to [facility] for 30 days. In April pt admitted for pacemaker. Pt has CG in the home to assist him with his ADL's. Dtr, Blanco Manual, lives with pt and spouse, however, she also works daily. Family transports pt to his medical appts. Son, Lacy Wall, lives [de-identified] miles from pt.   Palliative following for support, GOC.        Review of any needed services:    Spoke with son about possible need for [facility] or SNF as a goal of care. Son tells me pt has CG in the home. However, if SNF needed first choice is [facility].             Electronically signed by Manisha Holder RN on 7/27/2021 at 11:51 AM

## 2021-07-27 NOTE — PROGRESS NOTES
Progress Note  Date:2021       Room:0315/315-01  Patient Sabine Murcia. YOB: 1945     Age:76 y.o. Subjective    Subjective   Patient seen and examined this a.m. sitting up consuming breakfast.  Feels short of breath. Nurse present in the room. Patient denies headache, change in vision, chest pain, nausea vomiting, fevers or chills. Does not utilize home oxygen. Cumulative Hospital course: Patient admitted 926, 75-year-old obese  male with a known past medical history of heart failure with preserved ejection fraction, pacemaker, depression/anxiety, coronary artery disease, hypertension, hyperlipidemia, iron deficiency anemia scented to the emergency room due to progressive weakness as well as shortness of breath. Noted proBNP upon elevation, elevated troponin troponin has down trended, continues with IV diuresis, cumulative output negative. Echocardiogram completed, cardiology consultation. Review of Systems   ROS: 14 point review of systems is negative except as specifically addressed above. Objective         Vitals Last 24 Hours:  TEMPERATURE:  Temp  Av.3 °F (36.3 °C)  Min: 96.5 °F (35.8 °C)  Max: 97.9 °F (36.6 °C)  RESPIRATIONS RANGE: Resp  Av.3  Min: 12  Max: 19  PULSE OXIMETRY RANGE: SpO2  Av.8 %  Min: 86 %  Max: 99 %  PULSE RANGE: Pulse  Av.8  Min: 76  Max: 96  BLOOD PRESSURE RANGE: Systolic (50TGH), BXJ:617 , Min:119 , BPP:351   ; Diastolic (61ZPP), MZT:79, Min:60, Max:99    I/O (24Hr): Intake/Output Summary (Last 24 hours) at 2021 1002  Last data filed at 2021 0530  Gross per 24 hour   Intake --   Output 1225 ml   Net -1225 ml     Physical Exam  Vitals and nursing note reviewed. Constitutional:       General: He is not in acute distress. Appearance: He is obese. He is not ill-appearing. Comments: Conversational, fatigued   HENT:      Head: Normocephalic and atraumatic.       Nose: Nose normal.   Eyes: General:         Right eye: No discharge. Left eye: No discharge. Conjunctiva/sclera: Conjunctivae normal.   Cardiovascular:      Rate and Rhythm: Normal rate and regular rhythm. Pulses: Normal pulses. Pulmonary:      Breath sounds: Wheezing and rales present. Comments: Nasal cannula oxygen in place  Abdominal:      General: Bowel sounds are normal. There is distension. Tenderness: There is no guarding or rebound. Musculoskeletal:      Right lower leg: Edema present. Left lower leg: Edema present. Skin:     General: Skin is warm. Neurological:      Mental Status: He is alert and oriented to person, place, and time. Mental status is at baseline. Motor: Weakness present. Psychiatric:         Mood and Affect: Mood normal.         Labs/Imaging/Diagnostics    Labs:  CBC:  Recent Labs     07/26/21 1955   WBC 10.0   RBC 4.47*   HGB 8.7*   HCT 32.7*   MCV 73.2*   RDW 19.5*        CHEMISTRIES:  Recent Labs     07/26/21 1955 07/27/21  0129    136   K 4.5 4.6    103   CO2 23 23   BUN 29* 30*   CREATININE 1.6* 1.8*   GLUCOSE 117* 172*   MG  --  2.1     PT/INR:  Recent Labs     07/26/21 1955   PROTIME 15.5*   INR 1.21*     APTT:  Recent Labs     07/26/21 1955   APTT 36.5*     LIVER PROFILE:  Recent Labs     07/26/21 1955   AST 59*   ALT 78*   BILITOT 0.6   ALKPHOS 94       Imaging Last 24 Hours:  XR CHEST PORTABLE    Result Date: 7/26/2021  XR CHEST PORTABLE 7/26/2021 8:20 PM HISTORY:   Chest pain and shortness of air  Single view. COMPARISONS:  7/19/2021 and 4/6/2021 FINDINGS: There is cardiomegaly. There is bronchovascular interstitial prominence with patchy airspace opacities particularly in the right lower lobe with right-sided pleural reaction likely. Permanent cardiac pacemaker observed. Differential considerations include pulmonary edema and heart failure.     1. Cardiomegaly with bilateral interstitial infiltrates with patchy right airspace opacities with probable right pleural effusion. Differential considerations include pulmonary edema and heart failure.  Signed by Dr Selena Downing    Assessment//Plan           Hospital Problems         Last Modified POA    * (Principal) Acute on chronic systolic CHF (congestive heart failure) ([address] 7/26/2021 Yes    AAA (abdominal aortic aneurysm) ([address] 7/26/2021 Yes    Mixed hyperlipidemia 7/26/2021 Yes    Cerebrovascular accident (CVA) due to embolism ([address] 7/26/2021 Yes    Obstructive sleep apnea syndrome 7/26/2021 Yes    CKD (chronic kidney disease) stage 3, GFR 30-59 ml/min ([address] 7/26/2021 Yes    Coronary artery disease involving native heart without angina pectoris 7/26/2021 Yes    Cerebrovascular accident (CVA) due to embolism of right middle cerebral artery ([address] 7/26/2021 Yes    Simple chronic bronchitis ([address] 7/26/2021 Yes    Recurrent major depressive disorder in partial remission ([address] 7/26/2021 Yes    Hyperparathyroidism ([address] 7/26/2021 Yes    Atherosclerosis of native artery of both lower extremities ([address] 7/26/2021 Yes    Bilateral carotid artery stenosis 7/26/2021 Yes    Shortness of breath 7/26/2021 Yes    PND (paroxysmal nocturnal dyspnea) 7/26/2021 Yes    Hypoxemia 7/26/2021 Yes        Acute exacerbation of chronic diastolic congestive heart failure  -Noted elevated proBNP  -Chest x-ray reviewed  -Continues on IV diuresis  -Monitor I's and O's negative  -Thyroid function within normal limits  -Thoracic echocardiogram completed awaiting official report  -Continue on cardiac diet  -Cardiology consultation appreciate recommendations  -Continue beta-blocker, spironolactone, lisinopril  -Dietary consultation    CKD stage III-chronic condition, continue to monitor daily metabolic profile, renally dose medications, avoid nephrotoxic agents    Hyperlipidemia-chronic condition, continue with statin therapy    Essential hypertension-chronic condition, continue Coreg    Obstructive sleep apnea-CPAP nightly    Coronary artery disease-continue Plavix/aspirin/statin therapy        Advanced directives-full code   DVT prophylaxis-Eliquis      EMR Dragon/Transcription disclaimer:   Much of this encounter note is an electronic transcription/translation of spoken language to printed text.  The electronic translation of spoken language may permit erroneous, or at times, nonsensical words or phrases to be inadvertently transcribed; although attempts have made to review the note for such errors, some may still exist.    Electronically signed by   Ciaran Amezcua MD   Internal Medicine Hospitalist  On 7/27/2021  At 10:02 AM

## 2021-07-27 NOTE — CONSULTS
Comprehensive Nutrition Assessment    Type and Reason for Visit:  Initial, Consult    Nutrition Recommendations/Plan: continue current POC. Continue to attempt to obtain more indepth diet hx    Nutrition Assessment:  Woke pt up at time of visit--still really sleepy. PO intake fairly good with intake ranging 50-75%. Has help at home that does the cooking. No family in room. Atarted to drift back to sleep but did state doesn't use salt shaker.     Malnutrition Assessment:  Malnutrition Status:  No malnutrition    Context:  Acute Illness     Findings of the 6 clinical characteristics of malnutrition:  Energy Intake:  Mild decrease in energy intake (Comment)  Weight Loss:  No significant weight loss     Body Fat Loss:  No significant body fat loss     Muscle Mass Loss:  No significant muscle mass loss    Fluid Accumulation:  7 - Moderate to Severe Generalized   Strength:  Not Performed    Nutrition Related Findings:  well nourished      Wounds:  None       Current Nutrition Therapies:    ADULT DIET; Easy to Chew; Low Fat/Low Chol/High Fiber/LUIS; Low Sodium (2 gm)    Anthropometric Measures:  · Height: 5' 8\" (172.7 cm)  · Current Body Weight: 239 lb 8 oz (108.6 kg)   · Admission Body Weight: 237 lb (107.5 kg)    · Usual Body Weight:       · Ideal Body Weight: 154 lbs; % Ideal Body Weight 155.5 %   · BMI: 36.4  · Adjusted Body Weight:  ; No Adjustment   · BMI Categories: Obese Class 2 (BMI 35.0 -39.9)       Nutrition Diagnosis:   · Altered nutrition-related lab values related to cardiac dysfunction as evidenced by lab values      Nutrition Interventions:   Food and/or Nutrient Delivery:  Continue Current Diet  Nutrition Education/Counseling:  Education not indicated   Coordination of Nutrition Care:  Continue to monitor while inpatient    Goals:          Nutrition Monitoring and Evaluation:   Behavioral-Environmental Outcomes:  None Identified   Food/Nutrient Intake Outcomes:  Food and Nutrient Intake  Physical Signs/Symptoms Outcomes:  Biochemical Data, Chewing or Swallowing, Fluid Status or Edema, Weight, Skin, Nutrition Focused Physical Findings     Discharge Planning:    Continue current diet     Electronically signed by Charles Dowling, MS, RD, LD on 7/27/21 at 2:25 PM CDT    Contact: [phone]

## 2021-07-27 NOTE — ED PROVIDER NOTES
[facility] EMERGENCY DEPT  EMERGENCY DEPARTMENT ENCOUNTER      Pt Name: Cheli Wells. MRN: [id number]  Birthdate 1945  Date of evaluation: 7/26/2021  Provider: Cayden Kim MD    CHIEF COMPLAINT       Chief Complaint   Patient presents with    Shortness of Breath     progressive worsening for the past 3 months    Chest Pain     progressive worsening for the past 3 months; given 325 mg Aspirin PTA per [employer] [facility]         HISTORY OF PRESENT ILLNESS   (Location/Symptom, Timing/Onset,Context/Setting, Quality, Duration, Modifying Factors, Severity)  Note limiting factors. Cheli Wells. is a 64 y.o. male who presents to the emergency department with complaint of shortness of breath associated with cough, congestion, and sinus pressure that has been present over the last 2 weeks and gradually worsening. States he was seen at an urgent care for this and they did not do anything to help him. States he saw his regular doctor and was told there was nothing to do but then asked why he could not get a penicillin shot because back in the day that is what they used to do when people were sick. Patient states he has had arthritis for many years and also is complaining of chronic back pain and bilateral knee pain. Denies any acute worsening. Per medical record has a history of CKD, prior stroke, AAA, pacemaker for complete AV block, and coronary artery disease. He denies any associated chest pain. States his shortness of breath is mild. Denies any new or worsened leg swelling. Patient was flown here emergently by [facility] from [place] [id number] for the symptoms. HPI    NursingNotes were reviewed. REVIEW OF SYSTEMS    (2-9 systems for level 4, 10 or more for level 5)     Review of Systems   Constitutional: Negative for appetite change and fever. HENT: Positive for congestion and rhinorrhea. Negative for voice change. Eyes: Negative for pain and redness.    Respiratory: Positive for cough and shortness of breath. Cardiovascular: Negative for chest pain. Gastrointestinal: Negative for abdominal pain, diarrhea and vomiting. Endocrine: Negative. Genitourinary: Negative. Musculoskeletal: Positive for arthralgias and back pain. Negative for gait problem. Skin: Negative for rash and wound. Neurological: Negative for weakness and headaches. Hematological: Negative. Psychiatric/Behavioral: Negative. All other systems reviewed and are negative. A complete review of systems was performed and is negative except as noted above in the HPI. PAST MEDICAL HISTORY     Past Medical History:   Diagnosis Date    AAA (abdominal aortic aneurysm) (HCC)     Acute on chronic systolic CHF (congestive heart failure) ([address] 7/26/2021    Aortic dissection ([address] 10/10/2012    CAD (coronary artery disease)     Cancer (HCC)     lung&lymphnode    Carotid artery occlusion     Cerebrovascular accident (CVA) ([address]     Cerebrovascular accident (CVA) due to embolism ([address] 02/16/2016    Cerebrovascular accident (CVA) due to embolism of right middle cerebral artery ([address]     COVID-19 2/6/2021    Depression     HTN (hypertension)     Hyperlipidemia     MI (myocardial infarction) ([address]     acute 5/13/2015    Non Hodgkin's lymphoma ([address]     Palliative care patient 02/16/2021    Sleep apnea     no c-pap    Unspecified sleep apnea          SURGICAL HISTORY       Past Surgical History:   Procedure Laterality Date    CARDIAC CATHETERIZATION  5/13/15  [address]    EF 40%. stent to LAD, stent to posterior descending branch of RCA.  CAROTID ENDARTERECTOMY Right 7/22/2016    Right carotid endarterectomy,carotid arteriograms. 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, repeat in 1 yr    UPPER GASTROINTESTINAL ENDOSCOPY  11/08/2019    Juan Manuel: normal    UPPER GASTROINTESTINAL ENDOSCOPY  04/13/2018    Dr Krystal Garcia: normal, 2cm HH, small amount of food in stomach w/dark fluid,but not kathy blood    VASCULAR SURGERY  10/29/12 [facility]    open exposure of R common femoral artery; percutaneous cannulation of L  femoral artery with 7-french sheath; suprarenal abd aortagram with bilat iliofem arteriogram; endoluminal graft repair of AAA with endologix [id number] main body, 28x95 infrarenal cuff; balloon angioplasty of infrarenal abd aorta and endoluminal graft with coda balloon; completion abd aortogram w bilat iliofem arteriogram;     VASCULAR SURGERY  10/29/ [facility]     (cont) open repair of right common femoral ateriotomy; perclose repair of left common femoral artery puncture site. CURRENT MEDICATIONS       Previous Medications    ASPIRIN 81 MG EC TABLET    Take 1 tablet by mouth daily    ATORVASTATIN (LIPITOR) 40 MG TABLET    TAKE ONE (1) TABLET EVERY DAY    CHOLECALCIFEROL (VITAMIN D3) 1.25 MG (77204 UT) CAPS    Take by mouth    CLOPIDOGREL (PLAVIX) 75 MG TABLET    TAKE ONE (1) TABLET BY MOUTH EVERY DAY     DOXAZOSIN (CARDURA) 2 MG TABLET    Take 4 mg by mouth nightly     FAMOTIDINE (PEPCID) 20 MG TABLET    Take 1 tablet by mouth 2 times daily    FERROUS SULFATE (IRON 325) 325 (65 FE) MG TABLET    Take 1 tablet by mouth daily (with breakfast)    FLUTICASONE (FLONASE) 50 MCG/ACT NASAL SPRAY    1 spray by Each Nostril route daily    FUROSEMIDE (LASIX) 40 MG TABLET    Take 1 tablet by mouth daily    GABAPENTIN (NEURONTIN) 100 MG CAPSULE        HYDROCODONE-ACETAMINOPHEN (NORCO)  MG PER TABLET    Take 1 tablet by mouth every 6 hours as needed for Pain for up to 30 days.     LISINOPRIL (PRINIVIL;ZESTRIL) 40 MG TABLET    TAKE 1 TABLET BY MOUTH EVERY DAY     NITROGLYCERIN (NITROSTAT) 0.4 MG SL TABLET    Place 0.4 mg under the tongue every 5 Gatherings with Friends and Family:     Attends [employer] Services:     Active Member of Clubs or Organizations:     Attends Club or Organization Meetings:     Marital Status:    Intimate Partner Violence:     Fear of Current or Ex-Partner:     Emotionally Abused:     Physically Abused:     Sexually Abused:        SCREENINGS             PHYSICAL EXAM    (up to 7 for level 4, 8 or more for level 5)     ED Triage Vitals [21 1950]   BP Temp Temp Source Pulse Resp SpO2 Height Weight   134/80 97.9 °F (36.6 °C) Oral 91 19 91 % 5' 8\" (1.727 m) 227 lb (103 kg)       Physical Exam  Vitals and nursing note reviewed. Constitutional:       General: He is not in acute distress. Appearance: He is well-developed. He is not diaphoretic. HENT:      Head: Normocephalic and atraumatic. Eyes:      General: No scleral icterus. Neck:      Vascular: No JVD. Cardiovascular:      Rate and Rhythm: Normal rate and regular rhythm. Pulses:           Radial pulses are 2+ on the right side and 2+ on the left side. Dorsalis pedis pulses are 2+ on the right side and 2+ on the left side. Heart sounds: Normal heart sounds. No murmur heard. No friction rub. No gallop. Pulmonary:      Effort: Pulmonary effort is normal. No accessory muscle usage or respiratory distress. Breath sounds: Normal breath sounds. No stridor. No decreased breath sounds, wheezing, rhonchi or rales. Chest:      Chest wall: No tenderness. Abdominal:      General: There is no distension. Palpations: Abdomen is soft. Tenderness: There is no abdominal tenderness. There is no guarding or rebound. Musculoskeletal:         General: No deformity. Normal range of motion. Right lower le+ Edema present. Left lower le+ Edema present. Skin:     General: Skin is warm and dry. Coloration: Skin is not pale. Findings: No erythema.    Neurological:      Mental Status: He is alert and oriented to person, place, and time. GCS: GCS eye subscore is 4. GCS verbal subscore is 5. GCS motor subscore is 6. Cranial Nerves: No cranial nerve deficit. Motor: No abnormal muscle tone. Coordination: Coordination normal.   Psychiatric:         Behavior: Behavior normal.         Judgment: Judgment normal.         DIAGNOSTIC RESULTS     EKG: All EKG's are interpreted by the Emergency Department Physician who either signs or Co-signs this chart in the absence of a cardiologist.        RADIOLOGY:   Non-plain film images such as CT, Ultrasound and MRI are read by the radiologist. Plainradiographic images are visualized and preliminarily interpreted by the emergency physician with the below findings:      Interpretation per the Radiologist below, if available at the time of this note:    XR CHEST PORTABLE   Final Result   1. Cardiomegaly with bilateral interstitial infiltrates with patchy   right airspace opacities with probable right pleural effusion. Differential considerations include pulmonary edema and heart failure.    Signed by Dr Kristine Mckinley            ED BEDSIDE ULTRASOUND:   Performed by ED Physician - none    LABS:  Labs Reviewed   CBC WITH AUTO DIFFERENTIAL - Abnormal; Notable for the following components:       Result Value    RBC 4.47 (*)     Hemoglobin 8.7 (*)     Hematocrit 32.7 (*)     MCV 73.2 (*)     MCH 19.5 (*)     MCHC 26.6 (*)     RDW 19.5 (*)     Neutrophils % 69.9 (*)     Lymphocytes % 18.7 (*)     Basophils % 1.4 (*)     Monocytes Absolute 1.00 (*)     Anisocytosis 1+ (*)     Polychromasia 2+ (*)     Hypochromia 2+ (*)     Ovalocytes Occasional (*)     All other components within normal limits   COMPREHENSIVE METABOLIC PANEL W/ REFLEX TO MG FOR LOW K - Abnormal; Notable for the following components:    Glucose 117 (*)     BUN 29 (*)     CREATININE 1.6 (*)     GFR Non- 42 (*)     GFR  51 (*)     Calcium 10.7 (*)     ALT 78 (*)     AST 59 (*)     All other components within normal limits   TROPONIN - Abnormal; Notable for the following components:    Troponin 0.04 (*)     All other components within normal limits   BRAIN NATRIURETIC PEPTIDE - Abnormal; Notable for the following components:    Pro-BNP 7,565 (*)     All other components within normal limits   PROTIME-INR - Abnormal; Notable for the following components:    Protime 15.5 (*)     INR 1.21 (*)     All other components within normal limits   APTT - Abnormal; Notable for the following components:    aPTT 36.5 (*)     All other components within normal limits   COVID-19, RAPID   RESPIRATORY PANEL, MOLECULAR, WITH COVID-19       All other labs were within normal range or not returned as of this dictation. EMERGENCY DEPARTMENT COURSE and DIFFERENTIALDIAGNOSIS/MDM:   Vitals:    Vitals:    07/26/21 1950 07/26/21 2100 07/26/21 2200 07/26/21 2215   BP: 134/80 (!) 145/99 (!) 154/91    Pulse: 91 92 93 86   Resp: 19 18 17 12   Temp: 97.9 °F (36.6 °C)      TempSrc: Oral      SpO2: 91% 94% (!) 86% 98%   Weight: 227 lb (103 kg)      Height: 5' 8\" (1.727 m)          [facility]  ED Course as of Jul 26 2326 Mon Jul 26, 2021 2019 EKG shows ventricular paced rhythm with a rate of 94. Negative P[employer] criteria no further evaluation available. Constellation of symptoms seems consistent with a viral illness. [NEISHA]   2240 Troponin(!): 0.04 [NEISHA]   2241 Pro-BNP(!): 7,565 [NEISHA]      ED Course User Index  [NEISHA] Zoë Rizo MD     Based on the evaluation and work-up here patient is felt to require further monitoring, work-up, or treatment that is available in the emergency department. Case was discussed with hospitalist who agrees for observation or admission for further management. Treatment and stabilization as necessary were provided in the emergency department prior to transfer of care to the medicine service.       CONSULTS:  IP CONSULT TO HEART FAILURE NURSE/COORDINATOR  IP CONSULT TO DIETITIAN  IP CONSULT TO CARDIOLOGY    PROCEDURES:  Unless otherwise notedbelow, none     Procedures    FINAL IMPRESSION     1. Shortness of breath    2. Acute on chronic combined systolic and diastolic CHF (congestive heart failure) ([facility]    3. Acute respiratory failure with hypoxia ([facility]          DISPOSITION/PLAN   DISPOSITION Admitted 07/26/2021 11:26:20 PM      PATIENT REFERRED TO:  No follow-up provider specified.     DISCHARGE MEDICATIONS:  New Prescriptions    No medications on file          (Please note that portions of this note were completed with a voice recognition program.  Efforts were made to edit the dictations butoccasionally words are mis-transcribed.)    Lea Landau, MD (electronically signed)  Emergency Physician          Lea Landau., MD  07/26/21 [id number]

## 2021-07-27 NOTE — ED NOTES
Patient placed on cardiac monitor, continuous pulse oximeter, and NIBP monitor. Monitor alarms on.        Brent Guzman RN  07/26/21 2650

## 2021-07-27 NOTE — CONSULTS
[facility]  Cardiology Consult      Requesting MD:  Homa Melendez MD   Admit Status:  Inpatient [101]       History obtained from:   [] Patient  [] Other (specify):     Patient:  Theo Malik  [id number]     Chief Complaint:   Chief Complaint   Patient presents with    Shortness of Breath     progressive worsening for the past 3 months    Chest Pain     progressive worsening for the past 3 months; given 325 mg Aspirin PTA per [employer]         HPI: Mr. Leticia Engle is a 64 y.o. male with a history of recent pacemaker implantation 4/6/2021 dual-chamber right side due to persistent left superior vena cava. Seen in the office on 5/20/2021. Complaints of going downhill last few months increase shortness of breath no definite anginal chest pain. Known coronary artery disease cardiac cath 5/13/2015 stent placement x2 LAD and right sided PDA. Troponins negative since admission. proBNP 7565. Feels somewhat better today. Review of Systems:  Review of Systems   Constitutional: Negative. Negative for chills, fever and unexpected weight change. HENT: Negative. Eyes: Negative. Respiratory: Negative. Negative for shortness of breath. Cardiovascular: Negative. Negative for chest pain. Gastrointestinal: Negative. Negative for diarrhea, nausea and vomiting. Endocrine: Negative. Genitourinary: Negative. Musculoskeletal: Negative. Skin: Negative. Neurological: Negative. All other systems reviewed and are negative.       Cardiac Specific Data:  Specialty Problems        Cardiology Problems    Complete atrioventricular block (HCC)        AAA (abdominal aortic aneurysm) (HCC)        Mixed hyperlipidemia        Cerebrovascular accident (CVA) due to embolism ([address]        Cerebrovascular accident (CVA) due to embolism of right middle cerebral artery ([address]        Coronary artery disease involving native heart without angina pectoris        Essential hypertension Stenosis of right carotid artery        Atherosclerosis of native artery of both lower extremities (HCC)        Bilateral carotid artery stenosis        Symptomatic bradycardia        * (Principal) Acute on chronic systolic CHF (congestive heart failure) (HCC)              Past Medical History:  Past Medical History:   Diagnosis Date    AAA (abdominal aortic aneurysm) (HCC)     Acute on chronic systolic CHF (congestive heart failure) ([address] 7/26/2021    Aortic dissection ([address] 10/10/2012    CAD (coronary artery disease)     Cancer (HCC)     lung&lymphnode    Carotid artery occlusion     Cerebrovascular accident (CVA) ([address]     Cerebrovascular accident (CVA) due to embolism ([address] 02/16/2016    Cerebrovascular accident (CVA) due to embolism of right middle cerebral artery ([address]     COVID-19 2/6/2021    Depression     HTN (hypertension)     Hyperlipidemia     MI (myocardial infarction) ([address]     acute 5/13/2015    Non Hodgkin's lymphoma ([address]     Palliative care patient 02/16/2021    Sleep apnea     no c-pap    Unspecified sleep apnea         Past Surgical History:  Past Surgical History:   Procedure Laterality Date    CARDIAC CATHETERIZATION  5/13/15  [employer]    EF 40%. stent to LAD, stent to posterior descending branch of RCA.  CAROTID ENDARTERECTOMY Right 7/22/2016    Right carotid endarterectomy,carotid arteriograms. 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, repeat in 1 yr    UPPER GASTROINTESTINAL ENDOSCOPY  11/08/2019    Juan Manuel: normal    UPPER GASTROINTESTINAL ENDOSCOPY  04/13/2018    Dr Subramanian Filler: normal, 2cm HH, small amount of food in stomach w/dark fluid,but not kathy blood    VASCULAR SURGERY  10/29/12 [facility]    open exposure of R Running Out of Food in the Last Year:    [address] in the Last Year:    Transportation Needs:     Lack of Transportation (Medical):  Lack of Transportation (Non-Medical):    Physical Activity:     Days of Exercise per Week:     Minutes of Exercise per Session:    Stress:     Feeling of Stress :    Social Connections:     Frequency of Communication with Friends and Family:     Frequency of Social Gatherings with Friends and Family:     Attends Zoroastrianism Services:     Active Member of Clubs or Organizations:     Attends Club or Organization Meetings:     Marital Status:    Intimate Partner Violence:     Fear of Current or Ex-Partner:     Emotionally Abused:     Physically Abused:     Sexually Abused: Allergies:  No Known Allergies    Home Meds:  Prior to Admission medications    Medication Sig Start Date End Date Taking? Authorizing Provider   fluticasone (FLONASE) 50 MCG/ACT nasal spray 1 spray by Each Nostril route daily   Yes Historical Provider, MD   HYDROcodone-acetaminophen (NORCO)  MG per tablet Take 1 tablet by mouth every 6 hours as needed for Pain for up to 30 days.  7/8/21 8/7/21 Yes TREV Grewal   atorvastatin (LIPITOR) 40 MG tablet TAKE ONE (1) TABLET EVERY DAY 5/13/21  Yes Ryan Thomason MD   lisinopril (PRINIVIL;ZESTRIL) 40 MG tablet TAKE 1 TABLET BY MOUTH EVERY DAY  5/2/21  Yes Ryan Thomason MD   famotidine (PEPCID) 20 MG tablet Take 1 tablet by mouth 2 times daily 4/16/21  Yes Naaman Buerger Etheridge, APRN   venlafaxine (EFFEXOR) 75 MG tablet TAKE ONE (1) TABLET BY MOUTH EVERY DAY 4/9/21  Yes Ryan Thomason MD   gabapentin (NEURONTIN) 100 MG capsule  3/18/21  Yes Historical Provider, MD   clopidogrel (PLAVIX) 75 MG tablet TAKE ONE (1) TABLET BY MOUTH EVERY DAY   Patient taking differently: Take 75 mg by mouth daily  1/20/21  Yes Ryan Thomason MD   doxazosin (CARDURA) 2 MG tablet Take 4 mg by mouth nightly  6/14/19  Yes Historical Provider, MD aspirin 81 MG EC tablet Take 1 tablet by mouth daily 8/5/16  Yes Yosi Owens MD   ferrous sulfate (IRON 325) 325 (65 Fe) MG tablet Take 1 tablet by mouth daily (with breakfast) 7/21/21   Janna Friedman MD   furosemide (LASIX) 40 MG tablet Take 1 tablet by mouth daily 7/19/21   Janna Friedman MD   Cholecalciferol (VITAMIN D3) 1.25 MG (12956 UT) CAPS Take by mouth  Patient not taking: Reported on 7/19/2021    Historical Provider, MD   nitroGLYCERIN (NITROSTAT) 0.4 MG SL tablet Place 0.4 mg under the tongue every 5 minutes as needed for Chest pain    Historical Provider, MD       Current Meds:   aspirin  81 mg Oral Daily    atorvastatin  20 mg Oral Nightly    clopidogrel  75 mg Oral Daily    doxazosin  4 mg Oral Nightly    fluticasone  1 spray Each Nostril Daily    gabapentin  200 mg Oral TID    venlafaxine  37.5 mg Oral Daily    carvedilol  3.125 mg Oral BID WC    spironolactone  25 mg Oral Daily    enoxaparin  40 mg Subcutaneous Daily    famotidine  20 mg Oral Daily    bumetanide  1 mg Intravenous BID       Current Infused Meds:      Physical Exam:  Vitals:    07/27/21 0846   BP: 119/64   Pulse: 79   Resp:    Temp:    SpO2: 96%       Intake/Output Summary (Last 24 hours) at 7/27/2021 1030  Last data filed at 7/27/2021 0530  Gross per 24 hour   Intake --   Output 1225 ml   Net -1225 ml     Estimated body mass index is 36.42 kg/m² as calculated from the following:    Height as of this encounter: 5' 8\" (1.727 m). Weight as of this encounter: 239 lb 8 oz (108.6 kg). Physical Exam  Vitals reviewed. Constitutional:       General: He is not in acute distress. Appearance: He is well-developed. He is obese. He is not ill-appearing, toxic-appearing or diaphoretic. HENT:      Head: Normocephalic and atraumatic. Nose: Nose normal.      Mouth/Throat:      Mouth: Mucous membranes are moist.      Pharynx: Oropharynx is clear. Eyes:      General: No scleral icterus.      Extraocular (CKTOTAL:3, CKMB:3, TROPONINI:3)@    Last 3 BNP:  No results for input(s): BNP in the last 72 hours. IMAGING:  XR CHEST (2 VW)    Result Date: 7/19/2021  HISTORY: R05 CXR: 2 views of the chest are obtained. COMPARISON: 4/6/2021. FINDINGS: Lungs remain hyperinflated. Increased blunting of right costophrenic sulcus may be secondary to small pleural effusion. Questionable trace left pleural effusion. Right basilar atelectasis. Similar positioning of the right subclavian cardiac pacer device. Stable cardiomegaly. Interstitial densities favoring edema. No pneumothorax. Moderate degenerative change of the thoracic spine. 1. Stable cardiomegaly with similar positioning of the right subclavian cardiac pacer device. Interstitial edema and small right greater than left pleural effusions favoring CHF. Signed by Dr Darwin Knott    XR CHEST PORTABLE    Result Date: 7/26/2021  XR CHEST PORTABLE 7/26/2021 8:20 PM HISTORY:   Chest pain and shortness of air  Single view. COMPARISONS:  7/19/2021 and 4/6/2021 FINDINGS: There is cardiomegaly. There is bronchovascular interstitial prominence with patchy airspace opacities particularly in the right lower lobe with right-sided pleural reaction likely. Permanent cardiac pacemaker observed. Differential considerations include pulmonary edema and heart failure. 1. Cardiomegaly with bilateral interstitial infiltrates with patchy right airspace opacities with probable right pleural effusion. Differential considerations include pulmonary edema and heart failure. Signed by Dr Patric Sanchez:  1. Complaints of increased shortness of breath BNP 7565 consistent with acute on chronic systolic and diastolic congestive heart failure  2. Hyperlipidemia  3. History of stroke  4. Obstructive sleep apnea  5. Chronic kidney disease  6. Coronary artery disease  7. Depression  8. Hyperparathyroidism  9. Carotid artery stenosis  10. Peripheral arterial disease  11.  Abdominal aortic aneurysm  12. CT head 2/16/2021 no acute findings mild chronic microvascular changes  13. Echo 7/15/2016 RVSP 26 mild TR normal LV function EF 55 to 46% grade 1 diastolic dysfunction  14. Catheterization 5/13/2015 EF 40% anteroseptal hypokinesis severe two-vessel disease bare-metal stent placement LAD and PDA of the right coronary artery      Recommendations:  1. Continue current medical therapy review echo further comments to follow.

## 2021-07-27 NOTE — ED NOTES
Bed: 04  Expected date:   Expected time:   Means of arrival:   Comments:  2000 Paula Baumann, RN  07/26/21 1948

## 2021-07-28 LAB
ANION GAP SERPL CALCULATED.3IONS-SCNC: 12 MMOL/L (ref 7–19)
BUN BLDV-MCNC: 32 MG/DL (ref 8–23)
CALCIUM SERPL-MCNC: 10.6 MG/DL (ref 8.8–10.2)
CHLORIDE BLD-SCNC: 106 MMOL/L (ref 98–111)
CO2: 23 MMOL/L (ref 22–29)
CREAT SERPL-MCNC: 1.8 MG/DL (ref 0.5–1.2)
GFR AFRICAN AMERICAN: 44
GFR NON-AFRICAN AMERICAN: 37
GLUCOSE BLD-MCNC: 117 MG/DL (ref 74–109)
HCT VFR BLD CALC: 32.3 % (ref 42–52)
HEMOGLOBIN: 8.2 G/DL (ref 14–18)
MAGNESIUM: 2 MG/DL (ref 1.6–2.4)
MCH RBC QN AUTO: 19.3 PG (ref 27–31)
MCHC RBC AUTO-ENTMCNC: 25.4 G/DL (ref 33–37)
MCV RBC AUTO: 76.2 FL (ref 80–94)
PDW BLD-RTO: 19 % (ref 11.5–14.5)
PLATELET # BLD: 302 K/UL (ref 130–400)
PMV BLD AUTO: 11.4 FL (ref 9.4–12.4)
POTASSIUM SERPL-SCNC: 4.8 MMOL/L (ref 3.5–5)
RBC # BLD: 4.24 M/UL (ref 4.7–6.1)
SODIUM BLD-SCNC: 141 MMOL/L (ref 136–145)
WBC # BLD: 9.3 K/UL (ref 4.8–10.8)

## 2021-07-28 PROCEDURE — 2500000003 HC RX 250 WO HCPCS: Performed by: FAMILY MEDICINE

## 2021-07-28 PROCEDURE — 1210000000 HC MED SURG R&B

## 2021-07-28 PROCEDURE — 80048 BASIC METABOLIC PNL TOTAL CA: CPT

## 2021-07-28 PROCEDURE — 36415 COLL VENOUS BLD VENIPUNCTURE: CPT

## 2021-07-28 PROCEDURE — 6360000002 HC RX W HCPCS: Performed by: INTERNAL MEDICINE

## 2021-07-28 PROCEDURE — 85027 COMPLETE CBC AUTOMATED: CPT

## 2021-07-28 PROCEDURE — 6370000000 HC RX 637 (ALT 250 FOR IP): Performed by: INTERNAL MEDICINE

## 2021-07-28 PROCEDURE — 2700000000 HC OXYGEN THERAPY PER DAY

## 2021-07-28 PROCEDURE — 97162 PT EVAL MOD COMPLEX 30 MIN: CPT

## 2021-07-28 PROCEDURE — 97110 THERAPEUTIC EXERCISES: CPT

## 2021-07-28 PROCEDURE — 83735 ASSAY OF MAGNESIUM: CPT

## 2021-07-28 PROCEDURE — 94640 AIRWAY INHALATION TREATMENT: CPT

## 2021-07-28 PROCEDURE — 97530 THERAPEUTIC ACTIVITIES: CPT

## 2021-07-28 PROCEDURE — 93971 EXTREMITY STUDY: CPT

## 2021-07-28 PROCEDURE — 99232 SBSQ HOSP IP/OBS MODERATE 35: CPT | Performed by: INTERNAL MEDICINE

## 2021-07-28 RX ORDER — ARFORMOTEROL TARTRATE 15 UG/2ML
15 SOLUTION RESPIRATORY (INHALATION) 2 TIMES DAILY
Status: DISCONTINUED | OUTPATIENT
Start: 2021-07-28 | End: 2021-07-29 | Stop reason: HOSPADM

## 2021-07-28 RX ORDER — BUDESONIDE 0.5 MG/2ML
0.5 INHALANT ORAL 2 TIMES DAILY
Status: DISCONTINUED | OUTPATIENT
Start: 2021-07-28 | End: 2021-07-29 | Stop reason: HOSPADM

## 2021-07-28 RX ADMIN — BUDESONIDE 500 MCG: 0.5 SUSPENSION RESPIRATORY (INHALATION) at 16:44

## 2021-07-28 RX ADMIN — GABAPENTIN 200 MG: 100 CAPSULE ORAL at 21:27

## 2021-07-28 RX ADMIN — FAMOTIDINE 20 MG: 20 TABLET, FILM COATED ORAL at 21:27

## 2021-07-28 RX ADMIN — BUMETANIDE 1 MG: 0.25 INJECTION, SOLUTION INTRAMUSCULAR; INTRAVENOUS at 08:38

## 2021-07-28 RX ADMIN — CLOPIDOGREL BISULFATE 75 MG: 75 TABLET ORAL at 08:38

## 2021-07-28 RX ADMIN — HYDROCODONE BITARTRATE AND ACETAMINOPHEN 1 TABLET: 10; 325 TABLET ORAL at 08:45

## 2021-07-28 RX ADMIN — HYDROCODONE BITARTRATE AND ACETAMINOPHEN 1 TABLET: 10; 325 TABLET ORAL at 21:27

## 2021-07-28 RX ADMIN — ARFORMOTEROL TARTRATE 15 MCG: 15 SOLUTION RESPIRATORY (INHALATION) at 16:44

## 2021-07-28 RX ADMIN — BUDESONIDE 500 MCG: 0.5 SUSPENSION RESPIRATORY (INHALATION) at 10:58

## 2021-07-28 RX ADMIN — HYDROCODONE BITARTRATE AND ACETAMINOPHEN 1 TABLET: 10; 325 TABLET ORAL at 13:20

## 2021-07-28 RX ADMIN — GABAPENTIN 200 MG: 100 CAPSULE ORAL at 13:20

## 2021-07-28 RX ADMIN — FLUTICASONE PROPIONATE 1 SPRAY: 50 SPRAY, METERED NASAL at 08:38

## 2021-07-28 RX ADMIN — ARFORMOTEROL TARTRATE 15 MCG: 15 SOLUTION RESPIRATORY (INHALATION) at 10:57

## 2021-07-28 RX ADMIN — ENOXAPARIN SODIUM 40 MG: 40 INJECTION SUBCUTANEOUS at 04:18

## 2021-07-28 RX ADMIN — ATORVASTATIN CALCIUM 20 MG: 20 TABLET, FILM COATED ORAL at 21:27

## 2021-07-28 RX ADMIN — DOXAZOSIN 4 MG: 4 TABLET ORAL at 21:27

## 2021-07-28 RX ADMIN — VENLAFAXINE 37.5 MG: 37.5 TABLET ORAL at 08:38

## 2021-07-28 RX ADMIN — CARVEDILOL 3.12 MG: 3.12 TABLET, FILM COATED ORAL at 08:38

## 2021-07-28 RX ADMIN — ASPIRIN 81 MG: 81 TABLET, COATED ORAL at 08:38

## 2021-07-28 RX ADMIN — GABAPENTIN 200 MG: 100 CAPSULE ORAL at 08:38

## 2021-07-28 RX ADMIN — Medication 3 MG: at 21:27

## 2021-07-28 RX ADMIN — SPIRONOLACTONE 25 MG: 25 TABLET ORAL at 08:38

## 2021-07-28 RX ADMIN — BUMETANIDE 1 MG: 0.25 INJECTION, SOLUTION INTRAMUSCULAR; INTRAVENOUS at 17:29

## 2021-07-28 ASSESSMENT — PAIN DESCRIPTION - LOCATION
LOCATION: BACK;LEG
LOCATION: BACK
LOCATION: GENERALIZED

## 2021-07-28 ASSESSMENT — PAIN DESCRIPTION - PAIN TYPE
TYPE: CHRONIC PAIN

## 2021-07-28 ASSESSMENT — PAIN SCALES - GENERAL
PAINLEVEL_OUTOF10: 5
PAINLEVEL_OUTOF10: 7
PAINLEVEL_OUTOF10: 6
PAINLEVEL_OUTOF10: 7
PAINLEVEL_OUTOF10: 10
PAINLEVEL_OUTOF10: 10
PAINLEVEL_OUTOF10: 7

## 2021-07-28 ASSESSMENT — PAIN DESCRIPTION - DESCRIPTORS
DESCRIPTORS: ACHING
DESCRIPTORS: ACHING

## 2021-07-28 ASSESSMENT — PAIN DESCRIPTION - ORIENTATION
ORIENTATION: LOWER;MID
ORIENTATION: LEFT

## 2021-07-28 ASSESSMENT — PAIN - FUNCTIONAL ASSESSMENT: PAIN_FUNCTIONAL_ASSESSMENT: PREVENTS OR INTERFERES SOME ACTIVE ACTIVITIES AND ADLS

## 2021-07-28 NOTE — CARE COORDINATION
Date / Time of Evaluation: 7/28/2021 4:15 PM  Assessment Completed by: Ebony Romo RN    Patient Admission Status: Inpatient [101]    [address] [place] [address]    [phone] (home)   Telephone Information:   Mobile [phone]       (Best Practice:  Have patient / caregiver verify above address and phone number by stating out loud their current address and reachable phone number.)  Is above information correct? yes      Current PCP:  Hernan Rodriguez MD  PCP verified? yes    Initial Assessment Completed at bedside with:  patient    Emergency Contacts:  Extended Emergency Contact Information  Primary Emergency Contact: Cheryl Stahl  Address: [address] [facility], [address] [facility] [address] [facility] Phone: [phone]  Work Phone: [phone]  Mobile Phone: [phone]  Relation: Spouse  Secondary Emergency Contact: Rafael Shell  [address] [facility] Phone: [phone]  Mobile Phone: [phone]  Relation: Child    Advance Directives: Code Status:  Full Code    Financial:  Payor: Ruchi Todd / Plan: MEDICARE PART A AND B / Product Type: *No Product type* /     Pre-Cert required for SNF:  no    Have Long Term Care Insurance:  no    Pharmacy:   [facility]/pharmacy [address], [address] [address] [address]  [address]  [address]  Phone: [phone] Fax: [phone]    [facility] - [address], [address] [facility] [address] [facility] [phone]  [facility] [address]  [address] [facility] [address]  Phone: [phone] Fax: [phone]    [address], [address]  [address] [place] [address]  Phone: [phone] Fax: [phone]      Potential assistance purchasing medications?   no    ADLS:  Support System:  Spouse/Significant Other, Children    Current Home Environment:  Home with spouse  Steps:      Plans to RETURN to current housing: no  Barriers to RETURNING to current housing:      Currently ACTIVE with Home Health CARE:  no  [address]:      DME Provider:  Has all dme at home he needs    Has a pulse oximetry unit at home: no    Had [address] prior to admission:  no  Oxygen Company:    Informed of need to bring portable home O2 tank to hospital on day of DISCHARGE:    Name of person committed to bringing portable tank at discharge: Active with HD/PD prior to admission: no  Nephrologist:    HD Center:      Transition Plan:       Transportation PLAN for Discharge:      Factors facilitating achievement of predicted outcomes:     Barriers to discharge: Additional CM/SW Notes: spoke with pt re: dc plans. Pt wants to go to [facility] upon dc. Aware he is not able to care for himself at home currently. Pt has a niece Giovana Guajardo) that works at [employer] and is aware of referal that was sent per pt wishes. Will follow. Electronically signed by Jonna Mooney RN on 7/28/2021 at 4:19 PM            Riaz and/or his family were provided with choice of provider.         Jonna Mooney RN  [facility]  Care Management Department  Ph:  [phone]   Fax:

## 2021-07-28 NOTE — PROGRESS NOTES
[facility]        Hospitalist Progress Note  7/28/2021 9:37 AM  Subjective:   Admit Date: 7/26/2021  PCP: Chey Han MD    Chief Complaint: Dyspnea    Subjective: Patient seen and examined at bedside. Dyspnea somewhat improved. Denies chest pain. Complaining of chronic back pain. No other acute complaints. [facility] History: 66-year-old obese male with a past medical history of congestive heart failure, complete heart block status post PPM 4/2021, depression/anxiety, CAD, hypertension, hyperlipidemia, iron deficiency anemia, hyperparathyroidism, COPD with continued tobacco abuse, ZION, CKD stage IIIb presenting to the hospital for weakness and increasing dyspnea found to have an elevated BNP with new onset systolic congestive heart failure noted on TTE (TTE showing EF of 30%). Cardiology on board. ROS: 14 point review of systems is negative except as specifically addressed above.     Diet NPO    Intake/Output Summary (Last 24 hours) at 7/28/2021 3677  Last data filed at 7/27/2021 1909  Gross per 24 hour   Intake 690 ml   Output 200 ml   Net 490 ml     Medications:  Current Facility-Administered Medications   Medication Dose Route Frequency Provider Last Rate Last Admin    aspirin EC tablet 81 mg  81 mg Oral Daily Yelitza Baez MD   81 mg at 07/28/21 5818    atorvastatin (LIPITOR) tablet 20 mg  20 mg Oral Nightly Yelitza Baez MD   20 mg at 07/27/21 2351    clopidogrel (PLAVIX) tablet 75 mg  75 mg Oral Daily Yelitza Baez MD   75 mg at 07/28/21 0838    doxazosin (CARDURA) tablet 4 mg  4 mg Oral Nightly Yelitza Baez MD   4 mg at 07/27/21 2353    fluticasone (FLONASE) 50 MCG/ACT nasal spray 1 spray  1 spray Each Nostril Daily Yelitza Baez MD   1 spray at 07/28/21 0838    gabapentin (NEURONTIN) capsule 200 mg  200 mg Oral TID Yelitza Baez MD   200 mg at 07/28/21 0838    HYDROcodone-acetaminophen (Bharat Comes)  MG per tablet 1 tablet  1 tablet Oral Q4H PRN Jamil Moore MD   1 tablet at 07/28/21 0845    nitroGLYCERIN (NITROSTAT) SL tablet 0.4 mg  0.4 mg Sublingual Q5 Min PRN Jamil Moore MD        venlafaxine [facility]) tablet 37.5 mg  37.5 mg Oral Daily Jamil Moore MD   37.5 mg at 07/28/21 0078    perflutren lipid microspheres (DEFINITY) injection 1.65 mg  1.5 mL Intravenous ONCE PRN Jamil Moore MD        carvedilol (COREG) tablet 3.125 mg  3.125 mg Oral BID WC Jamil Moore MD   3.125 mg at 07/28/21 4770    spironolactone (ALDACTONE) tablet 25 mg  25 mg Oral Daily Jamil Moore MD   25 mg at 07/28/21 0838    nitroGLYCERIN (NITROSTAT) SL tablet 0.4 mg  0.4 mg Sublingual Q5 Min PRN Jamil Moore MD        enoxaparin (LOVENOX) injection 40 mg  40 mg Subcutaneous Daily Jamil Moore MD   40 mg at 07/28/21 0418    ondansetron (ZOFRAN) injection 4 mg  4 mg Intravenous Q6H PRN Jamil Moore MD        melatonin tablet 3 mg  3 mg Oral Nightly PRN Jamil Moore MD        acetaminophen (TYLENOL) tablet 650 mg  650 mg Oral Q4H PRN Jamil Moore MD        ondansetron (ZOFRAN) injection 4 mg  4 mg Intravenous Q6H PRN Jamil Moore MD        albuterol (PROVENTIL) nebulizer solution 2.5 mg  2.5 mg Nebulization Q6H PRN Jamil Moore MD        famotidine (PEPCID) tablet 20 mg  20 mg Oral Daily Jamil Moore MD   20 mg at 07/27/21 8994    bumetanide (BUMEX) injection 1 mg  1 mg Intravenous BID Brooks Allen MD   1 mg at 07/28/21 3022        Labs:     Recent Labs     07/26/21 1955 07/28/21 0314   WBC 10.0 9.3   RBC 4.47* 4.24*   HGB 8.7* 8.2*   HCT 32.7* 32.3*   MCV 73.2* 76.2*   MCH 19.5* 19.3*   MCHC 26.6* 25.4*    302     Recent Labs     07/26/21  1955 07/27/21  0129 07/28/21  0314    136 141   K 4.5 4.6 4.8   ANIONGAP 9 10 12    103 106   CO2 23 23 23   BUN 29* 30* 32*   CREATININE 1.6* 1.8* 1.8*   GLUCOSE 117* 172* 117*   CALCIUM 10.7* 10.9* 10.6*     Recent Labs     07/27/21 0129 07/28/21  0314   MG 2.1 2.0     Recent Labs     07/26/21 1955   AST 59*   ALT 78*   BILITOT 0.6   ALKPHOS 94     ABGs:No results for input(s): PH, PO2, PCO2, HCO3, BE, O2SAT in the last 72 hours. Troponin T:   Recent Labs     07/26/21 1955 07/27/21 0129 07/27/21  0341   TROPONINI 0.04* 0.02 0.03     INR:   Recent Labs     07/26/21 1955   INR 1.21*     Lactic Acid: No results for input(s): LACTA in the last 72 hours.     Objective:   Vitals: /60   Pulse 56   Temp 96.3 °F (35.7 °C) (Temporal)   Resp 18   Ht 5' 8\" (1.727 m)   Wt 233 lb 3 oz (105.8 kg)   SpO2 97%   BMI 35.46 kg/m²   24HR INTAKE/OUTPUT:      Intake/Output Summary (Last 24 hours) at 7/28/2021 6260  Last data filed at 7/27/2021 1909  Gross per 24 hour   Intake 690 ml   Output 200 ml   Net 490 ml     General appearance: alert and cooperative with exam  HEENT: AT/NC  Lungs: no increased work of breathing, bibasilar crackles, BLE, no wheezing appreciated, poor inspiratory effort  Heart: RRR, no murmurs appreciated  Abdomen: BS+, soft, NT, ND  Extremities: +edema, pulses+  Neurologic: Alert, gross motor function intact  Skin: warm    Assessment and Plan:   Principal Problem:    Acute on chronic systolic CHF (congestive heart failure) ([facility])  Active Problems:    AAA (abdominal aortic aneurysm) ([facility])    Mixed hyperlipidemia    Cerebrovascular accident (CVA) due to embolism ([address]    Obstructive sleep apnea syndrome    CKD (chronic kidney disease) stage 3, GFR 30-59 ml/min ([facility])    Coronary artery disease involving native heart without angina pectoris    Cerebrovascular accident (CVA) due to embolism of right middle cerebral artery ([facility])    Simple chronic bronchitis ([facility])    Recurrent major depressive disorder in partial remission ([facility]    Hyperparathyroidism ([address]    Atherosclerosis of native artery of both lower extremities [facility]    Bilateral carotid artery stenosis    Shortness of breath    PND (paroxysmal nocturnal dyspnea)    Hypoxemia  Resolved Problems:    * No resolved hospital problems. *    CHF: New onset systolic CHF. Diuretics. Cardiology on board. BB.    CAD: Antiplatelets/statin. CKD IIIB: Chronic. Stable. Monitor BMP. COPD: Bronchodilators. Monitor O2. HTN: Monitor BP and adjust medications PRN. Supportive management. Advance Directive: Full Code    DVT prophylaxis: Lovenox    Discharge planning: TBD -patient does not want to go home on discharge; interested in SNF.       Signed:  Selena Kaur MD 7/28/2021 9:37 AM  Rounding Hospitalist

## 2021-07-28 NOTE — PROGRESS NOTES
Cardiology Daily Note Lesley Chambers MD      Patient:  Miko Isbell  [id number]    Patient Active Problem List    Diagnosis Date Noted    Complete atrioventricular block ([address]     Acute on chronic systolic CHF (congestive heart failure) ([address] 07/26/2021    Shortness of breath 07/26/2021    PND (paroxysmal nocturnal dyspnea) 07/26/2021    Hypoxemia 07/26/2021    Gastrointestinal stromal tumor of stomach ([address] 07/19/2021    Symptomatic bradycardia 02/16/2021    Atherosclerosis of native artery of both lower extremities ([address] 07/30/2019    Bilateral carotid artery stenosis 07/30/2019    Hyperparathyroidism ([address] 10/04/2018    Recurrent major depressive disorder in partial remission ([address] 07/11/2018    Chronic midline low back pain without sciatica 04/23/2018    Simple chronic bronchitis ([address] 10/02/2017    Depression with anxiety 09/28/2017    Hyperglycemia 09/28/2017    Gastroesophageal reflux disease without esophagitis 09/28/2017    Injury of right hypoglossal nerve 08/04/2016    Stenosis of right carotid artery     Coronary artery disease involving native heart without angina pectoris     Cerebrovascular accident (CVA) due to embolism of right middle cerebral artery ([address]     Essential hypertension     CKD (chronic kidney disease) stage 3, GFR 30-59 ml/min ([address] 07/17/2016    Obstructive sleep apnea syndrome 07/16/2016    Cerebrovascular accident (CVA) due to embolism ([address] 02/16/2016    Mixed hyperlipidemia     AAA (abdominal aortic aneurysm) ([address] 10/10/2012       Admit Date:  7/26/2021    Admission Problem List: Present on Admission:   Acute on chronic systolic CHF (congestive heart failure) ([address]   AAA (abdominal aortic aneurysm) ([address]   Cerebrovascular accident (CVA) due to embolism ([address]   Obstructive sleep apnea syndrome   CKD (chronic kidney disease) stage 3, GFR 30-59 ml/min ([address]   Coronary artery disease involving native heart without angina pectoris   Cerebrovascular accident (CVA) due to embolism of right middle cerebral artery (HCC)   Simple chronic bronchitis (HCC)   Recurrent major depressive disorder in partial remission ([address]   Hyperparathyroidism ([address]   Atherosclerosis of native artery of both lower extremities ([address]   Bilateral carotid artery stenosis   Mixed hyperlipidemia   Shortness of breath   PND (paroxysmal nocturnal dyspnea)   Hypoxemia      Cardiac Specific Data:  Specialty Problems        Cardiology Problems    Complete atrioventricular block (HCC)        AAA (abdominal aortic aneurysm) (HCC)        Mixed hyperlipidemia        Cerebrovascular accident (CVA) due to embolism ([address]        Cerebrovascular accident (CVA) due to embolism of right middle cerebral artery ([address]        Coronary artery disease involving native heart without angina pectoris        Essential hypertension        Stenosis of right carotid artery        Atherosclerosis of native artery of both lower extremities (HCC)        Bilateral carotid artery stenosis        Symptomatic bradycardia        * (Principal) Acute on chronic systolic CHF (congestive heart failure) ([address]              Subjective:  Mr. Iris Barnard seen today resting comfortably feels a little bit better. Creatinine 1.8. Does complain of some left lower extremity discomfort will obtain a venous ultrasound. No other complaints or issues reported. Blood pressure 103/60 heart fifty-six. Objective:   /60   Pulse 56   Temp 96.3 °F (35.7 °C) (Temporal)   Resp 18   Ht 5' 8\" (1.727 m)   Wt 233 lb 3 oz (105.8 kg)   SpO2 97%   BMI 35.46 kg/m²       Intake/Output Summary (Last 24 hours) at 7/28/2021 1049  Last data filed at 7/28/2021 1036  Gross per 24 hour   Intake 120 ml   Output 200 ml   Net -80 ml       Prior to Admission medications    Medication Sig Start Date End Date Taking?  Authorizing Provider   fluticasone (FLONASE) 50 MCG/ACT nasal spray 1 spray by Each Nostril route daily   Yes Historical Provider, MD   HYDROcodone-acetaminophen (NORCO)  MG per tablet Take 1 tablet by mouth every 6 hours as needed for Pain for up to 30 days.  7/8/21 8/7/21 Yes TREV Cronin   atorvastatin (LIPITOR) 40 MG tablet TAKE ONE (1) TABLET EVERY DAY 5/13/21  Yes Robin Skelton MD   lisinopril (PRINIVIL;ZESTRIL) 40 MG tablet TAKE 1 TABLET BY MOUTH EVERY DAY  5/2/21  Yes Robin Skelton MD   famotidine (PEPCID) 20 MG tablet Take 1 tablet by mouth 2 times daily 4/16/21  Yes TREV Crowder   venlafaxine (EFFEXOR) 75 MG tablet TAKE ONE (1) TABLET BY MOUTH EVERY DAY 4/9/21  Yes Robin Skelton MD   gabapentin (NEURONTIN) 100 MG capsule  3/18/21  Yes Historical Provider, MD   clopidogrel (PLAVIX) 75 MG tablet TAKE ONE (1) TABLET BY MOUTH EVERY DAY   Patient taking differently: Take 75 mg by mouth daily  1/20/21  Yes Robin Skelton MD   doxazosin (CARDURA) 2 MG tablet Take 4 mg by mouth nightly  6/14/19  Yes Historical Provider, MD   aspirin 81 MG EC tablet Take 1 tablet by mouth daily 8/5/16  Yes Dana Mora MD   ferrous sulfate (IRON 325) 325 (65 Fe) MG tablet Take 1 tablet by mouth daily (with breakfast) 7/21/21   Robin Skelton MD   furosemide (LASIX) 40 MG tablet Take 1 tablet by mouth daily 7/19/21   Robin Skelton MD   Cholecalciferol (VITAMIN D3) 1.25 MG (81076 UT) CAPS Take by mouth  Patient not taking: Reported on 7/19/2021    Historical Provider, MD   nitroGLYCERIN (NITROSTAT) 0.4 MG SL tablet Place 0.4 mg under the tongue every 5 minutes as needed for Chest pain    Historical Provider, MD        Arformoterol Tartrate  15 mcg Nebulization BID    budesonide  0.5 mg Nebulization BID    aspirin  81 mg Oral Daily    atorvastatin  20 mg Oral Nightly    clopidogrel  75 mg Oral Daily    doxazosin  4 mg Oral Nightly    fluticasone  1 spray Each Nostril Daily    gabapentin  200 mg Oral TID    venlafaxine  37.5 mg Oral Daily    carvedilol  3.125 mg Oral BID WC    spironolactone  25 mg Oral Daily    enoxaparin  40 mg Subcutaneous Daily    famotidine  20 mg Oral Daily    bumetanide  1 mg Intravenous BID       TELEMETRY: Sinus     Physical Exam:      Physical Exam      General:  Awake, alert, NAD  Skin:  Warm and dry  Neck:  no jvd , no carotid bruits  Chest:  Clear to auscultation, no wheezing or rales  Cardiovascular:  RRR B3N4 no murmurs, clicks, gallups, or rubs  Abdomen:  Soft nontender, nondistended, bowel sounds present  Extremities:  Edema: 1+ vague tenderness left lower extremity    Lab Data:  CBC:   Recent Labs     07/26/21 1955 07/28/21 0314   WBC 10.0 9.3   HGB 8.7* 8.2*   HCT 32.7* 32.3*   MCV 73.2* 76.2*    302     BMP:   Recent Labs     07/26/21 1955 07/27/21 0129 07/28/21 0314    136 141   K 4.5 4.6 4.8    103 106   CO2 23 23 23   BUN 29* 30* 32*   CREATININE 1.6* 1.8* 1.8*     LIVER PROFILE:   Recent Labs     07/26/21 1955   AST 59*   ALT 78*   BILITOT 0.6   ALKPHOS 94     PT/INR:   Recent Labs     07/26/21 1955   PROTIME 15.5*   INR 1.21*     APTT:   Recent Labs     07/26/21 1955   APTT 36.5*     BNP:  No results for input(s): BNP in the last 72 hours.   CK, CKMB, Troponin: @LABRCNT (CKTOTAL:3, CKMB:3, TROPONINI:3)@    IMAGING:  Echo Complete    Result Date: 7/27/2021  Transthoracic Echocardiography Report (TTE)  Demographics   Patient Name Kevin SHAY   Date of Study       07/27/2021               Gloria Patterson   MRN          [id number]             Gender              Male   Date of      1945         Room Number         MHL-0315  Birth   Age          64 year(s)   Height:      68 inches          Referring Physician Kirit Miller MD   Weight:      227.01 pounds      Sonographer         Josette Dietz   BSA:         2.16 m^2           Interpreting        Kashmir Herbert MD                                  Physician   BMI:         34.52 kg/m^2  Procedure Type of Study   TTE procedure:ECHO NO CONTRAST WITH DOP/COLR. Study Location: Echo Lab Technical Quality: Good visualization Patient Status: Inpatient Rhythm: Within normal limits Indications:Congestive heart failure, systolic dysfunction. Conclusions   Summary  Mitral valve leaflets are mildly thickened with preserved leaflet  mobility. Mild mitral regurgitation is present. Mildly thickened aortic valve leaflets with preserved leaflet mobility. Tricuspid valve is structurally normal.  Mild tricuspid regurgitation with estimated RVSP of 41 mm Hg. Mildly dilated left atrium. Left ventricular ejection fraction is visually estimated at 30%. Global hypokinesis  Moderate concentric left ventricular hypertrophy. No regional wall motion abnormalities. Mildly enlarged right atrium size. Signature   ----------------------------------------------------------------  Electronically signed by Isidra Castro MD(Interpreting  physician) on 07/27/2021 02:21 PM  ----------------------------------------------------------------   Findings   Mitral Valve  Mitral valve leaflets are mildly thickened with preserved leaflet  mobility. Mild mitral regurgitation is present. Aortic Valve  Mildly thickened aortic valve leaflets with preserved leaflet mobility. Tricuspid Valve  Tricuspid valve is structurally normal.  Mild tricuspid regurgitation with estimated RVSP of 41 mm Hg. Pulmonic Valve  The pulmonic valve was not well visualized . Left Atrium  Mildly dilated left atrium. Left Ventricle  Left ventricular ejection fraction is visually estimated at 30%. Global hypokinesis  Moderate concentric left ventricular hypertrophy. No regional wall motion abnormalities. Right Atrium  Mildly enlarged right atrium size. Right Ventricle  Normal right ventricular size with preserved RV function. Pericardial Effusion  No evidence of significant pericardial effusion is noted. Pleural Effusion  No evidence of pleural effusion.   Allergies   - No right kidney. The left kidney measures 11.6 x 5.4 x 3.5 cm also shows a slightly lobular cortical contour, cortical thickness ranges from 11.3 to 12.5 mm. No mass or hydronephrosis is identified. Color Doppler images demonstrate vascular flow within the left kidney. The bladder appears unremarkable. 1. Normal ultrasound of the kidneys. Small cortical cysts seen on previous ultrasound are not visualized. Signed by Dr Alexander Arrington. [place]    XR CHEST PORTABLE    Result Date: 7/26/2021  XR CHEST PORTABLE 7/26/2021 8:20 PM HISTORY:   Chest pain and shortness of air  Single view. COMPARISONS:  7/19/2021 and 4/6/2021 FINDINGS: There is cardiomegaly. There is bronchovascular interstitial prominence with patchy airspace opacities particularly in the right lower lobe with right-sided pleural reaction likely. Permanent cardiac pacemaker observed. Differential considerations include pulmonary edema and heart failure. 1. Cardiomegaly with bilateral interstitial infiltrates with patchy right airspace opacities with probable right pleural effusion. Differential considerations include pulmonary edema and heart failure. Signed by Dr Patricia Parr:  1. Complaints of increased shortness of breath BNP 7565 consistent with acute on chronic systolic and diastolic congestive heart failure  2. Hyperlipidemia  3. History of stroke  4. Obstructive sleep apnea  5. Chronic kidney disease  6. Coronary artery disease  7. Depression  8. Hyperparathyroidism  9. Carotid artery stenosis  10. Peripheral arterial disease  11. Abdominal aortic aneurysm  12. CT head 2/16/2021 no acute findings mild chronic microvascular changes  13. Echo 7/15/2016 RVSP 26 mild TR normal LV function EF 55 to 59% grade 1 diastolic dysfunction  14. Catheterization 5/13/2015 EF 40% anteroseptal hypokinesis severe two-vessel disease bare-metal stent placement LAD and PDA of the right coronary artery       Plan:  1. Continue current treatment  2.  Check

## 2021-07-28 NOTE — PROGRESS NOTES
Physical Therapy    Facility/Department: [facility]/VAS/MED  Initial Assessment    NAME: Cindy Santos. : 1945  MRN: [id number]    Date of Service: 2021    Discharge Recommendations:  Continue to assess pending progress, 24 hour supervision or assist, Patient would benefit from continued therapy after discharge        Assessment   Body structures, Functions, Activity limitations: Decreased functional mobility ; Decreased ROM; Decreased strength;Decreased endurance;Decreased safe awareness;Decreased balance; Increased pain;Decreased posture  Assessment: Pt. will benefit from cont. PT to decrease impairments. Pt. a fall risk and should not attempt mobility on his own at this time. Pt. would be safer to transfer with SS and 2A. Would use w/c follow for gait attempts. Treatment Diagnosis: debility  Prognosis: Fair  Decision Making: Medium Complexity  PT Education: Goals;PT Role;Plan of Care;Gait Training; Adaptive Device Training;Functional Mobility Training;Transfer Training;General Safety  Patient Education: use of call light  REQUIRES PT FOLLOW UP: Yes  Activity Tolerance  Activity Tolerance: Patient limited by fatigue;Patient limited by pain; Patient limited by endurance       Patient Diagnosis(es): The primary encounter diagnosis was Shortness of breath. Diagnoses of Acute on chronic combined systolic and diastolic CHF (congestive heart failure) (HCC) and Acute respiratory failure with hypoxia (HCC) were also pertinent to this visit.      has a past medical history of AAA (abdominal aortic aneurysm) (HCC), Acute on chronic systolic CHF (congestive heart failure) ([address] Aortic dissection ([address] CAD (coronary artery disease), Cancer ([address] Carotid artery occlusion, Cerebrovascular accident (CVA) ([address] Cerebrovascular accident (CVA) due to embolism [facility]), Cerebrovascular accident (CVA) due to embolism of right middle cerebral artery ([facility] COVID-19, Depression, HTN (hypertension), Hyperlipidemia, MI (myocardial infarction) ([facility] Non Hodgkin's lymphoma ([facility] Palliative care patient, Sleep apnea, and Unspecified sleep apnea. has a past surgical history that includes Lung cancer surgery; lymphadenectomy; Colonoscopy (04/09/2018); Rectal surgery; vascular surgery (10/29/12 [facility]); vascular surgery (10/29/ [facility] ); Cardiac catheterization (5/13/15  [facility]); Carotid endarterectomy (Right, 7/22/2016); Upper gastrointestinal endoscopy (N/A, 1/8/2020); Upper gastrointestinal endoscopy (11/08/2019); Upper gastrointestinal endoscopy (04/13/2018); and Endoscopy, colon, diagnostic. Restrictions  Restrictions/Precautions  Restrictions/Precautions: Fall Risk  Vision/Hearing  Hearing: Exceptions to [facility]  Hearing Exceptions: Hard of hearing/hearing concerns     Subjective  General  Chart Reviewed: Yes  Patient assessed for rehabilitation services?: Yes  Response To Previous Treatment: Not applicable  Family / Caregiver Present: No  Referring Practitioner: Karina Irene MD  Referral Date : 07/28/21  Diagnosis: acute exacerbation of chronic diastolic CHF, CKD III, hyperlipidemia, Acute respiratory failure with hypoxia  Follows Commands: Impaired  Other (Comment): pt needs redirection at times  General Comment  Comments: RNChidi PT. Subjective  Subjective: Pt. willing to work with therapy, but states he does not feel well today. Back is hurting and LLE. Pain Screening  Patient Currently in Pain: Yes  Pain Assessment  Pain Assessment: 0-10  Pain Level: 5  Pain Type: Chronic pain  Pain Location: Back;Leg  Pain Orientation: Left  Pain Descriptors: Aching  Functional Pain Assessment: Prevents or interferes some active activities and ADLs  Non-Pharmaceutical Pain Intervention(s): Repositioned; Ambulation/Increased Activity  Response to Pain Intervention: Patient Satisfied  Vital Signs  Patient Currently in Pain: Yes  Pre Treatment Pain Screening  Intervention List: Patient able to continue with treatment  Comments / Details: recently had pain meds    Orientation  Orientation  Overall Orientation Status: Within Functional Limits  Social/Functional History  Social/Functional History  Lives With: Spouse  Type of Home: House  Home Layout: One level  Home Equipment: SnapTell Help From: Family  ADL Assistance: Needs assistance  Ambulation Assistance:  (with cane, but only able to walk very short distances)  Transfer Assistance: Needs assistance  Occupation: Retired  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[employer] [address]  Overall Cognitive Status: WFL    Objective     Observation/Palpation  Posture: Fair  Observation: ELISEO winter, pt receiving breathing treatment after getting to chair    AROM RLE (degrees)  RLE AROM: Exceptions  RLE General AROM: hip flexion limited past 90 deg, knee and ankle WFLs  AROM LLE (degrees)  LLE AROM : Exceptions  LLE General AROM: hip flexion limited past 90 deg, knee and ankle WFLs  Strength RLE  Strength RLE: Exception  Comment: 2-/5 grossly hip, ankle and knee 3+/5  Strength LLE  Strength LLE: Exception  Comment: 2-/5 grossly hip, ankle and knee 3+/5        Bed mobility  Supine to Sit: Minimal assistance  Sit to Supine: Unable to assess  Scooting: Stand by assistance  Comment: able to scoot to [facility] with 3 attempts  Transfers  Sit to Stand: Moderate Assistance;Minimal Assistance;2 Person Assistance  Stand to sit: Minimal Assistance; Moderate Assistance  Bed to Chair: Moderate assistance;Minimal assistance;2 Person Assistance  Comment: pt sat on EOB x 10 mins, SBA. States he just feels bad, but willing to get to [facility] for breathing treatment.  Pt. side stepped to chair with Mod A by PT and Min A with RT  Ambulation  Ambulation?: No     Balance  Posture: Fair  Sitting - Static: Fair;+  Sitting - Dynamic: Fair;-  Standing - Static: Poor  Standing - Dynamic: Poor        Plan   Plan  Times per week: 3-7  Times per day: Daily  Plan weeks: 2  Current Treatment Recommendations: Strengthening, ROM, Balance Training, Functional Mobility Training, Transfer Training, Endurance Training, Gait Training, Safety Education & Training, Positioning, Equipment Evaluation, Education, & procurement, Patient/Caregiver Education & Training  Plan Comment: cont. PT per POC.   Safety Devices  Type of devices: Gait belt, Patient at risk for falls, Nurse notified, Call light within reach, Left in chair, Chair alarm in place    G-Code       OutComes Score                                                  AM-PAC Score             Goals  Short term goals  Time Frame for Short term goals: 2 wks  Short term goal 1: supine to sit indep  Short term goal 2: sit to stand indep  Short term goal 3: amb. 25' with RW SBA  Patient Goals   Patient goals : go home, feel better       Therapy Time   Individual Concurrent Group Co-treatment   Time In           Time Out           Minutes                   Dodie Reyes PT     Electronically signed by Dodie Reyes PT on 7/28/2021 at 12:30 PM

## 2021-07-29 VITALS
WEIGHT: 233.19 LBS | SYSTOLIC BLOOD PRESSURE: 106 MMHG | HEART RATE: 88 BPM | RESPIRATION RATE: 20 BRPM | TEMPERATURE: 98.1 F | HEIGHT: 68 IN | BODY MASS INDEX: 35.34 KG/M2 | OXYGEN SATURATION: 96 % | DIASTOLIC BLOOD PRESSURE: 65 MMHG

## 2021-07-29 LAB
ANION GAP SERPL CALCULATED.3IONS-SCNC: 13 MMOL/L (ref 7–19)
ANISOCYTOSIS: ABNORMAL
BASOPHILS ABSOLUTE: 0.1 K/UL (ref 0–0.2)
BASOPHILS RELATIVE PERCENT: 1.2 % (ref 0–1)
BUN BLDV-MCNC: 38 MG/DL (ref 8–23)
BURR CELLS: ABNORMAL
CALCIUM SERPL-MCNC: 10.7 MG/DL (ref 8.8–10.2)
CHLORIDE BLD-SCNC: 102 MMOL/L (ref 98–111)
CO2: 22 MMOL/L (ref 22–29)
CREAT SERPL-MCNC: 1.9 MG/DL (ref 0.5–1.2)
EOSINOPHILS ABSOLUTE: 0 K/UL (ref 0–0.6)
EOSINOPHILS RELATIVE PERCENT: 0 % (ref 0–5)
GFR AFRICAN AMERICAN: 42
GFR NON-AFRICAN AMERICAN: 34
GLUCOSE BLD-MCNC: 122 MG/DL (ref 74–109)
GLUCOSE BLD-MCNC: 123 MG/DL (ref 70–99)
HCT VFR BLD CALC: 33.2 % (ref 42–52)
HEMOGLOBIN: 8.5 G/DL (ref 14–18)
HYPOCHROMIA: ABNORMAL
IMMATURE GRANULOCYTES #: 0.1 K/UL
LYMPHOCYTES ABSOLUTE: 1.9 K/UL (ref 1.1–4.5)
LYMPHOCYTES RELATIVE PERCENT: 18.5 % (ref 20–40)
MAGNESIUM: 2 MG/DL (ref 1.6–2.4)
MCH RBC QN AUTO: 19.6 PG (ref 27–31)
MCHC RBC AUTO-ENTMCNC: 25.6 G/DL (ref 33–37)
MCV RBC AUTO: 76.5 FL (ref 80–94)
MICROCYTES: ABNORMAL
MONOCYTES ABSOLUTE: 1.2 K/UL (ref 0–0.9)
MONOCYTES RELATIVE PERCENT: 11.4 % (ref 0–10)
NEUTROPHILS ABSOLUTE: 7.1 K/UL (ref 1.5–7.5)
NEUTROPHILS RELATIVE PERCENT: 68.4 % (ref 50–65)
OVALOCYTES: ABNORMAL
PDW BLD-RTO: 19 % (ref 11.5–14.5)
PERFORMED ON: ABNORMAL
PLATELET # BLD: 310 K/UL (ref 130–400)
PLATELET SLIDE REVIEW: ADEQUATE
PMV BLD AUTO: 11.6 FL (ref 9.4–12.4)
POLYCHROMASIA: ABNORMAL
POTASSIUM SERPL-SCNC: 5.2 MMOL/L (ref 3.5–5)
PRO-BNP: 2240 PG/ML (ref 0–1800)
RBC # BLD: 4.34 M/UL (ref 4.7–6.1)
SARS-COV-2, NAAT: NOT DETECTED
SCHISTOCYTES: ABNORMAL
SODIUM BLD-SCNC: 137 MMOL/L (ref 136–145)
WBC # BLD: 10.4 K/UL (ref 4.8–10.8)

## 2021-07-29 PROCEDURE — 6360000002 HC RX W HCPCS: Performed by: INTERNAL MEDICINE

## 2021-07-29 PROCEDURE — 83880 ASSAY OF NATRIURETIC PEPTIDE: CPT

## 2021-07-29 PROCEDURE — 36415 COLL VENOUS BLD VENIPUNCTURE: CPT

## 2021-07-29 PROCEDURE — 97530 THERAPEUTIC ACTIVITIES: CPT

## 2021-07-29 PROCEDURE — 85025 COMPLETE CBC W/AUTO DIFF WBC: CPT

## 2021-07-29 PROCEDURE — 87635 SARS-COV-2 COVID-19 AMP PRB: CPT

## 2021-07-29 PROCEDURE — 83735 ASSAY OF MAGNESIUM: CPT

## 2021-07-29 PROCEDURE — 99232 SBSQ HOSP IP/OBS MODERATE 35: CPT | Performed by: INTERNAL MEDICINE

## 2021-07-29 PROCEDURE — 2700000000 HC OXYGEN THERAPY PER DAY

## 2021-07-29 PROCEDURE — 2500000003 HC RX 250 WO HCPCS: Performed by: FAMILY MEDICINE

## 2021-07-29 PROCEDURE — 80048 BASIC METABOLIC PNL TOTAL CA: CPT

## 2021-07-29 PROCEDURE — 94761 N-INVAS EAR/PLS OXIMETRY MLT: CPT

## 2021-07-29 PROCEDURE — 6370000000 HC RX 637 (ALT 250 FOR IP): Performed by: INTERNAL MEDICINE

## 2021-07-29 PROCEDURE — 97165 OT EVAL LOW COMPLEX 30 MIN: CPT

## 2021-07-29 PROCEDURE — 82947 ASSAY GLUCOSE BLOOD QUANT: CPT

## 2021-07-29 PROCEDURE — 94640 AIRWAY INHALATION TREATMENT: CPT

## 2021-07-29 RX ORDER — SPIRONOLACTONE 25 MG/1
25 TABLET ORAL DAILY
Qty: 30 TABLET | Refills: 0 | Status: SHIPPED | OUTPATIENT
Start: 2021-07-30 | End: 2021-10-28 | Stop reason: ALTCHOICE

## 2021-07-29 RX ORDER — BUMETANIDE 1 MG/1
2 TABLET ORAL DAILY
Status: DISCONTINUED | OUTPATIENT
Start: 2021-07-30 | End: 2021-07-29 | Stop reason: HOSPADM

## 2021-07-29 RX ORDER — GABAPENTIN 100 MG/1
100 CAPSULE ORAL 3 TIMES DAILY
Qty: 90 CAPSULE | Refills: 0 | Status: SHIPPED | OUTPATIENT
Start: 2021-07-29 | End: 2021-10-28 | Stop reason: SDUPTHER

## 2021-07-29 RX ORDER — LISINOPRIL 5 MG/1
5 TABLET ORAL DAILY
Qty: 30 TABLET | Refills: 0 | Status: SHIPPED | OUTPATIENT
Start: 2021-07-29 | End: 2021-10-22 | Stop reason: SDUPTHER

## 2021-07-29 RX ORDER — CARVEDILOL 3.12 MG/1
3.12 TABLET ORAL 2 TIMES DAILY WITH MEALS
Qty: 60 TABLET | Refills: 0 | Status: SHIPPED | OUTPATIENT
Start: 2021-07-29 | End: 2021-10-28 | Stop reason: ALTCHOICE

## 2021-07-29 RX ORDER — BUMETANIDE 1 MG/1
1 TABLET ORAL DAILY
Qty: 30 TABLET | Refills: 0 | Status: SHIPPED | OUTPATIENT
Start: 2021-07-30 | End: 2021-10-28 | Stop reason: ALTCHOICE

## 2021-07-29 RX ORDER — HYDROCODONE BITARTRATE AND ACETAMINOPHEN 10; 325 MG/1; MG/1
1 TABLET ORAL EVERY 6 HOURS PRN
Qty: 12 TABLET | Refills: 0 | Status: SHIPPED | OUTPATIENT
Start: 2021-07-29 | End: 2021-08-01

## 2021-07-29 RX ADMIN — FLUTICASONE PROPIONATE 1 SPRAY: 50 SPRAY, METERED NASAL at 08:25

## 2021-07-29 RX ADMIN — BUDESONIDE 500 MCG: 0.5 SUSPENSION RESPIRATORY (INHALATION) at 06:55

## 2021-07-29 RX ADMIN — ASPIRIN 81 MG: 81 TABLET, COATED ORAL at 08:24

## 2021-07-29 RX ADMIN — HYDROCODONE BITARTRATE AND ACETAMINOPHEN 1 TABLET: 10; 325 TABLET ORAL at 14:41

## 2021-07-29 RX ADMIN — CARVEDILOL 3.12 MG: 3.12 TABLET, FILM COATED ORAL at 08:25

## 2021-07-29 RX ADMIN — GABAPENTIN 200 MG: 100 CAPSULE ORAL at 08:25

## 2021-07-29 RX ADMIN — ENOXAPARIN SODIUM 40 MG: 40 INJECTION SUBCUTANEOUS at 06:07

## 2021-07-29 RX ADMIN — ARFORMOTEROL TARTRATE 15 MCG: 15 SOLUTION RESPIRATORY (INHALATION) at 06:55

## 2021-07-29 RX ADMIN — BUMETANIDE 1 MG: 0.25 INJECTION, SOLUTION INTRAMUSCULAR; INTRAVENOUS at 08:25

## 2021-07-29 RX ADMIN — HYDROCODONE BITARTRATE AND ACETAMINOPHEN 1 TABLET: 10; 325 TABLET ORAL at 03:49

## 2021-07-29 RX ADMIN — VENLAFAXINE 37.5 MG: 37.5 TABLET ORAL at 08:25

## 2021-07-29 RX ADMIN — CLOPIDOGREL BISULFATE 75 MG: 75 TABLET ORAL at 08:25

## 2021-07-29 RX ADMIN — GABAPENTIN 200 MG: 100 CAPSULE ORAL at 14:05

## 2021-07-29 RX ADMIN — SPIRONOLACTONE 25 MG: 25 TABLET ORAL at 08:25

## 2021-07-29 RX ADMIN — HYDROCODONE BITARTRATE AND ACETAMINOPHEN 1 TABLET: 10; 325 TABLET ORAL at 08:37

## 2021-07-29 ASSESSMENT — PAIN DESCRIPTION - LOCATION
LOCATION: BACK

## 2021-07-29 ASSESSMENT — PAIN SCALES - GENERAL
PAINLEVEL_OUTOF10: 6
PAINLEVEL_OUTOF10: 3
PAINLEVEL_OUTOF10: 5
PAINLEVEL_OUTOF10: 7
PAINLEVEL_OUTOF10: 6

## 2021-07-29 ASSESSMENT — PAIN DESCRIPTION - PAIN TYPE
TYPE: CHRONIC PAIN

## 2021-07-29 ASSESSMENT — PAIN DESCRIPTION - DESCRIPTORS
DESCRIPTORS: ACHING

## 2021-07-29 ASSESSMENT — PAIN DESCRIPTION - ONSET: ONSET: ON-GOING

## 2021-07-29 ASSESSMENT — PAIN - FUNCTIONAL ASSESSMENT: PAIN_FUNCTIONAL_ASSESSMENT: PREVENTS OR INTERFERES SOME ACTIVE ACTIVITIES AND ADLS

## 2021-07-29 ASSESSMENT — PAIN DESCRIPTION - ORIENTATION
ORIENTATION: LOWER
ORIENTATION: LOWER
ORIENTATION: LOWER;MID

## 2021-07-29 ASSESSMENT — PAIN DESCRIPTION - FREQUENCY: FREQUENCY: CONTINUOUS

## 2021-07-29 ASSESSMENT — PAIN DESCRIPTION - PROGRESSION: CLINICAL_PROGRESSION: NOT CHANGED

## 2021-07-29 NOTE — PROGRESS NOTES
Comprehensive Nutrition Assessment    Type and Reason for Visit:  Reassess    Nutrition Recommendations/Plan: continue current pOC    Nutrition Assessment:  PO intake remains good with intake ranging %. Aware to bedischarged to SNF. Malnutrition Assessment:  Malnutrition Status:  No malnutrition    Context:  Acute Illness     Findings of the 6 clinical characteristics of malnutrition:  Energy Intake:  Mild decrease in energy intake (Comment)  Weight Loss:  No significant weight loss     Body Fat Loss:  No significant body fat loss     Muscle Mass Loss:  No significant muscle mass loss    Fluid Accumulation:  1 - Mild Extremities   Strength:  Not Performed    Nutrition Related Findings:  well nourished      Wounds:  None       Current Nutrition Therapies:    ADULT DIET; Easy to Chew; Low Fat/Low Chol/High Fiber/LUIS; Low Sodium (2 gm)    Anthropometric Measures:  · Height: 5' 8\" (172.7 cm)  · Current Body Weight: 233 lb 3 oz (105.8 kg)   · Admission Body Weight: 237 lb (107.5 kg)    · Usual Body Weight:       · Ideal Body Weight: 154 lbs; % Ideal Body Weight 151.4 %   · BMI: 35.5  · Adjusted Body Weight:  ; No Adjustment    · BMI Categories: Obese Class 2 (BMI 35.0 -39.9)       Nutrition Diagnosis:   · Altered nutrition-related lab values related to cardiac dysfunction as evidenced by lab values      Nutrition Interventions:   Food and/or Nutrient Delivery:  Continue Current Diet  Nutrition Education/Counseling:  Education not indicated   Coordination of Nutrition Care:  Continue to monitor while inpatient    Goals:  po intake 75% or greater.   proBNP decreased prior to discharge       Nutrition Monitoring and Evaluation:   Behavioral-Environmental Outcomes:  None Identified   Food/Nutrient Intake Outcomes:  Food and Nutrient Intake  Physical Signs/Symptoms Outcomes:  Biochemical Data, Weight, Skin, Nutrition Focused Physical Findings, Hemodynamic Status     Discharge Planning:    Continue current diet     Electronically signed by Karen Hunter MS, RD, LD on 7/29/21 at 1:11 PM CDT  Contact: [phone]

## 2021-07-29 NOTE — PROGRESS NOTES
Occupational Therapy   Occupational Therapy Initial Assessment  Date: 2021   Patient Name: Gunjan Yan. MRN: [id number]     : 1945    Date of Service: 2021    Discharge Recommendations:          Assessment   Performance deficits / Impairments: Decreased functional mobility ; Decreased endurance;Decreased ADL status; Decreased balance;Decreased ROM; Decreased cognition  Assessment: Will progress as tolerated  Treatment Diagnosis: CHF  Prognosis: Good  Decision Making: Low Complexity  REQUIRES OT FOLLOW UP: Yes  Activity Tolerance  Activity Tolerance: Patient limited by fatigue           Patient Diagnosis(es): The primary encounter diagnosis was Shortness of breath. Diagnoses of Acute on chronic combined systolic and diastolic CHF (congestive heart failure) ([address] Acute respiratory failure with hypoxia ([address] and Chronic midline low back pain without sciatica were also pertinent to this visit. has a past medical history of AAA (abdominal aortic aneurysm) (HCC), Acute on chronic systolic CHF (congestive heart failure) ([address] Aortic dissection ([address] CAD (coronary artery disease), Cancer ([address] Carotid artery occlusion, Cerebrovascular accident (CVA) ([address] Cerebrovascular accident (CVA) due to embolism ([address] Cerebrovascular accident (CVA) due to embolism of right middle cerebral artery ([address] COVID-19, Depression, HTN (hypertension), Hyperlipidemia, MI (myocardial infarction) ([address] Non Hodgkin's lymphoma ([address] Palliative care patient, Sleep apnea, and Unspecified sleep apnea. has a past surgical history that includes Lung cancer surgery; lymphadenectomy; Colonoscopy (2018); Rectal surgery; vascular surgery (10/29/12 [facility]); vascular surgery (10/29/ [facility] ); Cardiac catheterization (5/13/15  [facility]); Carotid endarterectomy (Right, 2016); Upper gastrointestinal endoscopy (N/A, 2020); Upper gastrointestinal endoscopy (2019);  Upper gastrointestinal endoscopy (2018); and Endoscopy, colon, diagnostic. Treatment Diagnosis: CHF      Restrictions  Restrictions/Precautions  Restrictions/Precautions: Fall Risk    Subjective   General  Chart Reviewed: Yes  Patient assessed for rehabilitation services?: Yes  Family / Caregiver Present: No  Diagnosis: CHF, chronic kidney disease  Patient Currently in Pain: Yes  Pain Assessment  Pain Assessment: Faces  Pain Type: Chronic pain  Pain Location: Back  Pain Orientation: Lower  Pain Descriptors: Aching  Pain Frequency: Continuous  Pain Onset: On-going  Clinical Progression: Not changed  Functional Pain Assessment: Prevents or interferes some active activities and ADLs  Non-Pharmaceutical Pain Intervention(s): Rest  Response to Pain Intervention: Patient Satisfied  Vital Signs  Patient Currently in Pain: Yes  Social/Functional History  Social/Functional History  Lives With: Spouse  Type of Home: House  Home Layout: One level  Home Equipment: Cane  Receives Help From: Family  ADL Assistance: Needs assistance  Ambulation Assistance: Needs assistance  Transfer Assistance: Needs assistance  Occupation: Retired  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[employer]       Objective   Vision: Within Functional Limits  Hearing: Exceptions to [facility]  Hearing Exceptions: Hard of hearing/hearing concerns    Orientation  Overall Orientation Status: Impaired  Orientation Level: Oriented to person;Disoriented to situation;Disoriented to place; Disoriented to time        ADL  Feeding: Setup  Grooming: Moderate assistance  UE Bathing: Moderate assistance  LE Bathing: Maximum assistance  UE Dressing:  Moderate assistance  LE Dressing: Maximum assistance  Toileting: Maximum assistance  Additional Comments: Limited alertness today        Bed mobility  Supine to Sit: Maximum assistance  Transfers  Stand Step Transfers: Unable to assess  Sit to stand: Maximum assistance  Transfer Comments: max A sit-stand at bedside, unable to sidestep in standing     Cognition  Overall Cognitive Status: Exceptions  Problem Solving: Assistance required to generate solutions  Initiation: Requires cues for some  Sequencing: Requires cues for some                 LUE AROM (degrees)  LUE AROM : Exceptions  LUE General AROM: 45 deg shld AROM  RUE AROM (degrees)  RUE AROM : Exceptions  RUE General AROM: 45 deg at shld AROM  LUE Strength  Gross LUE Strength: WFL                   Plan   Plan  Times per week: 3-5x/week  Times per day: Daily    G-Code     OutComes Score                                                  AM-PAC Score             Goals  Short term goals  Time Frame for Short term goals: 1 week  Short term goal 1: Mod A SPS tfers bed to [facility]  Short term goal 2: Tolerate [id number] seconds with assist to help with ADLs  Short term goal 3: Tolerate 15 minutes BUE exercises  Long term goals  Long term goal 1: Return to PLOF       Therapy Time   Individual Concurrent Group Co-treatment   Time In           Time Out           Minutes                   Victor Hugo Soliz OTR/L

## 2021-07-29 NOTE — DISCHARGE INSTR - COC
Continuity of Care Form    Patient Name: Princess Melendez. :  1945  MRN:  [id number]    Admit date:  2021  Discharge date:  21    Code Status Order: Full Code   Advance Directives:     Admitting Physician:  No admitting provider for patient encounter. PCP: Caleb Melendez MD    Discharging Nurse: [facility] [address] Unit/Room#: 0315/315-01  Discharging Unit Phone Number: [phone]    Emergency Contact:   Extended Emergency Contact Information  Primary Emergency Contact: Cheryl Stahl  Address: [address] [facility], [address] [facility] of [address] [place] [address] Phone: [phone]  Work Phone: [phone]  Mobile Phone: [phone]  Relation: Spouse  Secondary Emergency Contact: Hiral [place] of [address] [place] [address] Phone: [phone]  Mobile Phone: [phone]  Relation: Child    Past Surgical History:  Past Surgical History:   Procedure Laterality Date    CARDIAC CATHETERIZATION  5/13/15  [address]    EF 40%. stent to LAD, stent to posterior descending branch of RCA.  CAROTID ENDARTERECTOMY Right 2016    Right carotid endarterectomy,carotid arteriograms. 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, repeat in 1 yr    UPPER GASTROINTESTINAL ENDOSCOPY  2019    Juan Manuel: normal    UPPER GASTROINTESTINAL ENDOSCOPY  2018    Dr Dustin Yusuf: normal, 2cm HH, small amount of food in stomach w/dark fluid,but not kathy blood    VASCULAR SURGERY  10/29/12 [facility]    open exposure of R common femoral artery; percutaneous cannulation of L  femoral artery with 7-Andorran sheath; suprarenal abd aortagram with bilat iliofem arteriogram; endoluminal graft repair of AAA with endologix [id number] main body, 28x95 infrarenal cuff; balloon angioplasty of infrarenal abd aorta and endoluminal graft with coda balloon; completion abd aortogram w bilat iliofem arteriogram;     VASCULAR SURGERY  10/29/ [facility]     (cont) open repair of right common femoral ateriotomy; perclose repair of left common femoral artery puncture site. Immunization History:   Immunization History   Administered Date(s) Administered    Influenza, High Dose (Fluzone 65 yrs and older) 09/28/2017, 10/02/2018    Influenza, Triv, inactivated, subunit, adjuvanted, IM (Fluad 65 yrs and older) 10/24/2019    Pneumococcal Polysaccharide ([id number]) 12/29/2008, 01/09/2018       Active Problems:  Patient Active Problem List   Diagnosis Code    AAA (abdominal aortic aneurysm) ([facility] I71.4    Mixed hyperlipidemia E78.2    Cerebrovascular accident (CVA) due to embolism ([facility] I63.9    Obstructive sleep apnea syndrome G47.33    CKD (chronic kidney disease) stage 3, GFR 30-59 ml/min ([facility]) N18.30    Coronary artery disease involving native heart without angina pectoris I25.10    Cerebrovascular accident (CVA) due to embolism of right middle cerebral artery ([facility] I63.411    Essential hypertension I10    Stenosis of right carotid artery I65.21    Injury of right hypoglossal nerve S04.891A    Depression with anxiety F41.8    Hyperglycemia R73.9    Gastroesophageal reflux disease without esophagitis K21.9    Simple chronic bronchitis ([facility]) J41.0    Chronic midline low back pain without sciatica M54.5, G89.29    Recurrent major depressive disorder in partial remission ([facility]) F33.41    Hyperparathyroidism ([facility]) E21.3    Atherosclerosis of native artery of both lower extremities ([facility]) I70.203    Bilateral carotid artery stenosis I65.23    Symptomatic bradycardia R00.1    Complete atrioventricular block ([facility]) I44.2    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    Acute on chronic systolic CHF (congestive heart failure) ([facility]) I50.23  Shortness of breath R06.02    PND (paroxysmal nocturnal dyspnea) R06.00    Hypoxemia R09.02       Isolation/Infection:   Isolation          No Isolation        Patient Infection Status     Infection Onset Added Last Indicated Last Indicated By Review Planned Expiration Resolved Resolved By    None active    Resolved    COVID-19 Rule Out 21 Respiratory Panel, Molecular, with COVID-19 (Restricted: peds pts or suitable admitted adults) (Ordered)   21 Rule-Out Test Resulted    COVID-19 21 COVID-19   21     COVID-19 Rule Out 21 COVID-19 (Ordered)   21 Rule-Out Test Resulted          Nurse Assessment:  Last Vital Signs: /65   Pulse 88   Temp 98.1 °F (36.7 °C)   Resp 20   Ht 5' 8\" (1.727 m)   Wt 233 lb 3 oz (105.8 kg)   SpO2 95%   BMI 35.46 kg/m²     Last documented pain score (0-10 scale): Pain Level: 3  Last Weight:   Wt Readings from Last 1 Encounters:   21 233 lb 3 oz (105.8 kg)     Mental Status:  oriented and alert    IV Access:  - None    Nursing Mobility/ADLs:  Walking   Assisted  Transfer  Assisted  Bathing  Assisted  Dressing  Assisted  Toileting  Assisted  Feeding  Independent  Med Admin  Independent  Med Delivery   whole    Wound Care Documentation and Therapy:        Elimination:  Continence:   · Bowel: Yes  · Bladder: Yes  Urinary Catheter: None   Colostomy/Ileostomy/Ileal Conduit: No       Date of Last BM: 21    Intake/Output Summary (Last 24 hours) at 2021 1040  Last data filed at 2021 1029  Gross per 24 hour   Intake 480 ml   Output 1000 ml   Net -520 ml     I/O last 3 completed shifts: In: 240 [P.O.:240]  Out: 1000 [Urine:1000]    Safety Concerns:      At Risk for Falls    Impairments/Disabilities:      None    Nutrition Therapy:  Current Nutrition Therapy:   - Oral Diet:  General    Routes of Feeding: Oral  Liquids: No Restrictions  Daily Fluid Restriction: no  Last Modified Barium Swallow with Video (Video Swallowing Test): not done    Treatments at the Time of Hospital Discharge:   Respiratory Treatments: N/A  Oxygen Therapy:  {Therapy; copd oxygen:43326}  Ventilator:    - No ventilator support    Rehab Therapies: Physical Therapy and Occupational Therapy  Weight Bearing Status/Restrictions: No weight bearing restirctions  Other Medical Equipment (for information only, NOT a DME order):  {EQUIPMENT:079226135}  Other Treatments: ***    Patient's personal belongings (please select all that are sent with patient):  None    RN SIGNATURE:  Electronically signed by Raquel Anderson RN on 7/29/21 at 10:49 AM CDT    CASE MANAGEMENT/SOCIAL WORK SECTION    Inpatient Status Date: 7/26/21    Readmission Risk Assessment Score:  Readmission Risk              Risk of Unplanned Readmission:  22           Discharging to Facility/ Agency   · Name:   · Address:  · Phone:  · Fax:    Dialysis Facility (if applicable)   · Name:  · Address:  · Dialysis Schedule:  · Phone:  · Fax:    / signature: {Esignature:[id number]}    PHYSICIAN SECTION    Prognosis: {Prognosis:8525251478}    Condition at Discharge: [address] [facility] Patient Condition:257970034}    Rehab Potential (if transferring to Rehab): {Prognosis:7573882058}    Recommended Labs or Other Treatments After Discharge: ***    Physician Certification: I certify the above information and transfer of Vince Lemon.  is necessary for the continuing treatment of the diagnosis listed and that he requires {Admit to Appropriate Level of Care:54540} for {GREATER/LESS:543002753} 30 days.      Update Admission H&P: {CHP DME Changes in NAWKM:586432712}    PHYSICIAN SIGNATURE:  {Esignature:[id number]}

## 2021-07-29 NOTE — PROGRESS NOTES
Physical Therapy   Name: Mariella Mccabe. MRN:  [id number]  Date of service:  7/29/2021  Pt. sleeping currently. RN, Rupesh Pang, states pt going to [facility] today.   Electronically signed by Maggie Jean-Baptiste PT on 7/29/2021 at 4:09 PM

## 2021-07-29 NOTE — PROGRESS NOTES
Home oxygen evaluation    96% on 2 LPM NC  82% on RA at rest     Patient stated that he can not walk at present time.

## 2021-07-29 NOTE — PROGRESS NOTES
tablet 75 mg  75 mg Oral Daily Kateryna Preston MD   75 mg at 07/29/21 0825    doxazosin (CARDURA) tablet 4 mg  4 mg Oral Nightly Kateryna Preston MD   4 mg at 07/28/21 2127    fluticasone (FLONASE) 50 MCG/ACT nasal spray 1 spray  1 spray Each Nostril Daily Kateryna Preston MD   1 spray at 07/29/21 0825    gabapentin (NEURONTIN) capsule 200 mg  200 mg Oral TID Kateryna Preston MD   200 mg at 07/29/21 0825    HYDROcodone-acetaminophen (Francies Reek)  MG per tablet 1 tablet  1 tablet Oral Q4H PRN Kateryna Preston MD   1 tablet at 07/29/21 0837    nitroGLYCERIN (NITROSTAT) SL tablet 0.4 mg  0.4 mg Sublingual Q5 Min PRN Kateryna Preston MD        [facility] tablet 37.5 mg  37.5 mg Oral Daily Kateryna Preston MD   37.5 mg at 07/29/21 0825    perflutren lipid microspheres (DEFINITY) injection 1.65 mg  1.5 mL Intravenous ONCE PRN Kateryna Preston MD        carvedilol (COREG) tablet 3.125 mg  3.125 mg Oral BID WC Kateryna Preston MD   3.125 mg at 07/29/21 0825    spironolactone (ALDACTONE) tablet 25 mg  25 mg Oral Daily Kateryna Preston MD   25 mg at 07/29/21 0825    nitroGLYCERIN (NITROSTAT) SL tablet 0.4 mg  0.4 mg Sublingual Q5 Min PRN Kateryna Preston MD        enoxaparin (LOVENOX) injection 40 mg  40 mg Subcutaneous Daily Kateryna Preston MD   40 mg at 07/29/21 0607    ondansetron (ZOFRAN) injection 4 mg  4 mg Intravenous Q6H PRN Kateryna Preston MD        melatonin tablet 3 mg  3 mg Oral Nightly PRN Kateryna Preston MD   3 mg at 07/28/21 2127    acetaminophen (TYLENOL) tablet 650 mg  650 mg Oral Q4H PRN Kateryna Preston MD        ondansetron (ZOFRAN) injection 4 mg  4 mg Intravenous Q6H PRN Kateryna Preston MD        albuterol (PROVENTIL) nebulizer solution 2.5 mg  2.5 mg Nebulization Q6H PRN Kateryna Preston MD        famotidine (PEPCID) tablet 20 mg  20 mg Oral Daily Gladis Major MD   20 mg at 07/28/21 2127        Labs:     Recent Labs     07/26/21 1955 07/28/21 0314 07/29/21 0311   WBC 10.0 9.3 10.4   RBC 4.47* 4.24* 4.34*   HGB 8.7* 8.2* 8.5*   HCT 32.7* 32.3* 33.2*   MCV 73.2* 76.2* 76.5*   MCH 19.5* 19.3* 19.6*   MCHC 26.6* 25.4* 25.6*    302 310     Recent Labs     07/27/21 0129 07/28/21 0314 07/29/21 0311    141 137   K 4.6 4.8 5.2*   ANIONGAP 10 12 13    106 102   CO2 23 23 22   BUN 30* 32* 38*   CREATININE 1.8* 1.8* 1.9*   GLUCOSE 172* 117* 122*   CALCIUM 10.9* 10.6* 10.7*     Recent Labs     07/27/21 0129 07/28/21 0314 07/29/21 0311   MG 2.1 2.0 2.0     Recent Labs     07/26/21 1955   AST 59*   ALT 78*   BILITOT 0.6   ALKPHOS 94     ABGs:No results for input(s): PH, PO2, PCO2, HCO3, BE, O2SAT in the last 72 hours. Troponin T:   Recent Labs     07/26/21 1955 07/27/21 0129 07/27/21 0341   TROPONINI 0.04* 0.02 0.03     INR:   Recent Labs     07/26/21 1955   INR 1.21*     Lactic Acid: No results for input(s): LACTA in the last 72 hours.     Objective:   Vitals: /65   Pulse 88   Temp 98.1 °F (36.7 °C)   Resp 20   Ht 5' 8\" (1.727 m)   Wt 233 lb 3 oz (105.8 kg)   SpO2 95%   BMI 35.46 kg/m²   24HR INTAKE/OUTPUT:      Intake/Output Summary (Last 24 hours) at 7/29/2021 1009  Last data filed at 7/29/2021 0022  Gross per 24 hour   Intake 240 ml   Output 1000 ml   Net -760 ml     General appearance: alert and cooperative with exam  HEENT: AT/NC  Lungs: no increased work of breathing, improving bibasilar crackles, BLE, no wheezing appreciated, poor inspiratory effort  Heart: RRR, no murmurs appreciated  Abdomen: BS+, soft, NT, ND  Extremities: improving edema, pulses+  Neurologic: Alert, gross motor function intact  Skin: warm    Assessment and Plan:   Principal Problem:    Acute on chronic systolic CHF (congestive heart failure) (HCC)  Active Problems:    AAA (abdominal aortic aneurysm) (HCC)    Mixed hyperlipidemia Cerebrovascular accident (CVA) due to embolism (HCC)    Obstructive sleep apnea syndrome    CKD (chronic kidney disease) stage 3, GFR 30-59 ml/min (HCC)    Coronary artery disease involving native heart without angina pectoris    Cerebrovascular accident (CVA) due to embolism of right middle cerebral artery (HCC)    Simple chronic bronchitis (HCC)    Recurrent major depressive disorder in partial remission ([address]    Hyperparathyroidism ([facility]    Atherosclerosis of native artery of both lower extremities ([facility]    Bilateral carotid artery stenosis    Shortness of breath    PND (paroxysmal nocturnal dyspnea)    Hypoxemia  Resolved Problems:    * No resolved hospital problems. *    CHF: New onset systolic CHF. Diuretics. Cardiology on board. BB.    CAD: Antiplatelets/statin. CKD IIIB: Chronic. Stable. Monitor BMP. COPD: Bronchodilators. Monitor O2. HTN: Monitor BP and adjust medications PRN. Supportive management.     Advance Directive: Full Code    DVT prophylaxis: Lovenox    Discharge planning: TBD - pending placement      Signed:  Selena Kaur MD 7/29/2021 10:09 AM  Rounding Hospitalist

## 2021-07-29 NOTE — PROGRESS NOTES
Cardiology Daily Note Monica Chappell MD      Patient:  Ivanna Harrell  [id number]    Patient Active Problem List    Diagnosis Date Noted    Complete atrioventricular block ([address]     Acute on chronic systolic CHF (congestive heart failure) ([address] 07/26/2021    Shortness of breath 07/26/2021    PND (paroxysmal nocturnal dyspnea) 07/26/2021    Hypoxemia 07/26/2021    Gastrointestinal stromal tumor of stomach ([address] 07/19/2021    Symptomatic bradycardia 02/16/2021    Atherosclerosis of native artery of both lower extremities ([address] 07/30/2019    Bilateral carotid artery stenosis 07/30/2019    Hyperparathyroidism ([address] 10/04/2018    Recurrent major depressive disorder in partial remission ([address] 07/11/2018    Chronic midline low back pain without sciatica 04/23/2018    Simple chronic bronchitis ([address] 10/02/2017    Depression with anxiety 09/28/2017    Hyperglycemia 09/28/2017    Gastroesophageal reflux disease without esophagitis 09/28/2017    Injury of right hypoglossal nerve 08/04/2016    Stenosis of right carotid artery     Coronary artery disease involving native heart without angina pectoris     Cerebrovascular accident (CVA) due to embolism of right middle cerebral artery ([address]     Essential hypertension     CKD (chronic kidney disease) stage 3, GFR 30-59 ml/min ([address] 07/17/2016    Obstructive sleep apnea syndrome 07/16/2016    Cerebrovascular accident (CVA) due to embolism ([address] 02/16/2016    Mixed hyperlipidemia     AAA (abdominal aortic aneurysm) ([address] 10/10/2012       Admit Date:  7/26/2021    Admission Problem List: Present on Admission:   Acute on chronic systolic CHF (congestive heart failure) ([address]   AAA (abdominal aortic aneurysm) ([address]   Cerebrovascular accident (CVA) due to embolism ([address]   Obstructive sleep apnea syndrome   CKD (chronic kidney disease) stage 3, GFR 30-59 ml/min ([address]   Coronary artery disease involving native heart without angina pectoris   Cerebrovascular accident (CVA) due to embolism of right middle cerebral artery (HCC)   Simple chronic bronchitis (HCC)   Recurrent major depressive disorder in partial remission ([address]   Hyperparathyroidism ([address]   Atherosclerosis of native artery of both lower extremities ([address]   Bilateral carotid artery stenosis   Mixed hyperlipidemia   Shortness of breath   PND (paroxysmal nocturnal dyspnea)   Hypoxemia      Cardiac Specific Data:  Specialty Problems        Cardiology Problems    Complete atrioventricular block (HCC)        AAA (abdominal aortic aneurysm) (HCC)        Mixed hyperlipidemia        Cerebrovascular accident (CVA) due to embolism ([address]        Cerebrovascular accident (CVA) due to embolism of right middle cerebral artery ([address]        Coronary artery disease involving native heart without angina pectoris        Essential hypertension        Stenosis of right carotid artery        Atherosclerosis of native artery of both lower extremities (HCC)        Bilateral carotid artery stenosis        Symptomatic bradycardia        * (Principal) Acute on chronic systolic CHF (congestive heart failure) ([facility]              Subjective:  Mr. Ca Reyez seen today resting comfortably still complains of his left leg feeling uncomfortable. Left lower extremity venous ultrasound negative for evidence of deep or superficial venous thrombosis. Denies chest pain dyspnea about the same proBNP down to 2240 previously 7565 on 7/26/2021. No new complaints or issues reported. Blood pressure 106/65 heart rate 88. Objective:   /65   Pulse 88   Temp 98.1 °F (36.7 °C)   Resp 20   Ht 5' 8\" (1.727 m)   Wt 233 lb 3 oz (105.8 kg)   SpO2 95%   BMI 35.46 kg/m²       Intake/Output Summary (Last 24 hours) at 7/29/2021 0850  Last data filed at 7/29/2021 0022  Gross per 24 hour   Intake 240 ml   Output 1000 ml   Net -760 ml       Prior to Admission medications    Medication Sig Start Date End Date Taking?  Authorizing Provider   fluticasone (FLONASE) 50 MCG/ACT nasal spray 1 spray by Each Nostril route daily   Yes Historical Provider, MD   HYDROcodone-acetaminophen (NORCO)  MG per tablet Take 1 tablet by mouth every 6 hours as needed for Pain for up to 30 days.  7/8/21 8/7/21 Yes TREV Valdivia   atorvastatin (LIPITOR) 40 MG tablet TAKE ONE (1) TABLET EVERY DAY 5/13/21  Yes Marie Pruitt MD   lisinopril (PRINIVIL;ZESTRIL) 40 MG tablet TAKE 1 TABLET BY MOUTH EVERY DAY  5/2/21  Yes Marie Pruitt MD   famotidine (PEPCID) 20 MG tablet Take 1 tablet by mouth 2 times daily 4/16/21  Yes TREV Isaac   venlafaxine (EFFEXOR) 75 MG tablet TAKE ONE (1) TABLET BY MOUTH EVERY DAY 4/9/21  Yes Marie Pruitt MD   gabapentin (NEURONTIN) 100 MG capsule  3/18/21  Yes Historical Provider, MD   clopidogrel (PLAVIX) 75 MG tablet TAKE ONE (1) TABLET BY MOUTH EVERY DAY   Patient taking differently: Take 75 mg by mouth daily  1/20/21  Yes Marie Pruitt MD   doxazosin (CARDURA) 2 MG tablet Take 4 mg by mouth nightly  6/14/19  Yes Historical Provider, MD   aspirin 81 MG EC tablet Take 1 tablet by mouth daily 8/5/16  Yes Chidi Helms MD   ferrous sulfate (IRON 325) 325 (65 Fe) MG tablet Take 1 tablet by mouth daily (with breakfast) 7/21/21   Marie Pruitt MD   furosemide (LASIX) 40 MG tablet Take 1 tablet by mouth daily 7/19/21   Marie Pruitt MD   Cholecalciferol (VITAMIN D3) 1.25 MG (15757 UT) CAPS Take by mouth  Patient not taking: Reported on 7/19/2021    Historical Provider, MD   nitroGLYCERIN (NITROSTAT) 0.4 MG SL tablet Place 0.4 mg under the tongue every 5 minutes as needed for Chest pain    Historical Provider, MD        Arformoterol Tartrate  15 mcg Nebulization BID    budesonide  0.5 mg Nebulization BID    aspirin  81 mg Oral Daily    atorvastatin  20 mg Oral Nightly    clopidogrel  75 mg Oral Daily    doxazosin  4 mg Oral Nightly    fluticasone  1 spray Each Nostril Daily    gabapentin 200 mg Oral TID    venlafaxine  37.5 mg Oral Daily    carvedilol  3.125 mg Oral BID WC    spironolactone  25 mg Oral Daily    enoxaparin  40 mg Subcutaneous Daily    famotidine  20 mg Oral Daily    bumetanide  1 mg Intravenous BID       TELEMETRY: Sinus     Physical Exam:      Physical Exam      General:  Awake, alert, NAD  Skin:  Warm and dry  Neck:  no jvd , no carotid bruits  Chest:  Clear to auscultation, no wheezing or rales  Cardiovascular:  RRR [id number] no murmurs, clicks, gallups, or rubs  Abdomen:  Soft nontender, nondistended, bowel sounds present  Extremities:  Edema: none       Lab Data:  CBC:   Recent Labs     07/26/21 1955 07/28/21 0314 07/29/21 0311   WBC 10.0 9.3 10.4   HGB 8.7* 8.2* 8.5*   HCT 32.7* 32.3* 33.2*   MCV 73.2* 76.2* 76.5*    302 310     BMP:   Recent Labs     07/27/21  0129 07/28/21 0314 07/29/21 0311    141 137   K 4.6 4.8 5.2*    106 102   CO2 23 23 22   BUN 30* 32* 38*   CREATININE 1.8* 1.8* 1.9*     LIVER PROFILE:   Recent Labs     07/26/21 1955   AST 59*   ALT 78*   BILITOT 0.6   ALKPHOS 94     PT/INR:   Recent Labs     07/26/21 1955   PROTIME 15.5*   INR 1.21*     APTT:   Recent Labs     07/26/21 1955   APTT 36.5*     BNP:  No results for input(s): BNP in the last 72 hours.   CK, CKMB, Troponin: @LABRCNT (CKTOTAL:3, CKMB:3, TROPONINI:3)@    IMAGING:  Echo Complete    Result Date: 7/27/2021  Transthoracic Echocardiography Report (TTE)  Demographics   Patient Name Alisson SHAY   Date of Study       07/27/2021               Isatu Tristan   MRN          [id number]             Gender              Male   Date of      1945         Room Number         [id number]  Birth   Age          64 year(s)   Height:      68 inches          Referring Physician Rudy Hampton MD   Weight:      227.01 pounds      Sonographer         Mauri Sotelo   BSA:         2.16 m^2           Interpreting        Marilyn Izquierdo MD Physician   BMI:         34.52 kg/m^2  Procedure Type of Study   TTE procedure:ECHO NO CONTRAST WITH DOP/COLR. Study Location: Echo Lab Technical Quality: Good visualization Patient Status: Inpatient Rhythm: Within normal limits Indications:Congestive heart failure, systolic dysfunction. Conclusions   Summary  Mitral valve leaflets are mildly thickened with preserved leaflet  mobility. Mild mitral regurgitation is present. Mildly thickened aortic valve leaflets with preserved leaflet mobility. Tricuspid valve is structurally normal.  Mild tricuspid regurgitation with estimated RVSP of 41 mm Hg. Mildly dilated left atrium. Left ventricular ejection fraction is visually estimated at 30%. Global hypokinesis  Moderate concentric left ventricular hypertrophy. No regional wall motion abnormalities. Mildly enlarged right atrium size. Signature   ----------------------------------------------------------------  Electronically signed by Pete Robbins MD(Interpreting  physician) on 07/27/2021 02:21 PM  ----------------------------------------------------------------   Findings   Mitral Valve  Mitral valve leaflets are mildly thickened with preserved leaflet  mobility. Mild mitral regurgitation is present. Aortic Valve  Mildly thickened aortic valve leaflets with preserved leaflet mobility. Tricuspid Valve  Tricuspid valve is structurally normal.  Mild tricuspid regurgitation with estimated RVSP of 41 mm Hg. Pulmonic Valve  The pulmonic valve was not well visualized . Left Atrium  Mildly dilated left atrium. Left Ventricle  Left ventricular ejection fraction is visually estimated at 30%. Global hypokinesis  Moderate concentric left ventricular hypertrophy. No regional wall motion abnormalities. Right Atrium  Mildly enlarged right atrium size. Right Ventricle  Normal right ventricular size with preserved RV function.    Pericardial Effusion  No evidence of significant pericardial effusion is noted. Pleural Effusion  No evidence of pleural effusion. Allergies   - No known allergies. M-Mode Measurements (cm)   LVIDd: 4.62 cm                         LVIDs: 3.95 cm  IVSd: 1.51 cm  LVPWd: 1.75 cm                         AO Root Dimension: 3.5 cm  % Ejection Fraction: 30.9 %            LA: 3.4 cm                                         LVOT: 2 cm  Doppler Measurements:   AV Peak Velocity:187 cm/s            MV Peak E-Wave: 68.6 cm/s  AV Peak Gradient: 13.99 mmHg         MV Peak A-Wave: 38.1 cm/s  AV Mean Gradient: 7 mmHg             MV E/A Ratio: 1.8 %  AV Area (Continuity):1.36 cm^2       MV Peak Gradient: 1.88 mmHg  TR Velocity:277 cm/s                 MV P1/2t: 64 msec  TR Gradient:30.69 mmHg               MVA by PHT3.44 cm^2  Estimated RAP:10 mmHg  RVSP:41 mmHg      XR CHEST (2 VW)    Result Date: 7/19/2021  HISTORY: R05 CXR: 2 views of the chest are obtained. COMPARISON: 4/6/2021. FINDINGS: Lungs remain hyperinflated. Increased blunting of right costophrenic sulcus may be secondary to small pleural effusion. Questionable trace left pleural effusion. Right basilar atelectasis. Similar positioning of the right subclavian cardiac pacer device. Stable cardiomegaly. Interstitial densities favoring edema. No pneumothorax. Moderate degenerative change of the thoracic spine. 1. Stable cardiomegaly with similar positioning of the right subclavian cardiac pacer device. Interstitial edema and small right greater than left pleural effusions favoring CHF. Signed by Dr Basim Andrews RENAL COMPLETE    Result Date: 7/27/2021  EXAMINATION: US RENAL COMPLETE 7/27/2021 5:07 PM HISTORY: Acute kidney injury. Report: Sonographic images of the kidneys were obtained bilaterally. COMPARISON: Ultrasound of the kidneys to February 6, 2021 . The right kidney measures 11.3 x 5.0 x 5.0 cm and has normal morphology, slightly lobular cortical contour. Cortical thickness ranges from 14.6 to 18.6 mm. !Location                            ! Visualized! Compressibility! Thrombosis! +------------------------------------+----------+---------------+----------+ ! Sapheno Femoral Junction            ! Yes       ! Yes            ! None      ! +------------------------------------+----------+---------------+----------+ ! Common Femoral                      !Yes       ! Yes            ! None      ! +------------------------------------+----------+---------------+----------+ Left Lower Extremities DVT Study Measurements Left 2D Measurements +------------------------------------+----------+---------------+----------+ ! Location                            ! Visualized! Compressibility! Thrombosis! +------------------------------------+----------+---------------+----------+ ! Sapheno Femoral Junction            ! Yes       ! Yes            ! None      ! +------------------------------------+----------+---------------+----------+ ! Common Femoral                      !Yes       ! Yes            ! None      ! +------------------------------------+----------+---------------+----------+ ! Prox Femoral                        !Yes       ! Yes            ! None      ! +------------------------------------+----------+---------------+----------+ ! Mid Femoral                         !Yes       ! Yes            ! None      ! +------------------------------------+----------+---------------+----------+ ! Dist Femoral                        !Yes       ! Yes            ! None      ! +------------------------------------+----------+---------------+----------+ ! Deep Femoral                        !Yes       ! Yes            ! None      ! +------------------------------------+----------+---------------+----------+ ! Popliteal                           !Yes       ! Yes            ! None      ! +------------------------------------+----------+---------------+----------+ ! SSV                                 ! Yes       ! Yes            ! None      ! +------------------------------------+----------+---------------+----------+ ! Gastroc                             ! Yes       ! Yes            ! None      ! +------------------------------------+----------+---------------+----------+ ! PTV                                 ! Yes       ! Yes            ! None      ! +------------------------------------+----------+---------------+----------+ ! GSV                                 ! Yes       ! Yes            ! None      ! +------------------------------------+----------+---------------+----------+ ! ATV                                 ! Yes       ! Yes            ! None      ! +------------------------------------+----------+---------------+----------+ ! Peroneal                            !Yes       ! Yes            ! None      ! +------------------------------------+----------+---------------+----------+ ! Soleal                              !Yes       ! Yes            ! None      ! +------------------------------------+----------+---------------+----------+    XR CHEST PORTABLE    Result Date: 7/26/2021  XR CHEST PORTABLE 7/26/2021 8:20 PM HISTORY:   Chest pain and shortness of air  Single view. COMPARISONS:  7/19/2021 and 4/6/2021 FINDINGS: There is cardiomegaly. There is bronchovascular interstitial prominence with patchy airspace opacities particularly in the right lower lobe with right-sided pleural reaction likely. Permanent cardiac pacemaker observed. Differential considerations include pulmonary edema and heart failure. 1. Cardiomegaly with bilateral interstitial infiltrates with patchy right airspace opacities with probable right pleural effusion. Differential considerations include pulmonary edema and heart failure. Signed by Dr Kevin Lawton:  1. Complaints of increased shortness of breath BNP 7565 consistent with acute on chronic systolic and diastolic congestive heart failure  2. Hyperlipidemia  3. History of stroke  4. Obstructive sleep apnea  5.  Chronic kidney disease  6. Coronary artery disease  7. Depression  8. Hyperparathyroidism  9. Carotid artery stenosis  10. Peripheral arterial disease  11. Abdominal aortic aneurysm  12. CT head 2/16/2021 no acute findings mild chronic microvascular changes  13. Echo 7/15/2016 RVSP 26 mild TR normal LV function EF 55 to 78% grade 1 diastolic dysfunction  14. Catheterization 5/13/2015 EF 40% anteroseptal hypokinesis severe two-vessel disease bare-metal stent placement LAD and PDA of the right coronary artery   15. Left lower extremity venous ultrasound 7/28/2021 normal study no evidence of deep or superficial venous thrombosis    Plan:  1.  Stable from a cardiac standpoint could be potentially discharged follow-up in the office    Jose Grossman MD, MD 7/29/2021 8:50 AM

## 2021-07-29 NOTE — PROGRESS NOTES
Report called to Markel at [facility] for patient transfer to SNF. No further questions present. EMS notified of patient transfer.     Electronically signed by Deanna Daigle RN on 7/29/2021 at 12:33 PM

## 2021-07-29 NOTE — DISCHARGE SUMMARY
Discharge Summary      Sanjeev De Dios. :  1945  MRN:  [id number]    Admit date:  2021  Discharge date:      Admitting Physician:  No admitting provider for patient encounter. Advance Directive: Full Code    Consults: cardiology    Primary Care Physician:  Tapan Gil MD    Discharge Diagnoses:  Principal Problem:    Acute on chronic systolic CHF (congestive heart failure) ([address]  Active Problems:    AAA (abdominal aortic aneurysm) ([address]    Mixed hyperlipidemia    Cerebrovascular accident (CVA) due to embolism ([address]    Obstructive sleep apnea syndrome    CKD (chronic kidney disease) stage 3, GFR 30-59 ml/min (HCC)    Coronary artery disease involving native heart without angina pectoris    Cerebrovascular accident (CVA) due to embolism of right middle cerebral artery (HCC)    Simple chronic bronchitis (HCC)    Recurrent major depressive disorder in partial remission ([address]    Hyperparathyroidism ([address]    Atherosclerosis of native artery of both lower extremities ([address]    Bilateral carotid artery stenosis    Shortness of breath    PND (paroxysmal nocturnal dyspnea)    Hypoxemia  Resolved Problems:    * No resolved hospital problems.  *      Significant Diagnostic Studies:   Echo Complete    Result Date: 2021  Transthoracic Echocardiography Report (TTE)  Demographics   Patient Name [facility]   Date of Study       2021               Flores Campbell   MRN          [id number]             Gender              Male   Date of      1945         Room Number         MHL-0315  Birth   Age          64 year(s)   Height:      68 inches          Referring Physician Rhett Eatsman MD   Weight:      227.01 pounds      Sonographer         Jackie Montez   BSA:         2.16 m^2           Interpreting        Isidra Castro MD                                  Physician   BMI:         34.52 kg/m^2  Procedure Type of Study   TTE procedure:ECHO NO CONTRAST Measurements (cm)   LVIDd: 4.62 cm                         LVIDs: 3.95 cm  IVSd: 1.51 cm  LVPWd: 1.75 cm                         AO Root Dimension: 3.5 cm  % Ejection Fraction: 30.9 %            LA: 3.4 cm                                         LVOT: 2 cm  Doppler Measurements:   AV Peak Velocity:187 cm/s            MV Peak E-Wave: 68.6 cm/s  AV Peak Gradient: 13.99 mmHg         MV Peak A-Wave: 38.1 cm/s  AV Mean Gradient: 7 mmHg             MV E/A Ratio: 1.8 %  AV Area (Continuity):1.36 cm^2       MV Peak Gradient: 1.88 mmHg  TR Velocity:277 cm/s                 MV P1/2t: 64 msec  TR Gradient:30.69 mmHg               MVA by PHT3.44 cm^2  Estimated RAP:10 mmHg  RVSP:41 mmHg      US RENAL COMPLETE    Result Date: 7/27/2021  EXAMINATION: US RENAL COMPLETE 7/27/2021 5:07 PM HISTORY: Acute kidney injury. Report: Sonographic images of the kidneys were obtained bilaterally. COMPARISON: Ultrasound of the kidneys to February 6, 2021 . The right kidney measures 11.3 x 5.0 x 5.0 cm and has normal morphology, slightly lobular cortical contour. Cortical thickness ranges from 14.6 to 18.6 mm. No mass or hydronephrosis is identified. Color Doppler images and straight vascular flow within the right kidney. The left kidney measures 11.6 x 5.4 x 3.5 cm also shows a slightly lobular cortical contour, cortical thickness ranges from 11.3 to 12.5 mm. No mass or hydronephrosis is identified. Color Doppler images demonstrate vascular flow within the left kidney. The bladder appears unremarkable. 1. Normal ultrasound of the kidneys. Small cortical cysts seen on previous ultrasound are not visualized. Signed by Dr Donaldo Cassidy. [place]    XR CHEST PORTABLE    Result Date: 7/26/2021  XR CHEST PORTABLE 7/26/2021 8:20 PM HISTORY:   Chest pain and shortness of air  Single view. COMPARISONS:  7/19/2021 and 4/6/2021 FINDINGS: There is cardiomegaly.  There is bronchovascular interstitial prominence with patchy airspace opacities particularly in the right lower lobe with right-sided pleural reaction likely. Permanent cardiac pacemaker observed. Differential considerations include pulmonary edema and heart failure. 1. Cardiomegaly with bilateral interstitial infiltrates with patchy right airspace opacities with probable right pleural effusion. Differential considerations include pulmonary edema and heart failure. Signed by Dr Nick Olmos:   CBC:   Recent Labs     07/26/21 1955 07/28/21 0314 07/29/21 0311   WBC 10.0 9.3 10.4   HGB 8.7* 8.2* 8.5*    302 310     BMP:    Recent Labs     07/27/21 0129 07/28/21 0314 07/29/21 0311    141 137   K 4.6 4.8 5.2*    106 102   CO2 23 23 22   BUN 30* 32* 38*   CREATININE 1.8* 1.8* 1.9*   GLUCOSE 172* 117* 122*     INR:   Recent Labs     07/26/21 1955   INR 1.21*     ABGs:No results for input(s): PH, PO2, PCO2, HCO3, BE, O2SAT in the last 72 hours. Lactic Acid:No results for input(s): LACTA in the last 72 hours. Procedures: None  Hospital Course: 77-year-old obese male with a past medical history of congestive heart failure, complete heart block status post PPM 4/2021, depression/anxiety, CAD, hypertension, hyperlipidemia, iron deficiency anemia, hyperparathyroidism, COPD with continued tobacco abuse, ZION, CKD stage IIIb presenting to the hospital for weakness and increasing dyspnea found to have an elevated BNP with new onset systolic congestive heart failure noted on TTE (TTE showing EF of 30%). Cardiology on board. Bilateral lower extremity venous Doppler performed showing no evidence of DVT. Breathing improved. Placement found. Patient is currently hemodynamically stable for discharge to skilled nursing facility today to follow-up with PCP and cardiology as an outpatient. Home oxygen screening to be performed prior to discharge.     Physical Exam:  Vitals: /65   Pulse 88   Temp 98.1 °F (36.7 °C)   Resp 20   Ht 5' 8\" (1.727 m)   Wt 233 lb 3 oz (105.8 kg) SpO2 95%   BMI 35.46 kg/m²   24HR INTAKE/OUTPUT:      Intake/Output Summary (Last 24 hours) at 7/29/2021 1032  Last data filed at 7/29/2021 1029  Gross per 24 hour   Intake 480 ml   Output 1000 ml   Net -520 ml     General appearance: alert and cooperative with exam  HEENT: AT/NC  Lungs: no increased work of breathing, improving bibasilar crackles, BLE, no wheezing appreciated, poor inspiratory effort  Heart: RRR, no murmurs appreciated  Abdomen: BS+, soft, NT, ND  Extremities: improving edema, pulses+  Neurologic: Alert, gross motor function intact  Skin: warm     Discharge Medications:       Fer Mode. Home Medication Instructions DCE:[id number]    Printed on:07/29/21 1032   Medication Information                      aspirin 81 MG EC tablet  Take 1 tablet by mouth daily             atorvastatin (LIPITOR) 40 MG tablet  TAKE ONE (1) TABLET EVERY DAY             bumetanide (BUMEX) 1 MG tablet  Take 1 tablet by mouth daily             carvedilol (COREG) 3.125 MG tablet  Take 1 tablet by mouth 2 times daily (with meals)             Cholecalciferol (VITAMIN D3) 1.25 MG (83134 UT) CAPS  Take by mouth             clopidogrel (PLAVIX) 75 MG tablet  TAKE ONE (1) TABLET BY MOUTH EVERY DAY              doxazosin (CARDURA) 2 MG tablet  Take 4 mg by mouth nightly              famotidine (PEPCID) 20 MG tablet  Take 1 tablet by mouth 2 times daily             ferrous sulfate (IRON 325) 325 (65 Fe) MG tablet  Take 1 tablet by mouth daily (with breakfast)             fluticasone (FLONASE) 50 MCG/ACT nasal spray  1 spray by Each Nostril route daily             gabapentin (NEURONTIN) 100 MG capsule               HYDROcodone-acetaminophen (NORCO)  MG per tablet  Take 1 tablet by mouth every 6 hours as needed for Pain for up to 3 days.              lisinopril (PRINIVIL;ZESTRIL) 5 MG tablet  Take 1 tablet by mouth daily             nitroGLYCERIN (NITROSTAT) 0.4 MG SL tablet  Place 0.4 mg under the tongue every 5 minutes as needed for Chest pain             spironolactone (ALDACTONE) 25 MG tablet  Take 1 tablet by mouth daily             venlafaxine (EFFEXOR) 75 MG tablet  TAKE ONE (1) TABLET BY MOUTH EVERY DAY                 Discharge Instructions: Follow up with Cezar Wellington MD in 7 days. Take medications as directed. Resume activity as tolerated. Diet: ADULT DIET; Easy to Chew; Low Fat/Low Chol/High Fiber/LUIS; Low Sodium (2 gm)     Disposition: Patient is medically stable and will be discharged Skilled nursing facility. Time spent on discharge 35 minutes.     Signed:  Tamara Fajardo MD 7/29/2021 10:32 AM

## 2021-07-30 ENCOUNTER — TELEPHONE (OUTPATIENT)
Dept: INTERNAL MEDICINE | Age: 76
End: 2021-07-30

## 2021-07-30 NOTE — TELEPHONE ENCOUNTER
SKILLED NURSING FACILITY:   INITIAL CALL POST-HOSPITAL DISCHARGE    SNF: [facility]     PHONE NUMBER: [phone]    THERAPY: PT/ST/OT    ANTICIPATED LENGTH OF STAY: unknown       Mr Cristóbal Cruz was discharged from [facility] and transferred to [facility] for short term rehab. Therapy has not been in yet to assess him for services. Physical, occupational, and speech therapies are ordered. There is no estimated length of stay at this time.

## 2021-07-30 NOTE — CONSULTS
Pt dc'd home prior to my education. Called and spoke with his daughter about CHF. She reports that he does use salt. He is currently at [facility] for rehab and it is under quarantine due to recent case of covid 19 in a pt. She does not know if they are doing daily wts or if they allow salt. She will relay need to them over the phone. She was not aware of his follow up appt. With Dr Komal Mccann on 8/9. We discussed the need to make that appt. So Dr Komal Mccann can check that his current medications are sufficient to maintain control of his CHF. She said she will bring him if the SNF does not provide transportation.

## 2021-08-06 ENCOUNTER — TELEPHONE (OUTPATIENT)
Dept: INTERNAL MEDICINE | Age: 76
End: 2021-08-06

## 2021-08-06 NOTE — TELEPHONE ENCOUNTER
[address] [facility] [phone]      Per Isaura Mendoza, nursing staff at [facility] and Rehab, patient is doing well. Patient continues to participate in therapy. No known discharge plans at this time.

## 2021-08-09 ENCOUNTER — OFFICE VISIT (OUTPATIENT)
Dept: CARDIOLOGY CLINIC | Age: 76
End: 2021-08-09
Payer: MEDICARE

## 2021-08-09 VITALS — HEART RATE: 80 BPM | SYSTOLIC BLOOD PRESSURE: 112 MMHG | DIASTOLIC BLOOD PRESSURE: 62 MMHG

## 2021-08-09 DIAGNOSIS — E78.2 MIXED HYPERLIPIDEMIA: ICD-10-CM

## 2021-08-09 DIAGNOSIS — R00.1 SYMPTOMATIC BRADYCARDIA: ICD-10-CM

## 2021-08-09 DIAGNOSIS — R06.02 SHORTNESS OF BREATH: ICD-10-CM

## 2021-08-09 DIAGNOSIS — I44.2 COMPLETE ATRIOVENTRICULAR BLOCK (HCC): ICD-10-CM

## 2021-08-09 DIAGNOSIS — I25.10 CORONARY ARTERY DISEASE INVOLVING NATIVE CORONARY ARTERY OF NATIVE HEART WITHOUT ANGINA PECTORIS: ICD-10-CM

## 2021-08-09 DIAGNOSIS — I10 ESSENTIAL HYPERTENSION: ICD-10-CM

## 2021-08-09 DIAGNOSIS — R06.00 PND (PAROXYSMAL NOCTURNAL DYSPNEA): ICD-10-CM

## 2021-08-09 DIAGNOSIS — I65.23 BILATERAL CAROTID ARTERY STENOSIS: Primary | ICD-10-CM

## 2021-08-09 PROCEDURE — G8427 DOCREV CUR MEDS BY ELIG CLIN: HCPCS | Performed by: INTERNAL MEDICINE

## 2021-08-09 PROCEDURE — 4004F PT TOBACCO SCREEN RCVD TLK: CPT | Performed by: INTERNAL MEDICINE

## 2021-08-09 PROCEDURE — 99214 OFFICE O/P EST MOD 30 MIN: CPT | Performed by: INTERNAL MEDICINE

## 2021-08-09 PROCEDURE — 4040F PNEUMOC VAC/ADMIN/RCVD: CPT | Performed by: INTERNAL MEDICINE

## 2021-08-09 PROCEDURE — G8417 CALC BMI ABV UP PARAM F/U: HCPCS | Performed by: INTERNAL MEDICINE

## 2021-08-09 PROCEDURE — 1111F DSCHRG MED/CURRENT MED MERGE: CPT | Performed by: INTERNAL MEDICINE

## 2021-08-09 PROCEDURE — 93280 PM DEVICE PROGR EVAL DUAL: CPT | Performed by: INTERNAL MEDICINE

## 2021-08-09 PROCEDURE — 1123F ACP DISCUSS/DSCN MKR DOCD: CPT | Performed by: INTERNAL MEDICINE

## 2021-08-09 ASSESSMENT — ENCOUNTER SYMPTOMS
SHORTNESS OF BREATH: 0
NAUSEA: 0
EYES NEGATIVE: 1
RESPIRATORY NEGATIVE: 1
GASTROINTESTINAL NEGATIVE: 1
DIARRHEA: 0
VOMITING: 0

## 2021-08-09 NOTE — PROGRESS NOTES
[facility] Progress Note                            Date:  8/9/2021  Patient: Cindy Santos. Age:  64 y.o., 1945      Reason for evaluation:         SUBJECTIVE:    Returns today follow-up assessment recently hospitalized with volume overload and congestive heart failure saw him on 7/29/2021. Now in a rehab facility. Obviously swollen today not weighing himself. Denies chest pain denies palpitations no other complaints or issues reported. Blood pressure 112/62 heart 80. Review of Systems   Constitutional: Negative. Negative for chills, fever and unexpected weight change. HENT: Negative. Eyes: Negative. Respiratory: Negative. Negative for shortness of breath. Cardiovascular: Negative. Negative for chest pain. Gastrointestinal: Negative. Negative for diarrhea, nausea and vomiting. Endocrine: Negative. Genitourinary: Negative. Musculoskeletal: Negative. Skin: Negative. Neurological: Negative. All other systems reviewed and are negative. OBJECTIVE:     /62   Pulse 80     Labs:   CBC: No results for input(s): WBC, HGB, HCT, PLT in the last 72 hours. BMP:No results for input(s): NA, K, CO2, BUN, CREATININE, LABGLOM, GLUCOSE in the last 72 hours. BNP: No results for input(s): BNP in the last 72 hours. PT/INR: No results for input(s): PROTIME, INR in the last 72 hours. APTT:No results for input(s): APTT in the last 72 hours. CARDIAC ENZYMES:No results for input(s): CKTOTAL, CKMB, CKMBINDEX, TROPONINI in the last 72 hours. FASTING LIPID PANEL:  Lab Results   Component Value Date    HDL 36 07/19/2021    LDLDIRECT 71 02/11/2016    LDLCALC 34 07/19/2021    TRIG 106 07/19/2021     LIVER PROFILE:No results for input(s): AST, ALT, LABALBU in the last 72 hours.         Past Medical History:   Diagnosis Date    AAA (abdominal aortic aneurysm) (HCC)     Acute on chronic systolic CHF (congestive heart failure) [facility] 7/26/2021    Aortic dissection femoral artery puncture site. Family History   Problem Relation Age of Onset    High Blood Pressure Father     High Blood Pressure Mother     Diabetes Mother     Colon Polyps Brother     Cancer Brother     Colon Cancer Neg Hx      No Known Allergies  Current Outpatient Medications   Medication Sig Dispense Refill    lisinopril (PRINIVIL;ZESTRIL) 5 MG tablet Take 1 tablet by mouth daily 30 tablet 0    carvedilol (COREG) 3.125 MG tablet Take 1 tablet by mouth 2 times daily (with meals) 60 tablet 0    bumetanide (BUMEX) 1 MG tablet Take 1 tablet by mouth daily 30 tablet 0    spironolactone (ALDACTONE) 25 MG tablet Take 1 tablet by mouth daily 30 tablet 0    gabapentin (NEURONTIN) 100 MG capsule Take 1 capsule by mouth 3 times daily for 30 days. 90 capsule 0    ferrous sulfate (IRON 325) 325 (65 Fe) MG tablet Take 1 tablet by mouth daily (with breakfast) 90 tablet 1    fluticasone (FLONASE) 50 MCG/ACT nasal spray 1 spray by Each Nostril route daily      atorvastatin (LIPITOR) 40 MG tablet TAKE ONE (1) TABLET EVERY DAY 90 tablet 3    famotidine (PEPCID) 20 MG tablet Take 1 tablet by mouth 2 times daily 180 tablet 2    venlafaxine (EFFEXOR) 75 MG tablet TAKE ONE (1) TABLET BY MOUTH EVERY DAY 90 tablet 2    Cholecalciferol (VITAMIN D3) 1.25 MG (67225 UT) CAPS Take by mouth       clopidogrel (PLAVIX) 75 MG tablet TAKE ONE (1) TABLET BY MOUTH EVERY DAY  (Patient taking differently: Take 75 mg by mouth daily ) 90 tablet 1    doxazosin (CARDURA) 2 MG tablet Take 2 mg by mouth nightly       aspirin 81 MG EC tablet Take 1 tablet by mouth daily 30 tablet 3    nitroGLYCERIN (NITROSTAT) 0.4 MG SL tablet Place 0.4 mg under the tongue every 5 minutes as needed for Chest pain       No current facility-administered medications for this visit.      Social History     Socioeconomic History    Marital status:      Spouse name: Not on file    Number of children: Not on file    Years of education: Not on Rhythm: Normal rate and regular rhythm. Heart sounds: Normal heart sounds. No murmur heard. No friction rub. No gallop. Pulmonary:      Effort: Pulmonary effort is normal. No respiratory distress. Breath sounds: Normal breath sounds. No wheezing or rales. Abdominal:      General: There is no distension. Tenderness: There is no abdominal tenderness. Lymphadenopathy:      Cervical: No cervical adenopathy. Skin:     General: Skin is warm and dry. ASSESSMENT:     Diagnosis Orders   1. Bilateral carotid artery stenosis     2. Complete atrioventricular block (HCC)     3. Essential hypertension     4. Shortness of breath     5. PND (paroxysmal nocturnal dyspnea)     6. Symptomatic bradycardia     7. Coronary artery disease involving native coronary artery of native heart without angina pectoris     8. Mixed hyperlipidemia         PLAN:  No orders of the defined types were placed in this encounter. No orders of the defined types were placed in this encounter. 1. Continue present medications  2. Suggest increasing Bumex to 1 tablet p.o. twice daily for the next 3 to 4 days and to advise if not improved  3. Recommend follow-up assessment in 1 month    Return in about 4 weeks (around 9/6/2021) for return to Dr. Jenny Norman only. Pito Rolle MD 8/9/2021 3:36 PM CDT    [facility]      Thisdictation was generated by voice recognition computer software. Although all attempts are made to edit the dictation for accuracy, there may be errors in the transcription that are not intended.

## 2021-08-12 ENCOUNTER — TELEPHONE (OUTPATIENT)
Dept: INTERNAL MEDICINE | Age: 76
End: 2021-08-12

## 2021-08-12 NOTE — TELEPHONE ENCOUNTER
[address] [facility] [phone]      Per Radha Molina, nursing staff at [facility], patient is doing well. Patient continues to participate in therapy. No known discharge plans at this time.

## 2021-08-18 ENCOUNTER — TELEPHONE (OUTPATIENT)
Dept: INTERNAL MEDICINE | Age: 76
End: 2021-08-18

## 2021-08-18 NOTE — TELEPHONE ENCOUNTER
[address] [place] [address] [phone]      Per Ollie Bergman, nursing staff at [facility] and [facility], patient is doing well. Patient continues to participate in therapy. No known discharge plans at this time.

## 2021-08-27 ENCOUNTER — TELEPHONE (OUTPATIENT)
Dept: INTERNAL MEDICINE | Age: 76
End: 2021-08-27

## 2021-08-27 NOTE — TELEPHONE ENCOUNTER
[address] [facility] [phone]      Per Michelle Conte, nursing staff at [facility] and Rehab, patient is doing well. Patient continues to participate in therapy. No known discharge plans at this time.

## 2021-08-30 DIAGNOSIS — M54.50 CHRONIC MIDLINE LOW BACK PAIN WITHOUT SCIATICA: ICD-10-CM

## 2021-08-30 DIAGNOSIS — G89.29 CHRONIC MIDLINE LOW BACK PAIN WITHOUT SCIATICA: ICD-10-CM

## 2021-08-30 RX ORDER — HYDROCODONE BITARTRATE AND ACETAMINOPHEN 10; 325 MG/1; MG/1
1 TABLET ORAL EVERY 6 HOURS PRN
Qty: 120 TABLET | Refills: 0 | Status: SHIPPED | OUTPATIENT
Start: 2021-08-30 | End: 2021-09-27 | Stop reason: SDUPTHER

## 2021-09-02 ENCOUNTER — TELEPHONE (OUTPATIENT)
Dept: INTERNAL MEDICINE | Age: 76
End: 2021-09-02

## 2021-09-02 NOTE — TELEPHONE ENCOUNTER
[address] Transitions Initial Follow Up Call    Outreach made within 2 business days of discharge: Yes    Patient: [address]. Patient : 1945  MRN: [id number]    Reason for Admission: Admitted 21 for acute on chronic systolic CHF   Discharge Date: 21 from [facility]) then admitted to [facility] for short term rehab. Discharged 21   Discharge Diagnoses:  Principal Problem:    Acute on chronic systolic CHF (congestive heart failure) (HCC)  Active Problems:    AAA (abdominal aortic aneurysm) (HCC)    Mixed hyperlipidemia    Cerebrovascular accident (CVA) due to embolism (HCC)    Obstructive sleep apnea syndrome    CKD (chronic kidney disease) stage 3, GFR 30-59 ml/min (HCC)    Coronary artery disease involving native heart without angina pectoris    Cerebrovascular accident (CVA) due to embolism of right middle cerebral artery (HCC)    Simple chronic bronchitis (HCC)    Recurrent major depressive disorder in partial remission ([address]    Hyperparathyroidism ([facility]    Atherosclerosis of native artery of both lower extremities ([facility]    Bilateral carotid artery stenosis    Shortness of breath    PND (paroxysmal nocturnal dyspnea)    Hypoxemia     Spoke with: patient's daughter Rashi Chappell     Discharge department/facility: [address] [place] [address] and Rehab    TCM Interactive Patient Contact:  Was patient able to fill all prescriptions: Yes  Was patient instructed to bring all medications to the follow-up visit: Yes  Is patient taking all medications as directed in the discharge summary? Yes  Does patient understand their discharge instructions: Yes  Does patient have questions or concerns that need addressed prior to 7-14 day follow up office visit: no    I spoke with Rashi Ta, patient's daughter, she states Mr. Rema Johnson came home from [facility] and is doing well. She states she is living with him and her mom and helping to cook and care for them.  She states he does well getting around with his walker. She states it takes him a while to get going in the mornings but is is doing better. He is eating good. He has all of his medications and is taking them as directed. She denies any needs at this time and will bring him in for his hospital follow up appointment next week.      Scheduled appointment with PCP within 7-14 days    Follow Up  Future Appointments   Date Time Provider Kasi Alicia   9/7/2021 11:00 AM [facility]   9/9/2021  3:45 PM MD HORACIO Culver [facility] Cardio [facility]   9/13/2021 11:00 AM L VASC LAB ROOM 1 [facility]   9/13/2021 11:30 AM L VASC LAB ROOM 1 L [facility] LAB [facility]   9/13/2021  2:00 PM Prasanna Ritter PA-C N Vasc Spec [facility]   10/28/2021  3:30 PM Martha Brownlee [facility]   12/2/2021  3:15 PM MD HORACIO Culver [facility] Cardio [facility]       Chrystal Briceno MA

## 2021-09-07 ENCOUNTER — TELEPHONE (OUTPATIENT)
Dept: CARDIOLOGY CLINIC | Age: 76
End: 2021-09-07

## 2021-09-07 NOTE — TELEPHONE ENCOUNTER
Patient son called and needs rescheduled patient appointment with Dr Guero Kirby out a few weeks ,please call [phone]

## 2021-09-27 DIAGNOSIS — G89.29 CHRONIC MIDLINE LOW BACK PAIN WITHOUT SCIATICA: ICD-10-CM

## 2021-09-27 DIAGNOSIS — M54.50 CHRONIC MIDLINE LOW BACK PAIN WITHOUT SCIATICA: ICD-10-CM

## 2021-09-27 RX ORDER — HYDROCODONE BITARTRATE AND ACETAMINOPHEN 10; 325 MG/1; MG/1
1 TABLET ORAL EVERY 6 HOURS PRN
Qty: 120 TABLET | Refills: 0 | Status: SHIPPED | OUTPATIENT
Start: 2021-09-27 | End: 2021-10-26 | Stop reason: SDUPTHER

## 2021-09-27 NOTE — TELEPHONE ENCOUNTER
Ariella Jacinto. called to request a refill on his medication. Last office visit : 7/19/2021   Next office visit : 10/28/2021     Last UDS:   Amphetamine Screen, Urine   Date Value Ref Range Status   04/23/2018 Negative  Final     Barbiturate Screen, Urine   Date Value Ref Range Status   04/23/2018 Negative  Final     Benzodiazepine Screen, Urine   Date Value Ref Range Status   04/23/2018 Negative  Final     Cocaine Metabolite Screen, Urine   Date Value Ref Range Status   04/23/2018 Negative  Final     MDMA, Urine   Date Value Ref Range Status   04/23/2018 Negative  Final     Methamphetamine, Urine   Date Value Ref Range Status   04/23/2018 Negative  Final     Opiate Scrn, Ur   Date Value Ref Range Status   04/23/2018 Positive  Final     Oxycodone Screen, Ur   Date Value Ref Range Status   04/23/2018 Negative  Final     PCP Screen, Urine   Date Value Ref Range Status   04/23/2018 Negative  Final     Propoxyphene Screen, Urine   Date Value Ref Range Status   04/23/2018 Negative  Final     Tricyclic Antidepressants, Urine   Date Value Ref Range Status   04/23/2018 Negative  Final       Last Malachi Chol: 8/31/21  Medication Contract: 4/23/18   Last Fill: 8/30/21    Requested Prescriptions     Pending Prescriptions Disp Refills    HYDROcodone-acetaminophen (NORCO)  MG per tablet 120 tablet 0     Sig: Take 1 tablet by mouth every 6 hours as needed for Pain for up to 30 days. Please approve or refuse this medication.    Sheridan Wu MA

## 2021-09-30 ENCOUNTER — OFFICE VISIT (OUTPATIENT)
Dept: VASCULAR SURGERY | Age: 76
End: 2021-09-30
Payer: MEDICARE

## 2021-09-30 ENCOUNTER — HOSPITAL ENCOUNTER (OUTPATIENT)
Dept: VASCULAR LAB | Age: 76
Discharge: HOME | End: 2021-09-30
Payer: MEDICARE

## 2021-09-30 VITALS
WEIGHT: 220 LBS | TEMPERATURE: 96.2 F | SYSTOLIC BLOOD PRESSURE: 135 MMHG | OXYGEN SATURATION: 99 % | DIASTOLIC BLOOD PRESSURE: 86 MMHG | RESPIRATION RATE: 16 BRPM | HEIGHT: 68 IN | HEART RATE: 84 BPM | BODY MASS INDEX: 33.34 KG/M2

## 2021-09-30 DIAGNOSIS — I65.23 BILATERAL CAROTID ARTERY STENOSIS: ICD-10-CM

## 2021-09-30 DIAGNOSIS — I71.40 AAA (ABDOMINAL AORTIC ANEURYSM) WITHOUT RUPTURE: Primary | ICD-10-CM

## 2021-09-30 DIAGNOSIS — I70.213 ATHEROSCLEROSIS OF NATIVE ARTERY OF BOTH LOWER EXTREMITIES WITH INTERMITTENT CLAUDICATION (HCC): Primary | ICD-10-CM

## 2021-09-30 DIAGNOSIS — I70.213 ATHEROSCLEROSIS OF NATIVE ARTERY OF BOTH LOWER EXTREMITIES WITH INTERMITTENT CLAUDICATION (HCC): ICD-10-CM

## 2021-09-30 DIAGNOSIS — I73.9 PVD (PERIPHERAL VASCULAR DISEASE) (HCC): ICD-10-CM

## 2021-09-30 PROCEDURE — 93880 EXTRACRANIAL BILAT STUDY: CPT

## 2021-09-30 PROCEDURE — G8417 CALC BMI ABV UP PARAM F/U: HCPCS | Performed by: PHYSICIAN ASSISTANT

## 2021-09-30 PROCEDURE — G8427 DOCREV CUR MEDS BY ELIG CLIN: HCPCS | Performed by: PHYSICIAN ASSISTANT

## 2021-09-30 PROCEDURE — 4040F PNEUMOC VAC/ADMIN/RCVD: CPT | Performed by: PHYSICIAN ASSISTANT

## 2021-09-30 PROCEDURE — 99214 OFFICE O/P EST MOD 30 MIN: CPT | Performed by: PHYSICIAN ASSISTANT

## 2021-09-30 PROCEDURE — 1123F ACP DISCUSS/DSCN MKR DOCD: CPT | Performed by: PHYSICIAN ASSISTANT

## 2021-09-30 PROCEDURE — 4004F PT TOBACCO SCREEN RCVD TLK: CPT | Performed by: PHYSICIAN ASSISTANT

## 2021-09-30 PROCEDURE — 93923 UPR/LXTR ART STDY 3+ LVLS: CPT

## 2021-09-30 NOTE — PROGRESS NOTES
Patient Care Team:  Caleb Melendez MD as PCP - General (Family Medicine)  Caleb Melendez MD as PCP - [facility]  Marylen Platts, MD as Consulting Physician (Gastroenterology)  Amirah Nelson MD as Consulting Physician (Interventional Cardiology)      History and Physical    Mr. Ilsa Ding is a 75-year-old man who has a history of a abdominal aortic aneurysm, PVD and carotid artery stenosis. Currently he is on aspirin, Plavix and a statin. Denies any lifestyle limiting claudication, ischemic rest pain or nonhealing wounds on his lower extremities. He denies any back or abdominal pain. He denies any new onset of partial or complete loss of vision affecting only one eye, speech difficulty or lateralizing weakness, numbness/tingling. He is still smoking.       Princess Rene is a 64 y.o. male with the following history reviewed and [facility]:  Patient Active Problem List    Diagnosis Date Noted    Complete atrioventricular block ([address]      Priority: High    Acute on chronic systolic CHF (congestive heart failure) ([address] 07/26/2021    Shortness of breath 07/26/2021    PND (paroxysmal nocturnal dyspnea) 07/26/2021    Hypoxemia 07/26/2021    Gastrointestinal stromal tumor of stomach ([address] 07/19/2021    Symptomatic bradycardia 02/16/2021    Atherosclerosis of native artery of both lower extremities ([address] 07/30/2019    Bilateral carotid artery stenosis 07/30/2019    Hyperparathyroidism ([address] 10/04/2018    Recurrent major depressive disorder in partial remission ([address] 07/11/2018    Chronic midline low back pain without sciatica 04/23/2018    Simple chronic bronchitis ([address] 10/02/2017    Depression with anxiety 09/28/2017    Hyperglycemia 09/28/2017    Gastroesophageal reflux disease without esophagitis 09/28/2017    Injury of right hypoglossal nerve 08/04/2016    Stenosis of right carotid artery     Coronary artery disease involving native heart without angina pectoris every 5 minutes as needed for Chest pain       No current facility-administered medications for this visit. Allergies: Patient has no known allergies. Past Medical History:   Diagnosis Date    AAA (abdominal aortic aneurysm) (HCC)     Acute on chronic systolic CHF (congestive heart failure) ([address] 7/26/2021    Aortic dissection ([address] 10/10/2012    CAD (coronary artery disease)     Cancer (HCC)     lung&lymphnode    Carotid artery occlusion     Cerebrovascular accident (CVA) ([address]     Cerebrovascular accident (CVA) due to embolism ([address] 02/16/2016    Cerebrovascular accident (CVA) due to embolism of right middle cerebral artery ([address]     COVID-19 2/6/2021    Depression     HTN (hypertension)     Hyperlipidemia     MI (myocardial infarction) ([address]     acute 5/13/2015    Non Hodgkin's lymphoma ([address]     Palliative care patient 02/16/2021    Sleep apnea     no c-pap    Unspecified sleep apnea      Past Surgical History:   Procedure Laterality Date    CARDIAC CATHETERIZATION  5/13/15  [address]    EF 40%. stent to LAD, stent to posterior descending branch of RCA.  CAROTID ENDARTERECTOMY Right 7/22/2016    Right carotid endarterectomy,carotid arteriograms. [facility]    COLONOSCOPY  04/09/2018    Nola [facility] ENDOSCOPY, COLON, DIAGNOSTIC      LUNG CANCER SURGERY      right side    LYMPHADENECTOMY      removed lymphnodes     RECTAL SURGERY      fistula     UPPER GASTROINTESTINAL ENDOSCOPY N/A 1/8/2020    Dr Edwin Burgos (Dr Gale Monahan pt) w/EUS-Small low grade GIST in the stomach, repeat in 1 yr    UPPER GASTROINTESTINAL ENDOSCOPY  11/08/2019    Juan Manuel: normal    UPPER GASTROINTESTINAL ENDOSCOPY  04/13/2018    Dr [facility]: normal, 2cm HH, small amount of food in stomach w/dark fluid,but not kathy blood    VASCULAR SURGERY  10/29/12 [facility]    open exposure of R common femoral artery; percutaneous cannulation of L  femoral artery with 7-french sheath; suprarenal abd aortagram with bilat iliofem arteriogram; endoluminal graft repair of AAA with endologix [id number] main body, 28x95 infrarenal cuff; balloon angioplasty of infrarenal abd aorta and endoluminal graft with coda balloon; completion abd aortogram w bilat iliofem arteriogram;     VASCULAR SURGERY  10/29/ [facility]     (cont) open repair of right common femoral ateriotomy; perclose repair of left common femoral artery puncture site. Family History   Problem Relation Age of Onset    High Blood Pressure Father     High Blood Pressure Mother     Diabetes Mother     Colon Polyps Brother     Cancer Brother     Colon Cancer Neg Hx      Social History     Tobacco Use    Smoking status: Current Every Day Smoker     Packs/day: 0.50     Years: 40.00     Pack years: 20.00    Smokeless tobacco: Never Used    Tobacco comment: quit smoking in nov 2017, restarted smoking since then   Substance Use Topics    Alcohol use: No       Review of Systems    Constitutional -   No fever or chills   HENT - no HA's, tinnitus, rhinorrhea, sore throat  Eyes - no sudden vision change or amaurosis. Respiratory - SOB or chest pain  Cardiovascular - no chest pain, syncope, or significant dizziness. No palpitations. See HPI  Gastrointestinal - no significant abdominal pain. No blood in stool. No diarrhea, nausea, or vomiting. Genitourinary - No difficulty urinating, dysuria, frequency, or urgency. No flank pain or hematuria. Musculoskeletal - no back pain or myalgia. Skin - no rashes or wounds   Neurologic - no dizziness, facial asymmetry, or light headedness. No seizures. No new onset of partial or complete loss of vision affecting only one eye, speech difficulty or lateralizing weakness, numbness/tingling   Hematologic - no excessive bleeding. Psychiatric - no severe anxiety or nervousness. No confusion. All other review of systems are negative.       Physical Exam    /86 (Site: Right Upper Arm, Position: Sitting, Cuff Size: Medium Adult)   Pulse 84   Temp 96.2 °F (35.7 °C)   Resp 16   Ht 5' 8\" (1.727 m)   Wt 220 lb (99.8 kg)   SpO2 99%   BMI 33.45 kg/m²     Constitutional - No acute distress. HENT - head normocephalic. Hearing is intact   Eyes - conjunctiva normal.  EOMS normal.  No exudate. No icterus. Neck- ROM appears normal, no tracheal deviation. Cardiovascular - Regular rate and rhythm. Heart sounds are normal.  No murmur, rub, or gallop. Carotid pulses bilaterally without bruit. Extremities - Radial and ulnar pulses are 2+ to palpation bilaterally. No cyanosis, clubbing, or significant edema. No signs atheroembolic event. He has bilateral lower extremity swelling noted. DP and PT pulses are palpable bilaterally feet are warm and without wounds. Pulmonary - effort appears normal.  No respiratory distress. Lungs - Breath sounds normal.   GI - Abdomen - No distension or palpable mass. Genitourinary - deferred. Musculoskeletal - ROM appears normal.  No significant edema. Neurologic - alert and oriented X 3. Face symmetric. No lateralizing weakness noted. Skin - warm, dry, and intact. No rash, erythema, or pallor. Psychiatric - mood, affect, and behavior appear normal.  Judgment and thought processes appear normal.  Risk factors for atherosclerosis of all vascular beds have been reviewed with the patient including:  Family history, tobacco abuse in all forms, elevated cholesterol, hyperlipidemia, and diabetes. Lower extremity arterial study:   Right RACHEL 1.09, Left RACHEL 1.08. Individual films reviewed: Yes. Test results were reviewed with the patient. Doppler results:    Right CCA/ICA <50% stenotic  Left CCA/ICA <50% stenotic  Right verterbral artery flow is antegrade  Left verterbral artery flow is antegrade  Individual velocities reviewed: Yes. Results were reviewed with the patient. Disease process is stable      Assessment      1. AAA (abdominal aortic aneurysm) without rupture ([address]    2. Bilateral carotid artery stenosis    3.  PVD (peripheral

## 2021-10-22 RX ORDER — LISINOPRIL 5 MG/1
5 TABLET ORAL DAILY
Qty: 90 TABLET | Refills: 1 | Status: SHIPPED | OUTPATIENT
Start: 2021-10-22 | End: 2022-01-03 | Stop reason: ALTCHOICE

## 2021-10-25 DIAGNOSIS — M54.50 CHRONIC MIDLINE LOW BACK PAIN WITHOUT SCIATICA: ICD-10-CM

## 2021-10-25 DIAGNOSIS — G89.29 CHRONIC MIDLINE LOW BACK PAIN WITHOUT SCIATICA: ICD-10-CM

## 2021-10-25 NOTE — TELEPHONE ENCOUNTER
Madeline White. called to request a refill on his medication. Last office visit : 7/19/2021   Next office visit : 10/28/2021     Last UDS:   Amphetamine Screen, Urine   Date Value Ref Range Status   04/23/2018 Negative  Final     Barbiturate Screen, Urine   Date Value Ref Range Status   04/23/2018 Negative  Final     Benzodiazepine Screen, Urine   Date Value Ref Range Status   04/23/2018 Negative  Final     Cocaine Metabolite Screen, Urine   Date Value Ref Range Status   04/23/2018 Negative  Final     MDMA, Urine   Date Value Ref Range Status   04/23/2018 Negative  Final     Methamphetamine, Urine   Date Value Ref Range Status   04/23/2018 Negative  Final     Opiate Scrn, Ur   Date Value Ref Range Status   04/23/2018 Positive  Final     Oxycodone Screen, Ur   Date Value Ref Range Status   04/23/2018 Negative  Final     PCP Screen, Urine   Date Value Ref Range Status   04/23/2018 Negative  Final     Propoxyphene Screen, Urine   Date Value Ref Range Status   04/23/2018 Negative  Final     Tricyclic Antidepressants, Urine   Date Value Ref Range Status   04/23/2018 Negative  Final       Last Fany Cathie: 8/31/21  Medication Contract: 4/23/18   Last Fill: 9/27/21    Requested Prescriptions     Pending Prescriptions Disp Refills    HYDROcodone-acetaminophen (NORCO)  MG per tablet 120 tablet 0     Sig: Take 1 tablet by mouth every 6 hours as needed for Pain for up to 30 days. Please approve or refuse this medication.    Ashley Soto

## 2021-10-26 RX ORDER — HYDROCODONE BITARTRATE AND ACETAMINOPHEN 10; 325 MG/1; MG/1
1 TABLET ORAL EVERY 6 HOURS PRN
Qty: 120 TABLET | Refills: 0 | Status: SHIPPED | OUTPATIENT
Start: 2021-10-26 | End: 2021-11-29 | Stop reason: SDUPTHER

## 2021-10-28 ENCOUNTER — OFFICE VISIT (OUTPATIENT)
Dept: CARDIOLOGY CLINIC | Age: 76
End: 2021-10-28
Payer: MEDICARE

## 2021-10-28 ENCOUNTER — OFFICE VISIT (OUTPATIENT)
Dept: FAMILY MEDICINE CLINIC | Age: 76
End: 2021-10-28
Payer: MEDICARE

## 2021-10-28 VITALS
DIASTOLIC BLOOD PRESSURE: 84 MMHG | SYSTOLIC BLOOD PRESSURE: 138 MMHG | BODY MASS INDEX: 33.03 KG/M2 | WEIGHT: 223 LBS | HEIGHT: 69 IN | HEART RATE: 81 BPM

## 2021-10-28 VITALS
HEART RATE: 81 BPM | DIASTOLIC BLOOD PRESSURE: 76 MMHG | OXYGEN SATURATION: 97 % | TEMPERATURE: 96.9 F | SYSTOLIC BLOOD PRESSURE: 124 MMHG

## 2021-10-28 DIAGNOSIS — I44.2 COMPLETE ATRIOVENTRICULAR BLOCK (HCC): Primary | ICD-10-CM

## 2021-10-28 DIAGNOSIS — I10 ESSENTIAL (PRIMARY) HYPERTENSION: Primary | ICD-10-CM

## 2021-10-28 DIAGNOSIS — E78.2 MIXED HYPERLIPIDEMIA: ICD-10-CM

## 2021-10-28 DIAGNOSIS — K21.9 GASTROESOPHAGEAL REFLUX DISEASE WITHOUT ESOPHAGITIS: ICD-10-CM

## 2021-10-28 DIAGNOSIS — G62.9 NEUROPATHY: ICD-10-CM

## 2021-10-28 DIAGNOSIS — I10 ESSENTIAL HYPERTENSION: ICD-10-CM

## 2021-10-28 DIAGNOSIS — R00.1 SYMPTOMATIC BRADYCARDIA: ICD-10-CM

## 2021-10-28 DIAGNOSIS — N18.32 STAGE 3B CHRONIC KIDNEY DISEASE (HCC): ICD-10-CM

## 2021-10-28 DIAGNOSIS — N40.0 BENIGN PROSTATIC HYPERPLASIA WITHOUT LOWER URINARY TRACT SYMPTOMS: ICD-10-CM

## 2021-10-28 DIAGNOSIS — R06.02 SHORTNESS OF BREATH: ICD-10-CM

## 2021-10-28 DIAGNOSIS — F41.8 DEPRESSION WITH ANXIETY: ICD-10-CM

## 2021-10-28 DIAGNOSIS — Z91.81 AT HIGH RISK FOR FALLS: ICD-10-CM

## 2021-10-28 DIAGNOSIS — Z95.0 PACEMAKER: ICD-10-CM

## 2021-10-28 DIAGNOSIS — E78.00 HYPERCHOLESTEROLEMIA: ICD-10-CM

## 2021-10-28 DIAGNOSIS — R53.83 OTHER FATIGUE: ICD-10-CM

## 2021-10-28 DIAGNOSIS — R79.9 ABNORMAL FINDING OF BLOOD CHEMISTRY, UNSPECIFIED: ICD-10-CM

## 2021-10-28 DIAGNOSIS — I25.10 CORONARY ARTERY DISEASE INVOLVING NATIVE CORONARY ARTERY OF NATIVE HEART WITHOUT ANGINA PECTORIS: ICD-10-CM

## 2021-10-28 PROCEDURE — G8428 CUR MEDS NOT DOCUMENT: HCPCS | Performed by: FAMILY MEDICINE

## 2021-10-28 PROCEDURE — 4040F PNEUMOC VAC/ADMIN/RCVD: CPT | Performed by: FAMILY MEDICINE

## 2021-10-28 PROCEDURE — G8417 CALC BMI ABV UP PARAM F/U: HCPCS | Performed by: INTERNAL MEDICINE

## 2021-10-28 PROCEDURE — 4004F PT TOBACCO SCREEN RCVD TLK: CPT | Performed by: INTERNAL MEDICINE

## 2021-10-28 PROCEDURE — 4040F PNEUMOC VAC/ADMIN/RCVD: CPT | Performed by: INTERNAL MEDICINE

## 2021-10-28 PROCEDURE — G8484 FLU IMMUNIZE NO ADMIN: HCPCS | Performed by: FAMILY MEDICINE

## 2021-10-28 PROCEDURE — G8427 DOCREV CUR MEDS BY ELIG CLIN: HCPCS | Performed by: INTERNAL MEDICINE

## 2021-10-28 PROCEDURE — 1123F ACP DISCUSS/DSCN MKR DOCD: CPT | Performed by: INTERNAL MEDICINE

## 2021-10-28 PROCEDURE — 4004F PT TOBACCO SCREEN RCVD TLK: CPT | Performed by: FAMILY MEDICINE

## 2021-10-28 PROCEDURE — G0008 ADMIN INFLUENZA VIRUS VAC: HCPCS | Performed by: FAMILY MEDICINE

## 2021-10-28 PROCEDURE — 90694 VACC AIIV4 NO PRSRV 0.5ML IM: CPT | Performed by: FAMILY MEDICINE

## 2021-10-28 PROCEDURE — 99214 OFFICE O/P EST MOD 30 MIN: CPT | Performed by: INTERNAL MEDICINE

## 2021-10-28 PROCEDURE — G8484 FLU IMMUNIZE NO ADMIN: HCPCS | Performed by: INTERNAL MEDICINE

## 2021-10-28 PROCEDURE — 1123F ACP DISCUSS/DSCN MKR DOCD: CPT | Performed by: FAMILY MEDICINE

## 2021-10-28 PROCEDURE — G8417 CALC BMI ABV UP PARAM F/U: HCPCS | Performed by: FAMILY MEDICINE

## 2021-10-28 PROCEDURE — 93280 PM DEVICE PROGR EVAL DUAL: CPT | Performed by: INTERNAL MEDICINE

## 2021-10-28 PROCEDURE — 99214 OFFICE O/P EST MOD 30 MIN: CPT | Performed by: FAMILY MEDICINE

## 2021-10-28 RX ORDER — GABAPENTIN 100 MG/1
100 CAPSULE ORAL 3 TIMES DAILY
Qty: 90 CAPSULE | Refills: 5 | Status: SHIPPED | OUTPATIENT
Start: 2021-10-28 | End: 2022-04-29 | Stop reason: ALTCHOICE

## 2021-10-28 RX ORDER — DOXAZOSIN 2 MG/1
2 TABLET ORAL NIGHTLY
Qty: 30 TABLET | Refills: 5 | Status: ON HOLD | OUTPATIENT
Start: 2021-10-28 | End: 2022-01-12 | Stop reason: HOSPADM

## 2021-10-28 ASSESSMENT — ENCOUNTER SYMPTOMS
GASTROINTESTINAL NEGATIVE: 1
NAUSEA: 0
RESPIRATORY NEGATIVE: 1
DIARRHEA: 0
SHORTNESS OF BREATH: 0
EYES NEGATIVE: 1
VOMITING: 0

## 2021-10-28 NOTE — PROGRESS NOTES
Pacemaker interrogated  Presenting rhythm:  AS/, AP 8.5%,  99.6%  Battey voltage 10.2 years  Lead status:  Lead impedance within range and stable  Sensing:  P waves 2.3 mV,  R waves 13.0 mV  Thresholds:  Atrial -- High, ventricular 1.000 V @ 0.4ms  Observations:  1 monitored episode VT  Reprogramming for sensitivity and threshold testing  Next [facility] appointment:  01/28/22

## 2021-10-28 NOTE — PROGRESS NOTES
[facility] Progress Note                            Date:  10/28/2021  Patient: Xena Mcguire. Age:  64 y.o., 1945      Reason for evaluation:         SUBJECTIVE:    Returns today follow-up assessment overall doing well. Edema and shortness of breath about the same denies chest pain denies worsening palpitations. Device interrogated today one 5 beat run of ventricular tachycardia which is typical for him no new complaints or issues reported. Blood pressure 138/84 heart 81. Does not have scales and is not weighing himself regularly and I encouraged him to do that and to report if his weight increases more than 1 or 2 pounds in a day. Review of Systems   Constitutional: Negative. Negative for chills, fever and unexpected weight change. HENT: Negative. Eyes: Negative. Respiratory: Negative. Negative for shortness of breath. Cardiovascular: Negative. Negative for chest pain. Gastrointestinal: Negative. Negative for diarrhea, nausea and vomiting. Endocrine: Negative. Genitourinary: Negative. Musculoskeletal: Negative. Skin: Negative. Neurological: Negative. All other systems reviewed and are negative. OBJECTIVE:     /84   Pulse 81   Ht 5' 9\" (1.753 m)   Wt 223 lb (101.2 kg)   BMI 32.93 kg/m²     Labs:   CBC: No results for input(s): WBC, HGB, HCT, PLT in the last 72 hours. BMP:No results for input(s): NA, K, CO2, BUN, CREATININE, LABGLOM, GLUCOSE in the last 72 hours. BNP: No results for input(s): BNP in the last 72 hours. PT/INR: No results for input(s): PROTIME, INR in the last 72 hours. APTT:No results for input(s): APTT in the last 72 hours. CARDIAC ENZYMES:No results for input(s): CKTOTAL, CKMB, CKMBINDEX, TROPONINI in the last 72 hours.   FASTING LIPID PANEL:  Lab Results   Component Value Date    HDL 36 07/19/2021    LDLDIRECT 71 02/11/2016    LDLCALC 34 07/19/2021    TRIG 106 07/19/2021     LIVER PROFILE:No results for input(s): AST, ALT, LABALBU in the last 72 hours. Past Medical History:   Diagnosis Date    AAA (abdominal aortic aneurysm) (HCC)     Acute on chronic systolic CHF (congestive heart failure) ([address] 7/26/2021    Aortic dissection ([address] 10/10/2012    CAD (coronary artery disease)     Cancer (HCC)     lung&lymphnode    Carotid artery occlusion     Cerebrovascular accident (CVA) ([address]     Cerebrovascular accident (CVA) due to embolism ([address] 02/16/2016    Cerebrovascular accident (CVA) due to embolism of right middle cerebral artery ([address]     COVID-19 2/6/2021    Depression     HTN (hypertension)     Hyperlipidemia     MI (myocardial infarction) ([address]     acute 5/13/2015    Non Hodgkin's lymphoma ([address]     Palliative care patient 02/16/2021    Sleep apnea     no c-pap    Unspecified sleep apnea      Past Surgical History:   Procedure Laterality Date    CARDIAC CATHETERIZATION  5/13/15  [address]    EF 40%. stent to LAD, stent to posterior descending branch of RCA.  CAROTID ENDARTERECTOMY Right 7/22/2016    Right carotid endarterectomy,carotid arteriograms. S    COLONOSCOPY  04/09/2018    Juan Manuel    ENDOSCOPY, COLON, DIAGNOSTIC      LUNG CANCER SURGERY      right side    LYMPHADENECTOMY      removed lymphnodes     RECTAL SURGERY      fistula     UPPER GASTROINTESTINAL ENDOSCOPY N/A 1/8/2020    Dr Swati Jacques (Dr Mortimer Primer pt) w/EUS-Small low grade GIST in the stomach, repeat in 1 yr    UPPER GASTROINTESTINAL ENDOSCOPY  11/08/2019    Juan Manuel: normal    UPPER GASTROINTESTINAL ENDOSCOPY  04/13/2018    Dr Matias Sapphire: normal, 2cm HH, small amount of food in stomach w/dark fluid,but not kathy blood    VASCULAR SURGERY  10/29/12 [facility]    open exposure of R common femoral artery; percutaneous cannulation of L  femoral artery with 7-Romanian sheath; suprarenal abd aortagram with bilat iliofem arteriogram; endoluminal graft repair of AAA with endologix [id number] main body, 28x95 infrarenal cuff; balloon angioplasty of infrarenal abd aorta and endoluminal graft with coda balloon; completion abd aortogram w bilat iliofem arteriogram;     VASCULAR SURGERY  10/29/ [facility]     (cont) open repair of right common femoral ateriotomy; perclose repair of left common femoral artery puncture site. Family History   Problem Relation Age of Onset    High Blood Pressure Father     High Blood Pressure Mother     Diabetes Mother     Colon Polyps Brother     Cancer Brother     Colon Cancer Neg Hx      No Known Allergies  Current Outpatient Medications   Medication Sig Dispense Refill    HYDROcodone-acetaminophen (NORCO)  MG per tablet Take 1 tablet by mouth every 6 hours as needed for Pain for up to 30 days.  120 tablet 0    lisinopril (PRINIVIL;ZESTRIL) 5 MG tablet Take 1 tablet by mouth daily 90 tablet 1    carvedilol (COREG) 3.125 MG tablet Take 1 tablet by mouth 2 times daily (with meals) 60 tablet 0    spironolactone (ALDACTONE) 25 MG tablet Take 1 tablet by mouth daily 30 tablet 0    ferrous sulfate (IRON 325) 325 (65 Fe) MG tablet Take 1 tablet by mouth daily (with breakfast) 90 tablet 1    fluticasone (FLONASE) 50 MCG/ACT nasal spray 1 spray by Each Nostril route daily      atorvastatin (LIPITOR) 40 MG tablet TAKE ONE (1) TABLET EVERY DAY 90 tablet 3    famotidine (PEPCID) 20 MG tablet Take 1 tablet by mouth 2 times daily 180 tablet 2    venlafaxine (EFFEXOR) 75 MG tablet TAKE ONE (1) TABLET BY MOUTH EVERY DAY 90 tablet 2    Cholecalciferol (VITAMIN D3) 1.25 MG (01090 UT) CAPS Take by mouth       clopidogrel (PLAVIX) 75 MG tablet TAKE ONE (1) TABLET BY MOUTH EVERY DAY  (Patient taking differently: Take 75 mg by mouth daily ) 90 tablet 1    doxazosin (CARDURA) 2 MG tablet Take 2 mg by mouth nightly       aspirin 81 MG EC tablet Take 1 tablet by mouth daily 30 tablet 3    nitroGLYCERIN (NITROSTAT) 0.4 MG SL tablet Place 0.4 mg under the tongue every 5 minutes as needed for Chest pain      bumetanide (BUMEX) 1 MG tablet Take 1 tablet by mouth daily 30 tablet 0    gabapentin (NEURONTIN) 100 MG capsule Take 1 capsule by mouth 3 times daily for 30 days. 90 capsule 0     No current facility-administered medications for this visit. Social History     Socioeconomic History    Marital status:      Spouse name: Not on file    Number of children: Not on file    Years of education: Not on file    Highest education level: Not on file   Occupational History    Not on file   Tobacco Use    Smoking status: Current Every Day Smoker     Packs/day: 0.50     Years: 40.00     Pack years: 20.00    Smokeless tobacco: Never Used    Tobacco comment: quit smoking in nov 2017, restarted smoking since then   Vaping Use    Vaping Use: Never used   Substance and Sexual Activity    Alcohol use: No    Drug use: No    Sexual activity: Not on file   Other Topics Concern    Not on file   Social History Narrative     50+ yearsHe has 1 son and 1 daughterWorks for a [employer] companyPreviously [employer] active duty 4-1/2 yearsHe does not go to the [employer] presentlyEducation high school 2 years of collegeEnjoys hunting and fishingDenies alcohol or substance usage he does smoke 1 pack/dayPhysically sedentary     Social Determinants of Health     Financial Resource Strain:     Difficulty of Paying Living Expenses:    Food Insecurity:     Worried About Running Out of Food in the Last Year:     Ran Out of Food in the Last Year:    Transportation Needs:     Lack of Transportation (Medical):      Lack of Transportation (Non-Medical):    Physical Activity:     Days of Exercise per Week:     Minutes of Exercise per Session:    Stress:     Feeling of Stress :    Social Connections:     Frequency of Communication with Friends and Family:     Frequency of Social Gatherings with Friends and Family:     Attends Baptism Services:     Active Member of Clubs or Organizations:     Attends Club or Organization Meetings:     Marital Status:    Intimate Partner Violence:     Fear of Current or Ex-Partner:     Emotionally Abused:     Physically Abused:     Sexually Abused:        Physical Examination:  /84   Pulse 81   Ht 5' 9\" (1.753 m)   Wt 223 lb (101.2 kg)   BMI 32.93 kg/m²   Physical Exam  Vitals reviewed. Constitutional:       Appearance: He is well-developed. Neck:      Vascular: No carotid bruit or JVD. Cardiovascular:      Rate and Rhythm: Normal rate and regular rhythm. Heart sounds: Normal heart sounds. No murmur heard. No friction rub. No gallop. Pulmonary:      Effort: Pulmonary effort is normal. No respiratory distress. Breath sounds: Normal breath sounds. No wheezing or rales. Abdominal:      General: There is no distension. Tenderness: There is no abdominal tenderness. Lymphadenopathy:      Cervical: No cervical adenopathy. Skin:     General: Skin is warm and dry. ASSESSMENT:     Diagnosis Orders   1. Complete atrioventricular block ([address]     2. Essential hypertension     3. Shortness of breath     4. Symptomatic bradycardia     5. Coronary artery disease involving native coronary artery of native heart without angina pectoris     6. Mixed hyperlipidemia     7. Pacemaker         PLAN:  No orders of the defined types were placed in this encounter. No orders of the defined types were placed in this encounter. 1. Continue present medications  2. Recommend follow-up assessment in 3 months    Return in about 3 months (around 1/28/2022) for return to Dr. Matthew Vega only. Lizabeth Saenz MD 10/28/2021 2:03 PM CDT    [facility]      Thisdictation was generated by voice recognition computer software. Although all attempts are made to edit the dictation for accuracy, there may be errors in the transcription that are not intended.

## 2021-10-28 NOTE — PROGRESS NOTES
[facility] PHYSICIAN SERVICES  [facility]  [address] [address]  [facility] [place] [address]  Dept: [phone]  Dept Fax: [phone]  Loc: [phone]    Xena Barry is a 64 y.o. male who presents today for his medical conditions/complaints as noted below. Xena Barry is here for 3 Month Follow-Up        HPI:   CC: Here today to discuss the followin-year-old here for follow-up today. Last saw his cardiologist on . He had his device interrogated which showed a 5 beat run of ventricular tachycardia was typical for him. He had no chest pain or palpitations. He appeared to be at his baseline at that visit no medication changes were made. Coronary Artery Disease  Currently no active angina symptoms. No chest pain with exertion. No orthopnea. He saw his vascular specialist in September. He has a history of AAA repair. He also has bilateral carotid artery stenosis. The recommended continue with aspirin Plavix and statin. Continues to suffer from chronic musculoskeletal complaints. Specifically he has lower back hip and bilateral foot pain. Continues to take hydrocodone 10 mg 4 times a day scheduled. He states he stopped his gabapentin as he did not feel like it helped. He was taking 100 mg 3 times a day. Hyperlipidemia  Tolerating current cholesterol medication without side effects. No body aches. Attempting to reduce processed sugar and cholesterol from diet. Benign Prostatic Hypertrophy  States he discontinued his Cardura as well. He was not sure why he was on it. Since discontinuing the medication he has had increased urinary frequency at night. Often getting up 4-5 times at night. Gastroesophageal Reflux Disease  Symptoms currently under control. Medication adequately controls his symptoms. No hematochezia or melena. Depression with Anxiety  Compliant with medications. No adverse effects from medication. Depression and anxiety symptoms are stable today. No suicidal or homicidal ideation. Excessive worry, insomnia, and anxiousness are stable. Energy, concentration, and apathy are stable. HPI    Past Medical History:   Diagnosis Date    AAA (abdominal aortic aneurysm) (HCC)     Acute on chronic systolic CHF (congestive heart failure) ([address] 7/26/2021    Aortic dissection ([address] 10/10/2012    CAD (coronary artery disease)     Cancer (HCC)     lung&lymphnode    Carotid artery occlusion     Cerebrovascular accident (CVA) ([address]     Cerebrovascular accident (CVA) due to embolism ([address] 02/16/2016    Cerebrovascular accident (CVA) due to embolism of right middle cerebral artery ([address]     COVID-19 2/6/2021    Depression     HTN (hypertension)     Hyperlipidemia     MI (myocardial infarction) ([address]     acute 5/13/2015    Non Hodgkin's lymphoma ([address]     Palliative care patient 02/16/2021    Sleep apnea     no c-pap    Unspecified sleep apnea       Past Surgical History:   Procedure Laterality Date    CARDIAC CATHETERIZATION  5/13/15  [address]    EF 40%. stent to LAD, stent to posterior descending branch of RCA.  CAROTID ENDARTERECTOMY Right 7/22/2016    Right carotid endarterectomy,carotid arteriograms. 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, repeat in 1 yr    UPPER GASTROINTESTINAL ENDOSCOPY  11/08/2019    Juan Manuel: normal    UPPER GASTROINTESTINAL ENDOSCOPY  04/13/2018    Dr Eleuterio Nicholas: normal, 2cm HH, small amount of food in stomach w/dark fluid,but not kathy blood    VASCULAR SURGERY  10/29/12 [facility]    open exposure of R common femoral artery; percutaneous cannulation of L  femoral artery with 7-Vincentian sheath; suprarenal abd aortagram with bilat iliofem arteriogram; endoluminal graft repair of AAA with endologix [id number] main body, 28x95 infrarenal cuff; balloon angioplasty of infrarenal abd aorta and endoluminal graft with coda balloon; completion abd aortogram w bilat iliofem arteriogram;     VASCULAR SURGERY  10/29/ [facility]     (cont) open repair of right common femoral ateriotomy; perclose repair of left common femoral artery puncture site. Family History   Problem Relation Age of Onset    High Blood Pressure Father     High Blood Pressure Mother     Diabetes Mother     Colon Polyps Brother     Cancer Brother     Colon Cancer Neg Hx        Social History     Tobacco Use    Smoking status: Current Every Day Smoker     Packs/day: 0.50     Years: 40.00     Pack years: 20.00    Smokeless tobacco: Never Used    Tobacco comment: quit smoking in nov 2017, restarted smoking since then   Substance Use Topics    Alcohol use: No     Current Outpatient Medications   Medication Sig Dispense Refill    gabapentin (NEURONTIN) 100 MG capsule Take 1 capsule by mouth 3 times daily for 30 days. 90 capsule 5    doxazosin (CARDURA) 2 MG tablet Take 1 tablet by mouth nightly 30 tablet 5    HYDROcodone-acetaminophen (NORCO)  MG per tablet Take 1 tablet by mouth every 6 hours as needed for Pain for up to 30 days.  120 tablet 0    lisinopril (PRINIVIL;ZESTRIL) 5 MG tablet Take 1 tablet by mouth daily 90 tablet 1    fluticasone (FLONASE) 50 MCG/ACT nasal spray 1 spray by Each Nostril route daily      atorvastatin (LIPITOR) 40 MG tablet TAKE ONE (1) TABLET EVERY DAY 90 tablet 3    famotidine (PEPCID) 20 MG tablet Take 1 tablet by mouth 2 times daily 180 tablet 2    venlafaxine (EFFEXOR) 75 MG tablet TAKE ONE (1) TABLET BY MOUTH EVERY DAY 90 tablet 2    Cholecalciferol (VITAMIN D3) 1.25 MG (29957 UT) CAPS Take by mouth       clopidogrel (PLAVIX) 75 MG tablet TAKE ONE (1) TABLET BY MOUTH EVERY DAY  (Patient taking differently: Take 75 mg by mouth daily ) 90 tablet 1    aspirin 81 MG EC tablet Take 1 tablet by mouth daily 30 tablet 3    nitroGLYCERIN (NITROSTAT) 0.4 MG SL tablet Place 0.4 mg under the tongue every 5 minutes as needed for Chest pain       No current facility-administered medications for this visit. No Known Allergies    Health Maintenance   Topic Date Due    Hepatitis C screen  Never done    COVID-19 Vaccine (1) Never done    DTaP/Tdap/Td vaccine (1 - Tdap) Never done    Low dose CT lung screening  06/20/2020    Shingles Vaccine (1 of 2) 07/19/2022 (Originally 2/23/1993)    Lipid screen  07/19/2022    Annual Wellness Visit (AWV)  07/20/2022    Potassium monitoring  07/29/2022    Creatinine monitoring  07/29/2022    Flu vaccine  Completed    Pneumococcal 65+ years Vaccine  Completed    Hepatitis A vaccine  Aged Out    Hepatitis B vaccine  Aged Out    Hib vaccine  Aged Out    Meningococcal (ACWY) vaccine  Aged Out       Subjective:      Review of Systems   Constitutional: Negative for chills and fever. HENT: Negative for congestion. Respiratory: Negative for cough, chest tightness and shortness of breath. Cardiovascular: Negative for chest pain, palpitations and leg swelling. Gastrointestinal: Negative for abdominal pain, anal bleeding, constipation, diarrhea and nausea. Genitourinary: Negative for difficulty urinating. Musculoskeletal: Positive for arthralgias, back pain, myalgias, neck pain and neck stiffness. Negative for joint swelling. Psychiatric/Behavioral: Negative. SeeHPI for visit specific review of symptoms. All others negative      Objective:   /76   Pulse 81   Temp 96.9 °F (36.1 °C)   SpO2 97%   Physical Exam  Constitutional:       Appearance: He is well-developed. He is not ill-appearing. Eyes:      Pupils: Pupils are equal, round, and reactive to light. Cardiovascular:      Rate and Rhythm: Normal rate and regular rhythm. Heart sounds: No murmur heard.    No friction rub. Pulmonary:      Effort: Pulmonary effort is normal. No respiratory distress. Breath sounds: Normal breath sounds. No wheezing or rales. Abdominal:      General: Bowel sounds are normal. There is no distension. Palpations: Abdomen is soft. Tenderness: There is no abdominal tenderness. There is no guarding or rebound. Musculoskeletal:      Cervical back: Normal range of motion and neck supple. Neurological:      Mental Status: He is alert.    Psychiatric:         Behavior: Behavior normal.           Recent Results (from the past 672 hour(s))   PTH, Intact    Collection Time: 10/28/21  4:31 PM   Result Value Ref Range    .2 (H) 15.0 - 65.0 pg/mL   Iron    Collection Time: 10/28/21  4:31 PM   Result Value Ref Range    Iron 36 (L) 59 - 158 ug/dL   Ferritin    Collection Time: 10/28/21  4:31 PM   Result Value Ref Range    Ferritin 69.5 30.0 - 400.0 ng/mL   Phosphorus    Collection Time: 10/28/21  4:31 PM   Result Value Ref Range    Phosphorus 2.5 2.5 - 4.5 mg/dL   Urinalysis    Collection Time: 10/28/21  4:31 PM   Result Value Ref Range    Color, UA YELLOW Straw/Yellow    Clarity, UA Clear Clear    Glucose, Ur Negative Negative mg/dL    Bilirubin Urine Negative Negative    Ketones, Urine TRACE (A) Negative mg/dL    Specific Gravity, UA 1.024 1.005 - 1.030    Blood, Urine Negative Negative    pH, UA 5.0 5.0 - 8.0    Protein,  (A) Negative mg/dL    Urobilinogen, Urine 1.0 <2.0 E.U./dL    Nitrite, Urine Negative Negative    Leukocyte Esterase, Urine Negative Negative   Uric Acid    Collection Time: 10/28/21  4:31 PM   Result Value Ref Range    Uric Acid, Serum 7.9 (H) 3.4 - 7.0 mg/dL   Transferrin    Collection Time: 10/28/21  4:31 PM   Result Value Ref Range    Transferrin 286 200 - 400 mg/dL   CBC Auto Differential    Collection Time: 10/28/21  4:31 PM   Result Value Ref Range    WBC 8.7 4.8 - 10.8 K/uL    RBC 4.82 4.70 - 6.10 M/uL    Hemoglobin 11.1 (L) 14.0 - 18.0 g/dL Hematocrit 39.4 (L) 42.0 - 52.0 %    MCV 81.7 80.0 - 94.0 fL    MCH 23.0 (L) 27.0 - 31.0 pg    MCHC 28.2 (L) 33.0 - 37.0 g/dL    RDW 19.5 (H) 11.5 - 14.5 %    Platelets 853 946 - 541 K/uL    MPV 11.2 9.4 - 12.4 fL    PLATELET SLIDE REVIEW Adequate     Neutrophils % 71.4 (H) 50.0 - 65.0 %    Lymphocytes % 21.1 20.0 - 40.0 %    Monocytes % 6.3 0.0 - 10.0 %    Eosinophils % 0.1 0.0 - 5.0 %    Basophils % 0.9 0.0 - 1.0 %    Neutrophils Absolute 6.2 1.5 - 7.5 K/uL    Immature Granulocytes # 0.0 K/uL    Lymphocytes Absolute 1.8 1.1 - 4.5 K/uL    Monocytes Absolute 0.60 0.00 - 0.90 K/uL    Eosinophils Absolute 0.00 0.00 - 0.60 K/uL    Basophils Absolute 0.10 0.00 - 0.20 K/uL    Anisocytosis 1+ (A)     Microcytes . 1+ (A)     Polychromasia 1+ (A)     Hypochromia 1+ (A)    T4, Free    Collection Time: 10/28/21  4:31 PM   Result Value Ref Range    T4 Free 1.18 0.93 - 1.70 ng/dL   TSH without Reflex    Collection Time: 10/28/21  4:31 PM   Result Value Ref Range    TSH 1.550 0.270 - 4.200 uIU/mL   Microscopic Urinalysis    Collection Time: 10/28/21  4:31 PM   Result Value Ref Range    Bacteria, UA NEGATIVE (A) None Seen /HPF    Crystals, UA NEG (A) None Seen /HPF    Hyaline Casts, UA 2 0 - 8 /HPF    WBC, UA 1 0 - 5 /HPF    RBC, UA 1 0 - 4 /HPF    Epithelial Cells, UA 1 0 - 5 /HPF               Assessment & Plan: The following diagnoses and conditions are stable with no further orders unless indicated:  1. Essential (primary) hypertension  BP Readings from Last 3 Encounters:   10/28/21 124/76   10/28/21 138/84   09/30/21 135/86     Blood pressure stable    2. Neuropathy  Recommend we restart his gabapentin 100 mg 3 times a day  Discussed increasing the dose as tolerated for pain control. Discussed risks and benefits of this new medication. Discussed potential side effects. He voiced understanding.     - gabapentin (NEURONTIN) 100 MG capsule; Take 1 capsule by mouth 3 times daily for 30 days.   Dispense: 90 capsule; Refill: 5    3. Benign prostatic hyperplasia without lower urinary tract symptoms  Restart his doxazosin  Discussed risks and benefits of this new medication. Discussed potential side effects. He voiced understanding.     - doxazosin (CARDURA) 2 MG tablet; Take 1 tablet by mouth nightly  Dispense: 30 tablet; Refill: 5    4. Stage 3b chronic kidney disease ([facility] [address]  Lab Results   Component Value Date    CREATININE 1.9 (H) 07/29/2021     Lab Results   Component Value Date    BUN 38 (H) 07/29/2021         Reinforced goals of adequate hydration. Recommended he avoid NSAIDs such as naproxen and ibuprofen. He is declining referral to nephrology at this point  - PTH, Intact; Future  - Iron; Future  - Ferritin; Future  - Phosphorus; Future  - Urinalysis; Future  - Uric Acid; Future  - Transferrin; Future  - COMP METABOLIC PROFILE; Future    5. Other fatigue    - CBC Auto Differential; Future  - T4, Free; Future  - TSH without Reflex; Future    6. Abnormal finding of blood chemistry, unspecified     - Iron; Future  - Ferritin; Future  - Transferrin; Future    7. At high risk for falls  Home safety tips discussed    8. Hypercholesterolemia  Continue with atorvastatin    9. Depression with anxiety  Stable    10. Gastroesophageal reflux disease without esophagitis  Stable      See back in 3 months      No follow-ups on file. Discussed use, benefit, and side effects of prescribed medications. All patient questions answered. Pt voiced understanding. Reviewed health maintenance. Instructedto continue current medications, diet and exercise. Patient agreed with treatmentplan. Follow up as directed. Note dictated using Dragon Dictation software  Sometimes this dictation software makes erroneous transcriptions. On the basis of positive falls risk screening, assessment and plan is as follows: home safety tips provided.

## 2021-11-01 ASSESSMENT — ENCOUNTER SYMPTOMS
BACK PAIN: 1
COUGH: 0
ABDOMINAL PAIN: 0
NAUSEA: 0
CONSTIPATION: 0
ANAL BLEEDING: 0
DIARRHEA: 0
SHORTNESS OF BREATH: 0
CHEST TIGHTNESS: 0

## 2021-11-29 DIAGNOSIS — M54.50 CHRONIC MIDLINE LOW BACK PAIN WITHOUT SCIATICA: ICD-10-CM

## 2021-11-29 DIAGNOSIS — G89.29 CHRONIC MIDLINE LOW BACK PAIN WITHOUT SCIATICA: ICD-10-CM

## 2021-11-29 RX ORDER — HYDROCODONE BITARTRATE AND ACETAMINOPHEN 10; 325 MG/1; MG/1
1 TABLET ORAL EVERY 6 HOURS PRN
Qty: 120 TABLET | Refills: 0 | Status: SHIPPED | OUTPATIENT
Start: 2021-11-29 | End: 2021-12-20 | Stop reason: SDUPTHER

## 2021-11-29 NOTE — TELEPHONE ENCOUNTER
Beni Rosales. called to request a refill on his medication. Last office visit : 10/28/2021   Next office visit : 2/3/2022     Last UDS:   Amphetamine Screen, Urine   Date Value Ref Range Status   04/23/2018 Negative  Final     Barbiturate Screen, Urine   Date Value Ref Range Status   04/23/2018 Negative  Final     Benzodiazepine Screen, Urine   Date Value Ref Range Status   04/23/2018 Negative  Final     Cocaine Metabolite Screen, Urine   Date Value Ref Range Status   04/23/2018 Negative  Final     MDMA, Urine   Date Value Ref Range Status   04/23/2018 Negative  Final     Methamphetamine, Urine   Date Value Ref Range Status   04/23/2018 Negative  Final     Opiate Scrn, Ur   Date Value Ref Range Status   04/23/2018 Positive  Final     Oxycodone Screen, Ur   Date Value Ref Range Status   04/23/2018 Negative  Final     PCP Screen, Urine   Date Value Ref Range Status   04/23/2018 Negative  Final     Propoxyphene Screen, Urine   Date Value Ref Range Status   04/23/2018 Negative  Final     Tricyclic Antidepressants, Urine   Date Value Ref Range Status   04/23/2018 Negative  Final       Last Janice Bjornstad: 11/29/21  Medication Contract: 4/23/18   Last Fill: 10/26/21    Requested Prescriptions      No prescriptions requested or ordered in this encounter         Please approve or refuse this medication.    Inés Nieves MA

## 2021-11-29 NOTE — TELEPHONE ENCOUNTER
----- Message from Tracy Sexton sent at 11/26/2021  9:34 AM CST -----  Subject: Refill Request    QUESTIONS  Name of Medication? HYDROcodone-acetaminophen (NORCO)  MG per tablet  Patient-reported dosage and instructions? 1 tablet by mouth every 6 hours   How many days do you have left? 0  Preferred Pharmacy? [address] [facility]  Pharmacy phone number (if available)? [phone]  ---------------------------------------------------------------------------  --------------  Manuel ANGELO  What is the best way for the office to contact you? OK to leave message on   voicemail  Preferred Call Back Phone Number?  [phone]

## 2021-12-20 ENCOUNTER — TELEPHONE (OUTPATIENT)
Dept: CARDIOLOGY CLINIC | Age: 76
End: 2021-12-20

## 2021-12-20 DIAGNOSIS — G89.29 CHRONIC MIDLINE LOW BACK PAIN WITHOUT SCIATICA: ICD-10-CM

## 2021-12-20 DIAGNOSIS — M54.50 CHRONIC MIDLINE LOW BACK PAIN WITHOUT SCIATICA: ICD-10-CM

## 2021-12-20 RX ORDER — HYDROCODONE BITARTRATE AND ACETAMINOPHEN 10; 325 MG/1; MG/1
1 TABLET ORAL EVERY 6 HOURS PRN
Qty: 120 TABLET | Refills: 0 | Status: ON HOLD | OUTPATIENT
Start: 2021-12-20 | End: 2022-01-20

## 2021-12-20 NOTE — TELEPHONE ENCOUNTER
Dr. Itz Schwartz called this morning to schedule a HFU appt for the patient and requested that Dr. Grant Campoverde please give him a call back to discuss what has been going on with the patient at their hospital. Please contact Dr. Itz Schwartz @ [phone]. Thank you.

## 2021-12-20 NOTE — TELEPHONE ENCOUNTER
Talked with physician. Said patient was admitted for CHF and EF has declined. He feels the patient needs to be upgraded to ICD. He did start patient on Entresto and creatinine has been 1.7-1.8 so he feels it is okay to continue that medication. Patient is being discharged today to a facility but unknown where at this time. I will follow up with son or grandson tomorrow to set up appointment with [facility] to give them time to get settled in.

## 2021-12-20 NOTE — TELEPHONE ENCOUNTER
Dorothy Silver called to request a refill on his medication. Last office visit : 10/28/2021   Next office visit : 2/3/2022     Last UDS:   Amphetamine Screen, Urine   Date Value Ref Range Status   04/23/2018 Negative  Final     Barbiturate Screen, Urine   Date Value Ref Range Status   04/23/2018 Negative  Final     Benzodiazepine Screen, Urine   Date Value Ref Range Status   04/23/2018 Negative  Final     Cocaine Metabolite Screen, Urine   Date Value Ref Range Status   04/23/2018 Negative  Final     MDMA, Urine   Date Value Ref Range Status   04/23/2018 Negative  Final     Methamphetamine, Urine   Date Value Ref Range Status   04/23/2018 Negative  Final     Opiate Scrn, Ur   Date Value Ref Range Status   04/23/2018 Positive  Final     Oxycodone Screen, Ur   Date Value Ref Range Status   04/23/2018 Negative  Final     PCP Screen, Urine   Date Value Ref Range Status   04/23/2018 Negative  Final     Propoxyphene Screen, Urine   Date Value Ref Range Status   04/23/2018 Negative  Final     Tricyclic Antidepressants, Urine   Date Value Ref Range Status   04/23/2018 Negative  Final       Last Cara Mort: 11/29/21  Medication Contract: 4/23/18   Last Fill: 11/29/21    Requested Prescriptions     Pending Prescriptions Disp Refills    HYDROcodone-acetaminophen (NORCO)  MG per tablet 120 tablet 0     Sig: Take 1 tablet by mouth every 6 hours as needed for Pain for up to 30 days. Please approve or refuse this medication.    Vidya Richardson

## 2021-12-21 NOTE — TELEPHONE ENCOUNTER
Patient is in [facility] facility. Talked with Nurse on [address] said she is not able to take down kristen at this time she will have to call back. Kristen is scheduled for       If nursing home calls back please just relay the information and if it doesn't work, document in this encounter and I will call them back Thursday.

## 2021-12-29 ENCOUNTER — TELEPHONE (OUTPATIENT)
Dept: CARDIOLOGY CLINIC | Age: 76
End: 2021-12-29

## 2021-12-30 NOTE — TELEPHONE ENCOUNTER
Patient has pacer not ICD. Pacer doesn't do anything for EF which is why life vest was given. His current device is working with no issues. Called nursing home and scheduled OV for Monday PM with R[facility].

## 2021-12-30 NOTE — TELEPHONE ENCOUNTER
Dr. Duglas Garcia cardiologist here was consulted and recommended upgrade of device to CRT-D as well as cardiac catheterization.  Patient ultimately desire to follow-up with his primary cardiologist. Tyra Muhammad was arranged by local cardiologist Dr. Duglas Garcia here and recommended the patient follow-up with his primary cardiologist.

## 2022-01-03 ENCOUNTER — OFFICE VISIT (OUTPATIENT)
Dept: CARDIOLOGY CLINIC | Age: 77
End: 2022-01-03
Payer: MEDICARE

## 2022-01-03 VITALS
BODY MASS INDEX: 34.07 KG/M2 | WEIGHT: 230 LBS | DIASTOLIC BLOOD PRESSURE: 70 MMHG | HEIGHT: 69 IN | SYSTOLIC BLOOD PRESSURE: 122 MMHG | HEART RATE: 77 BPM

## 2022-01-03 DIAGNOSIS — I44.2 COMPLETE ATRIOVENTRICULAR BLOCK (HCC): ICD-10-CM

## 2022-01-03 DIAGNOSIS — R00.1 SYMPTOMATIC BRADYCARDIA: ICD-10-CM

## 2022-01-03 DIAGNOSIS — E78.2 MIXED HYPERLIPIDEMIA: ICD-10-CM

## 2022-01-03 DIAGNOSIS — I50.23 ACUTE ON CHRONIC SYSTOLIC (CONGESTIVE) HEART FAILURE (HCC): ICD-10-CM

## 2022-01-03 DIAGNOSIS — I25.10 CORONARY ARTERY DISEASE INVOLVING NATIVE CORONARY ARTERY OF NATIVE HEART WITHOUT ANGINA PECTORIS: ICD-10-CM

## 2022-01-03 DIAGNOSIS — I73.9 PVD (PERIPHERAL VASCULAR DISEASE) (HCC): ICD-10-CM

## 2022-01-03 DIAGNOSIS — I10 ESSENTIAL HYPERTENSION: ICD-10-CM

## 2022-01-03 DIAGNOSIS — R06.02 SHORTNESS OF BREATH: Primary | ICD-10-CM

## 2022-01-03 PROCEDURE — G8428 CUR MEDS NOT DOCUMENT: HCPCS | Performed by: INTERNAL MEDICINE

## 2022-01-03 PROCEDURE — 1123F ACP DISCUSS/DSCN MKR DOCD: CPT | Performed by: INTERNAL MEDICINE

## 2022-01-03 PROCEDURE — G8417 CALC BMI ABV UP PARAM F/U: HCPCS | Performed by: INTERNAL MEDICINE

## 2022-01-03 PROCEDURE — 1036F TOBACCO NON-USER: CPT | Performed by: INTERNAL MEDICINE

## 2022-01-03 PROCEDURE — 99214 OFFICE O/P EST MOD 30 MIN: CPT | Performed by: INTERNAL MEDICINE

## 2022-01-03 PROCEDURE — 93280 PM DEVICE PROGR EVAL DUAL: CPT | Performed by: INTERNAL MEDICINE

## 2022-01-03 PROCEDURE — G8484 FLU IMMUNIZE NO ADMIN: HCPCS | Performed by: INTERNAL MEDICINE

## 2022-01-03 PROCEDURE — 4040F PNEUMOC VAC/ADMIN/RCVD: CPT | Performed by: INTERNAL MEDICINE

## 2022-01-03 RX ORDER — TORSEMIDE 20 MG/1
20 TABLET ORAL EVERY OTHER DAY
COMMUNITY
End: 2022-02-09 | Stop reason: SDUPTHER

## 2022-01-03 ASSESSMENT — ENCOUNTER SYMPTOMS
VOMITING: 0
SHORTNESS OF BREATH: 0
RESPIRATORY NEGATIVE: 1
DIARRHEA: 0
NAUSEA: 0
EYES NEGATIVE: 1
GASTROINTESTINAL NEGATIVE: 1

## 2022-01-03 NOTE — PROGRESS NOTES
[facility] Progress Note                            Date:  1/3/2022  Patient: Sindy Wagner  Age:  64 y.o., 1945      Reason for evaluation:         SUBJECTIVE:    Returns today for follow-up assessment. Apparently he was recently hospitalized at [place] [address] for several days starting around 12/12/2021 started on Entresto for LV dysfunction replacing lisinopril. Apparently an echocardiogram was done at their facility showing ejection fraction had progressively worsened to less than 20%. Denies anginal chest pain. At the present time his dyspnea appears stable. The physician there had spoken with the patient and family about a possible cardiac catheterization and possible need for upgrade of his dual-chamber pacemaker to a biventricular ICD. His EKG prior to the pacemaker being implanted showed a right bundle branch block and at the time of his original implant his left ventricular ejection fraction was 40% therefore not a candidate for ICD implant at that time. Creatinine around 1.6. No other complaints or issues reported. Review of Systems   Constitutional: Negative. Negative for chills, fever and unexpected weight change. HENT: Negative. Eyes: Negative. Respiratory: Negative. Negative for shortness of breath. Cardiovascular: Negative. Negative for chest pain. Gastrointestinal: Negative. Negative for diarrhea, nausea and vomiting. Endocrine: Negative. Genitourinary: Negative. Musculoskeletal: Negative. Skin: Negative. Neurological: Negative. All other systems reviewed and are negative. OBJECTIVE:     /70   Pulse 77   Ht 5' 9\" (1.753 m)   Wt 230 lb (104.3 kg)   BMI 33.97 kg/m²     Labs:   CBC: No results for input(s): WBC, HGB, HCT, PLT in the last 72 hours. BMP:No results for input(s): NA, K, CO2, BUN, CREATININE, LABGLOM, GLUCOSE in the last 72 hours. BNP: No results for input(s): BNP in the last 72 hours.   PT/INR: No results for input(s): PROTIME, INR in the last 72 hours. APTT:No results for input(s): APTT in the last 72 hours. CARDIAC ENZYMES:No results for input(s): CKTOTAL, CKMB, CKMBINDEX, TROPONINI in the last 72 hours. FASTING LIPID PANEL:  Lab Results   Component Value Date    HDL 36 07/19/2021    LDLDIRECT 71 02/11/2016    LDLCALC 34 07/19/2021    TRIG 106 07/19/2021     LIVER PROFILE:No results for input(s): AST, ALT, LABALBU in the last 72 hours. Past Medical History:   Diagnosis Date    AAA (abdominal aortic aneurysm) (HCC)     Acute on chronic systolic CHF (congestive heart failure) ([address] 7/26/2021    Aortic dissection ([address] 10/10/2012    CAD (coronary artery disease)     Cancer (HCC)     lung&lymphnode    Carotid artery occlusion     Cerebrovascular accident (CVA) ([address]     Cerebrovascular accident (CVA) due to embolism ([address] 02/16/2016    Cerebrovascular accident (CVA) due to embolism of right middle cerebral artery ([address]     COVID-19 2/6/2021    Depression     HTN (hypertension)     Hyperlipidemia     MI (myocardial infarction) ([address]     acute 5/13/2015    Non Hodgkin's lymphoma ([address]     Palliative care patient 02/16/2021    Sleep apnea     no c-pap    Unspecified sleep apnea      Past Surgical History:   Procedure Laterality Date    CARDIAC CATHETERIZATION  5/13/15  [facility]    EF 40%. stent to LAD, stent to posterior descending branch of RCA.  CAROTID ENDARTERECTOMY Right 7/22/2016    Right carotid endarterectomy,carotid arteriograms. 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, repeat in 1 yr    UPPER GASTROINTESTINAL ENDOSCOPY  11/08/2019    [facility]: normal    UPPER GASTROINTESTINAL ENDOSCOPY  04/13/2018    Dr Dena Ruiz: normal, 2cm HH, small amount of food in stomach w/dark fluid,but not kathy blood    VASCULAR SURGERY  10/29/12 [facility]    open exposure of R common femoral artery; percutaneous cannulation of L  femoral artery with 7-french sheath; suprarenal abd aortagram with bilat iliofem arteriogram; endoluminal graft repair of AAA with endologix [id number] main body, 28x95 infrarenal cuff; balloon angioplasty of infrarenal abd aorta and endoluminal graft with coda balloon; completion abd aortogram w bilat iliofem arteriogram;     VASCULAR SURGERY  10/29/ [facility]     (cont) open repair of right common femoral ateriotomy; perclose repair of left common femoral artery puncture site. Family History   Problem Relation Age of Onset    High Blood Pressure Father     High Blood Pressure Mother     Diabetes Mother     Colon Polyps Brother     Cancer Brother     Colon Cancer Neg Hx      No Known Allergies  Current Outpatient Medications   Medication Sig Dispense Refill    torsemide (DEMADEX) 20 MG tablet Take 20 mg by mouth daily      HYDROcodone-acetaminophen (NORCO)  MG per tablet Take 1 tablet by mouth every 6 hours as needed for Pain for up to 30 days.  120 tablet 0    fluticasone (FLONASE) 50 MCG/ACT nasal spray 1 spray by Each Nostril route daily      atorvastatin (LIPITOR) 40 MG tablet TAKE ONE (1) TABLET EVERY DAY 90 tablet 3    famotidine (PEPCID) 20 MG tablet Take 1 tablet by mouth 2 times daily 180 tablet 2    venlafaxine (EFFEXOR) 75 MG tablet TAKE ONE (1) TABLET BY MOUTH EVERY DAY 90 tablet 2    Cholecalciferol (VITAMIN D3) 1.25 MG (17679 UT) CAPS Take by mouth       clopidogrel (PLAVIX) 75 MG tablet TAKE ONE (1) TABLET BY MOUTH EVERY DAY  (Patient taking differently: Take 75 mg by mouth daily ) 90 tablet 1    aspirin 81 MG EC tablet Take 1 tablet by mouth daily 30 tablet 3    nitroGLYCERIN (NITROSTAT) 0.4 MG SL tablet Place 0.4 mg under the tongue every 5 minutes as needed for Chest pain      gabapentin (NEURONTIN) 100 MG capsule Take 1 capsule by mouth 3 times daily for 30 days. 90 capsule 5    doxazosin (CARDURA) 2 MG tablet Take 1 tablet by mouth nightly 30 tablet 5     No current facility-administered medications for this visit. Social History     Socioeconomic History    Marital status:      Spouse name: Not on file    Number of children: Not on file    Years of education: Not on file    Highest education level: Not on file   Occupational History    Not on file   Tobacco Use    Smoking status: Former Smoker     Packs/day: 0.50     Years: 40.00     Pack years: 20.00    Smokeless tobacco: Never Used    Tobacco comment: quit smoking Dec 10, 2021. quit smoking in nov 2017, restarted smoking since then   Vaping Use    Vaping Use: Never used   Substance and Sexual Activity    Alcohol use: No    Drug use: No    Sexual activity: Not on file   Other Topics Concern    Not on file   Social History Narrative     50+ yearsHe has 1 son and 1 daughterWorks for a telephone companyPreviously [address] MedicalnsWTFast active duty 4-1/2 yearsHe does not go to the [employer] presentlyEducation high school 2 years of collegeEnjoys hunting and fishingDenies alcohol or substance usage he does smoke 1 pack/dayPhysically sedentary     Social Determinants of Health     Financial Resource Strain:     Difficulty of Paying Living Expenses: Not on file   Food Insecurity:     Worried About Running Out of Food in the Last Year: Not on file    Steve of Food in the Last Year: Not on file   Transportation Needs:     Lack of Transportation (Medical): Not on file    Lack of Transportation (Non-Medical):  Not on file   Physical Activity:     Days of Exercise per Week: Not on file    Minutes of Exercise per Session: Not on file   Stress:     Feeling of Stress : Not on file   Social Connections:     Frequency of Communication with Friends and Family: Not on file    Frequency of Social Gatherings with Friends medications  2. Recommend cardiac catheterization for definitive assessment advise indication alternatives benefits and risk patient agreeable arrange as soon as feasible. I have discussed with the patient regarding indications for the proposed procedure LEFT HEART CATHETERIZATION AND POSSIBLE PERCUTANEOUS INTERVENTION  along with possible alternatives benefits and risks including but not limited to risks of death, myocardial infarction, stroke, contrast induced nephropathy which in some cases may lead to acute kidney failure requiring dialysis, allergic reactions, bleeding requiring blood transfusion,  cardiac arrhthymias, respiratory failure which may require placing the patient on respiratory support such as a ventilator or breathing machine,risk of complications which may require vascular surgery, and if coronary intervention is performed emergency CABG may be required in less than 1% of cases. The patient is awake and alert and understands the issues involved and indicates willingness to proceed as ordered. The patient does not have any contraindications to dual antiplatelet therapy. The patient does not have any known  pending surgical procedures in the next 12 months at this time. The patient is  a reasonable candidate for moderate conscious sedation. ASA score:  ASA 3 - Patient with moderate systemic disease with functional limitations    Mallampati: I (soft palate, uvula, fauces, tonsillar pillars visible)    Preferred vascular access site will be: right radial artery        Return in about 4 weeks (around 1/31/2022) for return to Dr. Jad Garcia only. Jeny Terrazas MD 1/3/2022 3:10 PM [facility]      Thisdictation was generated by voice recognition computer software. Although all attempts are made to edit the dictation for accuracy, there may be errors in the transcription that are not intended.

## 2022-01-03 NOTE — PATIENT INSTRUCTIONS
Make sure you drink plenty of fluids the day before your heart cath. I have instructed the nursing facility to draw labs to check your kidney function on 1/10/22. [place] at the [facility] and [address] [facility] located on the first floor of [place] [address] through hospital main entrance and turn immediately to your left. Date/Time: 1/12/22 arrive at 10AM for noon procedure. Allergies:  Patient has no known allergies. Contact number:  [phone] (home)     Cardiac Catheterization Instructions   · Do not eat or drink anything after midnight on the night before your procedure. You can take your morning medications with a sip of water unless otherwise directed not to. · Bring a list of the names and dosages of all the medications you are taking. · Diabetic medication should be stopped two days prior: Metformin (glucophage), Invokana (canagliflozin), Farxiga (dapagliflozin), Jardiance (empagliflozin)   · Coumadin (warfarin) should be stopped two days prior to this procedure. · Pradaxa (dabigatran) should be stopped one day prior to procedure. · You should arrange to have someone take you home rather than drive yourself. · Further plan will depend upon the result of the cardiac catheterization.       If for any reason you are unable to keep this appointment, please contact [facility], [phone], as soon as possible to reschedule.  -------------------------------------------------------------------------------------------------------------------

## 2022-01-10 DIAGNOSIS — R06.02 SHORTNESS OF BREATH: ICD-10-CM

## 2022-01-12 ENCOUNTER — APPOINTMENT (OUTPATIENT)
Dept: GENERAL RADIOLOGY | Age: 77
End: 2022-01-12
Attending: INTERNAL MEDICINE
Payer: MEDICARE

## 2022-01-12 ENCOUNTER — HOSPITAL ENCOUNTER (OUTPATIENT)
Dept: CARDIAC CATH/INVASIVE PROCEDURES | Age: 77
Discharge: NURSING, REHAB OR LONG-TERM CARE | End: 2022-01-12
Attending: INTERNAL MEDICINE | Admitting: INTERNAL MEDICINE
Payer: MEDICARE

## 2022-01-12 VITALS
SYSTOLIC BLOOD PRESSURE: 135 MMHG | HEIGHT: 69 IN | BODY MASS INDEX: 32.58 KG/M2 | DIASTOLIC BLOOD PRESSURE: 60 MMHG | TEMPERATURE: 97.3 F | WEIGHT: 220 LBS | HEART RATE: 60 BPM | OXYGEN SATURATION: 93 % | RESPIRATION RATE: 15 BRPM

## 2022-01-12 LAB
LV EF: 23 %
LVEF MODALITY: NORMAL

## 2022-01-12 PROCEDURE — C8929 TTE W OR WO FOL WCON,DOPPLER: HCPCS

## 2022-01-12 PROCEDURE — 6360000004 HC RX CONTRAST MEDICATION: Performed by: INTERNAL MEDICINE

## 2022-01-12 PROCEDURE — 2580000003 HC RX 258: Performed by: INTERNAL MEDICINE

## 2022-01-12 PROCEDURE — 6370000000 HC RX 637 (ALT 250 FOR IP): Performed by: INTERNAL MEDICINE

## 2022-01-12 PROCEDURE — 93458 L HRT ARTERY/VENTRICLE ANGIO: CPT | Performed by: INTERNAL MEDICINE

## 2022-01-12 PROCEDURE — 99152 MOD SED SAME PHYS/QHP 5/>YRS: CPT | Performed by: INTERNAL MEDICINE

## 2022-01-12 PROCEDURE — 99024 POSTOP FOLLOW-UP VISIT: CPT | Performed by: INTERNAL MEDICINE

## 2022-01-12 PROCEDURE — C1769 GUIDE WIRE: HCPCS

## 2022-01-12 PROCEDURE — 2709999900 HC NON-CHARGEABLE SUPPLY

## 2022-01-12 PROCEDURE — 2500000003 HC RX 250 WO HCPCS

## 2022-01-12 PROCEDURE — 93458 L HRT ARTERY/VENTRICLE ANGIO: CPT

## 2022-01-12 PROCEDURE — 6360000002 HC RX W HCPCS

## 2022-01-12 PROCEDURE — 99152 MOD SED SAME PHYS/QHP 5/>YRS: CPT

## 2022-01-12 PROCEDURE — 71046 X-RAY EXAM CHEST 2 VIEWS: CPT

## 2022-01-12 PROCEDURE — C1894 INTRO/SHEATH, NON-LASER: HCPCS

## 2022-01-12 RX ORDER — POLYETHYLENE GLYCOL 3350 17 G/17G
17 POWDER, FOR SOLUTION ORAL DAILY PRN
COMMUNITY

## 2022-01-12 RX ORDER — SODIUM CHLORIDE 9 MG/ML
INJECTION, SOLUTION INTRAVENOUS CONTINUOUS
Status: DISCONTINUED | OUTPATIENT
Start: 2022-01-12 | End: 2022-01-12 | Stop reason: HOSPADM

## 2022-01-12 RX ORDER — ACETAMINOPHEN 325 MG/1
650 TABLET ORAL EVERY 4 HOURS PRN
Status: DISCONTINUED | OUTPATIENT
Start: 2022-01-12 | End: 2022-01-12 | Stop reason: HOSPADM

## 2022-01-12 RX ORDER — ALPRAZOLAM 0.5 MG/1
0.5 TABLET ORAL EVERY 8 HOURS PRN
COMMUNITY
End: 2022-08-01 | Stop reason: ALTCHOICE

## 2022-01-12 RX ORDER — ONDANSETRON 2 MG/ML
4 INJECTION INTRAMUSCULAR; INTRAVENOUS EVERY 6 HOURS PRN
Status: DISCONTINUED | OUTPATIENT
Start: 2022-01-12 | End: 2022-01-12 | Stop reason: HOSPADM

## 2022-01-12 RX ORDER — CARVEDILOL 3.12 MG/1
3.12 TABLET ORAL 2 TIMES DAILY WITH MEALS
COMMUNITY
Start: 2021-12-20 | End: 2022-01-20

## 2022-01-12 RX ORDER — ASPIRIN 81 MG/1
81 TABLET ORAL ONCE
Status: DISCONTINUED | OUTPATIENT
Start: 2022-01-12 | End: 2022-01-12 | Stop reason: HOSPADM

## 2022-01-12 RX ORDER — ISOSORBIDE MONONITRATE 30 MG/1
30 TABLET, EXTENDED RELEASE ORAL DAILY
Qty: 30 TABLET | Refills: 3 | Status: SHIPPED | OUTPATIENT
Start: 2022-01-12 | End: 2022-06-06

## 2022-01-12 RX ORDER — IPRATROPIUM BROMIDE AND ALBUTEROL SULFATE 2.5; .5 MG/3ML; MG/3ML
1 SOLUTION RESPIRATORY (INHALATION) EVERY 6 HOURS PRN
COMMUNITY
End: 2022-08-01

## 2022-01-12 RX ORDER — HYDRALAZINE HYDROCHLORIDE 20 MG/ML
10 INJECTION INTRAMUSCULAR; INTRAVENOUS EVERY 10 MIN PRN
Status: DISCONTINUED | OUTPATIENT
Start: 2022-01-12 | End: 2022-01-12 | Stop reason: HOSPADM

## 2022-01-12 RX ORDER — SODIUM CHLORIDE 9 MG/ML
1000 INJECTION, SOLUTION INTRAVENOUS CONTINUOUS
Status: DISCONTINUED | OUTPATIENT
Start: 2022-01-12 | End: 2022-01-12 | Stop reason: HOSPADM

## 2022-01-12 RX ORDER — HYDROCODONE BITARTRATE AND ACETAMINOPHEN 10; 325 MG/1; MG/1
1 TABLET ORAL EVERY 6 HOURS PRN
Status: DISCONTINUED | OUTPATIENT
Start: 2022-01-12 | End: 2022-01-12 | Stop reason: HOSPADM

## 2022-01-12 RX ADMIN — HYDROCODONE BITARTRATE AND ACETAMINOPHEN 1 TABLET: 10; 325 TABLET ORAL at 14:39

## 2022-01-12 RX ADMIN — IOPAMIDOL 98 ML: 612 INJECTION, SOLUTION INTRAVENOUS at 14:22

## 2022-01-12 RX ADMIN — PERFLUTREN 1.5 ML: 6.52 INJECTION, SUSPENSION INTRAVENOUS at 16:12

## 2022-01-12 RX ADMIN — SODIUM CHLORIDE: 9 INJECTION, SOLUTION INTRAVENOUS at 12:11

## 2022-01-12 ASSESSMENT — PAIN SCALES - GENERAL: PAINLEVEL_OUTOF10: 7

## 2022-01-12 NOTE — PROGRESS NOTES
Limited 2D Echo completed per MD order.   Electronically signed by Bryn Hernandez RN on 1/12/2022 at 4:35 PM

## 2022-01-12 NOTE — PROGRESS NOTES
BP cuff causing small hematoma to IAN. BP cuff removed at this time. Vital signs have been stable.   Electronically signed by Trinidad Layton RN on 1/12/2022 at 4:20 PM

## 2022-01-12 NOTE — PROGRESS NOTES
Discharge instructions reviewed with patient and patient states understanding. Right radial site c/d/i.  Patient discharged from cath holding with all belongings and AVS.Electronically signed by Hao Bryan RN on 1/12/2022 at 5:17 PM

## 2022-01-12 NOTE — PROGRESS NOTES
[address] [place] [address]  to verify what medications the patient received this am but received no answer. Patient is on Plavix and ASA.   Electronically signed by Magdi Gil RN on 1/12/2022 at 12:30 PM

## 2022-01-12 NOTE — H&P
Office Visit  Open     1/3/2022  [facility]    Interventional Cardiology  Shortness of breath +7 more    Dx  Follow-up; Referred by Jaspal Rai MD    Reason for Visit       Progress Notes  Rebekah Belle MD (Physician) [facility]  Unsigned  Expand AllCollapse All    [id number] [facility] Progress Note                              Date:                1/3/2022  Patient:            Jayesh Post  Age:                 64 y.o., 1945        Reason for evaluation:            SUBJECTIVE:    Returns today for follow-up assessment. Apparently he was recently hospitalized at [place] [address] for several days starting around 12/12/2021 started on Entresto for LV dysfunction replacing lisinopril. Apparently an echocardiogram was done at their facility showing ejection fraction had progressively worsened to less than 20%. Denies anginal chest pain. At the present time his dyspnea appears stable. The physician there had spoken with the patient and family about a possible cardiac catheterization and possible need for upgrade of his dual-chamber pacemaker to a biventricular ICD. His EKG prior to the pacemaker being implanted showed a right bundle branch block and at the time of his original implant his left ventricular ejection fraction was 40% therefore not a candidate for ICD implant at that time. Creatinine around 1.6. No other complaints or issues reported.     Review of Systems   Constitutional: Negative. Negative for chills, fever and unexpected weight change. HENT: Negative. Eyes: Negative. Respiratory: Negative. Negative for shortness of breath. Cardiovascular: Negative. Negative for chest pain. Gastrointestinal: Negative. Negative for diarrhea, nausea and vomiting. Endocrine: Negative. Genitourinary: Negative. Musculoskeletal: Negative. Skin: Negative. Neurological: Negative.     All other systems reviewed and are negative.           OBJECTIVE:     /70   Pulse 77   Ht 5' 9\" (1.753 m)   Wt 230 lb (104.3 kg)   BMI 33.97 kg/m²      Labs:   CBC: No results for input(s): WBC, HGB, HCT, PLT in the last 72 hours. BMP:No results for input(s): NA, K, CO2, BUN, CREATININE, LABGLOM, GLUCOSE in the last 72 hours. BNP: No results for input(s): BNP in the last 72 hours. PT/INR: No results for input(s): PROTIME, INR in the last 72 hours. APTT:No results for input(s): APTT in the last 72 hours. CARDIAC ENZYMES:No results for input(s): CKTOTAL, CKMB, CKMBINDEX, TROPONINI in the last 72 hours. FASTING LIPID PANEL:        Lab Results   Component Value Date     HDL 36 07/19/2021     LDLDIRECT 71 02/11/2016     LDLCALC 34 07/19/2021     TRIG 106 07/19/2021      LIVER PROFILE:No results for input(s): AST, ALT, LABALBU in the last 72 hours.         Past Medical History   Past Medical History:   Diagnosis Date    AAA (abdominal aortic aneurysm) ([address]      Acute on chronic systolic CHF (congestive heart failure) ([address] 7/26/2021    Aortic dissection ([address] 10/10/2012    CAD (coronary artery disease)      Cancer (HCC)       lung&lymphnode    Carotid artery occlusion      Cerebrovascular accident (CVA) ([address]      Cerebrovascular accident (CVA) due to embolism ([address] 02/16/2016    Cerebrovascular accident (CVA) due to embolism of right middle cerebral artery ([address]      COVID-19 2/6/2021    Depression      HTN (hypertension)      Hyperlipidemia      MI (myocardial infarction) ([address]       acute 5/13/2015    Non Hodgkin's lymphoma ([address]      Palliative care patient 02/16/2021    Sleep apnea       no c-pap    Unspecified sleep apnea           Past Surgical History         Past Surgical History:   Procedure Laterality Date    CARDIAC CATHETERIZATION   [facility]     EF 40%. stent to LAD, stent to posterior descending branch of RCA.     CAROTID ENDARTERECTOMY Right 7/22/2016     Right carotid endarterectomy,carotid arteriograms. 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, repeat in 1 yr    UPPER GASTROINTESTINAL ENDOSCOPY   11/08/2019     [facility]: normal    UPPER GASTROINTESTINAL ENDOSCOPY   04/13/2018     Dr Sarah Jean: normal, 2cm HH, small amount of food in stomach w/dark fluid,but not kathy blood    VASCULAR SURGERY   10/29/12 [facility]     open exposure of R common femoral artery; percutaneous cannulation of L  femoral artery with 7-french sheath; suprarenal abd aortagram with bilat iliofem arteriogram; endoluminal graft repair of AAA with endologix [id number] main body, 28x95 infrarenal cuff; balloon angioplasty of infrarenal abd aorta and endoluminal graft with coda balloon; completion abd aortogram w bilat iliofem arteriogram;     VASCULAR SURGERY   10/29/ [facility]      (cont) open repair of right common femoral ateriotomy; perclose repair of left common femoral artery puncture site.          Family History         Family History   Problem Relation Age of Onset    High Blood Pressure Father      High Blood Pressure Mother      Diabetes Mother      Colon Polyps Brother      Cancer Brother      Colon Cancer Neg Hx           No Known Allergies  Current Facility-Administered Medications          Current Outpatient Medications   Medication Sig Dispense Refill    torsemide (DEMADEX) 20 MG tablet Take 20 mg by mouth daily        HYDROcodone-acetaminophen (NORCO)  MG per tablet Take 1 tablet by mouth every 6 hours as needed for Pain for up to 30 days.  120 tablet 0    fluticasone (FLONASE) 50 MCG/ACT nasal spray 1 spray by Each Nostril route daily        atorvastatin (LIPITOR) 40 MG tablet TAKE ONE (1) TABLET EVERY DAY 90 tablet Resource Strain:     Difficulty of Paying Living Expenses: Not on file   Food Insecurity:     Worried About Running Out of Food in the Last Year: Not on file    Steve of Food in the Last Year: Not on file   Transportation Needs:     Lack of Transportation (Medical): Not on file    Lack of Transportation (Non-Medical): Not on file   Physical Activity:     Days of Exercise per Week: Not on file    Minutes of Exercise per Session: Not on file   Stress:     Feeling of Stress : Not on file   Social Connections:     Frequency of Communication with Friends and Family: Not on file    Frequency of Social Gatherings with Friends and Family: Not on file    Attends Jewish Services: Not on file    Active Member of [address] [place] [address] or Organizations: Not on file    Attends Club or Organization Meetings: Not on file    Marital Status: Not on file   Intimate Partner Violence:     Fear of Current or Ex-Partner: Not on file    Emotionally Abused: Not on file    Physically Abused: Not on file    Sexually Abused: Not on file   Housing Stability:     Unable to Pay for Housing in the Last Year: Not on file    Number of [place] in the Last Year: Not on file    Unstable Housing in the Last Year: Not on file            Physical Examination:  /70   Pulse 77   Ht 5' 9\" (1.753 m)   Wt 230 lb (104.3 kg)   BMI 33.97 kg/m²   Physical Exam  Vitals reviewed. Constitutional:       Appearance: He is well-developed. Neck:      Vascular: No carotid bruit or JVD. Cardiovascular:      Rate and Rhythm: Normal rate and regular rhythm. Heart sounds: Normal heart sounds. No murmur heard. No friction rub. No gallop. Pulmonary:      Effort: Pulmonary effort is normal. No respiratory distress. Breath sounds: Normal breath sounds. No wheezing or rales. Abdominal:      General: There is no distension. Tenderness: There is no abdominal tenderness. Lymphadenopathy:      Cervical: No cervical adenopathy. Skin:     General: Skin is warm and dry.                ASSESSMENT:       Diagnosis Orders   1. Complete atrioventricular block (HCC)  EKG 12 lead   2. Acute on chronic systolic (congestive) heart failure (HCC)      3. PVD (peripheral vascular disease) (HCC)      4. Essential hypertension      5. Shortness of breath      6. Symptomatic bradycardia      7. Coronary artery disease involving native coronary artery of native heart without angina pectoris      8. Mixed hyperlipidemia            PLAN:      Orders Placed This Encounter   Procedures    EKG 12 lead        Encounter Medications    No orders of the defined types were placed in this encounter.            1. Continue present medications  2. Recommend cardiac catheterization for definitive assessment advise indication alternatives benefits and risk patient agreeable arrange as soon as feasible. I have discussed with the patient regarding indications for the proposed procedure LEFT HEART CATHETERIZATION AND POSSIBLE PERCUTANEOUS INTERVENTION  along with possible alternatives benefits and risks including but not limited to risks of death, myocardial infarction, stroke, contrast induced nephropathy which in some cases may lead to acute kidney failure requiring dialysis, allergic reactions, bleeding requiring blood transfusion,  cardiac arrhthymias, respiratory failure which may require placing the patient on respiratory support such as a ventilator or breathing machine,risk of complications which may require vascular surgery, and if coronary intervention is performed emergency CABG may be required in less than 1% of cases.  The patient is awake and alert and understands the issues involved and indicates willingness to proceed as ordered.     The patient does not have any contraindications to dual antiplatelet therapy.     The patient does not have any known  pending surgical procedures in the next 12 months at this time.     The patient is  a reasonable candidate for moderate conscious sedation.     ASA score:  ASA 3 - Patient with moderate systemic disease with functional limitations     Mallampati: I (soft palate, uvula, fauces, tonsillar pillars visible)     Preferred vascular access site will be: right radial artery           Return in about 4 weeks (around 1/31/2022) for return to Dr. Christiana Severin only.        Kristi Meza MD 1/3/2022 3:10 PM [address] [facility]        Thisdictation was generated by voice recognition computer software.   Although all attempts are made to edit the dictation for accuracy, there may be errors in the transcription that are not intended.                                   No significant interval history change since above visit

## 2022-01-13 ENCOUNTER — TELEPHONE (OUTPATIENT)
Dept: CARDIOLOGY CLINIC | Age: 77
End: 2022-01-13

## 2022-01-13 DIAGNOSIS — Z11.59 SCREENING FOR VIRAL DISEASE: Primary | ICD-10-CM

## 2022-01-13 NOTE — TELEPHONE ENCOUNTER
Okay to go ahead and schedule. Reached out to family to schedule no answer left message to return my call.

## 2022-01-13 NOTE — TELEPHONE ENCOUNTER
Patient needs set up for ICD. Called to touch base with family about this because patient is in nursing facility. Told them I wanted to confirm the date we need to schedule before I do it. Daughter voiced understanding.

## 2022-01-14 NOTE — TELEPHONE ENCOUNTER
Igor Sigala  home. Scheduled for 1/19/22 arrive at [address] [facility] for 12P procedure. COVID swab on 1/17/22 ONLY. Since he is in nursing home will have them do it there. Spoke with Bonnie Brown at nursing facility. Give instructions for covid swab to be done as rapid and faxed to us the results by 3PM on 1/18/22. NPO after midnight and can take morning meds with sip of water.

## 2022-01-18 DIAGNOSIS — Z11.59 SCREENING FOR VIRAL DISEASE: ICD-10-CM

## 2022-01-19 ENCOUNTER — ANESTHESIA (OUTPATIENT)
Dept: CARDIAC CATH/INVASIVE PROCEDURES | Age: 77
End: 2022-01-19
Payer: MEDICARE

## 2022-01-19 ENCOUNTER — ANESTHESIA EVENT (OUTPATIENT)
Dept: CARDIAC CATH/INVASIVE PROCEDURES | Age: 77
End: 2022-01-19
Payer: MEDICARE

## 2022-01-19 ENCOUNTER — HOSPITAL ENCOUNTER (OUTPATIENT)
Dept: CARDIAC CATH/INVASIVE PROCEDURES | Age: 77
Setting detail: OBSERVATION
Discharge: HOME | End: 2022-01-20
Attending: INTERNAL MEDICINE | Admitting: INTERNAL MEDICINE
Payer: MEDICARE

## 2022-01-19 ENCOUNTER — APPOINTMENT (OUTPATIENT)
Dept: GENERAL RADIOLOGY | Age: 77
End: 2022-01-19
Attending: INTERNAL MEDICINE
Payer: MEDICARE

## 2022-01-19 VITALS — OXYGEN SATURATION: 100 % | DIASTOLIC BLOOD PRESSURE: 78 MMHG | TEMPERATURE: 97 F | SYSTOLIC BLOOD PRESSURE: 138 MMHG

## 2022-01-19 DIAGNOSIS — G89.29 CHRONIC MIDLINE LOW BACK PAIN WITHOUT SCIATICA: ICD-10-CM

## 2022-01-19 DIAGNOSIS — M54.50 CHRONIC MIDLINE LOW BACK PAIN WITHOUT SCIATICA: ICD-10-CM

## 2022-01-19 PROBLEM — R93.1 DECREASED CARDIAC EJECTION FRACTION: Status: ACTIVE | Noted: 2022-01-19

## 2022-01-19 LAB
ACANTHOCYTES: ABNORMAL
ALBUMIN SERPL-MCNC: 4.1 G/DL (ref 3.5–5.2)
ALP BLD-CCNC: 82 U/L (ref 40–130)
ALT SERPL-CCNC: 16 U/L (ref 5–41)
ANION GAP SERPL CALCULATED.3IONS-SCNC: 12 MMOL/L (ref 7–19)
ANISOCYTOSIS: ABNORMAL
AST SERPL-CCNC: 25 U/L (ref 5–40)
BACTERIA: NEGATIVE /HPF
BASOPHILS ABSOLUTE: 0.1 K/UL (ref 0–0.2)
BASOPHILS RELATIVE PERCENT: 0.7 % (ref 0–1)
BILIRUB SERPL-MCNC: 0.5 MG/DL (ref 0.2–1.2)
BILIRUBIN URINE: NEGATIVE
BLOOD, URINE: NEGATIVE
BUN BLDV-MCNC: 28 MG/DL (ref 8–23)
CALCIUM SERPL-MCNC: 11.3 MG/DL (ref 8.8–10.2)
CHLORIDE BLD-SCNC: 102 MMOL/L (ref 98–111)
CLARITY: CLEAR
CO2: 24 MMOL/L (ref 22–29)
COLOR: YELLOW
CREAT SERPL-MCNC: 1.7 MG/DL (ref 0.5–1.2)
CRYSTALS, UA: ABNORMAL /HPF
EOSINOPHILS ABSOLUTE: 0 K/UL (ref 0–0.6)
EOSINOPHILS RELATIVE PERCENT: 0.1 % (ref 0–5)
EPITHELIAL CELLS, UA: 0 /HPF (ref 0–5)
GFR AFRICAN AMERICAN: 47
GFR NON-AFRICAN AMERICAN: 39
GLUCOSE BLD-MCNC: 111 MG/DL (ref 74–109)
GLUCOSE URINE: NEGATIVE MG/DL
HCT VFR BLD CALC: 47.1 % (ref 42–52)
HEMOGLOBIN: 13 G/DL (ref 14–18)
HYALINE CASTS: 0 /HPF (ref 0–8)
HYPOCHROMIA: ABNORMAL
IMMATURE GRANULOCYTES #: 0 K/UL
KETONES, URINE: NEGATIVE MG/DL
LEUKOCYTE ESTERASE, URINE: NEGATIVE
LYMPHOCYTES ABSOLUTE: 1.8 K/UL (ref 1.1–4.5)
LYMPHOCYTES RELATIVE PERCENT: 22.7 % (ref 20–40)
MCH RBC QN AUTO: 22.2 PG (ref 27–31)
MCHC RBC AUTO-ENTMCNC: 27.6 G/DL (ref 33–37)
MCV RBC AUTO: 80.4 FL (ref 80–94)
MICROCYTES: ABNORMAL
MONOCYTES ABSOLUTE: 0.8 K/UL (ref 0–0.9)
MONOCYTES RELATIVE PERCENT: 9.3 % (ref 0–10)
NEUTROPHILS ABSOLUTE: 5.4 K/UL (ref 1.5–7.5)
NEUTROPHILS RELATIVE PERCENT: 66.8 % (ref 50–65)
NITRITE, URINE: NEGATIVE
OVALOCYTES: ABNORMAL
PDW BLD-RTO: 24.5 % (ref 11.5–14.5)
PH UA: 7 (ref 5–8)
PLATELET # BLD: 178 K/UL (ref 130–400)
PLATELET SLIDE REVIEW: ADEQUATE
PMV BLD AUTO: 11.1 FL (ref 9.4–12.4)
POIKILOCYTES: ABNORMAL
POTASSIUM REFLEX MAGNESIUM: 5 MMOL/L (ref 3.5–5)
PROTEIN UA: 30 MG/DL
RBC # BLD: 5.86 M/UL (ref 4.7–6.1)
RBC UA: 1 /HPF (ref 0–4)
SODIUM BLD-SCNC: 138 MMOL/L (ref 136–145)
SPECIFIC GRAVITY UA: 1.02 (ref 1–1.03)
TOTAL PROTEIN: 7 G/DL (ref 6.6–8.7)
UROBILINOGEN, URINE: 1 E.U./DL
WBC # BLD: 8.1 K/UL (ref 4.8–10.8)
WBC UA: 0 /HPF (ref 0–5)

## 2022-01-19 PROCEDURE — C1769 GUIDE WIRE: HCPCS

## 2022-01-19 PROCEDURE — G0378 HOSPITAL OBSERVATION PER HR: HCPCS

## 2022-01-19 PROCEDURE — 3700000001 HC ADD 15 MINUTES (ANESTHESIA)

## 2022-01-19 PROCEDURE — C1730 CATH, EP, 19 OR FEW ELECT: HCPCS

## 2022-01-19 PROCEDURE — 6360000002 HC RX W HCPCS: Performed by: NURSE ANESTHETIST, CERTIFIED REGISTERED

## 2022-01-19 PROCEDURE — 80053 COMPREHEN METABOLIC PANEL: CPT

## 2022-01-19 PROCEDURE — 6360000002 HC RX W HCPCS

## 2022-01-19 PROCEDURE — 85025 COMPLETE CBC W/AUTO DIFF WBC: CPT

## 2022-01-19 PROCEDURE — 36415 COLL VENOUS BLD VENIPUNCTURE: CPT

## 2022-01-19 PROCEDURE — 7100000000 HC PACU RECOVERY - FIRST 15 MIN

## 2022-01-19 PROCEDURE — 2580000003 HC RX 258: Performed by: INTERNAL MEDICINE

## 2022-01-19 PROCEDURE — 6360000002 HC RX W HCPCS: Performed by: ANESTHESIOLOGY

## 2022-01-19 PROCEDURE — 33235 REMOVAL PACEMAKER ELECTRODE: CPT | Performed by: INTERNAL MEDICINE

## 2022-01-19 PROCEDURE — 33235 REMOVAL PACEMAKER ELECTRODE: CPT

## 2022-01-19 PROCEDURE — 99024 POSTOP FOLLOW-UP VISIT: CPT | Performed by: INTERNAL MEDICINE

## 2022-01-19 PROCEDURE — 93005 ELECTROCARDIOGRAM TRACING: CPT | Performed by: INTERNAL MEDICINE

## 2022-01-19 PROCEDURE — 93971 EXTREMITY STUDY: CPT

## 2022-01-19 PROCEDURE — C1777 LEAD, AICD, ENDO SINGLE COIL: HCPCS

## 2022-01-19 PROCEDURE — 33249 INSJ/RPLCMT DEFIB W/LEAD(S): CPT | Performed by: INTERNAL MEDICINE

## 2022-01-19 PROCEDURE — 33233 REMOVAL OF PM GENERATOR: CPT | Performed by: INTERNAL MEDICINE

## 2022-01-19 PROCEDURE — 2500000003 HC RX 250 WO HCPCS

## 2022-01-19 PROCEDURE — 6370000000 HC RX 637 (ALT 250 FOR IP): Performed by: INTERNAL MEDICINE

## 2022-01-19 PROCEDURE — 33225 L VENTRIC PACING LEAD ADD-ON: CPT | Performed by: INTERNAL MEDICINE

## 2022-01-19 PROCEDURE — 81001 URINALYSIS AUTO W/SCOPE: CPT

## 2022-01-19 PROCEDURE — 7100000001 HC PACU RECOVERY - ADDTL 15 MIN

## 2022-01-19 PROCEDURE — 2780000010 HC IMPLANT OTHER

## 2022-01-19 PROCEDURE — C1887 CATHETER, GUIDING: HCPCS

## 2022-01-19 PROCEDURE — C1721 AICD, DUAL CHAMBER: HCPCS

## 2022-01-19 PROCEDURE — 6360000004 HC RX CONTRAST MEDICATION: Performed by: INTERNAL MEDICINE

## 2022-01-19 PROCEDURE — C1894 INTRO/SHEATH, NON-LASER: HCPCS

## 2022-01-19 PROCEDURE — 33233 REMOVAL OF PM GENERATOR: CPT

## 2022-01-19 PROCEDURE — 2500000003 HC RX 250 WO HCPCS: Performed by: NURSE ANESTHETIST, CERTIFIED REGISTERED

## 2022-01-19 PROCEDURE — 3700000000 HC ANESTHESIA ATTENDED CARE

## 2022-01-19 PROCEDURE — 33249 INSJ/RPLCMT DEFIB W/LEAD(S): CPT

## 2022-01-19 PROCEDURE — 71045 X-RAY EXAM CHEST 1 VIEW: CPT

## 2022-01-19 RX ORDER — MEPERIDINE HYDROCHLORIDE 25 MG/ML
12.5 INJECTION INTRAMUSCULAR; INTRAVENOUS; SUBCUTANEOUS EVERY 5 MIN PRN
Status: DISCONTINUED | OUTPATIENT
Start: 2022-01-19 | End: 2022-01-20 | Stop reason: HOSPADM

## 2022-01-19 RX ORDER — EPHEDRINE SULFATE 50 MG/ML
INJECTION, SOLUTION INTRAVENOUS PRN
Status: DISCONTINUED | OUTPATIENT
Start: 2022-01-19 | End: 2022-01-19 | Stop reason: SDUPTHER

## 2022-01-19 RX ORDER — SODIUM CHLORIDE 9 MG/ML
INJECTION, SOLUTION INTRAVENOUS CONTINUOUS
Status: DISCONTINUED | OUTPATIENT
Start: 2022-01-19 | End: 2022-01-20 | Stop reason: HOSPADM

## 2022-01-19 RX ORDER — FENTANYL CITRATE 50 UG/ML
25 INJECTION, SOLUTION INTRAMUSCULAR; INTRAVENOUS
Status: DISCONTINUED | OUTPATIENT
Start: 2022-01-19 | End: 2022-01-20 | Stop reason: HOSPADM

## 2022-01-19 RX ORDER — SODIUM CHLORIDE, SODIUM LACTATE, POTASSIUM CHLORIDE, CALCIUM CHLORIDE 600; 310; 30; 20 MG/100ML; MG/100ML; MG/100ML; MG/100ML
INJECTION, SOLUTION INTRAVENOUS CONTINUOUS
Status: DISCONTINUED | OUTPATIENT
Start: 2022-01-19 | End: 2022-01-19

## 2022-01-19 RX ORDER — SODIUM CHLORIDE 9 MG/ML
INJECTION, SOLUTION INTRAVENOUS CONTINUOUS
Status: DISCONTINUED | OUTPATIENT
Start: 2022-01-19 | End: 2022-01-19

## 2022-01-19 RX ORDER — DOCUSATE SODIUM 100 MG/1
100 CAPSULE, LIQUID FILLED ORAL 2 TIMES DAILY
Status: DISCONTINUED | OUTPATIENT
Start: 2022-01-19 | End: 2022-01-20 | Stop reason: HOSPADM

## 2022-01-19 RX ORDER — LIDOCAINE HYDROCHLORIDE 10 MG/ML
1 INJECTION, SOLUTION EPIDURAL; INFILTRATION; INTRACAUDAL; PERINEURAL
Status: ACTIVE | OUTPATIENT
Start: 2022-01-19 | End: 2022-01-19

## 2022-01-19 RX ORDER — MIDAZOLAM HYDROCHLORIDE 1 MG/ML
2 INJECTION INTRAMUSCULAR; INTRAVENOUS
Status: ACTIVE | OUTPATIENT
Start: 2022-01-19 | End: 2022-01-19

## 2022-01-19 RX ORDER — NITROGLYCERIN 0.4 MG/1
0.4 TABLET SUBLINGUAL EVERY 5 MIN PRN
Status: DISCONTINUED | OUTPATIENT
Start: 2022-01-19 | End: 2022-01-20 | Stop reason: HOSPADM

## 2022-01-19 RX ORDER — HYDROMORPHONE HYDROCHLORIDE 1 MG/ML
0.25 INJECTION, SOLUTION INTRAMUSCULAR; INTRAVENOUS; SUBCUTANEOUS EVERY 5 MIN PRN
Status: DISCONTINUED | OUTPATIENT
Start: 2022-01-19 | End: 2022-01-20 | Stop reason: HOSPADM

## 2022-01-19 RX ORDER — ONDANSETRON 2 MG/ML
INJECTION INTRAMUSCULAR; INTRAVENOUS PRN
Status: DISCONTINUED | OUTPATIENT
Start: 2022-01-19 | End: 2022-01-19 | Stop reason: SDUPTHER

## 2022-01-19 RX ORDER — LABETALOL HYDROCHLORIDE 5 MG/ML
5 INJECTION, SOLUTION INTRAVENOUS EVERY 10 MIN PRN
Status: DISCONTINUED | OUTPATIENT
Start: 2022-01-19 | End: 2022-01-20 | Stop reason: HOSPADM

## 2022-01-19 RX ORDER — LIDOCAINE HYDROCHLORIDE 10 MG/ML
INJECTION, SOLUTION INFILTRATION; PERINEURAL PRN
Status: DISCONTINUED | OUTPATIENT
Start: 2022-01-19 | End: 2022-01-19 | Stop reason: SDUPTHER

## 2022-01-19 RX ORDER — SODIUM CHLORIDE 9 MG/ML
25 INJECTION, SOLUTION INTRAVENOUS PRN
Status: DISCONTINUED | OUTPATIENT
Start: 2022-01-19 | End: 2022-01-20 | Stop reason: HOSPADM

## 2022-01-19 RX ORDER — HYDROCODONE BITARTRATE AND ACETAMINOPHEN 10; 325 MG/1; MG/1
1 TABLET ORAL EVERY 6 HOURS PRN
Status: DISCONTINUED | OUTPATIENT
Start: 2022-01-19 | End: 2022-01-20 | Stop reason: HOSPADM

## 2022-01-19 RX ORDER — HYDRALAZINE HYDROCHLORIDE 20 MG/ML
5 INJECTION INTRAMUSCULAR; INTRAVENOUS EVERY 10 MIN PRN
Status: DISCONTINUED | OUTPATIENT
Start: 2022-01-19 | End: 2022-01-20 | Stop reason: HOSPADM

## 2022-01-19 RX ORDER — FENTANYL CITRATE 50 UG/ML
25 INJECTION, SOLUTION INTRAMUSCULAR; INTRAVENOUS EVERY 5 MIN PRN
Status: DISCONTINUED | OUTPATIENT
Start: 2022-01-19 | End: 2022-01-20 | Stop reason: HOSPADM

## 2022-01-19 RX ORDER — SODIUM CHLORIDE 0.9 % (FLUSH) 0.9 %
5-40 SYRINGE (ML) INJECTION EVERY 12 HOURS SCHEDULED
Status: DISCONTINUED | OUTPATIENT
Start: 2022-01-19 | End: 2022-01-20 | Stop reason: HOSPADM

## 2022-01-19 RX ORDER — FENTANYL CITRATE 50 UG/ML
50 INJECTION, SOLUTION INTRAMUSCULAR; INTRAVENOUS
Status: DISCONTINUED | OUTPATIENT
Start: 2022-01-19 | End: 2022-01-20 | Stop reason: HOSPADM

## 2022-01-19 RX ORDER — SODIUM CHLORIDE 0.9 % (FLUSH) 0.9 %
5-40 SYRINGE (ML) INJECTION PRN
Status: DISCONTINUED | OUTPATIENT
Start: 2022-01-19 | End: 2022-01-20 | Stop reason: HOSPADM

## 2022-01-19 RX ORDER — ONDANSETRON 2 MG/ML
4 INJECTION INTRAMUSCULAR; INTRAVENOUS
Status: ACTIVE | OUTPATIENT
Start: 2022-01-19 | End: 2022-01-19

## 2022-01-19 RX ORDER — ATORVASTATIN CALCIUM 40 MG/1
40 TABLET, FILM COATED ORAL NIGHTLY
Status: DISCONTINUED | OUTPATIENT
Start: 2022-01-19 | End: 2022-01-20 | Stop reason: HOSPADM

## 2022-01-19 RX ORDER — PROCHLORPERAZINE EDISYLATE 5 MG/ML
5 INJECTION INTRAMUSCULAR; INTRAVENOUS
Status: ACTIVE | OUTPATIENT
Start: 2022-01-19 | End: 2022-01-19

## 2022-01-19 RX ORDER — FAMOTIDINE 20 MG/1
20 TABLET, FILM COATED ORAL 2 TIMES DAILY
Status: DISCONTINUED | OUTPATIENT
Start: 2022-01-19 | End: 2022-01-19 | Stop reason: DRUGHIGH

## 2022-01-19 RX ORDER — FENTANYL CITRATE 50 UG/ML
INJECTION, SOLUTION INTRAMUSCULAR; INTRAVENOUS PRN
Status: DISCONTINUED | OUTPATIENT
Start: 2022-01-19 | End: 2022-01-19 | Stop reason: SDUPTHER

## 2022-01-19 RX ORDER — ALPRAZOLAM 0.5 MG/1
0.5 TABLET ORAL EVERY 8 HOURS PRN
Status: DISCONTINUED | OUTPATIENT
Start: 2022-01-19 | End: 2022-01-20 | Stop reason: HOSPADM

## 2022-01-19 RX ORDER — DEXAMETHASONE SODIUM PHOSPHATE 10 MG/ML
INJECTION, SOLUTION INTRAMUSCULAR; INTRAVENOUS PRN
Status: DISCONTINUED | OUTPATIENT
Start: 2022-01-19 | End: 2022-01-19 | Stop reason: SDUPTHER

## 2022-01-19 RX ORDER — HYDROMORPHONE HYDROCHLORIDE 1 MG/ML
0.5 INJECTION, SOLUTION INTRAMUSCULAR; INTRAVENOUS; SUBCUTANEOUS EVERY 5 MIN PRN
Status: DISCONTINUED | OUTPATIENT
Start: 2022-01-19 | End: 2022-01-20 | Stop reason: HOSPADM

## 2022-01-19 RX ORDER — GLYCOPYRROLATE 0.2 MG/ML
INJECTION INTRAMUSCULAR; INTRAVENOUS PRN
Status: DISCONTINUED | OUTPATIENT
Start: 2022-01-19 | End: 2022-01-19 | Stop reason: SDUPTHER

## 2022-01-19 RX ORDER — CLOPIDOGREL BISULFATE 75 MG/1
75 TABLET ORAL DAILY
Status: DISCONTINUED | OUTPATIENT
Start: 2022-01-20 | End: 2022-01-20 | Stop reason: HOSPADM

## 2022-01-19 RX ORDER — POLYETHYLENE GLYCOL 3350 17 G/17G
17 POWDER, FOR SOLUTION ORAL DAILY PRN
Status: DISCONTINUED | OUTPATIENT
Start: 2022-01-19 | End: 2022-01-20 | Stop reason: HOSPADM

## 2022-01-19 RX ORDER — DIPHENHYDRAMINE HYDROCHLORIDE 50 MG/ML
12.5 INJECTION INTRAMUSCULAR; INTRAVENOUS
Status: ACTIVE | OUTPATIENT
Start: 2022-01-19 | End: 2022-01-19

## 2022-01-19 RX ORDER — ISOSORBIDE MONONITRATE 30 MG/1
30 TABLET, EXTENDED RELEASE ORAL DAILY
Status: DISCONTINUED | OUTPATIENT
Start: 2022-01-19 | End: 2022-01-20 | Stop reason: HOSPADM

## 2022-01-19 RX ORDER — FLUTICASONE PROPIONATE 50 MCG
1 SPRAY, SUSPENSION (ML) NASAL DAILY PRN
Status: DISCONTINUED | OUTPATIENT
Start: 2022-01-19 | End: 2022-01-20 | Stop reason: HOSPADM

## 2022-01-19 RX ORDER — VANCOMYCIN HYDROCHLORIDE 1 G/20ML
INJECTION, POWDER, LYOPHILIZED, FOR SOLUTION INTRAVENOUS PRN
Status: DISCONTINUED | OUTPATIENT
Start: 2022-01-19 | End: 2022-01-19 | Stop reason: SDUPTHER

## 2022-01-19 RX ORDER — CARVEDILOL 3.12 MG/1
3.12 TABLET ORAL 2 TIMES DAILY WITH MEALS
Status: DISCONTINUED | OUTPATIENT
Start: 2022-01-19 | End: 2022-01-20 | Stop reason: HOSPADM

## 2022-01-19 RX ORDER — ASPIRIN 81 MG/1
81 TABLET ORAL DAILY
Status: DISCONTINUED | OUTPATIENT
Start: 2022-01-20 | End: 2022-01-20 | Stop reason: HOSPADM

## 2022-01-19 RX ORDER — FAMOTIDINE 20 MG/1
20 TABLET, FILM COATED ORAL DAILY
Status: DISCONTINUED | OUTPATIENT
Start: 2022-01-20 | End: 2022-01-20 | Stop reason: HOSPADM

## 2022-01-19 RX ORDER — VENLAFAXINE 37.5 MG/1
75 TABLET ORAL DAILY
Status: DISCONTINUED | OUTPATIENT
Start: 2022-01-20 | End: 2022-01-20 | Stop reason: HOSPADM

## 2022-01-19 RX ORDER — DOCUSATE SODIUM 100 MG/1
100 CAPSULE, LIQUID FILLED ORAL 2 TIMES DAILY
COMMUNITY
End: 2022-08-01 | Stop reason: ALTCHOICE

## 2022-01-19 RX ORDER — FENTANYL CITRATE 50 UG/ML
50 INJECTION, SOLUTION INTRAMUSCULAR; INTRAVENOUS EVERY 5 MIN PRN
Status: DISCONTINUED | OUTPATIENT
Start: 2022-01-19 | End: 2022-01-20 | Stop reason: HOSPADM

## 2022-01-19 RX ORDER — PROPOFOL 10 MG/ML
INJECTION, EMULSION INTRAVENOUS PRN
Status: DISCONTINUED | OUTPATIENT
Start: 2022-01-19 | End: 2022-01-19 | Stop reason: SDUPTHER

## 2022-01-19 RX ORDER — TORSEMIDE 10 MG/1
20 TABLET ORAL EVERY OTHER DAY
Status: DISCONTINUED | OUTPATIENT
Start: 2022-01-20 | End: 2022-01-20 | Stop reason: HOSPADM

## 2022-01-19 RX ORDER — IPRATROPIUM BROMIDE AND ALBUTEROL SULFATE 2.5; .5 MG/3ML; MG/3ML
1 SOLUTION RESPIRATORY (INHALATION) EVERY 6 HOURS PRN
Status: DISCONTINUED | OUTPATIENT
Start: 2022-01-19 | End: 2022-01-20 | Stop reason: HOSPADM

## 2022-01-19 RX ADMIN — SODIUM CHLORIDE: 9 INJECTION, SOLUTION INTRAVENOUS at 12:55

## 2022-01-19 RX ADMIN — DOCUSATE SODIUM 100 MG: 100 CAPSULE ORAL at 20:52

## 2022-01-19 RX ADMIN — SACUBITRIL AND VALSARTAN 1 TABLET: 24; 26 TABLET, FILM COATED ORAL at 20:52

## 2022-01-19 RX ADMIN — HYDROMORPHONE HYDROCHLORIDE 0.25 MG: 1 INJECTION, SOLUTION INTRAMUSCULAR; INTRAVENOUS; SUBCUTANEOUS at 19:12

## 2022-01-19 RX ADMIN — CHLORHEXIDINE GLUCONATE: 4 LIQUID TOPICAL at 13:02

## 2022-01-19 RX ADMIN — EPHEDRINE SULFATE 10 MG: 50 INJECTION INTRAMUSCULAR; INTRAVENOUS; SUBCUTANEOUS at 16:49

## 2022-01-19 RX ADMIN — DEXAMETHASONE SODIUM PHOSPHATE 4 MG: 10 INJECTION, SOLUTION INTRAMUSCULAR; INTRAVENOUS at 15:24

## 2022-01-19 RX ADMIN — PROPOFOL 80 MG: 10 INJECTION, EMULSION INTRAVENOUS at 15:12

## 2022-01-19 RX ADMIN — HYDROMORPHONE HYDROCHLORIDE 0.5 MG: 1 INJECTION, SOLUTION INTRAMUSCULAR; INTRAVENOUS; SUBCUTANEOUS at 19:21

## 2022-01-19 RX ADMIN — PHENYLEPHRINE HYDROCHLORIDE 150 MCG: 10 INJECTION INTRAVENOUS at 15:55

## 2022-01-19 RX ADMIN — PHENYLEPHRINE HYDROCHLORIDE 100 MCG: 10 INJECTION INTRAVENOUS at 17:09

## 2022-01-19 RX ADMIN — HYDROMORPHONE HYDROCHLORIDE 0.5 MG: 1 INJECTION, SOLUTION INTRAMUSCULAR; INTRAVENOUS; SUBCUTANEOUS at 18:56

## 2022-01-19 RX ADMIN — GLYCOPYRROLATE 0.1 MG: 0.2 INJECTION, SOLUTION INTRAMUSCULAR; INTRAVENOUS at 16:34

## 2022-01-19 RX ADMIN — ISOSORBIDE MONONITRATE 30 MG: 30 TABLET, EXTENDED RELEASE ORAL at 20:52

## 2022-01-19 RX ADMIN — FENTANYL CITRATE 25 MCG: 50 INJECTION, SOLUTION INTRAMUSCULAR; INTRAVENOUS at 15:21

## 2022-01-19 RX ADMIN — PHENYLEPHRINE HYDROCHLORIDE 100 MCG: 10 INJECTION INTRAVENOUS at 17:31

## 2022-01-19 RX ADMIN — PHENYLEPHRINE HYDROCHLORIDE 200 MCG: 10 INJECTION INTRAVENOUS at 16:16

## 2022-01-19 RX ADMIN — PHENYLEPHRINE HYDROCHLORIDE 100 MCG: 10 INJECTION INTRAVENOUS at 15:42

## 2022-01-19 RX ADMIN — ONDANSETRON 4 MG: 2 INJECTION INTRAMUSCULAR; INTRAVENOUS at 17:43

## 2022-01-19 RX ADMIN — PHENYLEPHRINE HYDROCHLORIDE 100 MCG: 10 INJECTION INTRAVENOUS at 17:33

## 2022-01-19 RX ADMIN — SODIUM CHLORIDE 125 ML/HR: 9 INJECTION, SOLUTION INTRAVENOUS at 19:10

## 2022-01-19 RX ADMIN — EPHEDRINE SULFATE 10 MG: 50 INJECTION INTRAMUSCULAR; INTRAVENOUS; SUBCUTANEOUS at 16:36

## 2022-01-19 RX ADMIN — PHENYLEPHRINE HYDROCHLORIDE 100 MCG: 10 INJECTION INTRAVENOUS at 15:46

## 2022-01-19 RX ADMIN — CARVEDILOL 3.12 MG: 3.12 TABLET, FILM COATED ORAL at 20:52

## 2022-01-19 RX ADMIN — PHENYLEPHRINE HYDROCHLORIDE 150 MCG: 10 INJECTION INTRAVENOUS at 16:08

## 2022-01-19 RX ADMIN — PHENYLEPHRINE HYDROCHLORIDE 100 MCG: 10 INJECTION INTRAVENOUS at 15:51

## 2022-01-19 RX ADMIN — SODIUM CHLORIDE: 9 INJECTION, SOLUTION INTRAVENOUS at 20:04

## 2022-01-19 RX ADMIN — HYDROCODONE BITARTRATE AND ACETAMINOPHEN 1 TABLET: 10; 325 TABLET ORAL at 20:52

## 2022-01-19 RX ADMIN — PHENYLEPHRINE HYDROCHLORIDE 100 MCG: 10 INJECTION INTRAVENOUS at 16:49

## 2022-01-19 RX ADMIN — PHENYLEPHRINE HYDROCHLORIDE 200 MCG: 10 INJECTION INTRAVENOUS at 17:01

## 2022-01-19 RX ADMIN — VANCOMYCIN HYDROCHLORIDE 1000 MG: 1 INJECTION, POWDER, LYOPHILIZED, FOR SOLUTION INTRAVENOUS at 15:21

## 2022-01-19 RX ADMIN — ATORVASTATIN CALCIUM 40 MG: 40 TABLET, FILM COATED ORAL at 20:52

## 2022-01-19 RX ADMIN — PHENYLEPHRINE HYDROCHLORIDE 150 MCG: 10 INJECTION INTRAVENOUS at 16:05

## 2022-01-19 RX ADMIN — PHENYLEPHRINE HYDROCHLORIDE 50 MCG: 10 INJECTION INTRAVENOUS at 15:40

## 2022-01-19 RX ADMIN — PROPOFOL 20 MG: 10 INJECTION, EMULSION INTRAVENOUS at 18:15

## 2022-01-19 RX ADMIN — MUPIROCIN: 20 OINTMENT TOPICAL at 13:01

## 2022-01-19 RX ADMIN — HYDROMORPHONE HYDROCHLORIDE 0.25 MG: 1 INJECTION, SOLUTION INTRAMUSCULAR; INTRAVENOUS; SUBCUTANEOUS at 19:04

## 2022-01-19 RX ADMIN — FENTANYL CITRATE 25 MCG: 50 INJECTION, SOLUTION INTRAMUSCULAR; INTRAVENOUS at 15:53

## 2022-01-19 RX ADMIN — FENTANYL CITRATE 50 MCG: 50 INJECTION, SOLUTION INTRAMUSCULAR; INTRAVENOUS at 15:11

## 2022-01-19 RX ADMIN — PHENYLEPHRINE HYDROCHLORIDE 50 MCG: 10 INJECTION INTRAVENOUS at 16:36

## 2022-01-19 RX ADMIN — LIDOCAINE HYDROCHLORIDE 50 MG: 10 INJECTION, SOLUTION INFILTRATION; PERINEURAL at 15:12

## 2022-01-19 RX ADMIN — PROPOFOL 40 MG: 10 INJECTION, EMULSION INTRAVENOUS at 15:13

## 2022-01-19 RX ADMIN — IOPAMIDOL 40 ML: 612 INJECTION, SOLUTION INTRAVENOUS at 18:00

## 2022-01-19 RX ADMIN — PHENYLEPHRINE HYDROCHLORIDE 50 MCG: 10 INJECTION INTRAVENOUS at 15:39

## 2022-01-19 RX ADMIN — PHENYLEPHRINE HYDROCHLORIDE 150 MCG: 10 INJECTION INTRAVENOUS at 16:00

## 2022-01-19 ASSESSMENT — PAIN SCALES - GENERAL
PAINLEVEL_OUTOF10: 5
PAINLEVEL_OUTOF10: 7
PAINLEVEL_OUTOF10: 4
PAINLEVEL_OUTOF10: 5
PAINLEVEL_OUTOF10: 7

## 2022-01-19 ASSESSMENT — ENCOUNTER SYMPTOMS: SHORTNESS OF BREATH: 1

## 2022-01-19 ASSESSMENT — LIFESTYLE VARIABLES: SMOKING_STATUS: 0

## 2022-01-19 NOTE — LETTER
[facility]  Cardiac Rehab Department  [address] [facility] [address]  [phone]  Toll Free [phone]              January 21, 2022    Dear Swati Cifuentes,    Please find enclosed information concerning the care of your pacemaker insertion site and the guidelines you should follow for the next four weeks of your recovery. Each of these recommendations apply unless you were specifically instructed otherwise by your cardiologist.    If you have not already received one, a transtelephonic patient transmitter has been ordered for you on your behalf. When in your possession and appropriate, unbox the transmitter, plug it in and complete your first pacemaker check by following the instructional pictures in the easy-to-follow pamphlet or on the transmitter itself. In the event you have a 'smartphone', an armen may have been downloaded on your phone during your hospitilization to allow you to use your phone for the same purpose. If any questions or concerns arise or you experience difficulty completing the steps stated above, you should contact your cardiologist's office for assistance. Rest assured that the use of your transmitter will be reviewed with you as needed during your upcoming follow-up visit in Dr. Ochoa's office or via teleconference. Thank you. To Your [facility],        [facility] Cardiac Rehab Staff    JOSE Chris, MA Viola Roberts, RN  Registered Nurse  Exercise Physiologist  Registered Nurse

## 2022-01-19 NOTE — ANESTHESIA PRE PROCEDURE
Department of Anesthesiology  Preprocedure Note       Name:  Rajesh Mak   Age:  64 y.o.  :  1945                                          MRN:  [id number]         Date:  2022      Surgeon: * Surgery not found *    Procedure:     Medications prior to admission:   Prior to Admission medications    Medication Sig Start Date End Date Taking? Authorizing Provider   docusate sodium (COLACE) 100 MG capsule Take 100 mg by mouth 2 times daily   Yes Historical Provider, MD   carvedilol (COREG) 3.125 MG tablet Take 3.125 mg by mouth 2 times daily (with meals) 21 Yes Historical Provider, MD   sacubitril-valsartan (ENTRESTO) 24-26 MG per tablet Take 1 tablet by mouth 2 times daily 21 Yes Historical Provider, MD   ALPRAZolam Oletha Wake[place]) 0.5 MG tablet Take 0.5 mg by mouth every 8 hours as needed for Sleep. Yes Historical Provider, MD   ipratropium-albuterol (DUONEB) 0.5-2.5 (3) MG/3ML SOLN nebulizer solution Inhale 1 vial into the lungs every 6 hours as needed for Shortness of Breath   Yes Historical Provider, MD   polyethylene glycol (GLYCOLAX) 17 g packet Take 17 g by mouth daily as needed for Constipation   Yes Historical Provider, MD   isosorbide mononitrate (IMDUR) 30 MG extended release tablet Take 1 tablet by mouth daily 22  Yes Josi Adan MD   torsemide (DEMADEX) 20 MG tablet Take 20 mg by mouth every other day    Yes Historical Provider, MD   HYDROcodone-acetaminophen (NORCO)  MG per tablet Take 1 tablet by mouth every 6 hours as needed for Pain for up to 30 days.  21 Yes Elizabeth Gardiner MD   atorvastatin (LIPITOR) 40 MG tablet TAKE ONE (1) TABLET EVERY DAY 21  Yes Elizabeth Gardiner MD   famotidine (PEPCID) 20 MG tablet Take 1 tablet by mouth 2 times daily 21  Yes TREV Carter   venlafaxine (EFFEXOR) 75 MG tablet TAKE ONE (1) TABLET BY MOUTH EVERY DAY 21  Yes Elizabeth Gardiner MD   clopidogrel (PLAVIX) 75 MG tablet TAKE ONE (1) TABLET BY MOUTH EVERY DAY   Patient taking differently: Take 75 mg by mouth daily  1/20/21  Yes Sherry Suárez MD   aspirin 81 MG EC tablet Take 1 tablet by mouth daily 8/5/16  Yes Sean Sullivan MD   gabapentin (NEURONTIN) 100 MG capsule Take 1 capsule by mouth 3 times daily for 30 days. 10/28/21 11/27/21  Sherry Suárez MD   fluticasone (FLONASE) 50 MCG/ACT nasal spray 1 spray by Each Nostril route daily as needed     Historical Provider, MD   nitroGLYCERIN (NITROSTAT) 0.4 MG SL tablet Place 0.4 mg under the tongue every 5 minutes as needed for Chest pain    Historical Provider, MD       Current medications:    Current Facility-Administered Medications   Medication Dose Route Frequency Provider Last Rate Last Admin    0.9 % sodium chloride infusion   IntraVENous Continuous Teresa Ritter  mL/hr at 01/19/22 1255 New Bag at 01/19/22 1255    ceFAZolin (ANCEF) 2000 mg in 0.9% sodium chloride 50 mL IVPB  2,000 mg IntraVENous On Call to [facility], MD           Allergies:     Allergies   Allergen Reactions    Influenza Virus Vaccine Swelling    Penicillin G Itching       Problem List:    Patient Active Problem List   Diagnosis Code    AAA (abdominal aortic aneurysm) ([facility] I71.4    Mixed hyperlipidemia E78.2    Cerebrovascular accident (CVA) due to embolism ([facility] I63.9    Obstructive sleep apnea syndrome G47.33    CKD (chronic kidney disease) stage 3, GFR 30-59 ml/min ([facility]) N18.30    Coronary artery disease involving native heart without angina pectoris I25.10    Cerebrovascular accident (CVA) due to embolism of right middle cerebral artery ([address] I63.411    Essential hypertension I10    Stenosis of right carotid artery I65.21    Injury of right hypoglossal nerve S04.891A    Depression with anxiety F41.8    Hyperglycemia R73.9    Gastroesophageal reflux disease without esophagitis K21.9    Simple chronic bronchitis ([facility]) J41.0    Chronic midline low back pain without sciatica M54.50, G89.29    Recurrent major depressive disorder in partial remission ([facility]) F33.41    Hyperparathyroidism ([address] E21.3    Atherosclerosis of native artery of both lower extremities ([facility]) I70.203    Bilateral carotid artery stenosis I65.23    Symptomatic bradycardia R00.1    Complete atrioventricular block (HCC) I44.2    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    Acute on chronic systolic CHF (congestive heart failure) ([facility]) I50.23    Shortness of breath R06.02    PND (paroxysmal nocturnal dyspnea) R06.00    Hypoxemia R09.02    Unstable angina ([facility]) I20.0       Past Medical History:        Diagnosis Date    AAA (abdominal aortic aneurysm) ([facility])     Acute on chronic systolic CHF (congestive heart failure) ([address] 7/26/2021    Aortic dissection ([address] 10/10/2012    CAD (coronary artery disease)     stents    Cancer (HCC)     lung&lymphnode    Carotid artery occlusion     Cerebrovascular accident (CVA) ([address]     Cerebrovascular accident (CVA) due to embolism ([address] 02/16/2016    Cerebrovascular accident (CVA) due to embolism of right middle cerebral artery ([address]     COVID-19 2/6/2021    Depression     HTN (hypertension)     Hyperlipidemia     MI (myocardial infarction) ([address]     acute 5/13/2015    Non Hodgkin's lymphoma ([address]     Palliative care patient 02/16/2021    Sleep apnea     no c-pap    Unspecified sleep apnea        Past Surgical History:        Procedure Laterality Date    CARDIAC CATHETERIZATION  5/13/15  [address]    EF 40%. stent to LAD, stent to posterior descending branch of RCA.  CAROTID ENDARTERECTOMY Right 7/22/2016    Right carotid endarterectomy,carotid arteriograms. S    COLONOSCOPY  04/09/2018    Tracey Kevin ENDOSCOPY, COLON, DIAGNOSTIC      LUNG CANCER SURGERY      right side    LYMPHADENECTOMY      removed lymphnodes     PACEMAKER PLACEMENT      RECTAL SURGERY      fistula     UPPER GASTROINTESTINAL ENDOSCOPY N/A 1/8/2020    Dr Alisson Pinedo Marissa Augustin (Dr Mery Clinton pt) w/EUS-Small low grade GIST in the stomach, repeat in 1 yr   [facility] UPPER GASTROINTESTINAL ENDOSCOPY  11/08/2019    Juan Manuel: normal    UPPER GASTROINTESTINAL ENDOSCOPY  04/13/2018    Dr Bharat Nuñez: normal, 2cm HH, small amount of food in stomach w/dark fluid,but not kathy blood    VASCULAR SURGERY  10/29/12 S    open exposure of R common femoral artery; percutaneous cannulation of L  femoral artery with 7-Bengali sheath; suprarenal abd aortagram with bilat iliofem arteriogram; endoluminal graft repair of AAA with endologix [id number] main body, 28x95 infrarenal cuff; balloon angioplasty of infrarenal abd aorta and endoluminal graft with coda balloon; completion abd aortogram w bilat iliofem arteriogram;     VASCULAR SURGERY  10/29/ S     (cont) open repair of right common femoral ateriotomy; perclose repair of left common femoral artery puncture site. Social History:    Social History     Tobacco Use    Smoking status: Former Smoker     Packs/day: 0.50     Years: 40.00     Pack years: 20.00    Smokeless tobacco: Never Used    Tobacco comment: quit smoking Dec 10, 2021. Substance Use Topics    Alcohol use: No                                Counseling given: Not Answered  Comment: quit smoking Dec 10, 2021.        Vital Signs (Current):   Vitals:    01/19/22 1220 01/19/22 1235   BP: (!) 145/96    Pulse: 66 76   Resp: 16    Temp: 97.5 °F (36.4 °C)    TempSrc: Temporal    SpO2: 100%    Weight: 220 lb (99.8 kg)    Height: 5' 9\" (1.753 m)                                               BP Readings from Last 3 Encounters:   01/19/22 (!) 145/96   01/12/22 135/60   01/03/22 122/70       NPO Status: Time of last liquid consumption: 0600 (sip with meds)                        Time of last solid consumption: 1800                        Date of last liquid consumption: 01/19/22                        Date of last solid food consumption: 01/18/22    BMI:   Wt Readings from Last 3 Encounters:   01/19/22 220 lb treated right lobe resection   Cardiovascular:  Exercise tolerance: poor (<4 METS),   (+) hypertension:, angina:, pacemaker (originally placed for bradycardia): pacemaker and no interval change, past MI: no interval change, CAD:, CABG/stent (last stenting ~ 10 years ago):, CHF: systolic, PND, hyperlipidemia         Beta Blocker:  Dose within 24 Hrs         Neuro/Psych:   (+) CVA (left sided weakness): residual symptoms, neuromuscular disease:, psychiatric history:   (-) seizures           GI/Hepatic/Renal:   (+) GERD: well controlled, renal disease: CRI,           Endo/Other:    (+) blood dyscrasia (plavix use last dose this am): anemia:., no malignancy/cancer (non hodgkins lymphoma treated with chemo). (-) diabetes mellitus, no malignancy/cancer (non hodgkins lymphoma treated with chemo)               Abdominal:   (+) obese,           Vascular:   + PVD, aortic or cerebral, .  - DVT and PE. Other Findings:             Anesthesia Plan      general     ASA 4       Induction: intravenous. MIPS: Postoperative opioids intended and Prophylactic antiemetics administered. Anesthetic plan and risks discussed with patient.                       Cayla Rose, DO   1/19/2022

## 2022-01-19 NOTE — H&P
Office Visit    1/3/2022  [facility] MD    Interventional Cardiology  Shortness of breath +7 more    Dx  Follow-up; Referred by Jorge L Lujan MD    Reason for Visit       Progress Notes  Chary Watkins MD (Physician) [facility] Interventional Cardiology  Expand All Collapse All  [facility]                              Date:                1/3/2022  Patient:            Vijay Hernandez  Age:                 64 y.o., 1945        Reason for evaluation:            SUBJECTIVE:    Returns today for follow-up assessment. Apparently he was recently hospitalized at [place] [address] for several days starting around 12/12/2021 started on Entresto for LV dysfunction replacing lisinopril. Apparently an echocardiogram was done at their facility showing ejection fraction had progressively worsened to less than 20%. Denies anginal chest pain. At the present time his dyspnea appears stable. The physician there had spoken with the patient and family about a possible cardiac catheterization and possible need for upgrade of his dual-chamber pacemaker to a biventricular ICD. His EKG prior to the pacemaker being implanted showed a right bundle branch block and at the time of his original implant his left ventricular ejection fraction was 40% therefore not a candidate for ICD implant at that time. Creatinine around 1.6. No other complaints or issues reported.     Review of Systems   Constitutional: Negative. Negative for chills, fever and unexpected weight change. HENT: Negative. Eyes: Negative. Respiratory: Negative. Negative for shortness of breath. Cardiovascular: Negative. Negative for chest pain. Gastrointestinal: Negative. Negative for diarrhea, nausea and vomiting. Endocrine: Negative. Genitourinary: Negative. Musculoskeletal: Negative. Skin: Negative. Neurological: Negative.     All other systems reviewed and are negative.           OBJECTIVE:     /70   Pulse 77   Ht 5' 9\" (1.753 m)   Wt 230 lb (104.3 kg)   BMI 33.97 kg/m²      Labs:   CBC: No results for input(s): WBC, HGB, HCT, PLT in the last 72 hours. BMP:No results for input(s): NA, K, CO2, BUN, CREATININE, LABGLOM, GLUCOSE in the last 72 hours. BNP: No results for input(s): BNP in the last 72 hours. PT/INR: No results for input(s): PROTIME, INR in the last 72 hours. APTT:No results for input(s): APTT in the last 72 hours. CARDIAC ENZYMES:No results for input(s): CKTOTAL, CKMB, CKMBINDEX, TROPONINI in the last 72 hours. FASTING LIPID PANEL:        Lab Results   Component Value Date     HDL 36 07/19/2021     LDLDIRECT 71 02/11/2016     LDLCALC 34 07/19/2021     TRIG 106 07/19/2021      LIVER PROFILE:No results for input(s): AST, ALT, LABALBU in the last 72 hours.         Past Medical History        Past Medical History:   Diagnosis Date    AAA (abdominal aortic aneurysm) ([address]      Acute on chronic systolic CHF (congestive heart failure) ([address] 7/26/2021    Aortic dissection ([address] 10/10/2012    CAD (coronary artery disease)      Cancer (HCC)       lung&lymphnode    Carotid artery occlusion      Cerebrovascular accident (CVA) ([address]      Cerebrovascular accident (CVA) due to embolism ([address] 02/16/2016    Cerebrovascular accident (CVA) due to embolism of right middle cerebral artery ([address]      COVID-19 2/6/2021    Depression      HTN (hypertension)      Hyperlipidemia      MI (myocardial infarction) ([address]       acute 5/13/2015    Non Hodgkin's lymphoma ([address]      Palliative care patient 02/16/2021    Sleep apnea       no c-pap    Unspecified sleep apnea           Past Surgical History         Past Surgical History:   Procedure Laterality Date    CARDIAC CATHETERIZATION   [facility]     EF 40%. stent to LAD, stent to posterior descending branch of RCA.     CAROTID ENDARTERECTOMY Right 7/22/2016     Right carotid endarterectomy,carotid arteriograms. [facility]    COLONOSCOPY   04/09/2018     Juan Manuel    ENDOSCOPY, COLON, DIAGNOSTIC        LUNG CANCER SURGERY         right side    LYMPHADENECTOMY         removed lymphnodes     RECTAL SURGERY         fistula     UPPER GASTROINTESTINAL ENDOSCOPY N/A 1/8/2020     Dr Bayron Lamb ( [facility] pt) w/EUS-Small low grade GIST in the stomach, repeat in 1 yr    UPPER GASTROINTESTINAL ENDOSCOPY   11/08/2019     Juan Manuel: normal    UPPER GASTROINTESTINAL ENDOSCOPY   04/13/2018     Dr Karena Troy: normal, 2cm HH, small amount of food in stomach w/dark fluid,but not kathy blood    VASCULAR SURGERY   10/29/12 [facility]     open exposure of R common femoral artery; percutaneous cannulation of L  femoral artery with 7-french sheath; suprarenal abd aortagram with bilat iliofem arteriogram; endoluminal graft repair of AAA with endologix [id number] main body, 28x95 infrarenal cuff; balloon angioplasty of infrarenal abd aorta and endoluminal graft with coda balloon; completion abd aortogram w bilat iliofem arteriogram;     VASCULAR SURGERY   10/29/ [facility]      (cont) open repair of right common femoral ateriotomy; perclose repair of left common femoral artery puncture site.          Family History         Family History   Problem Relation Age of Onset    High Blood Pressure Father      High Blood Pressure Mother      Diabetes Mother      Colon Polyps Brother      Cancer Brother      Colon Cancer Neg Hx           No Known Allergies  Current Facility-Administered Medications          Current Outpatient Medications   Medication Sig Dispense Refill    torsemide (DEMADEX) 20 MG tablet Take 20 mg by mouth daily        HYDROcodone-acetaminophen (NORCO)  MG per tablet Take 1 tablet by mouth every 6 hours as needed for Pain for up to 30 days.  120 tablet 0    fluticasone (FLONASE) 50 MCG/ACT nasal spray 1 spray by Each Nostril route daily        atorvastatin (LIPITOR) 40 MG tablet TAKE ONE (1) TABLET EVERY DAY 90 tablet 3    famotidine (PEPCID) 20 MG tablet Take 1 tablet by mouth 2 times daily 180 tablet 2    venlafaxine (EFFEXOR) 75 MG tablet TAKE ONE (1) TABLET BY MOUTH EVERY DAY 90 tablet 2    Cholecalciferol (VITAMIN D3) 1.25 MG (58414 UT) CAPS Take by mouth         clopidogrel (PLAVIX) 75 MG tablet TAKE ONE (1) TABLET BY MOUTH EVERY DAY  (Patient taking differently: Take 75 mg by mouth daily ) 90 tablet 1    aspirin 81 MG EC tablet Take 1 tablet by mouth daily 30 tablet 3    nitroGLYCERIN (NITROSTAT) 0.4 MG SL tablet Place 0.4 mg under the tongue every 5 minutes as needed for Chest pain        gabapentin (NEURONTIN) 100 MG capsule Take 1 capsule by mouth 3 times daily for 30 days. 90 capsule 5    doxazosin (CARDURA) 2 MG tablet Take 1 tablet by mouth nightly 30 tablet 5      No current facility-administered medications for this visit.         Social History               Socioeconomic History    Marital status:        Spouse name: Not on file    Number of children: Not on file    Years of education: Not on file    Highest education level: Not on file   Occupational History    Not on file   Tobacco Use    Smoking status: Former Smoker       Packs/day: 0.50       Years: 40.00       Pack years: 20.00    Smokeless tobacco: Never Used    Tobacco comment: quit smoking Dec 10, 2021.  quit smoking in nov 2017, restarted smoking since then   Vaping Use    Vaping Use: Never used   Substance and Sexual Activity    Alcohol use: No    Drug use: No    Sexual activity: Not on file   Other Topics Concern    Not on file   Social History Narrative      50+ yearsHe has 1 son and 1 daughterWorks for a telephone companyPreviously [address] active duty 4-1/2 yearsHe does not go to the [employer] high school 2 years of collegeEnjoys hunting and Realtime GamesDenies alcohol or substance usage he does smoke 1 pack/dayPhysically sedentary      Social Determinants of Health          Financial Resource Strain:  Difficulty of Paying Living Expenses: Not on file   Food Insecurity:     Worried About Running Out of Food in the Last Year: Not on file    Ran Out of Food in the Last Year: Not on file   Transportation Needs:     Lack of Transportation (Medical): Not on file    Lack of Transportation (Non-Medical): Not on file   Physical Activity:     Days of Exercise per Week: Not on file    Minutes of Exercise per Session: Not on file   Stress:     Feeling of Stress : Not on file   Social Connections:     Frequency of Communication with Friends and Family: Not on file    Frequency of Social Gatherings with Friends and Family: Not on file    Attends Latter day Services: Not on file    Active Member of [address] [place] [address] [employer] or Organizations: Not on file    Attends Club or Organization Meetings: Not on file    Marital Status: Not on file   Intimate Partner Violence:     Fear of Current or Ex-Partner: Not on file    Emotionally Abused: Not on file    Physically Abused: Not on file    Sexually Abused: Not on file   Housing Stability:     Unable to Pay for Housing in the Last Year: Not on file    Number of [place] in the Last Year: Not on file    Unstable Housing in the Last Year: Not on file            Physical Examination:  /70   Pulse 77   Ht 5' 9\" (1.753 m)   Wt 230 lb (104.3 kg)   BMI 33.97 kg/m²   Physical Exam  Vitals reviewed. Constitutional:       Appearance: He is well-developed. Neck:      Vascular: No carotid bruit or JVD. Cardiovascular:      Rate and Rhythm: Normal rate and regular rhythm. Heart sounds: Normal heart sounds. No murmur heard. No friction rub. No gallop. Pulmonary:      Effort: Pulmonary effort is normal. No respiratory distress. Breath sounds: Normal breath sounds. No wheezing or rales. Abdominal:      General: There is no distension. Tenderness: There is no abdominal tenderness. Lymphadenopathy:      Cervical: No cervical adenopathy.    Skin:     General: Skin is warm and dry.                ASSESSMENT:       Diagnosis Orders   1. Complete atrioventricular block (HCC)  EKG 12 lead   2. Acute on chronic systolic (congestive) heart failure (HCC)      3. PVD (peripheral vascular disease) (HCC)      4. Essential hypertension      5. Shortness of breath      6. Symptomatic bradycardia      7. Coronary artery disease involving native coronary artery of native heart without angina pectoris      8. Mixed hyperlipidemia            PLAN:      Orders Placed This Encounter   Procedures    EKG 12 lead        Encounter Medications    No orders of the defined types were placed in this encounter.            1. Continue present medications  2. Recommend cardiac catheterization for definitive assessment advise indication alternatives benefits and risk patient agreeable arrange as soon as feasible. I have discussed with the patient regarding indications for the proposed procedure LEFT HEART CATHETERIZATION AND POSSIBLE PERCUTANEOUS INTERVENTION  along with possible alternatives benefits and risks including but not limited to risks of death, myocardial infarction, stroke, contrast induced nephropathy which in some cases may lead to acute kidney failure requiring dialysis, allergic reactions, bleeding requiring blood transfusion,  cardiac arrhthymias, respiratory failure which may require placing the patient on respiratory support such as a ventilator or breathing machine,risk of complications which may require vascular surgery, and if coronary intervention is performed emergency CABG may be required in less than 1% of cases.  The patient is awake and alert and understands the issues involved and indicates willingness to proceed as ordered.     The patient does not have any contraindications to dual antiplatelet therapy.     The patient does not have any known  pending surgical procedures in the next 12 months at this time.     The patient is  a reasonable candidate for moderate conscious sedation.     ASA score:  ASA 3 - Patient with moderate systemic disease with functional limitations     Mallampati: I (soft palate, uvula, fauces, tonsillar pillars visible)     Preferred vascular access site will be: right radial artery           Return in about 4 weeks (around 1/31/2022) for return to Dr. Jad Garcia only.             No significant interval history change since above visit here for device upgrade

## 2022-01-20 VITALS
BODY MASS INDEX: 32.58 KG/M2 | HEIGHT: 69 IN | DIASTOLIC BLOOD PRESSURE: 59 MMHG | SYSTOLIC BLOOD PRESSURE: 114 MMHG | OXYGEN SATURATION: 97 % | RESPIRATION RATE: 18 BRPM | WEIGHT: 220 LBS | TEMPERATURE: 97.4 F | HEART RATE: 79 BPM

## 2022-01-20 LAB
EKG P AXIS: -67 DEGREES
EKG P-R INTERVAL: 174 MS
EKG Q-T INTERVAL: 446 MS
EKG QRS DURATION: 146 MS
EKG QTC CALCULATION (BAZETT): 399 MS
EKG T AXIS: 113 DEGREES

## 2022-01-20 PROCEDURE — 99024 POSTOP FOLLOW-UP VISIT: CPT | Performed by: INTERNAL MEDICINE

## 2022-01-20 PROCEDURE — 6370000000 HC RX 637 (ALT 250 FOR IP)

## 2022-01-20 PROCEDURE — 96374 THER/PROPH/DIAG INJ IV PUSH: CPT

## 2022-01-20 PROCEDURE — 6360000002 HC RX W HCPCS: Performed by: ANESTHESIOLOGY

## 2022-01-20 PROCEDURE — G0378 HOSPITAL OBSERVATION PER HR: HCPCS

## 2022-01-20 PROCEDURE — 2580000003 HC RX 258: Performed by: ANESTHESIOLOGY

## 2022-01-20 PROCEDURE — 6370000000 HC RX 637 (ALT 250 FOR IP): Performed by: INTERNAL MEDICINE

## 2022-01-20 RX ORDER — HYDROCODONE BITARTRATE AND ACETAMINOPHEN 10; 325 MG/1; MG/1
1 TABLET ORAL EVERY 6 HOURS PRN
Qty: 120 TABLET | Refills: 0 | Status: SHIPPED | OUTPATIENT
Start: 2022-01-20 | End: 2022-01-21 | Stop reason: SDUPTHER

## 2022-01-20 RX ADMIN — CARVEDILOL 3.12 MG: 3.12 TABLET, FILM COATED ORAL at 09:28

## 2022-01-20 RX ADMIN — HYDROCODONE BITARTRATE AND ACETAMINOPHEN 1 TABLET: 10; 325 TABLET ORAL at 09:29

## 2022-01-20 RX ADMIN — ASPIRIN 81 MG: 81 TABLET, COATED ORAL at 09:30

## 2022-01-20 RX ADMIN — FAMOTIDINE 20 MG: 20 TABLET ORAL at 09:30

## 2022-01-20 RX ADMIN — HYDROCODONE BITARTRATE AND ACETAMINOPHEN 1 TABLET: 10; 325 TABLET ORAL at 03:34

## 2022-01-20 RX ADMIN — CLOPIDOGREL BISULFATE 75 MG: 75 TABLET ORAL at 09:29

## 2022-01-20 RX ADMIN — SACUBITRIL AND VALSARTAN 1 TABLET: 24; 26 TABLET, FILM COATED ORAL at 09:29

## 2022-01-20 RX ADMIN — VENLAFAXINE 75 MG: 37.5 TABLET ORAL at 09:30

## 2022-01-20 RX ADMIN — TORSEMIDE 20 MG: 10 TABLET ORAL at 09:29

## 2022-01-20 RX ADMIN — ISOSORBIDE MONONITRATE 30 MG: 30 TABLET, EXTENDED RELEASE ORAL at 09:29

## 2022-01-20 RX ADMIN — DOCUSATE SODIUM 100 MG: 100 CAPSULE ORAL at 09:28

## 2022-01-20 RX ADMIN — SODIUM CHLORIDE, PRESERVATIVE FREE 10 ML: 5 INJECTION INTRAVENOUS at 09:30

## 2022-01-20 RX ADMIN — HYDROMORPHONE HYDROCHLORIDE 0.5 MG: 1 INJECTION, SOLUTION INTRAMUSCULAR; INTRAVENOUS; SUBCUTANEOUS at 04:54

## 2022-01-20 RX ADMIN — ALPRAZOLAM 0.5 MG: 0.5 TABLET ORAL at 04:54

## 2022-01-20 ASSESSMENT — PAIN SCALES - GENERAL
PAINLEVEL_OUTOF10: 7
PAINLEVEL_OUTOF10: 5
PAINLEVEL_OUTOF10: 4
PAINLEVEL_OUTOF10: 7

## 2022-01-20 NOTE — PROGRESS NOTES
4 Eyes Skin Assessment    Steve Dc is being assessed upon: Admission    I agree that I, Evelyn Celeste RN, along with *** (either 2 RN's or 1 LPN and 1 RN) have performed a thorough Head to Toe Skin Assessment on the patient. ALL assessment sites listed below have been assessed. Areas assessed by both nurses:     [x]   Head, Face, and Ears   [x]   Shoulders, Back, and Chest  [x]   Arms, Elbows, and Hands   [x]   Coccyx, Sacrum, and Ischium  [x]   Legs, Feet, and Heels    Does the Patient have Skin Breakdown?  No    Maury Prevention initiated: NA  Wound Care Orders initiated: NA    WO nurse consulted for Pressure Injury (Stage 3,4, Unstageable, DTI, NWPT, and Complex wounds) and New or Established Ostomies: NA        Primary Nurse eSignature: Evelyn Celeste RN on 1/19/2022 at 11:00 PM      Co-Signer eSignature: {Esignature:[id number]}

## 2022-01-20 NOTE — PROGRESS NOTES
Gala Parr arrived to room # [id number]. Presented with: post surgical pacemaker  Mental Status: Patient is oriented, alert, coherent, logical, thought processes intact and able to concentrate and follow conversation. Vitals:    01/19/22 2011   BP: 132/81   Pulse: 71   Resp: 14   Temp: 97.3 °F (36.3 °C)   SpO2: 91%     Patient safety contract and falls prevention contract reviewed with patient Yes. Oriented Patient to room. Call light within reach. Yes.   Needs, issues or concerns expressed at this time: no.      Electronically signed by Darrel Cisse RN on 1/19/2022 at 11:02 PM

## 2022-01-20 NOTE — DISCHARGE SUMMARY
Discharge Summary    Janet Brambila  :  1945  MRN:  [id number]    Admit date:  2022  Discharge date:      Admitting Physician:  Tika Vela MD    Advance Directive: Full Code    Consults: none    Primary Care Physician:  Junior Lindsay MD    Discharge Diagnoses: Active Problems:    Decreased cardiac ejection fraction  Resolved Problems:    * No resolved hospital problems. *      Cardiology Specific Data:  Specialty Problems        Cardiology Problems    Complete atrioventricular block (HCC)        Unstable angina (HCC)        AAA (abdominal aortic aneurysm) (HCC)        Mixed hyperlipidemia        Cerebrovascular accident (CVA) due to embolism ([address]        Cerebrovascular accident (CVA) due to embolism of right middle cerebral artery ([address]        Coronary artery disease involving native heart without angina pectoris        Essential hypertension        Stenosis of right carotid artery        Atherosclerosis of native artery of both lower extremities (HCC)        Bilateral carotid artery stenosis        Symptomatic bradycardia        Acute on chronic systolic CHF (congestive heart failure) ([address]              Significant Diagnostic Studies:   XR CHEST PORTABLE    Result Date: 2022  XR CHEST PORTABLE 2022 8:40 PM History: Pacemaker placement. Portable chest x-ray. Comparison is made with 2022. Magnified heart size. Normal appearance of a right-sided 2-lead cardiac pacer. Vascular and interstitial markings suggest vascular congestion. No pneumothorax. 1. Normal appearance of the cardiac pacer with no pneumothorax. 2. Chronic lung changes with interstitial prominence suspicious for early edema.  Signed by Dr Tammy Rios    VL Extremity Venous Right    Result Date: 2022  Vascular Lower Extremities Venous Insufficiency Procedure  Demographics   Patient Name    [facility]  Age                  64                  E   Patient Number  [id number] Gender               Male   Visit Number    [id number]          Interpreting         Gonzalez Brambila                                     Physician   Date of Birth   1945         Referring Physician  Ivette Jin MD   Accession       [id number]         Sonographer  Number  Procedure Type of Study:   Veins:Lower Extremities Venous Insufficiency, VL EXTREMITY VENOUS RIGHT. Risk Factors   - The patient's last creatinine was 1.7 mg/dl. Allergies   - Penicillins.   - Other allergy:(FLU VACCINE). Impression   Unsuccessful subclavian vein access. Signature   ----------------------------------------------------------------  Electronically signed by Rene Ordaz(Interpreting  physician) on 01/19/2022 05:04 PM  ----------------------------------------------------------------        Pertinent Labs:   CBC:   Recent Labs     01/19/22  1151   WBC 8.1   HGB 13.0*        BMP:    Recent Labs     01/19/22  1151      K 5.0      CO2 24   BUN 28*   CREATININE 1.7*   GLUCOSE 111*     INR: No results for input(s): INR in the last 72 hours. Lipids: No results for input(s): CHOL, HDL in the last 72 hours. Invalid input(s): LDLCALCU  ABGs:No results for input(s): PHART, HXE5QXT, PO2ART, LSI9MEX, BEART, HGBAE, L5YKMIUB, CARBOXHGBART, 02THERAPY in the last 72 hours. HgBA1c:  No results for input(s): LABA1C in the last 72 hours. Procedures: Upgrade of previous dual-chamber pacemaker to dual-chamber ICD    Hospital Course: Admitted yesterday to the Cath Lab where his previous incision over the right pacemaker was opened the atrial lead was repositioned but was noted to be still functioning appropriately at the time of upgrade the old RV lead was withdrawn and removed new RV lead was inserted.   We also made an attempt to insert a left ventricular lead but was unsuccessful as due to the tortuosity and anatomy every attempt to advance the coronary sinus catheter resulted in simultaneous movement of

## 2022-01-20 NOTE — ANESTHESIA POSTPROCEDURE EVALUATION
Department of Anesthesiology  Postprocedure Note    Patient: Renée Estevez  MRN: [id number]  YOB: 1945  Date of evaluation: 1/19/2022  Time:  6:34 PM     Procedure Summary     Date: 01/19/22 Room / Location: [facility] CATH LAB    Anesthesia Start: 1919 Anesthesia Stop: 5120    Procedure: CATH LAB WITH ANESTHESIA Diagnosis: Decreased cardiac ejection fraction    Scheduled Providers:  Responsible Provider: TREV Baker CRNA    Anesthesia Type: general ASA Status: 4          Anesthesia Type: general    Lay Phase I:      Lay Phase II:      Last vitals: Reviewed and per EMR flowsheets.        Anesthesia Post Evaluation    Patient location during evaluation: PACU  Patient participation: complete - patient participated  Level of consciousness: awake and alert  Pain score: 0  Airway patency: patent  Nausea & Vomiting: no nausea and no vomiting  Complications: no  Cardiovascular status: hemodynamically stable and blood pressure returned to baseline  Respiratory status: acceptable and face mask  Hydration status: stable

## 2022-01-20 NOTE — CARE COORDINATION
Confirmed with [facility] in admissions at [facility] the pt is able to admit to the facility today and will require covid results prior to dc.      [facility]  [address]: [phone]  F: [phone]

## 2022-01-20 NOTE — PROGRESS NOTES
Pharmacy Renal Adjustment    Luis Ham is a 64 y.o. male. Pharmacy has renally adjusted medications per protocol. Recent Labs     01/19/22  1151   BUN 28*       Recent Labs     01/19/22  1151   CREATININE 1.7*       Estimated Creatinine Clearance: 42 mL/min (A) (based on SCr of 1.7 mg/dL (H)).     Height:   Ht Readings from Last 1 Encounters:   01/19/22 5' 9\" (1.753 m)     Weight:  Wt Readings from Last 1 Encounters:   01/19/22 220 lb (99.8 kg)       Plan: Adjust the following medications based on renal function:           Pepcid to 20mg po daily    Electronically signed by Marcelino Whitfield, [facility] on 1/19/2022 at 8:13 PM

## 2022-01-20 NOTE — PROGRESS NOTES
Cardiac cath note preliminary findings incision over right pacemaker opened up and device explanted onto the chest the atrial lead had to be repositioned but was functioning at the time of evaluation the new RV lead was inserted and subsequently the previously implanted RV lead was withdrawn and removed. We did advance a coronary sinus catheter and guide an attempt to cannulate the coronary sinus but due to the anatomy every attempt at advancement or manipulation of the coronary sinus guide catheter would result and movement of either the atrial or ventricular catheter and at times subsequent dislodgment requiring repeat positioning. After several attempts we elected to abandon this aspect of the procedure and proceeded with implanting a dual-chamber ICD generator. Tolerated well.

## 2022-01-21 ENCOUNTER — TELEPHONE (OUTPATIENT)
Dept: INTERNAL MEDICINE | Age: 77
End: 2022-01-21

## 2022-01-21 DIAGNOSIS — G89.29 CHRONIC MIDLINE LOW BACK PAIN WITHOUT SCIATICA: ICD-10-CM

## 2022-01-21 DIAGNOSIS — M54.50 CHRONIC MIDLINE LOW BACK PAIN WITHOUT SCIATICA: ICD-10-CM

## 2022-01-21 RX ORDER — HYDROCODONE BITARTRATE AND ACETAMINOPHEN 10; 325 MG/1; MG/1
1 TABLET ORAL EVERY 6 HOURS PRN
Qty: 120 TABLET | Refills: 0 | Status: SHIPPED | OUTPATIENT
Start: 2022-01-21 | End: 2022-02-20

## 2022-01-21 NOTE — TELEPHONE ENCOUNTER
SKILLED NURSING FACILITY:   INITIAL CALL POST-HOSPITAL DISCHARGE    SNF: [facility]  [address]    PHONE NUMBER: [phone]    THERAPY: PT/OT     ANTICIPATED LENGTH OF STAY: unknown    Mr. Roseann Ocampo was admitted to [facility] for short term rehab following discharge fro [facility] on 1/20/22 for decreased cardiac ejection fraction. Therapy will be in to evaluate for a plan of care. Estimated length of stay is unknown at this time.

## 2022-01-21 NOTE — TELEPHONE ENCOUNTER
Hanny Jeffrey called to request a refill on his medication. Last office visit : 10/28/2021   Next office visit : 1/24/2022     Last UDS:   Amphetamine Screen, Urine   Date Value Ref Range Status   04/23/2018 Negative  Final     Barbiturate Screen, Urine   Date Value Ref Range Status   04/23/2018 Negative  Final     Benzodiazepine Screen, Urine   Date Value Ref Range Status   04/23/2018 Negative  Final     Cocaine Metabolite Screen, Urine   Date Value Ref Range Status   04/23/2018 Negative  Final     MDMA, Urine   Date Value Ref Range Status   04/23/2018 Negative  Final     Methamphetamine, Urine   Date Value Ref Range Status   04/23/2018 Negative  Final     Opiate Scrn, Ur   Date Value Ref Range Status   04/23/2018 Positive  Final     Oxycodone Screen, Ur   Date Value Ref Range Status   04/23/2018 Negative  Final     PCP Screen, Urine   Date Value Ref Range Status   04/23/2018 Negative  Final     Propoxyphene Screen, Urine   Date Value Ref Range Status   04/23/2018 Negative  Final     Tricyclic Antidepressants, Urine   Date Value Ref Range Status   04/23/2018 Negative  Final       Last Carly Manjeet: 1/21/22  Medication Contract: 4/23/18   Last Fill: 12/20/21    Requested Prescriptions     Pending Prescriptions Disp Refills    HYDROcodone-acetaminophen (NORCO)  MG per tablet 120 tablet 0     Sig: Take 1 tablet by mouth every 6 hours as needed for Pain for up to 30 days. Please approve or refuse this medication.    Anjel Franco

## 2022-01-24 ENCOUNTER — OFFICE VISIT (OUTPATIENT)
Dept: FAMILY MEDICINE CLINIC | Age: 77
End: 2022-01-24
Payer: MEDICARE

## 2022-01-24 VITALS
HEART RATE: 75 BPM | DIASTOLIC BLOOD PRESSURE: 82 MMHG | OXYGEN SATURATION: 100 % | TEMPERATURE: 97.8 F | SYSTOLIC BLOOD PRESSURE: 152 MMHG

## 2022-01-24 DIAGNOSIS — N18.32 STAGE 3B CHRONIC KIDNEY DISEASE (HCC): ICD-10-CM

## 2022-01-24 DIAGNOSIS — I63.411 CEREBROVASCULAR ACCIDENT (CVA) DUE TO EMBOLISM OF RIGHT MIDDLE CEREBRAL ARTERY (HCC): ICD-10-CM

## 2022-01-24 DIAGNOSIS — C49.A2 GASTROINTESTINAL STROMAL TUMOR OF STOMACH (HCC): ICD-10-CM

## 2022-01-24 DIAGNOSIS — I63.40 CEREBROVASCULAR ACCIDENT (CVA) DUE TO EMBOLISM OF CEREBRAL ARTERY (HCC): ICD-10-CM

## 2022-01-24 DIAGNOSIS — J41.0 SIMPLE CHRONIC BRONCHITIS (HCC): ICD-10-CM

## 2022-01-24 DIAGNOSIS — E21.3 HYPERPARATHYROIDISM (HCC): ICD-10-CM

## 2022-01-24 DIAGNOSIS — N18.30 STAGE 3 CHRONIC KIDNEY DISEASE, UNSPECIFIED WHETHER STAGE 3A OR 3B CKD (HCC): ICD-10-CM

## 2022-01-24 DIAGNOSIS — F41.8 DEPRESSION WITH ANXIETY: ICD-10-CM

## 2022-01-24 DIAGNOSIS — G89.29 CHRONIC MIDLINE LOW BACK PAIN WITHOUT SCIATICA: ICD-10-CM

## 2022-01-24 DIAGNOSIS — R93.1 DECREASED CARDIAC EJECTION FRACTION: Primary | ICD-10-CM

## 2022-01-24 DIAGNOSIS — F33.41 RECURRENT MAJOR DEPRESSIVE DISORDER IN PARTIAL REMISSION (HCC): ICD-10-CM

## 2022-01-24 DIAGNOSIS — I10 ESSENTIAL HYPERTENSION: ICD-10-CM

## 2022-01-24 DIAGNOSIS — E78.2 MIXED HYPERLIPIDEMIA: ICD-10-CM

## 2022-01-24 DIAGNOSIS — M54.50 CHRONIC MIDLINE LOW BACK PAIN WITHOUT SCIATICA: ICD-10-CM

## 2022-01-24 DIAGNOSIS — G47.33 OBSTRUCTIVE SLEEP APNEA SYNDROME: ICD-10-CM

## 2022-01-24 PROBLEM — R06.00 PND (PAROXYSMAL NOCTURNAL DYSPNEA): Status: RESOLVED | Noted: 2021-07-26 | Resolved: 2022-01-24

## 2022-01-24 PROCEDURE — 99496 TRANSJ CARE MGMT HIGH F2F 7D: CPT | Performed by: FAMILY MEDICINE

## 2022-01-24 RX ORDER — SACUBITRIL AND VALSARTAN 24; 26 MG/1; MG/1
1 TABLET, FILM COATED ORAL 2 TIMES DAILY
COMMUNITY
End: 2022-03-04 | Stop reason: SDUPTHER

## 2022-01-24 RX ORDER — CARVEDILOL 3.12 MG/1
3.12 TABLET ORAL 2 TIMES DAILY WITH MEALS
COMMUNITY
End: 2022-03-04 | Stop reason: DRUGHIGH

## 2022-01-24 NOTE — PROGRESS NOTES
[facility] PHYSICIAN SERVICES  [facility] FAM[facility] MEDICINE  [id number] [place] [address] Debra Pennington [address]  Dept: [phone]  Dept Fax: [phone]  Loc: [phone]    Soledad Lenz is a 64 y.o. male who presents today for his medical conditions/complaints asnoted below. Soledad Lenz is here for Follow-Up from [facility] date:  1/19/2022                      Discharge date:   January 20, 2022     Admitting Physician:  Teresa Ritter MD     Advance Directive: Full Code     Consults: none     Primary Care Physician:  Sherry Suárez MD     Discharge Diagnoses: Active Problems:    Decreased cardiac ejection fraction  Resolved Problems:    * No resolved hospital problems. *        Inpatient course: Discharge summary reviewed- see chart for full details. \"Admitted [January 19th 2022] to the Cath Lab where his previous incision over the right pacemaker was opened the atrial lead was repositioned but was noted to be still functioning appropriately at the time of upgrade the old RV lead was withdrawn and removed new RV lead was inserted. We also made an attempt to insert a left ventricular lead but was unsuccessful as due to the tortuosity and anatomy every attempt to advance the coronary sinus catheter resulted in simultaneous movement of either the atrial or ventricular leads and after several attempts we elected to abandon this aspect of the procedure. Subsequently dual-chamber generator was implanted he was kept overnight today doing well no hematoma of the wound noted today. His device was interrogated functioning appropriately. Postprocedural chest x-ray satisfactory no evidence of pneumothorax or other complications. Now discharged in stable condition follow-up in the device clinic as arranged. \"    [facility] Risk Score (risk of hospital readmission):Readmission Risk Score: 22    Active Problems:    * No active hospital problems. *      Care management risk score Rising risk (score 2-5) and Complex Care (Scores >=6): 7     Non face to face  following discharge, date last encounter closed (first attempt may havebeen earlier): *No documented post hospital discharge outreach found in the last 14 days    Call initiated 2 business days of discharge: *No response recorded in the last 14 days      HPI andCOURSE SINCE DISCHARGE   CC:  Here today to discuss thefollowing:    Decreased cardiac ejection fraction/CHF:  He does have fatigue but denies any shortness of breath. No orthopnea or PND. He continues to have 2+ pitting edema bilateral lower extremities. He is taking torsemide 20 mg daily. He is tolerating his Entresto and carvedilol. Denies any chest pain or palpitations. HPI    Past Medical History:   Diagnosis Date    AAA (abdominal aortic aneurysm) (HCC)     Acute on chronic systolic CHF (congestive heart failure) ([address] 7/26/2021    Aortic dissection ([address] 10/10/2012    CAD (coronary artery disease)     stents    Cancer (HCC)     lung&lymphnode    Carotid artery occlusion     Cerebrovascular accident (CVA) ([address]     Cerebrovascular accident (CVA) due to embolism ([address] 02/16/2016    Cerebrovascular accident (CVA) due to embolism of right middle cerebral artery ([address]     COVID-19 2/6/2021    Depression     HTN (hypertension)     Hyperlipidemia     MI (myocardial infarction) ([address]     acute 5/13/2015    Non Hodgkin's lymphoma ([address]     Palliative care patient 02/16/2021    Sleep apnea     no c-pap    Unspecified sleep apnea       Past Surgical History:   Procedure Laterality Date    CARDIAC CATHETERIZATION  5/13/15  [address]    EF 40%. stent to LAD, stent to posterior descending branch of RCA.  CAROTID ENDARTERECTOMY Right 7/22/2016    Right carotid endarterectomy,carotid arteriograms. [facility]    COLONOSCOPY  04/09/2018    Juan Manuel    ENDOSCOPY, COLON, DIAGNOSTIC      LUNG CANCER SURGERY      right side    LYMPHADENECTOMY      removed lymphnodes     PACEMAKER PLACEMENT      RECTAL SURGERY      fistula     UPPER GASTROINTESTINAL ENDOSCOPY N/A 1/8/2020     Ruth Son (Dr Keven Valdes pt) w/EUS-Small low grade GIST in the stomach, repeat in 1 yr    UPPER GASTROINTESTINAL ENDOSCOPY  11/08/2019    Juan Manuel: normal    UPPER GASTROINTESTINAL ENDOSCOPY  04/13/2018    Dr Ricky Ruelas: normal, 2cm HH, small amount of food in stomach w/dark fluid,but not kathy blood    VASCULAR SURGERY  10/29/12 [facility]    open exposure of R common femoral artery; percutaneous cannulation of L  femoral artery with 7-Mexican sheath; suprarenal abd aortagram with bilat iliofem arteriogram; endoluminal graft repair of AAA with endologix [id number] main body, 28x95 infrarenal cuff; balloon angioplasty of infrarenal abd aorta and endoluminal graft with coda balloon; completion abd aortogram w bilat iliofem arteriogram;     VASCULAR SURGERY  10/29/ [facility]     (cont) open repair of right common femoral ateriotomy; perclose repair of left common femoral artery puncture site. Family History   Problem Relation Age of Onset    High Blood Pressure Father     High Blood Pressure Mother     Diabetes Mother     Colon Polyps Brother     Cancer Brother     Colon Cancer Neg Hx        Social History     Tobacco Use    Smoking status: Former Smoker     Packs/day: 0.50     Years: 40.00     Pack years: 20.00    Smokeless tobacco: Never Used    Tobacco comment: quit smoking Dec 10, 2021.     Substance Use Topics    Alcohol use: No        Medications patient taking as of now reconciled against medications ordered at time of hospital discharge     Current Outpatient Medications   Medication Sig Dispense Refill    carvedilol (COREG) 3.125 MG tablet Take 3.125 mg by mouth 2 times daily (with meals)      sacubitril-valsartan (ENTRESTO) 24-26 MG per tablet Take 1 tablet by mouth 2 times daily      HYDROcodone-acetaminophen (NORCO)  MG per tablet Take 1 tablet by mouth every 6 hours as needed for Pain for up to 30 days. 120 tablet 0    docusate sodium (COLACE) 100 MG capsule Take 100 mg by mouth 2 times daily      ALPRAZolam (XANAX) 0.5 MG tablet Take 0.5 mg by mouth every 8 hours as needed for Sleep.  ipratropium-albuterol (DUONEB) 0.5-2.5 (3) MG/3ML SOLN nebulizer solution Inhale 1 vial into the lungs every 6 hours as needed for Shortness of Breath      polyethylene glycol (GLYCOLAX) 17 g packet Take 17 g by mouth daily as needed for Constipation      isosorbide mononitrate (IMDUR) 30 MG extended release tablet Take 1 tablet by mouth daily 30 tablet 3    gabapentin (NEURONTIN) 100 MG capsule Take 1 capsule by mouth 3 times daily for 30 days. 90 capsule 5    fluticasone (FLONASE) 50 MCG/ACT nasal spray 1 spray by Each Nostril route daily as needed       atorvastatin (LIPITOR) 40 MG tablet TAKE ONE (1) TABLET EVERY DAY 90 tablet 3    famotidine (PEPCID) 20 MG tablet Take 1 tablet by mouth 2 times daily 180 tablet 2    venlafaxine (EFFEXOR) 75 MG tablet TAKE ONE (1) TABLET BY MOUTH EVERY DAY 90 tablet 2    clopidogrel (PLAVIX) 75 MG tablet TAKE ONE (1) TABLET BY MOUTH EVERY DAY  (Patient taking differently: Take 75 mg by mouth daily ) 90 tablet 1    aspirin 81 MG EC tablet Take 1 tablet by mouth daily 30 tablet 3    nitroGLYCERIN (NITROSTAT) 0.4 MG SL tablet Place 0.4 mg under the tongue every 5 minutes as needed for Chest pain      torsemide (DEMADEX) 20 MG tablet Take 20 mg by mouth every other day  (Patient not taking: Reported on 1/24/2022)       No current facility-administered medications for this visit.      Allergies   Allergen Reactions    Influenza Virus Vaccine Swelling    Penicillin G Itching       Health Maintenance   Topic Date Due    Hepatitis C screen  Never done    DTaP/Tdap/Td vaccine (1 - Tdap) Never done    COVID-19 Vaccine (3 - Booster for Moderna series) 09/09/2021    Shingles Vaccine (1 of 2) 07/19/2022 (Originally 2/23/1993)   [place]ella Lipid screen  07/19/2022    Depression Monitoring  07/19/2022    Annual Wellness Visit (AWV)  07/20/2022    Potassium monitoring  01/19/2023    Creatinine monitoring  01/19/2023    Pneumococcal 65+ years Vaccine  Completed    Hepatitis A vaccine  Aged Out    Hepatitis B vaccine  Aged Out    Hib vaccine  Aged Out    Meningococcal (ACWY) vaccine  Aged Out       Subjective:      Review of Systems      See HPI for visit specific review of symptoms.   All others negative      Objective:   BP (!) 152/82   Pulse 75   Temp 97.8 °F (36.6 °C)   SpO2 100%   Physical Exam      Recent Results (from the past 672 hour(s))   CBC Auto Differential    Collection Time: 01/19/22 11:51 AM   Result Value Ref Range    WBC 8.1 4.8 - 10.8 K/uL    RBC 5.86 4.70 - 6.10 M/uL    Hemoglobin 13.0 (L) 14.0 - 18.0 g/dL    Hematocrit 47.1 42.0 - 52.0 %    MCV 80.4 80.0 - 94.0 fL    MCH 22.2 (L) 27.0 - 31.0 pg    MCHC 27.6 (L) 33.0 - 37.0 g/dL    RDW 24.5 (H) 11.5 - 14.5 %    Platelets 171 413 - 615 K/uL    MPV 11.1 9.4 - 12.4 fL    PLATELET SLIDE REVIEW Adequate     Neutrophils % 66.8 (H) 50.0 - 65.0 %    Lymphocytes % 22.7 20.0 - 40.0 %    Monocytes % 9.3 0.0 - 10.0 %    Eosinophils % 0.1 0.0 - 5.0 %    Basophils % 0.7 0.0 - 1.0 %    Neutrophils Absolute 5.4 1.5 - 7.5 K/uL    Immature Granulocytes # 0.0 K/uL    Lymphocytes Absolute 1.8 1.1 - 4.5 K/uL    Monocytes Absolute 0.80 0.00 - 0.90 K/uL    Eosinophils Absolute 0.00 0.00 - 0.60 K/uL    Basophils Absolute 0.10 0.00 - 0.20 K/uL    Anisocytosis 1+ (A)     Microcytes 1+ (A)     Hypochromia 1+ (A)     Poikilocytes 1+ (A)     Acanthocytes 1+ (A)     Ovalocytes 1+ (A)    Comprehensive Metabolic Panel w/ Reflex to MG    Collection Time: 01/19/22 11:51 AM   Result Value Ref Range    Sodium 138 136 - 145 mmol/L    Potassium reflex Magnesium 5.0 3.5 - 5.0 mmol/L    Chloride 102 98 - 111 mmol/L    CO2 24 22 - 29 mmol/L    Anion Gap 12 7 - 19 mmol/L    Glucose 111 (H) 74 - 109 mg/dL    BUN 28 ----------------------------------------------------------------     Procedure     Procedure Type      Diagnostic procedure:DCA, Coronary Angiogram, Left Heart Cath, Left   Ventriculogram, Left Ventricular Pressure Measurement      Conclusions      Severely impaired left ventricular systolic function global hypokinesis   Patent stent mid LAD noncritical disease proximal to the stent   Patent stent R-PDA      Recommendations      Routine Post Diagnostic Cath orders. Risk factor modification. Medical management. Signatures      ----------------------------------------------------------------   Electronically signed by Elver Gilmore MD(Performing   Physician) on 01/18/2022 12:08   ----------------------------------------------------------------    Assessment & Plan: The following diagnoses and conditions are stable with no further orders unless indicated:  1. Decreased cardiac ejection fraction  Fluid management  Torsemide 20 mg daily  Carvedilol 3.125 mg twice daily  Entresto  Discussed signs and symptoms consistent with CHF exacerbation    2. Gastrointestinal stromal tumor of stomach [facility])  His last esophagoduodenoscopy was January 2020. Was reported as normal.  Lesion of concern in the proximal stomach was biopsied. Was described as gastrointestinal stromal tumor. Favored low-grade. I had suggested routine follow-up but his health has declined since then. He was sent back to gastroenterology in July but they were unable to get in contact with him. As he recovers from his recent defibrillator placement and management for his CHF will consider pursuing this again. He denies any hematochezia or melena. He is having no abdominal issues. No nausea or vomiting    3. Simple chronic bronchitis ([address]  Continue with DuoNebs as needed    4. Recurrent major depressive disorder in partial remission (HCC)  Stable on Effexor    5.  Hyperparathyroidism [facility])  Lab Results   Component Value Date    PTH 206.2 (H) 10/28/2021    CALCIUM 11.3 (H) 01/19/2022    PHOS 2.5 10/28/2021     Continue to monitor his calcium  He has been to nephrology and ENT    His last evaluation from ENT was November 2019. They discussed both medical and surgical options. Possibility of 3-4 or 5 gland in normal anatomy was discussed. Surgical intervention discussed. At that visit, the patient did not think he wanted to proceed with surgical options. He identified that he is a poor surgical candidate due to his chronic medical issues. His health is certainly not improved since then. 6. Stage 3b chronic kidney disease ([facility]  Lab Results   Component Value Date    CREATININE 1.7 (H) 01/19/2022     Lab Results   Component Value Date    BUN 28 (H) 01/19/2022     He has been referred to nephrology but has missed his appointments due to his declining health, living [de-identified] miles away, and complications with scheduling through [facility]. His last appointment was January 2020:  Chronic kidney disease stage III secondary to nephrosclerosis  Hypercalcemia and hyperparathyroidism of renal origin        Reinforced goals of adequate hydration. Recommended he avoid NSAIDs such as naproxen and ibuprofen. 7. Mixed hyperlipidemia  Lab Results   Component Value Date    CHOL 91 (L) 07/19/2021    CHOL 87 (L) 02/16/2021    CHOL 111 (L) 01/04/2021     Lab Results   Component Value Date    TRIG 106 07/19/2021    TRIG 208 (H) 02/16/2021    TRIG 104 01/04/2021     Lab Results   Component Value Date    HDL 36 (L) 07/19/2021    HDL 25 (L) 02/16/2021    HDL 43 (L) 01/04/2021     Lab Results   Component Value Date    LDLCALC 34 07/19/2021    LDLCALC 20 02/16/2021    LDLCALC 47 01/04/2021     No results found for: LABVLDL, VLDL  No results found for: CHOLHDLRATIO  Continue with atorvastatin 40 mg daily    8. Cerebrovascular accident (CVA) due to embolism of cerebral artery ([facility]  Continue with cardiovascular risk reduction  Continue with Plavix 75 mg daily    9. Obstructive sleep apnea syndrome  Encouraged him to use his CPAP    10. Stage 3 chronic kidney disease, unspecified whether stage 3a or 3b CKD ([facility]  As above    11. Cerebrovascular accident (CVA) due to embolism of right middle cerebral artery ([facility]  As above    12. Essential hypertension  BP Readings from Last 3 Encounters:   01/24/22 (!) 152/82   01/20/22 (!) 114/59   01/19/22 138/78     Blood pressure was elevated today in the office however his blood pressures are better controlled in the hospital.  We will continue to monitor. He feels his blood pressure is elevated due to coming out here today    13. Depression with anxiety  Stable on Effexor    14. Chronic midline low back pain without sciatica  Continues to be a chronic issue. Stable with gabapentin 100 mg 3 times daily  Hydrocodone 10 mg 4 times daily as needed      Diagnostic test results reviewed. Medical Decision Making: high complexity    Return in about 2 weeks (around 2/7/2022).

## 2022-01-26 NOTE — PROCEDURES
Cardiac ICD upgrade from a dual-chamber pacemaker  placement Operative Report    Geeta Manriquez  [id number]  1/26/2022    Surgeon: Dameon Delaney    Anesthesia: General    Procedure(s):1. Removal of previously inserted right ventricular pacing lead    2. Insertion of new right ventricular ICD lead    3. Removal of previous pacemaker generator and insertion of new ICD generator    4. Attempted placement of a coronary sinus lead unsuccessful    Indications:   1. Ischemic cardiomyopathy with ejection fraction 30% or less  2. Chronic congestive systolic and diastolic heart failure    Procedure Details  The risks, benefits, complications, treatment options, and expected outcomes were discussed with the patient. The patient and/or family concurred with the proposed plan, giving informed consent. Patient was prepped and draped in the usual strict sterile fashion. After the antibiotic was completely infused, 30 cc Sensorcaine was infiltrated into the right Infraclavicular area. First the previous incision was incised and then utilizing sharp and blunt dissection the previously implanted pulse generator was identified freed from the surrounding subcutaneous tissues and delivered into the operative field. The device was disconnected from the previous leads. Venous access then obtained utilizing a micropuncture needle under ultrasonographic guidance and the micropuncture wire was advanced followed by the dilator. The wire was then exchanged for a standard 0.0.35 wire and then the dilator was removed. Next an 9french sheath and dilator was advanced over the wire then the dilator was removed. Another similar wire was advanced into the sheath then the sheath was removed leaving two wires in place in the subclavian vein. Next, the RV lead was inserted into its respective sheath and advanced into the right side of the heart the sheath was peeled away and removed.   Next under fluoroscopic guidance the right ventricular lead was advanced into the right ventricle where the active fixation mechanism was extended. The lead was then secured in place using interrupted 2-0 Ethibond sutures. Next the previously implanted right ventricular pacemaker lead was identified a straight stylette was advanced into the lead and under fluoroscopic guidance after retraction of the active fixation mechanism and with gentle traction the lead was easily withdrawn and removed from the subclavian vein and delivered into the operative field. Next, the 9french coronary sinus sheath and dilator was advanced over the wire under fluoroscopic guidance into the right ventricle. The dilator was removed. The wire was withdrawn. The sheath was then withdrawn slowly with counterclockwise torque applied and was brought into the right atrium and directed into the ostium of the coronary sinus. Multiple attempts were made to cannulate the coronary sinus but ultimately proved unsuccessful. We tried 2 different designs of a coronary sinus  sheath and guide both of which were unsuccessful. We also tried on a deflectable electrophysiologic catheter as well which proved to be unsuccessful. Subsequently the coronary sinus sheath guide was withdrawn and removed. After having assured adequate hemostasis the leads were connected to the pulse generator and the pulse generator and leads were placed in the pocket. The pocket was copiously irrigated utilizing antibiotic solution. The pulse generator and leads system were visualized under fluoroscopy. Appropriate redundancy/slack in the leads were noted. The pins of the leads were beyond the set screws. Hemostasis was reverified. The pocket was then closed using 2-0 Vicryl for the deep layer and 3-0 Vicryl for the middle layer. Surgical staples were then applied to the skin and sterile dressing was applied. At the end of the procedure instrument, needle, and sponge counts were correct.   An arm immobilizer was applied at this point and the patient was transferred. An independent trained observer administered medications under my direction. The patient was continuously monitored with respect to level of consciousness, and vital signs/physiologic status throughout the case. For further details regarding specific medications and doses please refer to Cath Lab procedural notes. Anesthesia start time:1530  Anesthesia stop time:1808      ICD data:       Atrial lead   Medtronic  Model   7412-64   serial #   [id number]  Volt    1.5 V    PW    0.4 ms    Current   NA   ma       Impedance:   436   ohms        Slew rate:   NA   V/sec  P wave:   1.7 mv    Right Ventricular lead    Medtronic  Model   27944E21   serial #   [id number]  Volt    0.7    V     PW    0.4 ms     Current   NA   ma    I  Impedance:   361   ohms       Slew rate:   NA   V/sec  R wave:    6.5 mv      Generator  Medtronic  Model   [id number]  Serial   [id number]    Explanted pacemaker generator:  : Medtronic  Model number: [id number]  Serial number: [id number]    Explanted right ventricular pacemaker lead: : Medtronic  Model number: [id number]  Serial number: [id number]      Estimated Blood Loss:  Minimal         Complications:  None; patient tolerated the procedure well. Disposition:  To progressive care unit           Condition: stable      Brenda Kern MD 1/26/2022 12:45 PM

## 2022-01-27 ENCOUNTER — TELEPHONE (OUTPATIENT)
Dept: INTERNAL MEDICINE | Age: 77
End: 2022-01-27

## 2022-01-27 ENCOUNTER — HOSPITAL ENCOUNTER (OUTPATIENT)
Dept: GENERAL RADIOLOGY | Age: 77
Discharge: HOME | End: 2022-01-27
Payer: MEDICARE

## 2022-01-27 ENCOUNTER — NURSE ONLY (OUTPATIENT)
Dept: CARDIOLOGY CLINIC | Age: 77
End: 2022-01-27

## 2022-01-27 DIAGNOSIS — I44.2 COMPLETE ATRIOVENTRICULAR BLOCK (HCC): ICD-10-CM

## 2022-01-27 DIAGNOSIS — Z51.89 VISIT FOR WOUND CHECK: ICD-10-CM

## 2022-01-27 DIAGNOSIS — E78.2 MIXED HYPERLIPIDEMIA: ICD-10-CM

## 2022-01-27 DIAGNOSIS — I25.10 CORONARY ARTERY DISEASE INVOLVING NATIVE CORONARY ARTERY OF NATIVE HEART WITHOUT ANGINA PECTORIS: ICD-10-CM

## 2022-01-27 DIAGNOSIS — Z51.89 VISIT FOR WOUND CHECK: Primary | ICD-10-CM

## 2022-01-27 DIAGNOSIS — Z95.810 ICD (IMPLANTABLE CARDIOVERTER-DEFIBRILLATOR) IN PLACE: ICD-10-CM

## 2022-01-27 DIAGNOSIS — I42.0 DILATED CARDIOMYOPATHY (HCC): ICD-10-CM

## 2022-01-27 DIAGNOSIS — R00.1 SYMPTOMATIC BRADYCARDIA: ICD-10-CM

## 2022-01-27 DIAGNOSIS — I10 ESSENTIAL HYPERTENSION: ICD-10-CM

## 2022-01-27 PROCEDURE — 99024 POSTOP FOLLOW-UP VISIT: CPT | Performed by: INTERNAL MEDICINE

## 2022-01-27 PROCEDURE — 71046 X-RAY EXAM CHEST 2 VIEWS: CPT

## 2022-01-27 ASSESSMENT — ENCOUNTER SYMPTOMS
NAUSEA: 0
RESPIRATORY NEGATIVE: 1
SHORTNESS OF BREATH: 0
EYES NEGATIVE: 1
DIARRHEA: 0
VOMITING: 0
GASTROINTESTINAL NEGATIVE: 1

## 2022-01-27 NOTE — PROGRESS NOTES
Patient here for a wound check visit status post ICD implant. Incision dry and intact. No redness, swelling, or drainage noted  Edges well approximated  Instructed patient to wash area with antibacterial soap and keep it clean and dry. Reviewed discharged instructions and questions answered.   [facility] and patient verbalized understanding  Follow up as scheduled

## 2022-01-27 NOTE — TELEPHONE ENCOUNTER
[facility] Transitions Initial Follow Up Call    Outreach made within 2 business days of discharge: Yes    Patient: Josephine Hoover   Patient : 1945  MRN: [id number]   Reason for Admission: Admitted 22 to [facility] for decreased cardiac ejection fraction   Discharge Date: 22 from [facility] then admitted to [facility] for short term rehab. Discharged 22  Discharge Diagnoses: Active Problems:    Decreased cardiac ejection fraction      Discharge department/facility:  [facility]     Patient seen in office on 22 for TCM follow up. He is not eligible for another TCM visit at this time.      Scheduled appointment with PCP within 7-14 days    Follow Up  Future Appointments   Date Time Provider Kasi Vargas   2022  2:15 PM MD HORACIO Titus [facility]   2/10/2022  4:00 PM [facility]   [facility]22  2:45 PM [facility]   3/3/2022  2:15 PM MD HORACIO [facility]       Ezra Delaney MA

## 2022-01-27 NOTE — PROGRESS NOTES
[facility] Progress Note                            Date:  1/27/2022  Patient: Renée Estevez  Age:  64 y.o., 1945      Reason for evaluation:         SUBJECTIVE:    Returns today follow-up assessment recent device upgrade of his pacemaker to an ICD with removal of the previous ventricular lead and insertion of a new RV ICD lead. We also attempted to place a coronary sinus lead but were unsuccessful from the right side. Today he is here for his wound check his wound is healing appropriately. Device interrogation indicates intermittent loss of capture underlying heart block. Chest x-ray just obtained shows the leads may be retracted slightly but this also could be depth of respiration and/or positional.  They do not appear obviously out of place. No other complaints noted denies chest pain denies dyspnea. Review of Systems   Constitutional: Negative. Negative for chills, fever and unexpected weight change. HENT: Negative. Eyes: Negative. Respiratory: Negative. Negative for shortness of breath. Cardiovascular: Negative. Negative for chest pain. Gastrointestinal: Negative. Negative for diarrhea, nausea and vomiting. Endocrine: Negative. Genitourinary: Negative. Musculoskeletal: Negative. Skin: Negative. Neurological: Negative. All other systems reviewed and are negative. OBJECTIVE:     There were no vitals taken for this visit. Labs:   CBC: No results for input(s): WBC, HGB, HCT, PLT in the last 72 hours. BMP:No results for input(s): NA, K, CO2, BUN, CREATININE, LABGLOM, GLUCOSE in the last 72 hours. BNP: No results for input(s): BNP in the last 72 hours. PT/INR: No results for input(s): PROTIME, INR in the last 72 hours. APTT:No results for input(s): APTT in the last 72 hours. CARDIAC ENZYMES:No results for input(s): CKTOTAL, CKMB, CKMBINDEX, TROPONINI in the last 72 hours.   FASTING LIPID PANEL:  Lab Results   Component Value Date HDL 36 07/19/2021    LDLDIRECT 71 02/11/2016    LDLCALC 34 07/19/2021    TRIG 106 07/19/2021     LIVER PROFILE:No results for input(s): AST, ALT, LABALBU in the last 72 hours. Past Medical History:   Diagnosis Date    AAA (abdominal aortic aneurysm) (HCC)     Acute on chronic systolic CHF (congestive heart failure) ([address] 7/26/2021    Aortic dissection ([address] 10/10/2012    CAD (coronary artery disease)     stents    Cancer (HCC)     lung&lymphnode    Carotid artery occlusion     Cerebrovascular accident (CVA) ([address]     Cerebrovascular accident (CVA) due to embolism ([address] 02/16/2016    Cerebrovascular accident (CVA) due to embolism of right middle cerebral artery ([address]     COVID-19 2/6/2021    Depression     HTN (hypertension)     Hyperlipidemia     MI (myocardial infarction) ([address]     acute 5/13/2015    Non Hodgkin's lymphoma ([address]     Palliative care patient 02/16/2021    Sleep apnea     no c-pap    Unspecified sleep apnea      Past Surgical History:   Procedure Laterality Date    CARDIAC CATHETERIZATION  5/13/15  [address]    EF 40%. stent to LAD, stent to posterior descending branch of RCA.  CAROTID ENDARTERECTOMY Right 7/22/2016    Right carotid endarterectomy,carotid arteriograms. [facility] [place]    COLONOSCOPY  04/09/2018    [facility] ENDOSCOPY, COLON, DIAGNOSTIC      LUNG CANCER SURGERY      right side    LYMPHADENECTOMY      removed lymphnodes     PACEMAKER PLACEMENT      RECTAL SURGERY      fistula     UPPER GASTROINTESTINAL ENDOSCOPY N/A 1/8/2020    Dr Aleksandr Lozano (Dr Zapata Flatness pt) w/EUS-Small low grade GIST in the stomach, repeat in 1 yr    UPPER GASTROINTESTINAL ENDOSCOPY  11/08/2019    Juan Manuel: normal    UPPER GASTROINTESTINAL ENDOSCOPY  04/13/2018    Dr Rojas Houser: normal, 2cm HH, small amount of food in stomach w/dark fluid,but not kathy blood    VASCULAR SURGERY  10/29/12 [facility] [place]    open exposure of R common femoral artery; percutaneous cannulation of L  femoral artery with 7-Malay sheath; suprarenal abd aortagram with bilat iliofem arteriogram; endoluminal graft repair of AAA with endologix [id number] main body, 28x95 infrarenal cuff; balloon angioplasty of infrarenal abd aorta and endoluminal graft with coda balloon; completion abd aortogram w bilat iliofem arteriogram;     VASCULAR SURGERY  10/29/ [facility]     (cont) open repair of right common femoral ateriotomy; perclose repair of left common femoral artery puncture site. Family History   Problem Relation Age of Onset    High Blood Pressure Father     High Blood Pressure Mother     Diabetes Mother     Colon Polyps Brother     Cancer Brother     Colon Cancer Neg Hx      Allergies   Allergen Reactions    Influenza Virus Vaccine Swelling    Penicillin G Itching     Current Outpatient Medications   Medication Sig Dispense Refill    carvedilol (COREG) 3.125 MG tablet Take 3.125 mg by mouth 2 times daily (with meals)      sacubitril-valsartan (ENTRESTO) 24-26 MG per tablet Take 1 tablet by mouth 2 times daily      HYDROcodone-acetaminophen (NORCO)  MG per tablet Take 1 tablet by mouth every 6 hours as needed for Pain for up to 30 days. 120 tablet 0    docusate sodium (COLACE) 100 MG capsule Take 100 mg by mouth 2 times daily      ALPRAZolam (XANAX) 0.5 MG tablet Take 0.5 mg by mouth every 8 hours as needed for Sleep.  ipratropium-albuterol (DUONEB) 0.5-2.5 (3) MG/3ML SOLN nebulizer solution Inhale 1 vial into the lungs every 6 hours as needed for Shortness of Breath      polyethylene glycol (GLYCOLAX) 17 g packet Take 17 g by mouth daily as needed for Constipation      isosorbide mononitrate (IMDUR) 30 MG extended release tablet Take 1 tablet by mouth daily 30 tablet 3    torsemide (DEMADEX) 20 MG tablet Take 20 mg by mouth every other day  (Patient not taking: Reported on 1/24/2022)      gabapentin (NEURONTIN) 100 MG capsule Take 1 capsule by mouth 3 times daily for 30 days.  90 capsule 5    fluticasone (FLONASE) 50 MCG/ACT nasal spray 1 spray by Each Nostril route daily as needed       atorvastatin (LIPITOR) 40 MG tablet TAKE ONE (1) TABLET EVERY DAY 90 tablet 3    famotidine (PEPCID) 20 MG tablet Take 1 tablet by mouth 2 times daily 180 tablet 2    venlafaxine (EFFEXOR) 75 MG tablet TAKE ONE (1) TABLET BY MOUTH EVERY DAY 90 tablet 2    clopidogrel (PLAVIX) 75 MG tablet TAKE ONE (1) TABLET BY MOUTH EVERY DAY  (Patient taking differently: Take 75 mg by mouth daily ) 90 tablet 1    aspirin 81 MG EC tablet Take 1 tablet by mouth daily 30 tablet 3    nitroGLYCERIN (NITROSTAT) 0.4 MG SL tablet Place 0.4 mg under the tongue every 5 minutes as needed for Chest pain       No current facility-administered medications for this visit. Social History     Socioeconomic History    Marital status:      Spouse name: Not on file    Number of children: Not on file    Years of education: Not on file    Highest education level: Not on file   Occupational History    Not on file   Tobacco Use    Smoking status: Former Smoker     Packs/day: 0.50     Years: 40.00     Pack years: 20.00    Smokeless tobacco: Never Used    Tobacco comment: quit smoking Dec 10, 2021.     Vaping Use    Vaping Use: Never used   Substance and Sexual Activity    Alcohol use: No    Drug use: No    Sexual activity: Not on file   Other Topics Concern    Not on file   Social History Narrative     50+ yearsHe has 1 son and 1 daughterWorks for a [employer] active duty 4-1/2 yearsHe does not go to the [employer] presentlyEducation high school 2 years of collegeEnjoys hunting and fishingDenies alcohol or substance usage he does smoke 1 pack/dayPhysically sedentary     Social Determinants of Health     Financial Resource Strain:     Difficulty of Paying Living Expenses: Not on file   Food Insecurity:     Worried About Running Out of Food in the Last Year: Not on file    Steve of Food in the Last Year: Not on file Transportation Needs:     Lack of Transportation (Medical): Not on file    Lack of Transportation (Non-Medical): Not on file   Physical Activity:     Days of Exercise per Week: Not on file    Minutes of Exercise per Session: Not on file   Stress:     Feeling of Stress : Not on file   Social Connections:     Frequency of Communication with Friends and Family: Not on file    Frequency of Social Gatherings with Friends and Family: Not on file    Attends [employer]ology Services: Not on file    Active Member of [address] [place] [address] [employer] or Organizations: Not on file    Attends Club or Organization Meetings: Not on file    Marital Status: Not on file   Intimate Partner Violence:     Fear of Current or Ex-Partner: Not on file    Emotionally Abused: Not on file    Physically Abused: Not on file    Sexually Abused: Not on file   Housing Stability:     Unable to Pay for Housing in the Last Year: Not on file    Number of [place] in the Last Year: Not on file    Unstable Housing in the Last Year: Not on file       Physical Examination:  There were no vitals taken for this visit. Physical Exam  Vitals reviewed. Constitutional:       Appearance: He is well-developed. Neck:      Vascular: No carotid bruit or JVD. Cardiovascular:      Rate and Rhythm: Normal rate and regular rhythm. Heart sounds: Normal heart sounds. No murmur heard. No friction rub. No gallop. Pulmonary:      Effort: Pulmonary effort is normal. No respiratory distress. Breath sounds: Normal breath sounds. No wheezing or rales. Abdominal:      General: There is no distension. Tenderness: There is no abdominal tenderness. Lymphadenopathy:      Cervical: No cervical adenopathy. Skin:     General: Skin is warm and dry. ASSESSMENT:     Diagnosis Orders   1. Visit for wound check  XR CHEST (2 VW)   2. Coronary artery disease involving native coronary artery of native heart without angina pectoris     3.  Symptomatic bradycardia 4. Dilated cardiomyopathy ([facility]     5. Mixed hyperlipidemia     6. Complete atrioventricular block (HCC)  XR CHEST (2 VW)   7. Essential hypertension     8. ICD (implantable cardioverter-defibrillator) in place         PLAN:  Orders Placed This Encounter   Procedures    XR CHEST (2 VW)     No orders of the defined types were placed in this encounter. 1. Continue present medications  2. Recommend follow-up assessment in 1 month with repeat check of thresholds etc.    Return in about 4 weeks (around 2/24/2022) for return to Dr. Laureano Carroll only. Leandro Hatchet, MD 1/27/2022 2:24 PM [facility]      Thisdictation was generated by voice recognition computer software. Although all attempts are made to edit the dictation for accuracy, there may be errors in the transcription that are not intended.

## 2022-02-09 ENCOUNTER — OFFICE VISIT (OUTPATIENT)
Dept: CARDIOLOGY CLINIC | Age: 77
End: 2022-02-09
Payer: MEDICARE

## 2022-02-09 VITALS
HEIGHT: 68 IN | BODY MASS INDEX: 33.34 KG/M2 | SYSTOLIC BLOOD PRESSURE: 122 MMHG | WEIGHT: 220 LBS | HEART RATE: 80 BPM | DIASTOLIC BLOOD PRESSURE: 78 MMHG

## 2022-02-09 DIAGNOSIS — I25.10 CORONARY ARTERY DISEASE INVOLVING NATIVE CORONARY ARTERY OF NATIVE HEART WITHOUT ANGINA PECTORIS: ICD-10-CM

## 2022-02-09 DIAGNOSIS — I10 ESSENTIAL HYPERTENSION: ICD-10-CM

## 2022-02-09 DIAGNOSIS — Z95.810 ICD (IMPLANTABLE CARDIOVERTER-DEFIBRILLATOR) IN PLACE: ICD-10-CM

## 2022-02-09 DIAGNOSIS — I42.0 DILATED CARDIOMYOPATHY (HCC): ICD-10-CM

## 2022-02-09 DIAGNOSIS — I44.2 COMPLETE ATRIOVENTRICULAR BLOCK (HCC): ICD-10-CM

## 2022-02-09 DIAGNOSIS — I50.23 ACUTE ON CHRONIC SYSTOLIC CHF (CONGESTIVE HEART FAILURE) (HCC): Primary | ICD-10-CM

## 2022-02-09 PROCEDURE — 99214 OFFICE O/P EST MOD 30 MIN: CPT | Performed by: CLINICAL NURSE SPECIALIST

## 2022-02-09 PROCEDURE — 93290 INTERROG DEV EVAL ICPMS IP: CPT | Performed by: CLINICAL NURSE SPECIALIST

## 2022-02-09 PROCEDURE — G8417 CALC BMI ABV UP PARAM F/U: HCPCS | Performed by: CLINICAL NURSE SPECIALIST

## 2022-02-09 PROCEDURE — 1036F TOBACCO NON-USER: CPT | Performed by: CLINICAL NURSE SPECIALIST

## 2022-02-09 PROCEDURE — G8427 DOCREV CUR MEDS BY ELIG CLIN: HCPCS | Performed by: CLINICAL NURSE SPECIALIST

## 2022-02-09 PROCEDURE — 1123F ACP DISCUSS/DSCN MKR DOCD: CPT | Performed by: CLINICAL NURSE SPECIALIST

## 2022-02-09 PROCEDURE — 4040F PNEUMOC VAC/ADMIN/RCVD: CPT | Performed by: CLINICAL NURSE SPECIALIST

## 2022-02-09 PROCEDURE — G8484 FLU IMMUNIZE NO ADMIN: HCPCS | Performed by: CLINICAL NURSE SPECIALIST

## 2022-02-09 PROCEDURE — 93289 INTERROG DEVICE EVAL HEART: CPT | Performed by: CLINICAL NURSE SPECIALIST

## 2022-02-09 RX ORDER — TORSEMIDE 20 MG/1
20 TABLET ORAL DAILY
Qty: 30 TABLET | Refills: 5 | Status: SHIPPED | OUTPATIENT
Start: 2022-02-09 | End: 2022-08-11 | Stop reason: SDUPTHER

## 2022-02-09 NOTE — PROGRESS NOTES
[id number] [facility]  [facility] [address] [id number]  Phone: [phone]  Fax: [phone]    OFFICE VISIT:  2022    Sindy Wagner - : 1945    Reason For Visit:  Xavi Underwood is a 64 y.o. male who is here for Follow-up (pt has edema with fatigue and soa), Coronary Artery Disease, and Irregular Heart Beat (ICD)  History coronary disease with MI and stenting in , carotid artery stenosis, AAA, CVA, COPD, hypertension, hyperlipidemia, anemia, ZION, stage III CKD with multiple medical problems over the last year. Patient had complete heart block noted and pacemaker placed 21    Patient was hospitalized in December in [place] and found to have LV dysfunction with EF 20%. He was transitioned to [facility]    Underwent elective heart catheterization 2022 that showed severely impaired LV function with global hypokinesis. Patent stent mid LAD and noncritical disease proximal to the stent. Patent stent in RPDA    2022 patient was admitted for pacemaker removal and AICD implantation  Previous ventricular lead was removed and insertion of a new RV ICD lead. Unsuccessful to place coronary sinus lead  Intermittent loss of capture noted at device check on 2022  Chest x-ray obtained with slightly retracted lead possible    Patient was seen and evaluated in local emergency room for testicular pain and epididymal mass. Sent home on antibiotics    He returns today accompanied with family member. He states he has had significant swelling. He reports sleeping with his head elevated but has done that for a while. He has had episodes of PND of late. Has not been able to get his shoes on for a month. He thinks that they took him off his fluid pill. Family member called and verified that he is not taking his Demadex with the niece that is helping him with his medication. He complains of urinary urgency and frequency especially at night.       Subjective  Xavi Underwood denies exertional chest pain, syncope, presyncope, arrhythmia. The patient denies numbness or weakness to suggest cerebrovascular accident or transient ischemic attack.     Elizabeth Gardiner MD is PCP     Rajesh Mak has the following history as recorded in [facility]:    Patient Active Problem List    Diagnosis Date Noted    Dilated cardiomyopathy ([address]     Decreased cardiac ejection fraction 01/19/2022    Unstable angina (HCC)     Complete atrioventricular block ([address]     Acute on chronic systolic CHF (congestive heart failure) ([address] 07/26/2021    Hypoxemia 07/26/2021    Gastrointestinal stromal tumor of stomach ([address] 07/19/2021    Symptomatic bradycardia 02/16/2021    Atherosclerosis of native artery of both lower extremities ([address] 07/30/2019    Bilateral carotid artery stenosis 07/30/2019    Hyperparathyroidism ([address] 10/04/2018    Recurrent major depressive disorder in partial remission ([address] 07/11/2018    Chronic midline low back pain without sciatica 04/23/2018    Simple chronic bronchitis ([address] 10/02/2017    Depression with anxiety 09/28/2017    Hyperglycemia 09/28/2017    Gastroesophageal reflux disease without esophagitis 09/28/2017    Injury of right hypoglossal nerve 08/04/2016    Stenosis of right carotid artery     Coronary artery disease involving native heart without angina pectoris     Cerebrovascular accident (CVA) due to embolism of right middle cerebral artery ([address]     Essential hypertension     CKD (chronic kidney disease) stage 3, GFR 30-59 ml/min ([address] 07/17/2016    Obstructive sleep apnea syndrome 07/16/2016    Cerebrovascular accident (CVA) due to embolism ([address] 02/16/2016    Mixed hyperlipidemia     AAA (abdominal aortic aneurysm) ([address] 10/10/2012     Past Medical History:   Diagnosis Date    AAA (abdominal aortic aneurysm) (HCC)     Acute on chronic systolic CHF (congestive heart failure) ([address] 7/26/2021    Aortic dissection ([address] 10/10/2012    CAD (coronary artery disease)     stents    Cancer ([facility]     lung&lymphnode    Carotid artery occlusion     Cerebrovascular accident (CVA) ([address]     Cerebrovascular accident (CVA) due to embolism ([address] 02/16/2016    Cerebrovascular accident (CVA) due to embolism of right middle cerebral artery ([address]     COVID-19 2/6/2021    Depression     HTN (hypertension)     Hyperlipidemia     MI (myocardial infarction) ([address]     acute 5/13/2015    Non Hodgkin's lymphoma ([address]     Palliative care patient 02/16/2021    Sleep apnea     no c-pap    Unspecified sleep apnea      Past Surgical History:   Procedure Laterality Date    CARDIAC CATHETERIZATION  5/13/15  [address]    EF 40%. stent to LAD, stent to posterior descending branch of RCA.  CAROTID ENDARTERECTOMY Right 7/22/2016    Right carotid endarterectomy,carotid arteriograms. [facility]    COLONOSCOPY  04/09/2018    Juan Manuel    ENDOSCOPY, COLON, DIAGNOSTIC      LUNG CANCER SURGERY      right side    LYMPHADENECTOMY      removed lymphnodes     PACEMAKER PLACEMENT      RECTAL SURGERY      fistula     UPPER GASTROINTESTINAL ENDOSCOPY N/A 1/8/2020    Dr Katlin Silva (Dr Chu Duty pt) w/EUS-Small low grade GIST in the stomach, repeat in 1 yr    UPPER GASTROINTESTINAL ENDOSCOPY  11/08/2019    Juan Manuel: normal    UPPER GASTROINTESTINAL ENDOSCOPY  04/13/2018    Dr Ondina Perez: normal, 2cm HH, small amount of food in stomach w/dark fluid,but not kathy blood    VASCULAR SURGERY  10/29/12 [facility]    open exposure of R common femoral artery; percutaneous cannulation of L  femoral artery with 7-Belizean sheath; suprarenal abd aortagram with bilat iliofem arteriogram; endoluminal graft repair of AAA with endologix [id number] main body, 28x95 infrarenal cuff; balloon angioplasty of infrarenal abd aorta and endoluminal graft with coda balloon; completion abd aortogram w bilat iliofem arteriogram;     VASCULAR SURGERY  10/29/ S     (cont) open repair of right common femoral ateriotomy; perclose repair of left common femoral artery puncture site. Family History   Problem Relation Age of Onset    High Blood Pressure Father     High Blood Pressure Mother     Diabetes Mother     Colon Polyps Brother     Cancer Brother     Colon Cancer Neg Hx      Social History     Tobacco Use    Smoking status: Former Smoker     Packs/day: 0.50     Years: 40.00     Pack years: 20.00    Smokeless tobacco: Never Used    Tobacco comment: quit smoking Dec 10, 2021. Substance Use Topics    Alcohol use: No      Current Outpatient Medications   Medication Sig Dispense Refill    torsemide (DEMADEX) 20 MG tablet Take 1 tablet by mouth daily 30 tablet 5    carvedilol (COREG) 3.125 MG tablet Take 3.125 mg by mouth 2 times daily (with meals)      sacubitril-valsartan (ENTRESTO) 24-26 MG per tablet Take 1 tablet by mouth 2 times daily      HYDROcodone-acetaminophen (NORCO)  MG per tablet Take 1 tablet by mouth every 6 hours as needed for Pain for up to 30 days. 120 tablet 0    docusate sodium (COLACE) 100 MG capsule Take 100 mg by mouth 2 times daily      ALPRAZolam (XANAX) 0.5 MG tablet Take 0.5 mg by mouth every 8 hours as needed for Sleep.       ipratropium-albuterol (DUONEB) 0.5-2.5 (3) MG/3ML SOLN nebulizer solution Inhale 1 vial into the lungs every 6 hours as needed for Shortness of Breath      polyethylene glycol (GLYCOLAX) 17 g packet Take 17 g by mouth daily as needed for Constipation      isosorbide mononitrate (IMDUR) 30 MG extended release tablet Take 1 tablet by mouth daily 30 tablet 3    fluticasone (FLONASE) 50 MCG/ACT nasal spray 1 spray by Each Nostril route daily as needed       atorvastatin (LIPITOR) 40 MG tablet TAKE ONE (1) TABLET EVERY DAY 90 tablet 3    famotidine (PEPCID) 20 MG tablet Take 1 tablet by mouth 2 times daily 180 tablet 2    venlafaxine (EFFEXOR) 75 MG tablet TAKE ONE (1) TABLET BY MOUTH EVERY DAY 90 tablet 2    clopidogrel (PLAVIX) 75 MG tablet TAKE ONE (1) TABLET BY MOUTH EVERY DAY (Patient taking differently: Take 75 mg by mouth daily ) 90 tablet 1    aspirin 81 MG EC tablet Take 1 tablet by mouth daily 30 tablet 3    nitroGLYCERIN (NITROSTAT) 0.4 MG SL tablet Place 0.4 mg under the tongue every 5 minutes as needed for Chest pain      gabapentin (NEURONTIN) 100 MG capsule Take 1 capsule by mouth 3 times daily for 30 days. 90 capsule 5     No current facility-administered medications for this visit. Allergies: Influenza virus vaccine and Penicillin g    Review of Systems  Constitutional - + significant activity change, decreased appetite unknown if weight gain. Does not have scale at home . No fever, chills or diaphoresis. + fatigue. HEENT - no significant rhinorrhea or epistaxis. No tinnitus or significant hearing loss. Eyes - no sudden vision change or amaurosis. Respiratory - no significant wheezing, stridor, apnea + cough. + dyspnea on exertion + shortness of breath. Cardiovascular - no exertional chest pain, +orthopnea + PND. No sensation of arrhythmia or slow heart rate. No claudication + leg edema. Gastrointestinal - no abdominal swelling or pain. No blood in stool. No severe constipation, diarrhea, nausea, or vomiting. Genitourinary - no difficulty urinating, dysuria, frequency, or urgency. No flank pain or hematuria. Musculoskeletal - no back pain, gait-unsteady uses cane or walker at home. Skin - no color change or rash. No pallor. No new surgical incision. Neurologic - no speech difficulty, facial asymmetry or lateralizing weakness. No seizures, presyncope, syncope, or significant dizziness. Hematologic - no easy bruising or excessive bleeding. Psychiatric - no severe anxiety or insomnia. No confusion. All other review of systems are negative. Objective  Vital Signs - /78   Pulse 80   Ht 5' 8\" (1.727 m)   Wt 220 lb (99.8 kg)   BMI 33.45 kg/m²   General - Janae Colon is alert, cooperative, and pleasant. Well groomed.   No acute distress. Body habitus is obese. HEENT - The head is normocephalic. No circumoral cyanosis. Dentition is normal.   EYES -  No Xanthelasma, no arcus senilis, no conjunctival hemorrhages or discharge. Neck - Supple, without increased jugular venous pressures. No carotid bruits. No mass. Respiratory - Lungs are clear bilaterally. No wheezes or rales. Normal effort without use of accessory muscles. Cardiovascular - Heart has regular rhythm and rate. No murmurs, rubs or gallops. + pedal pulses and no varicosities. Abdominal -  Soft, nontender, nondistended. Bowel sounds are intact. Extremities - No clubbing, cyanosis, 3+ pitting BLE and pedal edema. Musculoskeletal -  No clubbing . No Osler's nodes. Gait-in wheelchair. No kyphosis or scoliosis. Skin -  no statis ulcers or dermatitis. Neurological - No focal signs are identified. Oriented to person, place and time. Psychiatric -  Appropriate affect and mood. Assessment:     Diagnosis Orders   1. Acute on chronic systolic CHF (congestive heart failure) ([facility]     2. Coronary artery disease involving native coronary artery of native heart without angina pectoris     3. Dilated cardiomyopathy (HCC)  proBNP, N-TERMINAL    Basic Metabolic Panel   4. Complete atrioventricular block (HCC)     5. ICD (implantable cardioverter-defibrillator) in place     6. Essential hypertension       Data:  BP Readings from Last 3 Encounters:   02/09/22 122/78   01/24/22 (!) 152/82   01/20/22 (!) 114/59    Pulse Readings from Last 3 Encounters:   02/09/22 80   01/24/22 75   01/20/22 79        Wt Readings from Last 3 Encounters:   02/09/22 220 lb (99.8 kg)   01/19/22 220 lb (99.8 kg)   01/12/22 220 lb (99.8 kg)   ICD interrogation showed adequate battery voltage   (2.7 years) and charge time with  normal diagnostics, no firings and no sustained dysrhythmias. Elevated RV thresholds however was adjusted and rechecked manually.   Obtained good capture and now on auto capture  Optivol stable. Mode DDDR at 60  Ventricular paced 88.2%, atrial paced 19.8%     Reviewed all hospital records from multiple admissions here and at [facility] after last admission patient's Demadex was discontinued. Verified he is not taking. We will get that restarted and have him take daily. We discussed we will need to slowly diurese him. He has had significant swelling since he has been home. At one point he was in nursing home for rehab but has been home for the last month. Currently has home health. He does not have a scale but will get him 1 to do daily weights. Hopefully will see some slow weight loss with reinstituting his diuretic  We will recheck his labs in a week or so to reevaluate renal status and need for any potassium replacement    A long discussion over sodium intake. Family member reports they eat a lot of lunch meat. We discussed cutting back on processed foods and not adding any additional salt. Complicated patient with multiple comorbidities. Patient remains on DAPT with aspirin Plavix due to drug-eluting stents within the last year    Upgrade from pacemaker to AICD with questionable capturing last month. Today appears capturing well with appropriate AICD function  No arrhythmias noted    Blood pressure and heart rate controlled. Weight is stable  Also on beta-blocker, Entresto and long-acting nitrate. Also takes statin    We will keep his follow-up in a couple weeks with Dr. Sinai Trejo. We will reassess his device functioning. Does have home monitoring now so that we will be sending if it detects any problem  30 minutes were spent preparing, reviewing and seeing patient.   All questions answered    Plan  Restart your Torsemide (demadex)  This is a fluid pill- you should urinate more and slowly get rid of the swelling  Daily weights  Follow up in the 2th With Dr. Arnaldo Emery prior to apt   Call with any

## 2022-02-09 NOTE — PATIENT INSTRUCTIONS
Restart your Torsemide (demadex)  This is a fluid pill- you should urinate more and slowly get rid of the swelling  Daily weights  Follow up in the 2th With Dr. Ryan Whitman prior to apt   Call with any questions or concerns  Follow up with Nimisha Lee MD for non cardiac problems  Report any new problems  Cardiovascular Fitness-Exercise as tolerated. Cardiac / Healthy Diet  Continue current medications as directed  Continue plan of treatment  It is always recommended that you bring your medications bottles with you to each visit - this is for your safety! Patient Education        DASH Diet: Care Instructions  Your Care Instructions     The DASH diet is an eating plan that can help lower your blood pressure. DASH stands for Dietary Approaches to Stop Hypertension. Hypertension is high blood pressure. The DASH diet focuses on eating foods that are high in calcium, potassium, and magnesium. These nutrients can lower blood pressure. The foods that are highest in these nutrients are fruits, vegetables, low-fat dairy products, nuts, seeds, and legumes. But taking calcium, potassium, and magnesium supplements instead of eating foods that are high in those nutrients does not have the same effect. The DASH diet also includes whole grains, fish, and poultry. The DASH diet is one of several lifestyle changes your doctor may recommend to lower your high blood pressure. Your doctor may also want you to decrease the amount of sodium in your diet. Lowering sodium while following the DASH diet can lower blood pressure even further than just the DASH diet alone. Follow-up care is a key part of your treatment and safety. Be sure to make and go to all appointments, and call your doctor if you are having problems. It's also a good idea to know your test results and keep a list of the medicines you take. How can you care for yourself at home? Following the DASH diet  · Eat 4 to 5 servings of fruit each day.  A serving is 1 medium-sized piece of fruit, ½ cup chopped or canned fruit, 1/4 cup dried fruit, or 4 ounces (½ cup) of fruit juice. Choose fruit more often than fruit juice. · Eat 4 to 5 servings of vegetables each day. A serving is 1 cup of lettuce or raw leafy vegetables, ½ cup of chopped or cooked vegetables, or 4 ounces (½ cup) of vegetable juice. Choose vegetables more often than vegetable juice. · Get 2 to 3 servings of low-fat and fat-free dairy each day. A serving is 8 ounces of milk, 1 cup of yogurt, or 1 ½ ounces of cheese. · Eat 6 to 8 servings of grains each day. A serving is 1 slice of bread, 1 ounce of dry cereal, or ½ cup of cooked rice, pasta, or cooked cereal. Try to choose whole-grain products as much as possible. · Limit lean meat, poultry, and fish to 2 servings each day. A serving is 3 ounces, about the size of a deck of cards. · Eat 4 to 5 servings of nuts, seeds, and legumes (cooked dried beans, lentils, and split peas) each week. A serving is 1/3 cup of nuts, 2 tablespoons of seeds, or ½ cup of cooked beans or peas. · Limit fats and oils to 2 to 3 servings each day. A serving is 1 teaspoon of vegetable oil or 2 tablespoons of salad dressing. · Limit sweets and added sugars to 5 servings or less a week. A serving is 1 tablespoon jelly or jam, ½ cup sorbet, or 1 cup of lemonade. · Eat less than 2,300 milligrams (mg) of sodium a day. If you limit your sodium to 1,500 mg a day, you can lower your blood pressure even more. · Be aware that all of these are the suggested number of servings for people who eat 1,800 to 2,000 calories a day. Your recommended number of servings may be different if you need more or fewer calories. Tips for success  · Start small. Do not try to make dramatic changes to your diet all at once. You might feel that you are missing out on your favorite foods and then be more likely to not follow the plan. Make small changes, and stick with them.  Once those changes become habit, add a few more changes. · Try some of the following:  ? Make it a goal to eat a fruit or vegetable at every meal and at snacks. This will make it easy to get the recommended amount of fruits and vegetables each day. ? Try yogurt topped with fruit and nuts for a snack or healthy dessert. ? Add lettuce, tomato, cucumber, and onion to sandwiches. ? Combine a ready-made pizza crust with low-fat mozzarella cheese and lots of vegetable toppings. Try using tomatoes, squash, spinach, broccoli, carrots, cauliflower, and onions. ? Have a variety of cut-up vegetables with a low-fat dip as an appetizer instead of chips and dip. ? Sprinkle sunflower seeds or chopped almonds over salads. Or try adding chopped walnuts or almonds to cooked vegetables. ? Try some vegetarian meals using beans and peas. Add garbanzo or kidney beans to salads. Make burritos and tacos with mashed schwarz beans or black beans. Where can you learn more? Go to https://[employer]pe[employer]iceweb.[employer]. org and sign in to your [employer] account. Enter V315 in the [employer] box to learn more about \"DASH Diet: Care Instructions. \"     If you do not have an account, please click on the \"Sign Up Now\" link. Current as of: April 29, 2021               Content Version: 13.1  © 2006-2021 Healthwise, Incorporated. Care instructions adapted under license by [employer]. If you have questions about a medical condition or this instruction, always ask your healthcare professional. [place] [address] any warranty or liability for your use of this information.

## 2022-02-18 DIAGNOSIS — I25.10 CORONARY ARTERY DISEASE INVOLVING NATIVE HEART WITHOUT ANGINA PECTORIS, UNSPECIFIED VESSEL OR LESION TYPE: ICD-10-CM

## 2022-02-20 RX ORDER — CLOPIDOGREL BISULFATE 75 MG/1
TABLET ORAL
Qty: 90 TABLET | Refills: 1 | Status: SHIPPED | OUTPATIENT
Start: 2022-02-20 | End: 2022-08-11 | Stop reason: SDUPTHER

## 2022-02-23 DIAGNOSIS — M54.50 CHRONIC MIDLINE LOW BACK PAIN WITHOUT SCIATICA: ICD-10-CM

## 2022-02-23 DIAGNOSIS — G89.29 CHRONIC MIDLINE LOW BACK PAIN WITHOUT SCIATICA: ICD-10-CM

## 2022-02-23 RX ORDER — HYDROCODONE BITARTRATE AND ACETAMINOPHEN 10; 325 MG/1; MG/1
1 TABLET ORAL EVERY 6 HOURS PRN
Qty: 120 TABLET | Refills: 0 | Status: SHIPPED | OUTPATIENT
Start: 2022-02-23 | End: 2022-03-18 | Stop reason: SDUPTHER

## 2022-02-23 NOTE — TELEPHONE ENCOUNTER
Soledad Lenz called to request a refill on his medication. Last office visit : 1/24/2022   Next office visit : Visit date not found     Last UDS:   Amphetamine Screen, Urine   Date Value Ref Range Status   04/23/2018 Negative  Final     Barbiturate Screen, Urine   Date Value Ref Range Status   04/23/2018 Negative  Final     Benzodiazepine Screen, Urine   Date Value Ref Range Status   04/23/2018 Negative  Final     Cocaine Metabolite Screen, Urine   Date Value Ref Range Status   04/23/2018 Negative  Final     MDMA, Urine   Date Value Ref Range Status   04/23/2018 Negative  Final     Methamphetamine, Urine   Date Value Ref Range Status   04/23/2018 Negative  Final     Opiate Scrn, Ur   Date Value Ref Range Status   04/23/2018 Positive  Final     Oxycodone Screen, Ur   Date Value Ref Range Status   04/23/2018 Negative  Final     PCP Screen, Urine   Date Value Ref Range Status   04/23/2018 Negative  Final     Propoxyphene Screen, Urine   Date Value Ref Range Status   04/23/2018 Negative  Final     Tricyclic Antidepressants, Urine   Date Value Ref Range Status   04/23/2018 Negative  Final       Last Arthuro Curtis: 2/23/22  Medication Contract: 4/23/18   Last Fill: 1/21/22    Requested Prescriptions     Pending Prescriptions Disp Refills    HYDROcodone-acetaminophen (NORCO)  MG per tablet 120 tablet 0     Sig: Take 1 tablet by mouth every 6 hours as needed for Pain for up to 30 days. Please approve or refuse this medication.    Wendi Hanks

## 2022-02-23 NOTE — TELEPHONE ENCOUNTER
----- Message from Sergio Jensen sent at 2/23/2022  8:43 AM CST -----  Subject: Refill Request    QUESTIONS  Name of Medication? HYDROcodone-acetaminophen (NORCO)  MG per tablet  Patient-reported dosage and instructions?   How many days do you have left? 0  Preferred Pharmacy? [id number]  Pharmacy phone number (if available)? [phone]  ---------------------------------------------------------------------------  --------------  Ilsa ANGELO  What is the best way for the office to contact you? OK to leave message on   voicemail  Preferred Call Back Phone Number?  [phone]

## 2022-03-02 ENCOUNTER — TELEPHONE (OUTPATIENT)
Dept: FAMILY MEDICINE CLINIC | Age: 77
End: 2022-03-02

## 2022-03-02 NOTE — TELEPHONE ENCOUNTER
Margarita Briceño called asking about the patients Lisinopril being discontinued. It seems it was discontinued by Cardiology. He has an appointment there tomorrow and they will ask and find out if he still needs to be on this.

## 2022-03-03 ENCOUNTER — OFFICE VISIT (OUTPATIENT)
Dept: CARDIOLOGY CLINIC | Age: 77
End: 2022-03-03
Payer: MEDICARE

## 2022-03-03 VITALS
HEIGHT: 68 IN | SYSTOLIC BLOOD PRESSURE: 132 MMHG | WEIGHT: 220 LBS | HEART RATE: 79 BPM | DIASTOLIC BLOOD PRESSURE: 76 MMHG | BODY MASS INDEX: 33.34 KG/M2

## 2022-03-03 DIAGNOSIS — N40.0 BENIGN PROSTATIC HYPERPLASIA WITHOUT LOWER URINARY TRACT SYMPTOMS: ICD-10-CM

## 2022-03-03 DIAGNOSIS — R09.02 HYPOXEMIA: ICD-10-CM

## 2022-03-03 DIAGNOSIS — E78.2 MIXED HYPERLIPIDEMIA: ICD-10-CM

## 2022-03-03 DIAGNOSIS — R00.1 SYMPTOMATIC BRADYCARDIA: ICD-10-CM

## 2022-03-03 DIAGNOSIS — I10 ESSENTIAL HYPERTENSION: ICD-10-CM

## 2022-03-03 DIAGNOSIS — I25.10 CORONARY ARTERY DISEASE INVOLVING NATIVE CORONARY ARTERY OF NATIVE HEART WITHOUT ANGINA PECTORIS: ICD-10-CM

## 2022-03-03 DIAGNOSIS — I44.2 COMPLETE ATRIOVENTRICULAR BLOCK (HCC): ICD-10-CM

## 2022-03-03 DIAGNOSIS — I50.42 CHRONIC COMBINED SYSTOLIC (CONGESTIVE) AND DIASTOLIC (CONGESTIVE) HEART FAILURE (HCC): ICD-10-CM

## 2022-03-03 DIAGNOSIS — Z95.810 ICD (IMPLANTABLE CARDIOVERTER-DEFIBRILLATOR) IN PLACE: ICD-10-CM

## 2022-03-03 DIAGNOSIS — I42.0 DILATED CARDIOMYOPATHY (HCC): Primary | ICD-10-CM

## 2022-03-03 PROCEDURE — 99024 POSTOP FOLLOW-UP VISIT: CPT | Performed by: INTERNAL MEDICINE

## 2022-03-03 PROCEDURE — G8484 FLU IMMUNIZE NO ADMIN: HCPCS | Performed by: INTERNAL MEDICINE

## 2022-03-03 PROCEDURE — 1123F ACP DISCUSS/DSCN MKR DOCD: CPT | Performed by: INTERNAL MEDICINE

## 2022-03-03 PROCEDURE — 1036F TOBACCO NON-USER: CPT | Performed by: INTERNAL MEDICINE

## 2022-03-03 PROCEDURE — 93282 PRGRMG EVAL IMPLANTABLE DFB: CPT | Performed by: INTERNAL MEDICINE

## 2022-03-03 PROCEDURE — G8417 CALC BMI ABV UP PARAM F/U: HCPCS | Performed by: INTERNAL MEDICINE

## 2022-03-03 PROCEDURE — 4040F PNEUMOC VAC/ADMIN/RCVD: CPT | Performed by: INTERNAL MEDICINE

## 2022-03-03 PROCEDURE — G8427 DOCREV CUR MEDS BY ELIG CLIN: HCPCS | Performed by: INTERNAL MEDICINE

## 2022-03-03 RX ORDER — DOXAZOSIN MESYLATE 4 MG/1
TABLET ORAL
Qty: 15 TABLET | Refills: 5 | OUTPATIENT
Start: 2022-03-03

## 2022-03-03 RX ORDER — DOXAZOSIN MESYLATE 4 MG/1
4 TABLET ORAL NIGHTLY
COMMUNITY
End: 2022-08-01

## 2022-03-03 ASSESSMENT — ENCOUNTER SYMPTOMS
GASTROINTESTINAL NEGATIVE: 1
NAUSEA: 0
RESPIRATORY NEGATIVE: 1
EYES NEGATIVE: 1
SHORTNESS OF BREATH: 0
VOMITING: 0
DIARRHEA: 0

## 2022-03-03 NOTE — PROGRESS NOTES
[facility] Progress Note                            Date:  3/3/2022  Patient: Kerri Fallon  Age:  76 y.o., 1945      Reason for evaluation:         SUBJECTIVE:    Returns today follow-up assessment. Had recent upgrade of his device 1/19/2022 with addition of an RV ICD lead and removal of previous pacing lead. Attempt to place a coronary sinus lead unsuccessful. Overall seems to be feeling well. Denies chest pain dyspnea stable no new complaints or issues reported. Supposed to be on Entresto twice daily dosing but unclear if he is taking the son will confirm at home. Blood pressure 132/76 heart 79. Review of Systems   Constitutional: Negative. Negative for chills, fever and unexpected weight change. HENT: Negative. Eyes: Negative. Respiratory: Negative. Negative for shortness of breath. Cardiovascular: Negative. Negative for chest pain. Gastrointestinal: Negative. Negative for diarrhea, nausea and vomiting. Endocrine: Negative. Genitourinary: Negative. Musculoskeletal: Negative. Skin: Negative. Neurological: Negative. All other systems reviewed and are negative. OBJECTIVE:     /76   Pulse 79   Ht 5' 8\" (1.727 m)   Wt 220 lb (99.8 kg)   BMI 33.45 kg/m²     Labs:   CBC: No results for input(s): WBC, HGB, HCT, PLT in the last 72 hours. BMP:No results for input(s): NA, K, CO2, BUN, CREATININE, LABGLOM, GLUCOSE in the last 72 hours. BNP: No results for input(s): BNP in the last 72 hours. PT/INR: No results for input(s): PROTIME, INR in the last 72 hours. APTT:No results for input(s): APTT in the last 72 hours. CARDIAC ENZYMES:No results for input(s): CKTOTAL, CKMB, CKMBINDEX, TROPONINI in the last 72 hours.   FASTING LIPID PANEL:  Lab Results   Component Value Date    HDL 36 07/19/2021    LDLDIRECT 71 02/11/2016    LDLCALC 34 07/19/2021    TRIG 106 07/19/2021     LIVER PROFILE:No results for input(s): AST, ALT, LABALBU in the last 72 hours. Past Medical History:   Diagnosis Date    AAA (abdominal aortic aneurysm) (HCC)     Acute on chronic systolic CHF (congestive heart failure) ([address] 7/26/2021    Aortic dissection ([address] 10/10/2012    CAD (coronary artery disease)     stents    Cancer (HCC)     lung&lymphnode    Carotid artery occlusion     Cerebrovascular accident (CVA) ([address]     Cerebrovascular accident (CVA) due to embolism ([address] 02/16/2016    Cerebrovascular accident (CVA) due to embolism of right middle cerebral artery ([address]     COVID-19 2/6/2021    Depression     HTN (hypertension)     Hyperlipidemia     MI (myocardial infarction) ([address]     acute 5/13/2015    Non Hodgkin's lymphoma ([address]     Palliative care patient 02/16/2021    Sleep apnea     no c-pap    Unspecified sleep apnea      Past Surgical History:   Procedure Laterality Date    CARDIAC [facility]  [facility]15  [facility]    EF 40%. stent to LAD, stent to posterior descending branch of RCA.  CAROTID ENDARTERECTOMY Right 7/22/2016    Right carotid endarterectomy,carotid arteriograms. 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, repeat in 1 yr    UPPER GASTROINTESTINAL ENDOSCOPY  11/08/2019    J[facility] Manuel: normal    UPPER GASTROINTESTINAL ENDOSCOPY  04/13/2018    Dr Marianne Hauser: normal, 2cm HH, small amount of food in stomach w/dark fluid,but not kathy blood    VASCULAR SURGERY  10/29/12 [facility]    open exposure of R common femoral artery; percutaneous cannulation of L  femoral artery with 7-Japanese sheath; suprarenal abd aortagram with bilat iliofem arteriogram; endoluminal graft repair of AAA with endologix [id number] main body, 28x95 infrarenal cuff; balloon angioplasty of infrarenal abd aorta and endoluminal graft with coda balloon; completion abd aortogram w bilat iliofem arteriogram;     VASCULAR SURGERY  10/29/ [facility]     (cont) open repair of right common femoral ateriotomy; perclose repair of left common femoral artery puncture site. Family History   Problem Relation Age of Onset    High Blood Pressure Father     High Blood Pressure Mother     Diabetes Mother     Colon Polyps Brother     Cancer Brother     Colon Cancer Neg Hx      Allergies   Allergen Reactions    Influenza Virus Vaccine Swelling    Penicillin G Itching     Current Outpatient Medications   Medication Sig Dispense Refill    doxazosin (CARDURA) 4 MG tablet Take 4 mg by mouth nightly      HYDROcodone-acetaminophen (NORCO)  MG per tablet Take 1 tablet by mouth every 6 hours as needed for Pain for up to 30 days. 120 tablet 0    clopidogrel (PLAVIX) 75 MG tablet TAKE ONE (1) TABLET BY MOUTH EVERY DAY 90 tablet 1    torsemide (DEMADEX) 20 MG tablet Take 1 tablet by mouth daily 30 tablet 5    carvedilol (COREG) 3.125 MG tablet Take 3.125 mg by mouth 2 times daily (with meals)      sacubitril-valsartan (ENTRESTO) 24-26 MG per tablet Take 1 tablet by mouth 2 times daily      docusate sodium (COLACE) 100 MG capsule Take 100 mg by mouth 2 times daily      ALPRAZolam (XANAX) 0.5 MG tablet Take 0.5 mg by mouth every 8 hours as needed for Sleep.       ipratropium-albuterol (DUONEB) 0.5-2.5 (3) MG/3ML SOLN nebulizer solution Inhale 1 vial into the lungs every 6 hours as needed for Shortness of Breath      polyethylene glycol (GLYCOLAX) 17 g packet Take 17 g by mouth daily as needed for Constipation      isosorbide mononitrate (IMDUR) 30 MG extended release tablet Take 1 tablet by mouth daily 30 tablet 3    fluticasone (FLONASE) 50 MCG/ACT nasal spray 1 spray by Each Nostril route daily as needed       atorvastatin (LIPITOR) 40 MG tablet TAKE ONE (1) TABLET EVERY DAY 90 tablet 3    famotidine (PEPCID) 20 MG tablet Take 1 tablet by mouth 2 times daily 180 tablet 2    venlafaxine (EFFEXOR) 75 MG tablet TAKE ONE (1) TABLET BY MOUTH EVERY DAY 90 tablet 2    aspirin 81 MG EC tablet Take 1 tablet by mouth daily 30 tablet 3    nitroGLYCERIN (NITROSTAT) 0.4 MG SL tablet Place 0.4 mg under the tongue every 5 minutes as needed for Chest pain      gabapentin (NEURONTIN) 100 MG capsule Take 1 capsule by mouth 3 times daily for 30 days. 90 capsule 5     No current facility-administered medications for this visit. Social History     Socioeconomic History    Marital status:      Spouse name: Not on file    Number of children: Not on file    Years of education: Not on file    Highest education level: Not on file   Occupational History    Not on file   Tobacco Use    Smoking status: Former Smoker     Packs/day: 0.50     Years: 40.00     Pack years: 20.00    Smokeless tobacco: Never Used    Tobacco comment: quit smoking Dec 10, 2021. Vaping Use    Vaping Use: Never used   Substance and Sexual Activity    Alcohol use: No    Drug use: No    Sexual activity: Not on file   Other Topics Concern    Not on file   Social History Narrative     50+ yearsHe has 1 son and 1 daughterWorks for a telephone [id number] active duty 4-1/2 yearsHe does not go to the [employer] presentlyEducation high school 2 years of collegeEnjoys hunting and fishingDenies alcohol or substance usage he does smoke 1 pack/dayPhysically sedentary     Social Determinants of Health     Financial Resource Strain:     Difficulty of Paying Living Expenses: Not on file   Food Insecurity:     Worried About Running Out of Food in the Last Year: Not on file    Steve of Food in the Last Year: Not on file   Transportation Needs:     Lack of Transportation (Medical): Not on file    Lack of Transportation (Non-Medical):  Not on file   Physical Activity:     Days of Exercise per Week: Not on file    Minutes of Exercise per Session: Not on file   Stress:     Feeling of Stress : Not on file   Social Connections:     Frequency of Communication with Friends and Family: Not on file    Frequency of Social Gatherings with Friends and Family: Not on file    Attends Islam Services: Not on file    Active Member of [address] [place] [address] or Organizations: Not on file    Attends Club or Organization Meetings: Not on file    Marital Status: Not on file   Intimate Partner Violence:     Fear of Current or Ex-Partner: Not on file    Emotionally Abused: Not on file    Physically Abused: Not on file    Sexually Abused: Not on file   Housing Stability:     Unable to Pay for Housing in the Last Year: Not on file    Number of [place] in the Last Year: Not on file    Unstable Housing in the Last Year: Not on file       Physical Examination:  /76   Pulse 79   Ht 5' 8\" (1.727 m)   Wt 220 lb (99.8 kg)   BMI 33.45 kg/m²   Physical Exam  Vitals reviewed. Constitutional:       Appearance: He is well-developed. Neck:      Vascular: No carotid bruit or JVD. Cardiovascular:      Rate and Rhythm: Normal rate and regular rhythm. Heart sounds: Normal heart sounds. No murmur heard. No friction rub. No gallop. Pulmonary:      Effort: Pulmonary effort is normal. No respiratory distress. Breath sounds: Normal breath sounds. No wheezing or rales. Abdominal:      General: There is no distension. Tenderness: There is no abdominal tenderness. Lymphadenopathy:      Cervical: No cervical adenopathy. Skin:     General: Skin is warm and dry. ASSESSMENT:     Diagnosis Orders   1. Dilated cardiomyopathy ([address]     2. Essential hypertension     3. Hypoxemia     4. Complete atrioventricular block (HCC)     5. Mixed hyperlipidemia     6. Coronary artery disease involving native coronary artery of native heart without angina pectoris     7. Symptomatic bradycardia     8.  ICD (implantable cardioverter-defibrillator) in place     9. Chronic combined systolic (congestive) and diastolic (congestive) heart failure (HCC)         PLAN:  No orders of the defined types were placed in this encounter. No orders of the defined types were placed in this encounter. 1. Continue present medications  2. Recommend increasing dose of Coreg 6.25 p.o. twice daily  3. Recommend follow-up assessment in 2 months    Return in about 2 months (around 5/3/2022) for return to Dr. Suma Plummer only. Sarah Green MD 3/3/2022 2:16 PM [facility]      Thisdictation was generated by voice recognition computer software. Although all attempts are made to edit the dictation for accuracy, there may be errors in the transcription that are not intended.

## 2022-03-04 RX ORDER — CARVEDILOL 6.25 MG/1
6.25 TABLET ORAL 2 TIMES DAILY WITH MEALS
Qty: 60 TABLET | Refills: 5 | Status: SHIPPED | OUTPATIENT
Start: 2022-03-04 | End: 2022-08-11 | Stop reason: SDUPTHER

## 2022-03-04 RX ORDER — SACUBITRIL AND VALSARTAN 24; 26 MG/1; MG/1
1 TABLET, FILM COATED ORAL 2 TIMES DAILY
Qty: 60 TABLET | Refills: 5 | Status: SHIPPED | OUTPATIENT
Start: 2022-03-04 | End: 2022-08-11 | Stop reason: SDUPTHER

## 2022-03-08 NOTE — PROGRESS NOTES
ICD interrogated  Presenting rhythm: AS/, AP 5.7%,  100%  Battery status: 8.6 years  Lead status: lead impedance within range and stable  Sensing: P waves 5.6 mV,  R waves 6.5 mV  Thresholds:  Atrial 0.750 V @ 0.4ms, ventricular 0.500 V @ 0.4ms  Observations:  None  Reprogramming for sensitivity and threshold testing  Next [facility] home check scheduled for 06/06/22

## 2022-03-10 DIAGNOSIS — N17.9 ACUTE RENAL FAILURE, UNSPECIFIED ACUTE RENAL FAILURE TYPE (HCC): Primary | ICD-10-CM

## 2022-03-18 DIAGNOSIS — G89.29 CHRONIC MIDLINE LOW BACK PAIN WITHOUT SCIATICA: ICD-10-CM

## 2022-03-18 DIAGNOSIS — M54.50 CHRONIC MIDLINE LOW BACK PAIN WITHOUT SCIATICA: ICD-10-CM

## 2022-03-18 RX ORDER — HYDROCODONE BITARTRATE AND ACETAMINOPHEN 10; 325 MG/1; MG/1
1 TABLET ORAL EVERY 6 HOURS PRN
Qty: 120 TABLET | Refills: 0 | Status: SHIPPED | OUTPATIENT
Start: 2022-03-18 | End: 2022-04-21 | Stop reason: SDUPTHER

## 2022-04-20 DIAGNOSIS — G89.29 CHRONIC MIDLINE LOW BACK PAIN WITHOUT SCIATICA: ICD-10-CM

## 2022-04-20 DIAGNOSIS — M54.50 CHRONIC MIDLINE LOW BACK PAIN WITHOUT SCIATICA: ICD-10-CM

## 2022-04-20 NOTE — TELEPHONE ENCOUNTER
Deb Love called to request a refill on his medication. Last office visit : 1/24/2022   Next office visit : 4/29/2022     Last UDS:   Amphetamine Screen, Urine   Date Value Ref Range Status   04/23/2018 Negative  Final     Barbiturate Screen, Urine   Date Value Ref Range Status   04/23/2018 Negative  Final     Benzodiazepine Screen, Urine   Date Value Ref Range Status   04/23/2018 Negative  Final     Cocaine Metabolite Screen, Urine   Date Value Ref Range Status   04/23/2018 Negative  Final     MDMA, Urine   Date Value Ref Range Status   04/23/2018 Negative  Final     Methamphetamine, Urine   Date Value Ref Range Status   04/23/2018 Negative  Final     Opiate Scrn, Ur   Date Value Ref Range Status   04/23/2018 Positive  Final     Oxycodone Screen, Ur   Date Value Ref Range Status   04/23/2018 Negative  Final     PCP Screen, Urine   Date Value Ref Range Status   04/23/2018 Negative  Final     Propoxyphene Screen, Urine   Date Value Ref Range Status   04/23/2018 Negative  Final     Tricyclic Antidepressants, Urine   Date Value Ref Range Status   04/23/2018 Negative  Final       Last Viki Lerma: 2/24/22  Medication Contract: 4/23/18   Last Fill: 3/18/22    Requested Prescriptions     Pending Prescriptions Disp Refills    HYDROcodone-acetaminophen (NORCO)  MG per tablet 120 tablet 0     Sig: Take 1 tablet by mouth every 6 hours as needed for Pain for up to 30 days. Please approve or refuse this medication.    Ja Lee

## 2022-04-21 RX ORDER — HYDROCODONE BITARTRATE AND ACETAMINOPHEN 10; 325 MG/1; MG/1
1 TABLET ORAL EVERY 6 HOURS PRN
Qty: 120 TABLET | Refills: 0 | Status: SHIPPED | OUTPATIENT
Start: 2022-04-21 | End: 2022-05-19 | Stop reason: SDUPTHER

## 2022-04-26 DIAGNOSIS — F41.8 DEPRESSION WITH ANXIETY: ICD-10-CM

## 2022-04-26 RX ORDER — VENLAFAXINE 75 MG/1
TABLET ORAL
Qty: 90 TABLET | Refills: 2 | Status: SHIPPED | OUTPATIENT
Start: 2022-04-26 | End: 2022-09-29

## 2022-04-26 NOTE — TELEPHONE ENCOUNTER
Asim Eric called to request a refill on his medication.       Last office visit : 1/24/2022   Next office visit : 4/29/2022     Requested Prescriptions     Signed Prescriptions Disp Refills    venlafaxine (EFFEXOR) 75 MG tablet 90 tablet 2     Sig: TAKE ONE (1) TABLET BY MOUTH EVERY DAY     Authorizing Provider: Merlinda Guess     Ordering User: Ru Velasquez

## 2022-04-29 ENCOUNTER — OFFICE VISIT (OUTPATIENT)
Dept: FAMILY MEDICINE CLINIC | Age: 77
End: 2022-04-29
Payer: MEDICARE

## 2022-04-29 VITALS
WEIGHT: 222 LBS | HEART RATE: 70 BPM | SYSTOLIC BLOOD PRESSURE: 138 MMHG | TEMPERATURE: 98 F | OXYGEN SATURATION: 98 % | DIASTOLIC BLOOD PRESSURE: 80 MMHG | BODY MASS INDEX: 33.65 KG/M2 | HEIGHT: 68 IN

## 2022-04-29 DIAGNOSIS — M54.42 CHRONIC BILATERAL LOW BACK PAIN WITH LEFT-SIDED SCIATICA: Primary | ICD-10-CM

## 2022-04-29 DIAGNOSIS — G89.29 CHRONIC NECK PAIN: ICD-10-CM

## 2022-04-29 DIAGNOSIS — M54.2 CHRONIC NECK PAIN: ICD-10-CM

## 2022-04-29 DIAGNOSIS — G89.29 CHRONIC BILATERAL LOW BACK PAIN WITH LEFT-SIDED SCIATICA: Primary | ICD-10-CM

## 2022-04-29 PROCEDURE — G8417 CALC BMI ABV UP PARAM F/U: HCPCS | Performed by: FAMILY MEDICINE

## 2022-04-29 PROCEDURE — G8427 DOCREV CUR MEDS BY ELIG CLIN: HCPCS | Performed by: FAMILY MEDICINE

## 2022-04-29 PROCEDURE — 4040F PNEUMOC VAC/ADMIN/RCVD: CPT | Performed by: FAMILY MEDICINE

## 2022-04-29 PROCEDURE — 1036F TOBACCO NON-USER: CPT | Performed by: FAMILY MEDICINE

## 2022-04-29 PROCEDURE — 99213 OFFICE O/P EST LOW 20 MIN: CPT | Performed by: FAMILY MEDICINE

## 2022-04-29 PROCEDURE — 1123F ACP DISCUSS/DSCN MKR DOCD: CPT | Performed by: FAMILY MEDICINE

## 2022-04-29 RX ORDER — TIZANIDINE 4 MG/1
4 TABLET ORAL 3 TIMES DAILY PRN
Qty: 14 TABLET | Refills: 0 | Status: SHIPPED | OUTPATIENT
Start: 2022-04-29 | End: 2022-06-08 | Stop reason: ALTCHOICE

## 2022-04-29 RX ORDER — METHYLPREDNISOLONE 4 MG/1
TABLET ORAL
Qty: 1 KIT | Refills: 0 | Status: SHIPPED | OUTPATIENT
Start: 2022-04-29 | End: 2022-06-08 | Stop reason: ALTCHOICE

## 2022-05-02 NOTE — PROGRESS NOTES
[facility]  [facility]  [address] [facility] [address] [address]  Dept: [phone]  Dept Fax: [phone]  Loc: [phone]    Alejandro Phillips is a 76 y.o. male who presents today for his medical conditions/complaints as noted below. Alejandro Phillips is here for Back Pain        HPI:   CC: Here today to discuss the following:    Feels he has been having some increased neck pain for the past week or so. No injury. The pain is located along the left side of his neck radiating to the left shoulder. Worse when he rotates his head to the right or left. He does however maintain full range of motion of his neck. He does have pain with abduction of his left shoulder however. No numbness or tingling in his left hand. Pain generally described as mild to moderate. He does feel an occasional muscle spasm in the left trapezius as well. In addition, he is having left lumbar pain with radiation to the left anterior thigh. This appears to be new as well. Pain generally describes a dull ache. HPI    Subjective:      Review of Systems  General: No acute distress. MSK: Neck pain    SeeHPI for visit specific review of symptoms. All others negative      Objective:   /80   Pulse 70   Temp 98 °F (36.7 °C)   Ht 5' 8\" (1.727 m)   Wt 222 lb (100.7 kg)   SpO2 98%   BMI 33.75 kg/m²   Physical Exam  Physical Exam   MSK: Full range of motion neck. Pain with flexion extension and rotation to the right and left. Pain with abduction beyond 90 degrees of the left t shoulder. Positive impingement of the left shoulder. Tenderness to left lumbar area. Tenderness to left anterior thigh. No swelling or redness. Negative straight leg raise    No results found for this or any previous visit (from the past 672 hour(s)). Assessment & Plan: The following diagnoses and conditions are stable with no further orders unless indicated:  1.  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 Discussed use, benefit, and side effects of prescribed medications. All patient questions answered. Pt voiced understanding. Reviewed health maintenance. Instructedto continue current medications, diet and exercise. Patient agreed with treatmentplan. Follow up as directed.     _______________________________________________________________      Past Medical History:   Diagnosis Date    AAA (abdominal aortic aneurysm) (HCC)     Acute on chronic systolic CHF (congestive heart failure) ([address] 7/26/2021    Aortic dissection ([address] 10/10/2012    CAD (coronary artery disease)     stents    Cancer (HCC)     lung&lymphnode    Carotid artery occlusion     Cerebrovascular accident (CVA) ([address]     Cerebrovascular accident (CVA) due to embolism ([address] 02/16/2016    Cerebrovascular accident (CVA) due to embolism of right middle cerebral artery ([address]     COVID-19 2/6/2021    Depression     HTN (hypertension)     Hyperlipidemia     MI (myocardial infarction) ([address]     acute 5/13/2015    Non Hodgkin's lymphoma ([address]     Palliative care patient 02/16/2021    Sleep apnea     no c-pap    Unspecified sleep apnea       Past Surgical History:   Procedure Laterality Date    CARDIAC CATHETERIZATION  5/13/15  [address]    EF 40%. stent to LAD, stent to posterior descending branch of RCA.  CAROTID ENDARTERECTOMY Right 7/22/2016    Right carotid endarterectomy,carotid arteriograms. 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, repeat in 1 yr    UPPER GASTROINTESTINAL ENDOSCOPY  11/08/2019    [facility]: normal    UPPER GASTROINTESTINAL ENDOSCOPY  04/13/2018    Dr Shantanu Knutson: normal, 2cm HH, small amount of food in stomach w/dark fluid,but not kathy blood    VASCULAR SURGERY  10/29/12 [facility]    open exposure of R common femoral artery; percutaneous cannulation of L  femoral artery with 7-french sheath; suprarenal abd aortagram with bilat iliofem arteriogram; endoluminal graft repair of AAA with endologix [id number] main body, 28x95 infrarenal cuff; balloon angioplasty of infrarenal abd aorta and endoluminal graft with coda balloon; completion abd aortogram w bilat iliofem arteriogram;     VASCULAR SURGERY  10/29/ [facility]     (cont) open repair of right common femoral ateriotomy; perclose repair of left common femoral artery puncture site. Family History   Problem Relation Age of Onset    High Blood Pressure Father     High Blood Pressure Mother     Diabetes Mother     Colon Polyps Brother     Cancer Brother     Colon Cancer Neg Hx        Social History     Tobacco Use    Smoking status: Former Smoker     Packs/day: 0.50     Years: 40.00     Pack years: 20.00    Smokeless tobacco: Never Used    Tobacco comment: quit smoking Dec 10, 2021.     Substance Use Topics    Alcohol use: No     Current Outpatient Medications   Medication Sig Dispense Refill    methylPREDNISolone (MEDROL, BAM,) 4 MG tablet Take by mouth as directed on pack 1 kit 0    tiZANidine (ZANAFLEX) 4 MG tablet Take 1 tablet by mouth 3 times daily as needed (muscle spasm) 14 tablet 0    venlafaxine (EFFEXOR) 75 MG tablet TAKE ONE (1) TABLET BY MOUTH EVERY DAY 90 tablet 2    HYDROcodone-acetaminophen (NORCO)  MG per tablet Take 1 tablet by mouth every 6 hours as needed for Pain for up to 30 days. 120 tablet 0    carvedilol (COREG) 6.25 MG tablet Take 1 tablet by mouth 2 times daily (with meals) 60 tablet 5    clopidogrel (PLAVIX) 75 MG tablet TAKE ONE (1) TABLET BY MOUTH EVERY DAY 90 tablet 1    torsemide (DEMADEX) 20 MG tablet Take 1 tablet by mouth daily 30 tablet 5    docusate sodium (COLACE) 100 MG capsule Take 100 mg by mouth 2 times daily      ALPRAZolam (XANAX) 0.5 MG tablet Take 0.5 mg by mouth every 8 hours as needed for Sleep.  isosorbide mononitrate (IMDUR) 30 MG extended release tablet Take 1 tablet by mouth daily 30 tablet 3    fluticasone (FLONASE) 50 MCG/ACT nasal spray 1 spray by Each Nostril route daily as needed       atorvastatin (LIPITOR) 40 MG tablet TAKE ONE (1) TABLET EVERY DAY 90 tablet 3    famotidine (PEPCID) 20 MG tablet Take 1 tablet by mouth 2 times daily 180 tablet 2    aspirin 81 MG EC tablet Take 1 tablet by mouth daily 30 tablet 3    nitroGLYCERIN (NITROSTAT) 0.4 MG SL tablet Place 0.4 mg under the tongue every 5 minutes as needed for Chest pain      sacubitril-valsartan (ENTRESTO) 24-26 MG per tablet Take 1 tablet by mouth 2 times daily (Patient not taking: Reported on 4/29/2022) 60 tablet 5    doxazosin (CARDURA) 4 MG tablet Take 4 mg by mouth nightly (Patient not taking: Reported on 4/29/2022)      ipratropium-albuterol (DUONEB) 0.5-2.5 (3) MG/3ML SOLN nebulizer solution Inhale 1 vial into the lungs every 6 hours as needed for Shortness of Breath      polyethylene glycol (GLYCOLAX) 17 g packet Take 17 g by mouth daily as needed for Constipation       No current facility-administered medications for this visit.      Allergies   Allergen Reactions    Influenza Virus Vaccine Swelling    Penicillin G Itching       Health Maintenance   Topic Date Due    Hepatitis C screen  Never done    DTaP/Tdap/Td vaccine (1 - Tdap) Never done    Pneumococcal 65+ years Vaccine (2 - PCV) 01/09/2019    COVID-19 Vaccine (3 - Booster for Moderna series) 09/09/2021    Shingles vaccine (1 of 2) 07/19/2022 (Originally 2/23/1993)    Lipids  07/19/2022    Depression Monitoring  07/19/2022    Potassium  02/24/2023    Creatinine  02/24/2023    Hepatitis A vaccine  Aged Out    Hepatitis B vaccine  Aged Out    Hib vaccine  Aged Out    Meningococcal (ACWY) vaccine  Aged Out       _______________________________________________________________    Note dictated using [id number] [facility]  Sometimes this dictation software makes erroneous transcriptions.

## 2022-05-03 ENCOUNTER — CARE COORDINATION (OUTPATIENT)
Dept: CARE COORDINATION | Age: 77
End: 2022-05-03

## 2022-05-04 NOTE — CARE COORDINATION
Ambulatory Care Coordination Note  5/3/2022  CM Risk Score: 3  Charlson 10 Year Mortality Risk Score: 100%     ACC: Dane Claire RN    Summary Note: ACM spoke with patient's daughter via telephone for [facility] outreach. Daughter states that patient had a recent visit with PCP on 4/29/22 for back/neck pain. Daughter states that patient's pain continues, however it has not worsened. ACM advised daughter to call PCP office if pain does not improve or starts to worsen. Daughter voiced understanding and agreed. Daughter denies that patient has any increased shortness of breath, increased cough, or changes in sputum at this time. Daughter states that [address] is checking patient's blood pressure and his readings have been good. Daughter denies that patient is experiencing exertional chest pain, dizziness, heart palpitations, or edema. Patient has all of his medications and is taking them as prescribed. Daughter denies needing assistance from Meals on Wheels or transportation resources. ACM provided daughter with [facility] contact information to provide help affording medications. Daughter was given an opportunity to verbalize any questions and concerns and agrees to contact ACM or health care provider for questions related to their healthcare. PLAN:  Daughter will reach out to [facility] for assistance with affording patient's medications. Patient will continue to monitor symptoms and will call PCP for any new or worsening symptoms or will call 911 for severe or life threatening symptoms. ACM will follow up with daughter/patient in 2-3 weeks for AC outreach.     Sejal Lemos RN  Ambulatory Care Manager          Ambulatory Care Coordination Assessment    Care Coordination Protocol  Referral from Primary Care Provider: No  Week 1 - Initial Assessment     Do you have all of your prescriptions and are they filled?: Yes  Barriers to medication adherence: None  Are you able to afford your medications?: No  How often do you have trouble taking your medications the way you have been told to take them?: I always take them as prescribed. Do you have Home O2 Therapy?: No      Ability to seek help/take action for Emergent Urgent situations i.e. fire, crime, inclement weather or health crisis.: Needs Assistance  Ability to ambulate to restroom: Needs Assistance  Ability handle personal hygeine needs (bathing/dressing/grooming): Needs Assistance  Ability to manage Medications: Needs Assistance  Ability to prepare Food Preparation: Needs Assistance  Ability to drive and/or has transportation: Dependent  Is patient able to live independently?: Yes     Current Housing: Private Residence        Per the Fall Risk Screening, did the patient have 2 or more falls or 1 fall with injury in the past year?: Yes  How often do you think you are about to fall and you do NOT fall? For example, you grab something to stabilize yourself or hold onto a wall/furniture?: Occasionally           Suggested Interventions and Community Resources  Fall Risk Prevention: In Process Zone Management Tools: In Process         Set up/Review an Education Plan, Set up/Review Goals              Prior to Admission medications    Medication Sig Start Date End Date Taking? Authorizing Provider   methylPREDNISolone (MEDROL, BAM,) 4 MG tablet Take by mouth as directed on pack 4/29/22   Jaylen Greene MD   tiZANidine (ZANAFLEX) 4 MG tablet Take 1 tablet by mouth 3 times daily as needed (muscle spasm) 4/29/22   Jaylen Greene MD   venlafaxine (EFFEXOR) 75 MG tablet TAKE ONE (1) TABLET BY MOUTH EVERY DAY 4/26/22   Jaylen Greene MD   HYDROcodone-acetaminophen (NORCO)  MG per tablet Take 1 tablet by mouth every 6 hours as needed for Pain for up to 30 days.  4/21/22 5/21/22  Jaylen Greene MD   carvedilol (COREG) 6.25 MG tablet Take 1 tablet by mouth 2 times daily (with meals) 3/4/22   TREV Erazo   sacubitril-valsartan (ENTRESTO) 24-26 MG per tablet Take 1 tablet by mouth 2 times daily  Patient not taking: Reported on 4/29/2022 3/4/22   TREV Baig   doxazosin (CARDURA) 4 MG tablet Take 4 mg by mouth nightly  Patient not taking: Reported on 4/29/2022    Historical Provider, MD   clopidogrel (PLAVIX) 75 MG tablet TAKE ONE (1) TABLET BY MOUTH EVERY DAY 2/20/22   Hernan Rodriguez MD   torsemide (DEMADEX) 20 MG tablet Take 1 tablet by mouth daily 2/9/22   TREV Baig   docusate sodium (COLACE) 100 MG capsule Take 100 mg by mouth 2 times daily    Historical Provider, MD   ALPRAZolam Saddie Carloz) 0.5 MG tablet Take 0.5 mg by mouth every 8 hours as needed for Sleep.     Historical Provider, MD   ipratropium-albuterol (DUONEB) 0.5-2.5 (3) MG/3ML SOLN nebulizer solution Inhale 1 vial into the lungs every 6 hours as needed for Shortness of Breath    Historical Provider, MD   polyethylene glycol (GLYCOLAX) 17 g packet Take 17 g by mouth daily as needed for Constipation    Historical Provider, MD   isosorbide mononitrate (IMDUR) 30 MG extended release tablet Take 1 tablet by mouth daily 1/12/22   Yung Liang MD   fluticasone (FLONASE) 50 MCG/ACT nasal spray 1 spray by Each Nostril route daily as needed     Historical Provider, MD   atorvastatin (LIPITOR) 40 MG tablet TAKE ONE (1) TABLET EVERY DAY 5/13/21   Hernan Rodriguez MD   famotidine (PEPCID) 20 MG tablet Take 1 tablet by mouth 2 times daily 4/16/21   TREV Osorio   aspirin 81 MG EC tablet Take 1 tablet by mouth daily 8/5/16   Jelena Morales MD   nitroGLYCERIN (NITROSTAT) 0.4 MG SL tablet Place 0.4 mg under the tongue every 5 minutes as needed for Chest pain    Historical Provider, MD       Future Appointments   Date Time Provider Kasi Vargas   5/5/2022  2:00 PM Yung Liang MD [facility]   7/29/2022  3:30 PM Hernan Rodriguez MD [facility]

## 2022-05-05 ENCOUNTER — OFFICE VISIT (OUTPATIENT)
Dept: CARDIOLOGY CLINIC | Age: 77
End: 2022-05-05
Payer: MEDICARE

## 2022-05-05 VITALS — BODY MASS INDEX: 33.75 KG/M2 | DIASTOLIC BLOOD PRESSURE: 76 MMHG | SYSTOLIC BLOOD PRESSURE: 108 MMHG | HEIGHT: 68 IN

## 2022-05-05 DIAGNOSIS — I10 ESSENTIAL HYPERTENSION: ICD-10-CM

## 2022-05-05 DIAGNOSIS — I25.10 CORONARY ARTERY DISEASE INVOLVING NATIVE CORONARY ARTERY OF NATIVE HEART WITHOUT ANGINA PECTORIS: ICD-10-CM

## 2022-05-05 DIAGNOSIS — E78.2 MIXED HYPERLIPIDEMIA: ICD-10-CM

## 2022-05-05 DIAGNOSIS — Z95.810 ICD (IMPLANTABLE CARDIOVERTER-DEFIBRILLATOR), DUAL, IN SITU: ICD-10-CM

## 2022-05-05 DIAGNOSIS — I42.0 DILATED CARDIOMYOPATHY (HCC): Primary | ICD-10-CM

## 2022-05-05 DIAGNOSIS — I44.2 COMPLETE ATRIOVENTRICULAR BLOCK (HCC): ICD-10-CM

## 2022-05-05 DIAGNOSIS — R00.1 SYMPTOMATIC BRADYCARDIA: ICD-10-CM

## 2022-05-05 PROCEDURE — G8417 CALC BMI ABV UP PARAM F/U: HCPCS | Performed by: INTERNAL MEDICINE

## 2022-05-05 PROCEDURE — 93283 PRGRMG EVAL IMPLANTABLE DFB: CPT | Performed by: INTERNAL MEDICINE

## 2022-05-05 PROCEDURE — 1123F ACP DISCUSS/DSCN MKR DOCD: CPT | Performed by: INTERNAL MEDICINE

## 2022-05-05 PROCEDURE — 1036F TOBACCO NON-USER: CPT | Performed by: INTERNAL MEDICINE

## 2022-05-05 PROCEDURE — 99214 OFFICE O/P EST MOD 30 MIN: CPT | Performed by: INTERNAL MEDICINE

## 2022-05-05 PROCEDURE — 4040F PNEUMOC VAC/ADMIN/RCVD: CPT | Performed by: INTERNAL MEDICINE

## 2022-05-05 PROCEDURE — G8427 DOCREV CUR MEDS BY ELIG CLIN: HCPCS | Performed by: INTERNAL MEDICINE

## 2022-05-05 ASSESSMENT — ENCOUNTER SYMPTOMS
GASTROINTESTINAL NEGATIVE: 1
SHORTNESS OF BREATH: 0
NAUSEA: 0
DIARRHEA: 0
EYES NEGATIVE: 1
RESPIRATORY NEGATIVE: 1
VOMITING: 0

## 2022-05-05 NOTE — PROGRESS NOTES
[facility] Progress Note                            Date:  5/5/2022  Patient: Adama Granger  Age:  76 y.o., 1945      Reason for evaluation:         SUBJECTIVE:    Returns today follow-up assessment no new complaints or issues had 1 brief episode of chest discomfort about 3 weeks ago took a nitro that relieved and no problems since then. Dyspnea about the same. He has not been started on Entresto cannot afford it were arranging for him to get some samples today. No other complaints or issues reported. No ICD shocks. Blood pressure 108/76. Review of Systems   Constitutional: Negative. Negative for chills, fever and unexpected weight change. HENT: Negative. Eyes: Negative. Respiratory: Negative. Negative for shortness of breath. Cardiovascular: Negative. Negative for chest pain. Gastrointestinal: Negative. Negative for diarrhea, nausea and vomiting. Endocrine: Negative. Genitourinary: Negative. Musculoskeletal: Negative. Skin: Negative. Neurological: Negative. All other systems reviewed and are negative. OBJECTIVE:     /76   Ht 5' 8\" (1.727 m)   BMI 33.75 kg/m²     Labs:   CBC: No results for input(s): WBC, HGB, HCT, PLT in the last 72 hours. BMP:No results for input(s): NA, K, CO2, BUN, CREATININE, LABGLOM, GLUCOSE in the last 72 hours. BNP: No results for input(s): BNP in the last 72 hours. PT/INR: No results for input(s): PROTIME, INR in the last 72 hours. APTT:No results for input(s): APTT in the last 72 hours. CARDIAC ENZYMES:No results for input(s): CKTOTAL, CKMB, CKMBINDEX, TROPONINI in the last 72 hours. FASTING LIPID PANEL:  Lab Results   Component Value Date    HDL 36 07/19/2021    LDLDIRECT 71 02/11/2016    LDLCALC 34 07/19/2021    TRIG 106 07/19/2021     LIVER PROFILE:No results for input(s): AST, ALT, LABALBU in the last 72 hours.         Past Medical History:   Diagnosis Date    AAA (abdominal aortic aneurysm) ([facility] [address] arteriogram;     VASCULAR SURGERY  10/29/ [facility]     (cont) open repair of right common femoral ateriotomy; perclose repair of left common femoral artery puncture site. Family History   Problem Relation Age of Onset    High Blood Pressure Father     High Blood Pressure Mother     Diabetes Mother     Colon Polyps Brother     Cancer Brother     Colon Cancer Neg Hx      Allergies   Allergen Reactions    Influenza Virus Vaccine Swelling    Penicillin G Itching     Current Outpatient Medications   Medication Sig Dispense Refill    methylPREDNISolone (MEDROL, BAM,) 4 MG tablet Take by mouth as directed on pack 1 kit 0    tiZANidine (ZANAFLEX) 4 MG tablet Take 1 tablet by mouth 3 times daily as needed (muscle spasm) 14 tablet 0    venlafaxine (EFFEXOR) 75 MG tablet TAKE ONE (1) TABLET BY MOUTH EVERY DAY 90 tablet 2    HYDROcodone-acetaminophen (NORCO)  MG per tablet Take 1 tablet by mouth every 6 hours as needed for Pain for up to 30 days. 120 tablet 0    carvedilol (COREG) 6.25 MG tablet Take 1 tablet by mouth 2 times daily (with meals) 60 tablet 5    clopidogrel (PLAVIX) 75 MG tablet TAKE ONE (1) TABLET BY MOUTH EVERY DAY 90 tablet 1    torsemide (DEMADEX) 20 MG tablet Take 1 tablet by mouth daily 30 tablet 5    docusate sodium (COLACE) 100 MG capsule Take 100 mg by mouth 2 times daily      ALPRAZolam (XANAX) 0.5 MG tablet Take 0.5 mg by mouth every 8 hours as needed for Sleep.       ipratropium-albuterol (DUONEB) 0.5-2.5 (3) MG/3ML SOLN nebulizer solution Inhale 1 vial into the lungs every 6 hours as needed for Shortness of Breath      polyethylene glycol (GLYCOLAX) 17 g packet Take 17 g by mouth daily as needed for Constipation      isosorbide mononitrate (IMDUR) 30 MG extended release tablet Take 1 tablet by mouth daily 30 tablet 3    fluticasone (FLONASE) 50 MCG/ACT nasal spray 1 spray by Each Nostril route daily as needed       atorvastatin (LIPITOR) 40 MG tablet TAKE ONE (1) TABLET EVERY DAY 90 tablet 3    famotidine (PEPCID) 20 MG tablet Take 1 tablet by mouth 2 times daily 180 tablet 2    aspirin 81 MG EC tablet Take 1 tablet by mouth daily 30 tablet 3    nitroGLYCERIN (NITROSTAT) 0.4 MG SL tablet Place 0.4 mg under the tongue every 5 minutes as needed for Chest pain      sacubitril-valsartan (ENTRESTO) 24-26 MG per tablet Take 1 tablet by mouth 2 times daily (Patient not taking: Reported on 4/29/2022) 60 tablet 5    doxazosin (CARDURA) 4 MG tablet Take 4 mg by mouth nightly (Patient not taking: Reported on 4/29/2022)       No current facility-administered medications for this visit. Social History     Socioeconomic History    Marital status:      Spouse name: Not on file    Number of children: Not on file    Years of education: Not on file    Highest education level: Not on file   Occupational History    Not on file   Tobacco Use    Smoking status: Former Smoker     Packs/day: 0.50     Years: 40.00     Pack years: 20.00    Smokeless tobacco: Never Used    Tobacco comment: quit smoking Dec 10, 2021. Vaping Use    Vaping Use: Never used   Substance and Sexual Activity    Alcohol use: No    Drug use: No    Sexual activity: Not on file   Other Topics Concern    Not on file   Social History Narrative     50+ yearsHe has 1 son and 1 daughterWorks for a telephone [id number] active duty 4-1/2 yearsHe does not go to the [employer] presentlyEducation high school 2 years of collegeEnjoys hunting and fishingDenies alcohol or substance usage he does smoke 1 pack/dayPhysically sedentary     Social Determinants of Health     Financial Resource Strain:     Difficulty of Paying Living Expenses: Not on file   Food Insecurity:     Worried About Running Out of Food in the Last Year: Not on file    Steve of Food in the Last Year: Not on file   Transportation Needs:     Lack of Transportation (Medical):  Not on file    Lack of Transportation (Non-Medical): Not on file   Physical Activity:     Days of Exercise per Week: Not on file    Minutes of Exercise per Session: Not on file   Stress:     Feeling of Stress : Not on file   Social Connections:     Frequency of Communication with Friends and Family: Not on file    Frequency of Social Gatherings with Friends and Family: Not on file    Attends [employer] Services: Not on file    Active Member of [address] [place] [address] or Organizations: Not on file    Attends Club or Organization Meetings: Not on file    Marital Status: Not on file   Intimate Partner Violence:     Fear of Current or Ex-Partner: Not on file    Emotionally Abused: Not on file    Physically Abused: Not on file    Sexually Abused: Not on file   Housing Stability:     Unable to Pay for Housing in the Last Year: Not on file    Number of [place] in the Last Year: Not on file    Unstable Housing in the Last Year: Not on file       Physical Examination:  /76   Ht 5' 8\" (1.727 m)   BMI 33.75 kg/m²   Physical Exam  Vitals reviewed. Constitutional:       Appearance: He is well-developed. Neck:      Vascular: No carotid bruit or JVD. Cardiovascular:      Rate and Rhythm: Normal rate and regular rhythm. Heart sounds: Normal heart sounds. No murmur heard. No friction rub. No gallop. Pulmonary:      Effort: Pulmonary effort is normal. No respiratory distress. Breath sounds: Normal breath sounds. No wheezing or rales. Abdominal:      General: There is no distension. Tenderness: There is no abdominal tenderness. Lymphadenopathy:      Cervical: No cervical adenopathy. Skin:     General: Skin is warm and dry. ASSESSMENT:     Diagnosis Orders   1. Dilated cardiomyopathy ([address]     2. Complete atrioventricular block (HCC)     3. Symptomatic bradycardia     4. Coronary artery disease involving native coronary artery of native heart without angina pectoris     5. Essential hypertension     6. Mixed hyperlipidemia     7. ICD (implantable cardioverter-defibrillator), dual, in situ         PLAN:  No orders of the defined types were placed in this encounter. No orders of the defined types were placed in this encounter. 1. Continue present medications  2. Chem-7 10 to 14 days after starting Entresto  3. Recommend follow-up assessment in 3 months    Return in about 3 months (around 8/5/2022) for return to Dr. Jensen Rae only. Myra Gandhi MD 5/5/2022 2:16 PM CDT    [facility]      Thisdictation was generated by voice recognition computer software. Although all attempts are made to edit the dictation for accuracy, there may be errors in the transcription that are not intended.

## 2022-05-05 NOTE — PROGRESS NOTES
ICD interrogated  Presenting rhythm: AS , AP 22.9%,  100%  Battery status: 10.1 years  Lead status: lead impedance within range and stable  Sensing: P waves 5.3 mV,  R waves paced at office check, auto test 3.9 mV  Thresholds:  Atrial 0.625 V @ 0.4ms, ventricular 0.625 @ 0.4ms  Observations:  none  Reprogramming for sensitivity and threshold testing  Next [facility] home check scheduled for 8/8/22

## 2022-05-16 DIAGNOSIS — I42.0 DILATED CARDIOMYOPATHY (HCC): ICD-10-CM

## 2022-05-17 DIAGNOSIS — M54.50 CHRONIC MIDLINE LOW BACK PAIN WITHOUT SCIATICA: ICD-10-CM

## 2022-05-17 DIAGNOSIS — G89.29 CHRONIC MIDLINE LOW BACK PAIN WITHOUT SCIATICA: ICD-10-CM

## 2022-05-17 NOTE — TELEPHONE ENCOUNTER
Boone Marie called to request a refill on his medication. Last office visit : 4/29/2022   Next office visit : 7/29/2022     Last UDS:   Amphetamine Screen, Urine   Date Value Ref Range Status   04/23/2018 Negative  Final     Barbiturate Screen, Urine   Date Value Ref Range Status   04/23/2018 Negative  Final     Benzodiazepine Screen, Urine   Date Value Ref Range Status   04/23/2018 Negative  Final     Cocaine Metabolite Screen, Urine   Date Value Ref Range Status   04/23/2018 Negative  Final     MDMA, Urine   Date Value Ref Range Status   04/23/2018 Negative  Final     Methamphetamine, Urine   Date Value Ref Range Status   04/23/2018 Negative  Final     Opiate Scrn, Ur   Date Value Ref Range Status   04/23/2018 Positive  Final     Oxycodone Screen, Ur   Date Value Ref Range Status   04/23/2018 Negative  Final     PCP Screen, Urine   Date Value Ref Range Status   04/23/2018 Negative  Final     Propoxyphene Screen, Urine   Date Value Ref Range Status   04/23/2018 Negative  Final     Tricyclic Antidepressants, Urine   Date Value Ref Range Status   04/23/2018 Negative  Final       Last Josi Shy: 2/24/22  Medication Contract: 4/23/18   Last Fill: 4/21/22    Requested Prescriptions     Pending Prescriptions Disp Refills    HYDROcodone-acetaminophen (NORCO)  MG per tablet 120 tablet 0     Sig: Take 1 tablet by mouth every 6 hours as needed for Pain for up to 30 days. Please approve or refuse this medication.    Patric Larsen MA

## 2022-05-19 DIAGNOSIS — M54.50 CHRONIC MIDLINE LOW BACK PAIN WITHOUT SCIATICA: ICD-10-CM

## 2022-05-19 DIAGNOSIS — K21.9 GASTROESOPHAGEAL REFLUX DISEASE WITHOUT ESOPHAGITIS: ICD-10-CM

## 2022-05-19 DIAGNOSIS — G89.29 CHRONIC MIDLINE LOW BACK PAIN WITHOUT SCIATICA: ICD-10-CM

## 2022-05-19 RX ORDER — FAMOTIDINE 20 MG/1
20 TABLET, FILM COATED ORAL 2 TIMES DAILY
Qty: 180 TABLET | Refills: 2 | Status: SHIPPED | OUTPATIENT
Start: 2022-05-19

## 2022-05-19 RX ORDER — HYDROCODONE BITARTRATE AND ACETAMINOPHEN 10; 325 MG/1; MG/1
1 TABLET ORAL EVERY 6 HOURS PRN
Qty: 120 TABLET | Refills: 0 | OUTPATIENT
Start: 2022-05-19 | End: 2022-06-18

## 2022-05-19 RX ORDER — HYDROCODONE BITARTRATE AND ACETAMINOPHEN 10; 325 MG/1; MG/1
1 TABLET ORAL EVERY 6 HOURS PRN
Qty: 120 TABLET | Refills: 0 | Status: SHIPPED | OUTPATIENT
Start: 2022-05-19 | End: 2022-06-17 | Stop reason: SDUPTHER

## 2022-05-19 NOTE — TELEPHONE ENCOUNTER
Bárbara Mccormick called to request a refill on his medication.       Last office visit : 4/29/2022   Next office visit : 5/19/2022     Requested Prescriptions     Pending Prescriptions Disp Refills    famotidine (PEPCID) 20 MG tablet 180 tablet 2     Sig: Take 1 tablet by mouth 2 times daily            Gregory Gardner

## 2022-06-04 DIAGNOSIS — E78.01 FAMILIAL HYPERCHOLESTEROLEMIA: ICD-10-CM

## 2022-06-06 ENCOUNTER — TELEPHONE (OUTPATIENT)
Dept: CARDIOLOGY CLINIC | Age: 77
End: 2022-06-06

## 2022-06-06 RX ORDER — ATORVASTATIN CALCIUM 40 MG/1
TABLET, FILM COATED ORAL
Qty: 90 TABLET | Refills: 3 | Status: SHIPPED | OUTPATIENT
Start: 2022-06-06

## 2022-06-06 RX ORDER — ISOSORBIDE MONONITRATE 30 MG/1
30 TABLET, EXTENDED RELEASE ORAL DAILY
Qty: 30 TABLET | Refills: 3 | Status: SHIPPED | OUTPATIENT
Start: 2022-06-06 | End: 2022-08-11 | Stop reason: SDUPTHER

## 2022-06-06 NOTE — TELEPHONE ENCOUNTER
Home health called about patients device. Said something may be wrong with it? Tried to call back but couldn't reach anyone. Do you know anything about it?  [facility] number is [phone]

## 2022-06-07 NOTE — TELEPHONE ENCOUNTER
[facility] She wants to get patient in to be seen regarding pain at pacemaker site. Patient scheduled for 06/08/22 @ 1:45 with Leilani Greco.

## 2022-06-08 ENCOUNTER — OFFICE VISIT (OUTPATIENT)
Dept: CARDIOLOGY CLINIC | Age: 77
End: 2022-06-08
Payer: MEDICARE

## 2022-06-08 VITALS
HEART RATE: 81 BPM | BODY MASS INDEX: 34.1 KG/M2 | SYSTOLIC BLOOD PRESSURE: 120 MMHG | DIASTOLIC BLOOD PRESSURE: 70 MMHG | HEIGHT: 68 IN | WEIGHT: 225 LBS

## 2022-06-08 DIAGNOSIS — I42.0 DILATED CARDIOMYOPATHY (HCC): Primary | ICD-10-CM

## 2022-06-08 DIAGNOSIS — Z95.810 ICD (IMPLANTABLE CARDIOVERTER-DEFIBRILLATOR), DUAL, IN SITU: ICD-10-CM

## 2022-06-08 DIAGNOSIS — I50.42 CHRONIC COMBINED SYSTOLIC (CONGESTIVE) AND DIASTOLIC (CONGESTIVE) HEART FAILURE (HCC): ICD-10-CM

## 2022-06-08 DIAGNOSIS — I25.10 CORONARY ARTERY DISEASE INVOLVING NATIVE CORONARY ARTERY OF NATIVE HEART WITHOUT ANGINA PECTORIS: ICD-10-CM

## 2022-06-08 DIAGNOSIS — I44.2 COMPLETE ATRIOVENTRICULAR BLOCK (HCC): ICD-10-CM

## 2022-06-08 PROCEDURE — 93289 INTERROG DEVICE EVAL HEART: CPT | Performed by: CLINICAL NURSE SPECIALIST

## 2022-06-08 PROCEDURE — G8427 DOCREV CUR MEDS BY ELIG CLIN: HCPCS | Performed by: CLINICAL NURSE SPECIALIST

## 2022-06-08 PROCEDURE — G8417 CALC BMI ABV UP PARAM F/U: HCPCS | Performed by: CLINICAL NURSE SPECIALIST

## 2022-06-08 PROCEDURE — 1036F TOBACCO NON-USER: CPT | Performed by: CLINICAL NURSE SPECIALIST

## 2022-06-08 PROCEDURE — 99214 OFFICE O/P EST MOD 30 MIN: CPT | Performed by: CLINICAL NURSE SPECIALIST

## 2022-06-08 PROCEDURE — 1123F ACP DISCUSS/DSCN MKR DOCD: CPT | Performed by: CLINICAL NURSE SPECIALIST

## 2022-06-08 NOTE — PROGRESS NOTES
[facility] Cardiology  [place] [address]  Phone: [phone]  Fax: [phone]    OFFICE VISIT:  2022    Wilber Hassan - : 1945    Reason For Visit:  Griselda Jenkins is a 76 y.o. male who is here for Follow-up (pt has a painful knot around pacemaker), Congestive Heart Failure, and Cardiomyopathy (Pacemaker)  History coronary disease with MI and stenting in , carotid artery stenosis, AAA, CVA, COPD, hypertension, hyperlipidemia, anemia, ZION, stage III CKD with multiple medical problems over the last year. Patient had complete heart block noted and pacemaker placed 21     Patient was hospitalized in December in [place] and found to have LV dysfunction with EF 20%. He was transitioned to [facility]     Underwent elective heart catheterization 2022 that showed severely impaired LV function with global hypokinesis. Patent stent mid LAD and noncritical disease proximal to the stent. Patent stent in [facility]     2022 patient was admitted for pacemaker removal and AICD implantation  Previous ventricular lead was removed and insertion of a new RV ICD lead. Unsuccessful to place coronary sinus lead  Intermittent loss of capture noted at device check on 2022  Chest x-ray obtained with slightly retracted lead possible    Patient was seen 2022 by Dr. Komal Mccann. Recommended starting Entresto samples provided and repeating BMP 10 to 14 days after starting    Home health phoned due to concern with pain at device site. Right upper chest wall here for evaluation  He reports that he feels like his device is more prominent in his right chest.  If he rubs on it it is a little sore. He does not sleep on his right side lays on his back. Subjective  Griselda Jenkins denies exertional chest pain, resting shortness of breath, orthopnea, paroxysmal nocturnal dyspnea, syncope, presyncope, arrhythmia, edema and fatigue.   The patient denies numbness or weakness to suggest cerebrovascular accident or transient ischemic attack. Martha Brownlee MD is PCP and follows labs. He is not taking the Entresto at the moment. Ran out of it and is not affordable.   Candice Trinidad has the following history as recorded in [facility]:    Patient Active Problem List    Diagnosis Date Noted    Dilated cardiomyopathy ([address]     Decreased cardiac ejection fraction 01/19/2022    Unstable angina (HCC)     Complete atrioventricular block (HCC)     Acute on chronic systolic CHF (congestive heart failure) ([address] 07/26/2021    Hypoxemia 07/26/2021    Gastrointestinal stromal tumor of stomach ([address] 07/19/2021    Symptomatic bradycardia 02/16/2021    Atherosclerosis of native artery of both lower extremities ([address] 07/30/2019    Bilateral carotid artery stenosis 07/30/2019    Hyperparathyroidism ([address] 10/04/2018    Recurrent major depressive disorder in partial remission ([address] 07/11/2018    Chronic midline low back pain without sciatica 04/23/2018    Simple chronic bronchitis ([address] 10/02/2017    Depression with anxiety 09/28/2017    Hyperglycemia 09/28/2017    Gastroesophageal reflux disease without esophagitis 09/28/2017    Injury of right hypoglossal nerve 08/04/2016    Stenosis of right carotid artery     Coronary artery disease involving native heart without angina pectoris     Cerebrovascular accident (CVA) due to embolism of right middle cerebral artery ([address]     Essential hypertension     CKD (chronic kidney disease) stage 3, GFR 30-59 ml/min ([address] 07/17/2016    Obstructive sleep apnea syndrome 07/16/2016    Cerebrovascular accident (CVA) due to embolism ([address] 02/16/2016    Mixed hyperlipidemia     AAA (abdominal aortic aneurysm) ([address] 10/10/2012     Past Medical History:   Diagnosis Date    AAA (abdominal aortic aneurysm) (HCC)     Acute on chronic systolic CHF (congestive heart failure) ([address] 7/26/2021    Aortic dissection ([address] 10/10/2012    CAD (coronary artery disease)     stents    Cancer ([facility]     lung&lymphnode    Carotid artery occlusion     Cerebrovascular accident (CVA) ([address]     Cerebrovascular accident (CVA) due to embolism ([address] 02/16/2016    Cerebrovascular accident (CVA) due to embolism of right middle cerebral artery ([address]     COVID-19 2/6/2021    Depression     HTN (hypertension)     Hyperlipidemia     MI (myocardial infarction) ([facility]     acute 5/13/2015    Non Hodgkin's lymphoma ([facility]     Palliative care patient 02/16/2021    Sleep apnea     no c-pap    Unspecified sleep apnea      Past Surgical History:   Procedure Laterality Date    CARDIAC CATHETERIZATION  5/13/15  [address]    EF 40%. stent to LAD, stent to posterior descending branch of RCA.  CAROTID ENDARTERECTOMY Right 7/22/2016    Right carotid endarterectomy,carotid arteriograms. [facility]    COLONOSCOPY  04/09/2018    Juan Manuel    ENDOSCOPY, COLON, DIAGNOSTIC      LUNG CANCER SURGERY      right side    LYMPHADENECTOMY      removed lymphnodes     PACEMAKER PLACEMENT      RECTAL SURGERY      fistula     UPPER GASTROINTESTINAL ENDOSCOPY N/A 1/8/2020    Dr Ori Schmidt (Dr Rayne Nuñez pt) w/EUS-Small low grade GIST in the stomach, repeat in 1 yr    UPPER GASTROINTESTINAL ENDOSCOPY  11/08/2019    Juan Manuel: normal    UPPER GASTROINTESTINAL ENDOSCOPY  04/13/2018    Dr Michel Mar: normal, 2cm HH, small amount of food in stomach w/dark fluid,but not kathy blood    VASCULAR SURGERY  10/29/12 [facility]    open exposure of R common femoral artery; percutaneous cannulation of L  femoral artery with 7-Tajik sheath; suprarenal abd aortagram with bilat iliofem arteriogram; endoluminal graft repair of AAA with endologix [id number] main body, 28x95 infrarenal cuff; balloon angioplasty of infrarenal abd aorta and endoluminal graft with coda balloon; completion abd aortogram w bilat iliofem arteriogram;     VASCULAR SURGERY  10/29/ [facility]     (cont) open repair of right common femoral ateriotomy; perclose repair of left common femoral artery puncture site. Family History   Problem Relation Age of Onset    High Blood Pressure Father     High Blood Pressure Mother     Diabetes Mother     Colon Polyps Brother     Cancer Brother     Colon Cancer Neg Hx      Social History     Tobacco Use    Smoking status: Former Smoker     Packs/day: 0.50     Years: 40.00     Pack years: 20.00    Smokeless tobacco: Never Used    Tobacco comment: quit smoking Dec 10, 2021. Substance Use Topics    Alcohol use: No      Current Outpatient Medications   Medication Sig Dispense Refill    atorvastatin (LIPITOR) 40 MG tablet TAKE ONE (1) TABLET BY MOUTH EVERY DAY 90 tablet 3    isosorbide mononitrate (IMDUR) 30 MG extended release tablet TAKE 1 TABLET BY MOUTH DAILY 30 tablet 3    HYDROcodone-acetaminophen (NORCO)  MG per tablet Take 1 tablet by mouth every 6 hours as needed for Pain for up to 30 days.  120 tablet 0    famotidine (PEPCID) 20 MG tablet Take 1 tablet by mouth 2 times daily 180 tablet 2    venlafaxine (EFFEXOR) 75 MG tablet TAKE ONE (1) TABLET BY MOUTH EVERY DAY 90 tablet 2    carvedilol (COREG) 6.25 MG tablet Take 1 tablet by mouth 2 times daily (with meals) 60 tablet 5    sacubitril-valsartan (ENTRESTO) 24-26 MG per tablet Take 1 tablet by mouth 2 times daily 60 tablet 5    clopidogrel (PLAVIX) 75 MG tablet TAKE ONE (1) TABLET BY MOUTH EVERY DAY 90 tablet 1    torsemide (DEMADEX) 20 MG tablet Take 1 tablet by mouth daily 30 tablet 5    docusate sodium (COLACE) 100 MG capsule Take 100 mg by mouth 2 times daily      ipratropium-albuterol (DUONEB) 0.5-2.5 (3) MG/3ML SOLN nebulizer solution Inhale 1 vial into the lungs every 6 hours as needed for Shortness of Breath      polyethylene glycol (GLYCOLAX) 17 g packet Take 17 g by mouth daily as needed for Constipation      fluticasone (FLONASE) 50 MCG/ACT nasal spray 1 spray by Each Nostril route daily as needed       nitroGLYCERIN (NITROSTAT) 0.4 MG SL tablet Place 0.4 mg under the tongue every 5 minutes as needed for Chest pain      doxazosin (CARDURA) 4 MG tablet Take 4 mg by mouth nightly (Patient not taking: Reported on 4/29/2022)      ALPRAZolam (XANAX) 0.5 MG tablet Take 0.5 mg by mouth every 8 hours as needed for Sleep. (Patient not taking: Reported on 6/8/2022)       No current facility-administered medications for this visit. Allergies: Influenza virus vaccine and Penicillin g    Review of Systems  Constitutional - no significant activity change, appetite change, or unexpected weight change. No fever, chills or diaphoresis. No fatigue. HEENT - no significant rhinorrhea or epistaxis. No tinnitus or significant hearing loss. Eyes - no sudden vision change or amaurosis. Respiratory - no significant wheezing, stridor, apnea or cough. + dyspnea on exertion no resting shortness of breath. Cardiovascular - no exertional chest pain, orthopnea or PND. No sensation of arrhythmia or slow heart rate. No claudication or leg edema. Gastrointestinal - no abdominal swelling or pain. No blood in stool. No severe constipation, diarrhea, nausea, or vomiting. Genitourinary - no difficulty urinating, dysuria, frequency, or urgency. No flank pain or hematuria. Musculoskeletal - no back pain, gait disturbance, or myalgia. Skin - no color change or rash. No pallor. No new surgical incision. Neurologic - no speech difficulty, facial asymmetry or lateralizing weakness. No seizures, presyncope, syncope, or significant dizziness. Hematologic - no easy bruising or excessive bleeding. Psychiatric - no severe anxiety or insomnia. No confusion. All other review of systems are negative. obese  Objective  Vital Signs - /70   Pulse 81   Ht 5' 8\" (1.727 m)   Wt 225 lb (102.1 kg)   BMI 34.21 kg/m²   General - Joanne Howard is alert, cooperative, and pleasant. Well groomed. No acute distress. Body habitus is . HEENT - The head is normocephalic. No circumoral cyanosis. Dentition is normal.   EYES -  No Xanthelasma, no arcus senilis, no conjunctival hemorrhages or discharge. Neck - Supple, without increased jugular venous pressures. No carotid bruits. No mass. Respiratory - Lungs are clear bilaterally. No wheezes or rales. Normal effort without use of accessory muscles. Cardiovascular - Heart has regular rhythm and rate. No murmurs, rubs or gallops. + pedal pulses and no varicosities. Abdominal -  Soft, nontender, nondistended. Bowel sounds are intact. Extremities - No clubbing, cyanosis, or  edema. Musculoskeletal -  No clubbing . No Osler's nodes. Gait normal   No kyphosis or scoliosis. Skin -  no statis ulcers or dermatitis. Pace -Maker palpated at right upper chest wall. No redness or drainage well-healed  Neurological - No focal signs are identified. Oriented to person, place and time. Psychiatric -  Appropriate affect and mood. Assessment:     Diagnosis Orders   1. Dilated cardiomyopathy ([address]     2. Complete atrioventricular block (HCC)     3. Coronary artery disease involving native coronary artery of native heart without angina pectoris     4. ICD (implantable cardioverter-defibrillator), dual, in situ     5. Chronic combined systolic (congestive) and diastolic (congestive) heart failure (HCC)       Data:  BP Readings from Last 3 Encounters:   06/08/22 120/70   05/05/22 108/76   04/29/22 138/80    Pulse Readings from Last 3 Encounters:   06/08/22 81   04/29/22 70   03/03/22 79        Wt Readings from Last 3 Encounters:   06/08/22 225 lb (102.1 kg)   04/29/22 222 lb (100.7 kg)   03/03/22 220 lb (99.8 kg)     ICD interrogation showed adequate battery voltage   (10.1 years) and charge time with  normal diagnostics, no firings and no sustained dysrhythmias. Optivol stable. Mode DDDR at 61  Recheck in 3 mos. [address] [place] monitoring system    Pressure and heart rate well controlled. Appears euvolemic.   Has had significant weight loss and fluid overload improvement. Right upper chest wall device is more prominent. Appropriate functioning. Patient is feeling device edge and wire. Reassurance given. Education provided to him and his family  See media for picture    Unable to afford Entresto. We will work on getting him on a program.  He will need to submit financial information. We will get him some samples for now. Also on carvedilol and long-acting nitrate. Remains on Plavix but he stopped his aspirin due to seeing information on TV about dangers of taking aspirin. He is over 1 year from last stenting. Okay to discontinue Plavix. He does have a lot of easy bruising     Reviewed PCP recent notes   Reviewed recent labs     States taking medications as prescribed  Stable cardiovascular status. No evidence of overt heart failure, angina or dysrhythmia. 30 minutes were spent preparing, reviewing and seeing patient. All questions answered    Plan  Can stay off the aspirin but continue the Plavix   Follow up in [facility] as planned with Dr. Florencia Shaffer will be calling she will need financial information to get started on program for [employer]  Call with any questions or concerns  Follow up with Trinidad Birch MD for non cardiac problems  Report any new problems  Cardiovascular Fitness-Exercise as tolerated. Cardiac / Healthy Diet  Continue current medications as directed  Continue plan of treatment  It is always recommended that you bring your medications bottles with you to each visit - this is for your safety! TREV Chaney    EMR dragon/transcription disclaimer: Much of this encounter note is electronic transcription/translation of spoken language to printed tach. Electronic translation of spoken language may be erroneous, or at times, nonsensical words or phrases may be inadvertently transcribed.  Although, I have reviewed the note for such errors, some may still exist.

## 2022-06-08 NOTE — PATIENT INSTRUCTIONS
Plan  Can stay off the aspirin but continue the Plavix   Follow up in [facility] as planned with Dr. Westbrook Thebes will be calling she will need financial information to get started on program for Neida Gonzalez  Call with any questions or concerns  Follow up with Sarahy Camacho MD for non cardiac problems  Report any new problems  Cardiovascular Fitness-Exercise as tolerated. Cardiac / Healthy Diet  Continue current medications as directed  Continue plan of treatment  It is always recommended that you bring your medications bottles with you to each visit - this is for your safety!

## 2022-06-17 DIAGNOSIS — G89.29 CHRONIC MIDLINE LOW BACK PAIN WITHOUT SCIATICA: ICD-10-CM

## 2022-06-17 DIAGNOSIS — M54.50 CHRONIC MIDLINE LOW BACK PAIN WITHOUT SCIATICA: ICD-10-CM

## 2022-06-17 RX ORDER — HYDROCODONE BITARTRATE AND ACETAMINOPHEN 10; 325 MG/1; MG/1
1 TABLET ORAL EVERY 6 HOURS PRN
Qty: 120 TABLET | Refills: 0 | Status: SHIPPED | OUTPATIENT
Start: 2022-06-17 | End: 2022-07-20 | Stop reason: SDUPTHER

## 2022-06-17 NOTE — TELEPHONE ENCOUNTER
Helen Salmon called to request a refill on his medication. Last office visit : 4/29/2022   Next office visit : 7/29/2022     Last UDS:   Amphetamine Screen, Urine   Date Value Ref Range Status   04/23/2018 Negative  Final     Barbiturate Screen, Urine   Date Value Ref Range Status   04/23/2018 Negative  Final     Benzodiazepine Screen, Urine   Date Value Ref Range Status   04/23/2018 Negative  Final     Cocaine Metabolite Screen, Urine   Date Value Ref Range Status   04/23/2018 Negative  Final     MDMA, Urine   Date Value Ref Range Status   04/23/2018 Negative  Final     Methamphetamine, Urine   Date Value Ref Range Status   04/23/2018 Negative  Final     Opiate Scrn, Ur   Date Value Ref Range Status   04/23/2018 Positive  Final     Oxycodone Screen, Ur   Date Value Ref Range Status   04/23/2018 Negative  Final     PCP Screen, Urine   Date Value Ref Range Status   04/23/2018 Negative  Final     Propoxyphene Screen, Urine   Date Value Ref Range Status   04/23/2018 Negative  Final     Tricyclic Antidepressants, Urine   Date Value Ref Range Status   04/23/2018 Negative  Final       Last Genie Forth: 6/17/22  Medication Contract: 4/23/18   Last Fill: 5/19/22      Requested Prescriptions     Pending Prescriptions Disp Refills    HYDROcodone-acetaminophen (NORCO)  MG per tablet 120 tablet 0     Sig: Take 1 tablet by mouth every 6 hours as needed for Pain for up to 30 days. Please approve or refuse this medication.    Mingo Dodd

## 2022-06-29 ENCOUNTER — TELEPHONE (OUTPATIENT)
Dept: CARDIOLOGY CLINIC | Age: 77
End: 2022-06-29

## 2022-06-29 NOTE — LETTER
[address] [facility] [place], Cardiology  [address], [address] [facility]  [address]  Phone: [phone]  Fax: [phone]            2022          Re:  Mr. Wilber Hassan   :  1945  [address] [place]    To Yo Miguel. [employer] recently notified our office about important information related to you [employer] defibrillator. We are sending you this letter to provide information to you. Your implanted device is designed to deliver high-energy shocks to terminate an abnormal fast heart rhythm should one occur. [employer] has discovered that your implanted device may deliver a reduced-energy shock when providing therapy to reset your heart to a normal rhythm. Your device will still pace your heart, detect abnormal rhythms, and deliver a shock, if needed, but the energy delivered may be lower than expected. Your device is designed to issue and audible \"beeping\" alert if it detects anything unusual during routing function. If you hear a \"beeping\" tone coming from your device (the beeping tone will last for 30 seconds and will repeat every 4 hours), please call the office as soon as possible. As a reminder, if you use a home bedside monitor that allows our office to remotely check the operation of your heart device, please ensure your home monitor remains plugged in at all time. If you use your cell phone (or other smart device such as a tablet) for remote monitoring, ensure the [facility] armen is open and active in the background on your smart device at all time. You may contact our office at [phone] to speak to our staff about your [employer] heart device and this issue. [facility] patient services is available to answer any additional questions at [phone] (Monday through Friday 7 am to 6 pm Central Time), or via email at [email or web address].     We sincerely apologize for any difficulties this may cause you and your family. If you have any further questions, please do not hesitate to contact my office.     Sincerely,         Electronically signed: Jenn Caballero MD, 6/29/2022 4:02 PM

## 2022-06-29 NOTE — TELEPHONE ENCOUNTER
Notified patient for [employer] medical device advisory on his defibrillator. Mailed patient letter. Patient will need programming changes and recommended by [employer]. Patient has a scheduled office follow up on 8/11/22. Will have MD review to see if ok to wait until then or does patient need to come in sooner.

## 2022-06-30 NOTE — TELEPHONE ENCOUNTER
Per Dr. Mai Fuentes patient can come in sooner to see NP to have changes made. Patient said he does not want to come in sooner.

## 2022-07-19 DIAGNOSIS — M54.50 CHRONIC MIDLINE LOW BACK PAIN WITHOUT SCIATICA: ICD-10-CM

## 2022-07-19 DIAGNOSIS — G89.29 CHRONIC MIDLINE LOW BACK PAIN WITHOUT SCIATICA: ICD-10-CM

## 2022-07-19 NOTE — TELEPHONE ENCOUNTER
Sudheer Yasemin called to request a refill on his medication. Last office visit : 4/29/2022   Next office visit : 7/29/2022     Last UDS:   Amphetamine Screen, Urine   Date Value Ref Range Status   04/23/2018 Negative  Final     Barbiturate Screen, Urine   Date Value Ref Range Status   04/23/2018 Negative  Final     Benzodiazepine Screen, Urine   Date Value Ref Range Status   04/23/2018 Negative  Final     Cocaine Metabolite Screen, Urine   Date Value Ref Range Status   04/23/2018 Negative  Final     MDMA, Urine   Date Value Ref Range Status   04/23/2018 Negative  Final     Methamphetamine, Urine   Date Value Ref Range Status   04/23/2018 Negative  Final     Opiate Scrn, Ur   Date Value Ref Range Status   04/23/2018 Positive  Final     Oxycodone Screen, Ur   Date Value Ref Range Status   04/23/2018 Negative  Final     PCP Screen, Urine   Date Value Ref Range Status   04/23/2018 Negative  Final     Propoxyphene Screen, Urine   Date Value Ref Range Status   04/23/2018 Negative  Final     Tricyclic Antidepressants, Urine   Date Value Ref Range Status   04/23/2018 Negative  Final       Last Corky Laith: 6/24/22  Medication Contract: 4/23/18   Last Fill: 6/17/22      Requested Prescriptions     Pending Prescriptions Disp Refills    HYDROcodone-acetaminophen (NORCO)  MG per tablet 120 tablet 0     Sig: Take 1 tablet by mouth every 6 hours as needed for Pain for up to 30 days. Please approve or refuse this medication.    Joey Staton

## 2022-07-20 RX ORDER — HYDROCODONE BITARTRATE AND ACETAMINOPHEN 10; 325 MG/1; MG/1
1 TABLET ORAL EVERY 6 HOURS PRN
Qty: 120 TABLET | Refills: 0 | Status: SHIPPED | OUTPATIENT
Start: 2022-07-20 | End: 2022-08-17 | Stop reason: SDUPTHER

## 2022-07-29 ENCOUNTER — OFFICE VISIT (OUTPATIENT)
Dept: FAMILY MEDICINE CLINIC | Age: 77
End: 2022-07-29
Payer: MEDICARE

## 2022-07-29 ENCOUNTER — HOSPITAL ENCOUNTER (OUTPATIENT)
Dept: GENERAL RADIOLOGY | Age: 77
Discharge: HOME | End: 2022-07-29
Payer: MEDICARE

## 2022-07-29 VITALS
HEART RATE: 78 BPM | BODY MASS INDEX: 33.68 KG/M2 | WEIGHT: 222.2 LBS | DIASTOLIC BLOOD PRESSURE: 88 MMHG | HEIGHT: 68 IN | OXYGEN SATURATION: 97 % | TEMPERATURE: 98.2 F | SYSTOLIC BLOOD PRESSURE: 132 MMHG

## 2022-07-29 DIAGNOSIS — M54.50 CHRONIC BILATERAL LOW BACK PAIN WITHOUT SCIATICA: ICD-10-CM

## 2022-07-29 DIAGNOSIS — M25.552 LEFT HIP PAIN: ICD-10-CM

## 2022-07-29 DIAGNOSIS — K21.9 GASTROESOPHAGEAL REFLUX DISEASE WITHOUT ESOPHAGITIS: ICD-10-CM

## 2022-07-29 DIAGNOSIS — G89.29 CHRONIC BILATERAL LOW BACK PAIN WITHOUT SCIATICA: ICD-10-CM

## 2022-07-29 DIAGNOSIS — Z00.00 ANNUAL PHYSICAL EXAM: Primary | ICD-10-CM

## 2022-07-29 DIAGNOSIS — I10 ESSENTIAL (PRIMARY) HYPERTENSION: ICD-10-CM

## 2022-07-29 DIAGNOSIS — E78.00 HYPERCHOLESTEROLEMIA: ICD-10-CM

## 2022-07-29 PROCEDURE — G0439 PPPS, SUBSEQ VISIT: HCPCS | Performed by: FAMILY MEDICINE

## 2022-07-29 PROCEDURE — 72100 X-RAY EXAM L-S SPINE 2/3 VWS: CPT

## 2022-07-29 PROCEDURE — 1123F ACP DISCUSS/DSCN MKR DOCD: CPT | Performed by: FAMILY MEDICINE

## 2022-07-29 PROCEDURE — 99214 OFFICE O/P EST MOD 30 MIN: CPT | Performed by: FAMILY MEDICINE

## 2022-07-29 PROCEDURE — 73521 X-RAY EXAM HIPS BI 2 VIEWS: CPT

## 2022-07-29 PROCEDURE — 73521 X-RAY EXAM HIPS BI 2 VIEWS: CPT | Performed by: RADIOLOGY

## 2022-07-29 PROCEDURE — 72100 X-RAY EXAM L-S SPINE 2/3 VWS: CPT | Performed by: RADIOLOGY

## 2022-07-29 ASSESSMENT — PATIENT HEALTH QUESTIONNAIRE - PHQ9
SUM OF ALL RESPONSES TO PHQ QUESTIONS 1-9: 12
6. FEELING BAD ABOUT YOURSELF - OR THAT YOU ARE A FAILURE OR HAVE LET YOURSELF OR YOUR FAMILY DOWN: 0
SUM OF ALL RESPONSES TO PHQ QUESTIONS 1-9: 12
8. MOVING OR SPEAKING SO SLOWLY THAT OTHER PEOPLE COULD HAVE NOTICED. OR THE OPPOSITE, BEING SO FIGETY OR RESTLESS THAT YOU HAVE BEEN MOVING AROUND A LOT MORE THAN USUAL: 0
SUM OF ALL RESPONSES TO PHQ QUESTIONS 1-9: 12
SUM OF ALL RESPONSES TO PHQ9 QUESTIONS 1 & 2: 6
2. FEELING DOWN, DEPRESSED OR HOPELESS: 3
SUM OF ALL RESPONSES TO PHQ QUESTIONS 1-9: 6
3. TROUBLE FALLING OR STAYING ASLEEP: 3
7. TROUBLE CONCENTRATING ON THINGS, SUCH AS READING THE NEWSPAPER OR WATCHING TELEVISION: 0
1. LITTLE INTEREST OR PLEASURE IN DOING THINGS: 3
2. FEELING DOWN, DEPRESSED OR HOPELESS: 3
SUM OF ALL RESPONSES TO PHQ QUESTIONS 1-9: 6
SUM OF ALL RESPONSES TO PHQ QUESTIONS 1-9: 12
4. FEELING TIRED OR HAVING LITTLE ENERGY: 3
SUM OF ALL RESPONSES TO PHQ QUESTIONS 1-9: 6
SUM OF ALL RESPONSES TO PHQ QUESTIONS 1-9: 6
9. THOUGHTS THAT YOU WOULD BE BETTER OFF DEAD, OR OF HURTING YOURSELF: 0
3. TROUBLE FALLING OR STAYING ASLEEP: 3
10. IF YOU CHECKED OFF ANY PROBLEMS, HOW DIFFICULT HAVE THESE PROBLEMS MADE IT FOR YOU TO DO YOUR WORK, TAKE CARE OF THINGS AT HOME, OR GET ALONG WITH OTHER PEOPLE: 0
5. POOR APPETITE OR OVEREATING: 0

## 2022-07-29 ASSESSMENT — LIFESTYLE VARIABLES: HOW OFTEN DO YOU HAVE A DRINK CONTAINING ALCOHOL: NEVER

## 2022-07-29 NOTE — PROGRESS NOTES
[facility] ED PHYSICIAN SERVICES  [facility] FAMILY MEDICINE  [address] [facility] [address]  [address] [address]  Dept: [phone]  Dept Fax: [phone]  Loc: [phone]    Enoch Rust is a 76 y.o. male who presents today for his medical conditions/complaints as noted below. nEoch Rust is here for Medicare AWV        HPI:   CC: Here today to discuss the following:        Audra Brunner to his cardiologist office in June. Has a history of dilated cardiomyopathy, complete AV block, coronary artery disease, ICD implantation. They are working on trying to get him Entresto. They suggested he continue with Plavix and stop aspirin. He continues to have issues with chronic lower back pain. Currently taking hydrocodone 10 mg every 6 hours as needed for pain  Plain films of day and today showed mild to moderate nonacute L1 compression deformity. Hips show no significant osteoarthritis. He is interested in seeing pain management in [facility]    Hyperlipidemia  Tolerating current cholesterol medication without side effects. . Attempting to reduce processed sugar and cholesterol from diet. Hypertension  Compliant with medications. No adverse effects from medication. No lightheadedness, palpitations, or chest pain. Gastroesophageal Reflux Disease  Symptoms currently under control. Medication adequately controls symptoms. No hematochezia or melena. HPI    Subjective:      Review of Systems   Constitutional:  Negative for chills and fever. HENT:  Negative for congestion. Respiratory:  Negative for cough, chest tightness and shortness of breath. Cardiovascular:  Negative for chest pain, palpitations and leg swelling. Gastrointestinal:  Negative for abdominal pain, anal bleeding, constipation, diarrhea and nausea. Genitourinary:  Negative for difficulty urinating. Musculoskeletal:  Positive for arthralgias, back pain, joint swelling, myalgias and neck pain.  Negative for gait problem. Psychiatric/Behavioral: Negative. SeeHPI for visit specific review of symptoms. All others negative      Objective:   /88   Pulse 78   Temp 98.2 °F (36.8 °C) (Temporal)   Ht 5' 8\" (1.727 m)   Wt 222 lb 3.2 oz (100.8 kg)   SpO2 97%   BMI 33.79 kg/m²   Physical Exam  Physical Exam   Constitutional: He appears well-developed. Does not appear ill. Neck: Neck supple. No masses. Neck Symmetric. Normal tracheal position. No thyroid enlargement  Cardiovascular: Normal rate and regular rhythm. Exam reveals no friction rub. No murmur heard. Respiratory:  Effort normal and breath sounds normal. No respiratory distress. No wheezes. No rales. No use of accessory muscles or intercostal retractions. Neurological: alert. Psychiatric: normal mood and affect. His behavior is normal.       No results found for this or any previous visit (from the past 672 hour(s)). XR LUMBAR SPINE (2-3 VIEWS)    Result Date: 7/30/2022  Exam:X-RAYS OF THE LUMBAR SPINE Clinical data: Chronic bilateral low back pain without sciatica Technique: Three views of the lumbar spine. Prior studies: Lumbar spine x-rays dated 2/6/2021, 7/10/2018. Findings: Normal mineralization, architecture, and alignment. Mild-moderate L1 compression deformity appearing nonacute. Mild multilevel spondylosis with convex left scoliosis. Unremarkable facet joints. Aortoiliac graft. Mild-moderate nonacute L1 compression deformity which may be confirmed clinically or with MRI. Mild spondylosis with convex left scoliosis. Aortoiliac graft. Recommendation: Follow up as clinically indicated. Electronically Signed by Yarelis Burleson MD at 30-Jul-2022 10:01:20 AM             XR HIP BILATERAL W AP PELVIS (2 VIEWS)    Result Date: 7/30/2022  Exam:X-RAYS OF THE BILATERAL HIPS Clinical data:Left hip pain Technique: AP and frog leg views of the hips, with pelvis AP.  Prior studies: Left hip x-ray dated 2/6/2021, CT-scan of the pelvis dated 8/28/2020. Findings: AP and frog leg views of the hips, with pelvis AP demonstrateno evidence offracture bilaterally. Bone mineral density is preserved. No evidence of superior pubic ramus fracture. Aortoiliac graft. No acute osseous abnormalities. Aortoiliac graft. Recommendation: Follow up as clinically indicated. Electronically Signed by Mei Baltzaar MD at 30-Jul-2022 09:54:55 AM                   Assessment & Plan: The following diagnoses and conditions are stable with no further orders unless indicated:  1. Annual physical exam  Completed annual wellness today    2. Chronic bilateral low back pain without sciatica  Films of the lower back obtained. - XR LUMBAR SPINE (2-3 VIEWS); Future  - External Referral To Pain Clinic    3. Left hip pain    - XR HIP BILATERAL W AP PELVIS (2 VIEWS); Future  - External Referral To Pain Clinic    4. Hypercholesterolemia  Lab Results   Component Value Date    CHOL 91 (L) 07/19/2021    CHOL 87 (L) 02/16/2021    CHOL 111 (L) 01/04/2021     Lab Results   Component Value Date    TRIG 106 07/19/2021    TRIG 208 (H) 02/16/2021    TRIG 104 01/04/2021     Lab Results   Component Value Date    HDL 36 (L) 07/19/2021    HDL 25 (L) 02/16/2021    HDL 43 (L) 01/04/2021     Lab Results   Component Value Date    LDLCALC 34 07/19/2021    LDLCALC 20 02/16/2021    LDLCALC 47 01/04/2021     No results found for: LABVLDL, VLDL  No results found for: CHOLHDLRATIO  Will need laboratory work repeated as soon as possible. Continue with atorvastatin recheck lipid panel    5. Essential (primary) hypertension  BP Readings from Last 3 Encounters:   07/29/22 132/88   06/08/22 120/70   05/05/22 108/76     Pressure has crept up since May. He attributes it to increased pain. Continues carvedilol 6.25 mg twice daily  Entresto for CHF      6.  Gastroesophageal reflux disease without esophagitis  Stable on Pepcid          Return in about 3 months (around 10/29/2022) for Routine follow up - 20 minutes. Discussed use, benefit, and side effects of prescribed medications. All patient questions answered. Pt voiced understanding. Reviewed health maintenance. Instructedto continue current medications, diet and exercise. Patient agreed with treatmentplan. Follow up as directed.     _______________________________________________________________      Past Medical History:   Diagnosis Date    AAA (abdominal aortic aneurysm) ([address]     Acute on chronic systolic CHF (congestive heart failure) ([address] 7/26/2021    Aortic dissection ([address] 10/10/2012    CAD (coronary artery disease)     stents    Cancer (HCC)     lung&lymphnode    Carotid artery occlusion     Cerebrovascular accident (CVA) ([address]     Cerebrovascular accident (CVA) due to embolism ([address] 02/16/2016    Cerebrovascular accident (CVA) due to embolism of right middle cerebral artery ([address]     COVID-19 2/6/2021    Depression     HTN (hypertension)     Hyperlipidemia     MI (myocardial infarction) ([address]     acute 5/13/2015    Non Hodgkin's lymphoma ([address]     Palliative care patient 02/16/2021    Sleep apnea     no c-pap    Unspecified sleep apnea       Past Surgical History:   Procedure Laterality Date    CARDIAC CATHETERIZATION  5/13/15  [facility]    EF 40%. stent to LAD, stent to posterior descending branch of RCA. CAROTID ENDARTERECTOMY Right 7/22/2016    Right carotid endarterectomy,carotid arteriograms. [facility]    COLONOSCOPY  04/09/2018    [facility]    ENDOSCOPY, COLON, DIAGNOSTIC      LUNG CANCER SURGERY      right side    LYMPHADENECTOMY      removed lymphnodes     PACEMAKER PLACEMENT      RECTAL SURGERY      fistula     UPPER GASTROINTESTINAL ENDOSCOPY N/A 1/8/2020    Dr Keila Cox (Dr Lizabeth Kocher pt) w/EUS-Small low grade GIST in the stomach, repeat in 1 yr    UPPER GASTROINTESTINAL ENDOSCOPY  11/08/2019    [facility]: normal    UPPER GASTROINTESTINAL ENDOSCOPY  04/13/2018    Dr Rich Juarez: normal, 2cm HH, small amount of food in stomach w/dark fluid,but not kathy blood VASCULAR SURGERY  10/29/12 \[facility]\""    open exposure of R common femoral artery; percutaneous cannulation of L  femoral artery with 7-french sheath; suprarenal abd aortagram with bilat iliofem arteriogram; endoluminal graft repair of AAA with endologix [id number] main body, 28x95 infrarenal cuff; balloon angioplasty of infrarenal abd aorta and endoluminal graft with coda balloon; completion abd aortogram w bilat iliofem arteriogram;     VASCULAR SURGERY  10/29/ \[facility]\""     (cont) open repair of right common femoral ateriotomy; perclose repair of left common femoral artery puncture site. Family History   Problem Relation Age of Onset    High Blood Pressure Father     High Blood Pressure Mother     Diabetes Mother     Colon Polyps Brother     Cancer Brother     Colon Cancer Neg Hx        Social History     Tobacco Use    Smoking status: Former     Packs/day: 0.50     Years: 40.00     Pack years: 20.00     Types: Cigarettes    Smokeless tobacco: Never    Tobacco comments:     quit smoking Dec 10, 2021. Substance Use Topics    Alcohol use: No     Current Outpatient Medications   Medication Sig Dispense Refill    HYDROcodone-acetaminophen (NORCO)  MG per tablet Take 1 tablet by mouth every 6 hours as needed for Pain for up to 30 days.  120 tablet 0    atorvastatin (LIPITOR) 40 MG tablet TAKE ONE (1) TABLET BY MOUTH EVERY DAY 90 tablet 3    isosorbide mononitrate (IMDUR) 30 MG extended release tablet TAKE 1 TABLET BY MOUTH DAILY 30 tablet 3    famotidine (PEPCID) 20 MG tablet Take 1 tablet by mouth 2 times daily 180 tablet 2    carvedilol (COREG) 6.25 MG tablet Take 1 tablet by mouth 2 times daily (with meals) 60 tablet 5    sacubitril-valsartan (ENTRESTO) 24-26 MG per tablet Take 1 tablet by mouth 2 times daily 60 tablet 5    clopidogrel (PLAVIX) 75 MG tablet TAKE ONE (1) TABLET BY MOUTH EVERY DAY 90 tablet 1    torsemide (DEMADEX) 20 MG tablet Take 1 tablet by mouth daily 30 tablet 5    polyethylene glycol (GLYCOLAX) 17 g packet Take 17 g by mouth daily as needed for Constipation      fluticasone (FLONASE) 50 MCG/ACT nasal spray 1 spray by Each Nostril route daily as needed       nitroGLYCERIN (NITROSTAT) 0.4 MG SL tablet Place 0.4 mg under the tongue every 5 minutes as needed for Chest pain      venlafaxine (EFFEXOR) 75 MG tablet TAKE ONE (1) TABLET BY MOUTH EVERY DAY (Patient not taking: Reported on 7/29/2022) 90 tablet 2     No current facility-administered medications for this visit. Allergies   Allergen Reactions    Influenza Virus Vaccine Swelling    Penicillin G Itching       Health Maintenance   Topic Date Due    Hepatitis C screen  Never done    DTaP/Tdap/Td vaccine (1 - Tdap) Never done    Shingles vaccine (1 of 2) Never done    Pneumococcal 65+ years Vaccine (2 - PCV) 01/09/2019    COVID-19 Vaccine (3 - Booster for [place] Abhinav series) 09/09/2021    Lipids  07/19/2022    Annual Wellness Visit (AWV)  07/20/2022    Depression Monitoring  07/29/2023    Hepatitis A vaccine  Aged Out    Hepatitis B vaccine  Aged Out    Hib vaccine  Aged Out    Meningococcal (ACWY) vaccine  Aged Out       _______________________________________________________________    Note dictated using Dragon Dictation software  Sometimes this dictation software makes erroneous transcriptions.

## 2022-08-01 ASSESSMENT — ENCOUNTER SYMPTOMS
CONSTIPATION: 0
COUGH: 0
CHEST TIGHTNESS: 0
SHORTNESS OF BREATH: 0
NAUSEA: 0
ABDOMINAL PAIN: 0
DIARRHEA: 0
ANAL BLEEDING: 0
BACK PAIN: 1

## 2022-08-11 ENCOUNTER — OFFICE VISIT (OUTPATIENT)
Dept: CARDIOLOGY CLINIC | Age: 77
End: 2022-08-11
Payer: MEDICARE

## 2022-08-11 VITALS
HEART RATE: 78 BPM | BODY MASS INDEX: 33.49 KG/M2 | DIASTOLIC BLOOD PRESSURE: 64 MMHG | WEIGHT: 221 LBS | HEIGHT: 68 IN | SYSTOLIC BLOOD PRESSURE: 96 MMHG

## 2022-08-11 DIAGNOSIS — I25.10 CORONARY ARTERY DISEASE INVOLVING NATIVE CORONARY ARTERY OF NATIVE HEART WITHOUT ANGINA PECTORIS: ICD-10-CM

## 2022-08-11 DIAGNOSIS — I44.2 COMPLETE ATRIOVENTRICULAR BLOCK (HCC): ICD-10-CM

## 2022-08-11 DIAGNOSIS — I50.42 CHRONIC COMBINED SYSTOLIC (CONGESTIVE) AND DIASTOLIC (CONGESTIVE) HEART FAILURE (HCC): ICD-10-CM

## 2022-08-11 DIAGNOSIS — I42.0 DILATED CARDIOMYOPATHY (HCC): Primary | ICD-10-CM

## 2022-08-11 DIAGNOSIS — Z95.810 ICD (IMPLANTABLE CARDIOVERTER-DEFIBRILLATOR), DUAL, IN SITU: ICD-10-CM

## 2022-08-11 DIAGNOSIS — I25.10 CORONARY ARTERY DISEASE INVOLVING NATIVE HEART WITHOUT ANGINA PECTORIS, UNSPECIFIED VESSEL OR LESION TYPE: ICD-10-CM

## 2022-08-11 DIAGNOSIS — R00.1 SYMPTOMATIC BRADYCARDIA: ICD-10-CM

## 2022-08-11 DIAGNOSIS — Q26.1 PERSISTENT LEFT SUPERIOR VENA CAVA: ICD-10-CM

## 2022-08-11 DIAGNOSIS — I10 ESSENTIAL HYPERTENSION: ICD-10-CM

## 2022-08-11 DIAGNOSIS — E78.2 MIXED HYPERLIPIDEMIA: ICD-10-CM

## 2022-08-11 DIAGNOSIS — E78.01 FAMILIAL HYPERCHOLESTEROLEMIA: ICD-10-CM

## 2022-08-11 PROCEDURE — 93283 PRGRMG EVAL IMPLANTABLE DFB: CPT | Performed by: INTERNAL MEDICINE

## 2022-08-11 PROCEDURE — G8427 DOCREV CUR MEDS BY ELIG CLIN: HCPCS | Performed by: INTERNAL MEDICINE

## 2022-08-11 PROCEDURE — G8417 CALC BMI ABV UP PARAM F/U: HCPCS | Performed by: INTERNAL MEDICINE

## 2022-08-11 PROCEDURE — 4004F PT TOBACCO SCREEN RCVD TLK: CPT | Performed by: INTERNAL MEDICINE

## 2022-08-11 PROCEDURE — 1123F ACP DISCUSS/DSCN MKR DOCD: CPT | Performed by: INTERNAL MEDICINE

## 2022-08-11 PROCEDURE — 99214 OFFICE O/P EST MOD 30 MIN: CPT | Performed by: INTERNAL MEDICINE

## 2022-08-11 RX ORDER — ISOSORBIDE MONONITRATE 30 MG/1
30 TABLET, EXTENDED RELEASE ORAL DAILY
Qty: 90 TABLET | Refills: 3 | Status: SHIPPED | OUTPATIENT
Start: 2022-08-11

## 2022-08-11 RX ORDER — CLOPIDOGREL BISULFATE 75 MG/1
TABLET ORAL
Qty: 90 TABLET | Refills: 3 | Status: SHIPPED | OUTPATIENT
Start: 2022-08-11

## 2022-08-11 RX ORDER — TORSEMIDE 20 MG/1
20 TABLET ORAL DAILY
Qty: 90 TABLET | Refills: 3 | Status: SHIPPED | OUTPATIENT
Start: 2022-08-11 | End: 2022-09-01

## 2022-08-11 RX ORDER — CARVEDILOL 6.25 MG/1
6.25 TABLET ORAL 2 TIMES DAILY WITH MEALS
Qty: 180 TABLET | Refills: 3 | Status: SHIPPED | OUTPATIENT
Start: 2022-08-11

## 2022-08-11 RX ORDER — SACUBITRIL AND VALSARTAN 24; 26 MG/1; MG/1
1 TABLET, FILM COATED ORAL 2 TIMES DAILY
Qty: 60 TABLET | Refills: 5 | Status: SHIPPED | OUTPATIENT
Start: 2022-08-11

## 2022-08-11 ASSESSMENT — ENCOUNTER SYMPTOMS
VOMITING: 0
DIARRHEA: 0
EYES NEGATIVE: 1
NAUSEA: 0
RESPIRATORY NEGATIVE: 1
SHORTNESS OF BREATH: 0
GASTROINTESTINAL NEGATIVE: 1

## 2022-08-11 NOTE — PROGRESS NOTES
(abdominal aortic aneurysm) (HCC)     Acute on chronic systolic CHF (congestive heart failure) ([address] 7/26/2021    Aortic dissection ([address] 10/10/2012    CAD (coronary artery disease)     stents    Cancer (HCC)     lung&lymphnode    Carotid artery occlusion     Cerebrovascular accident (CVA) ([address]     Cerebrovascular accident (CVA) due to embolism ([address] 02/16/2016    Cerebrovascular accident (CVA) due to embolism of right middle cerebral artery ([address]     COVID-19 2/6/2021    Depression     HTN (hypertension)     Hyperlipidemia     MI (myocardial infarction) ([address]     acute 5/13/2015    Non Hodgkin's lymphoma ([address]     Palliative care patient 02/16/2021    Sleep apnea     no c-pap    Unspecified sleep apnea      Past Surgical History:   Procedure Laterality Date    CARDIAC CATHETERIZATION  5/13/15  [address]    EF 40%. stent to LAD, stent to posterior descending branch of RCA. CAROTID ENDARTERECTOMY Right 7/22/2016    Right carotid endarterectomy,carotid arteriograms. [facility]    COLONOSCOPY  04/09/2018    [facility]    ENDOSCOPY, COLON, DIAGNOSTIC      LUNG CANCER SURGERY      right side    LYMPHADENECTOMY      removed lymphnodes     PACEMAKER PLACEMENT      RECTAL SURGERY      fistula     UPPER GASTROINTESTINAL ENDOSCOPY N/A 1/8/2020    Dr Radha Ferrer (Dr Sarmad Guerrero pt) w/EUS-Small low grade GIST in the stomach, repeat in 1 yr    UPPER GASTROINTESTINAL ENDOSCOPY  11/08/2019    [facility]: normal    UPPER GASTROINTESTINAL ENDOSCOPY  04/13/2018    Dr Reanna Pelaez: normal, 2cm HH, small amount of food in stomach w/dark fluid,but not kathy blood    VASCULAR SURGERY  10/29/12 [facility]    open exposure of R common femoral artery; percutaneous cannulation of L  femoral artery with 7-[place]ian sheath; suprarenal abd aortagram with bilat iliofem arteriogram; endoluminal graft repair of AAA with endologix [id number] main body, 28x95 infrarenal cuff; balloon angioplasty of infrarenal abd aorta and endoluminal graft with coda balloon; completion abd aortogram w bilat iliofem arteriogram;     VASCULAR SURGERY  10/29/ [facility]     (cont) open repair of right common femoral ateriotomy; perclose repair of left common femoral artery puncture site. Family History   Problem Relation Age of Onset    High Blood Pressure Father     High Blood Pressure Mother     Diabetes Mother     Colon Polyps Brother     Cancer Brother     Colon Cancer Neg Hx      Allergies   Allergen Reactions    Influenza Virus Vaccine Swelling    Penicillin G Itching     Current Outpatient Medications   Medication Sig Dispense Refill    sacubitril-valsartan (ENTRESTO) 24-26 MG per tablet Take 1 tablet by mouth in the morning and 1 tablet before bedtime. 60 tablet 5    isosorbide mononitrate (IMDUR) 30 MG extended release tablet Take 1 tablet by mouth in the morning. 90 tablet 3    clopidogrel (PLAVIX) 75 MG tablet TAKE ONE (1) TABLET BY MOUTH EVERY DAY 90 tablet 3    torsemide (DEMADEX) 20 MG tablet Take 1 tablet by mouth in the morning. 90 tablet 3    carvedilol (COREG) 6.25 MG tablet Take 1 tablet by mouth in the morning and 1 tablet in the evening. Take with meals. 180 tablet 3    HYDROcodone-acetaminophen (NORCO)  MG per tablet Take 1 tablet by mouth every 6 hours as needed for Pain for up to 30 days. 120 tablet 0    atorvastatin (LIPITOR) 40 MG tablet TAKE ONE (1) TABLET BY MOUTH EVERY DAY 90 tablet 3    famotidine (PEPCID) 20 MG tablet Take 1 tablet by mouth 2 times daily 180 tablet 2    polyethylene glycol (GLYCOLAX) 17 g packet Take 17 g by mouth daily as needed for Constipation      fluticasone (FLONASE) 50 MCG/ACT nasal spray 1 spray by Each Nostril route daily as needed       nitroGLYCERIN (NITROSTAT) 0.4 MG SL tablet Place 0.4 mg under the tongue every 5 minutes as needed for Chest pain      venlafaxine (EFFEXOR) 75 MG tablet TAKE ONE (1) TABLET BY MOUTH EVERY DAY 90 tablet 2     No current facility-administered medications for this visit.      Social History     Socioeconomic History    Marital status:      Spouse name: Not on file    Number of children: Not on file    Years of education: Not on file    Highest education level: Not on file   Occupational History    Not on file   Tobacco Use    Smoking status: Every Day     Packs/day: 1.00     Years: 40.00     Pack years: 40.00     Types: Cigarettes    Smokeless tobacco: Never    Tobacco comments:     quit smoking Dec 10, 2021. Vaping Use    Vaping Use: Never used   Substance and Sexual Activity    Alcohol use: No    Drug use: No    Sexual activity: Not on file   Other Topics Concern    Not on file   Social History Narrative     50+ yearsHe has 1 son and 1 daughterWorks for a telephone [place] active duty 4-1/2 yearsHe does not go to the [employer] presentlyEdOwnerListens high school 2 years of collegeEnjoys hunting and fishingDenies alcohol or substance usage he does smoke 1 pack/dayPhysically sedentary     Social Determinants of Health     Financial Resource Strain: Not on file   Food Insecurity: Not on file   Transportation Needs: Not on file   Physical Activity: Inactive    Days of Exercise per Week: 0 days    Minutes of Exercise per Session: 0 min   Stress: Not on file   Social Connections: Not on file   Intimate Partner Violence: Not on file   Housing Stability: Not on file       Physical Examination:  BP 96/64   Pulse 78   Ht 5' 8\" (1.727 m)   Wt 221 lb (100.2 kg)   BMI 33.60 kg/m²   Physical Exam  Vitals reviewed. Constitutional:       Appearance: He is well-developed. Neck:      Vascular: No carotid bruit or JVD. Cardiovascular:      Rate and Rhythm: Normal rate and regular rhythm. Heart sounds: Normal heart sounds. No murmur heard. No friction rub. No gallop. Pulmonary:      Effort: Pulmonary effort is normal. No respiratory distress. Breath sounds: Normal breath sounds. No wheezing or rales. Abdominal:      General: There is no distension. Tenderness:  There is no abdominal tenderness. Lymphadenopathy:      Cervical: No cervical adenopathy. Skin:     General: Skin is warm and dry. ASSESSMENT:     Diagnosis Orders   1. Dilated cardiomyopathy (HCC)  sacubitril-valsartan (ENTRESTO) 24-26 MG per tablet    isosorbide mononitrate (IMDUR) 30 MG extended release tablet    clopidogrel (PLAVIX) 75 MG tablet    torsemide (DEMADEX) 20 MG tablet    carvedilol (COREG) 6.25 MG tablet      2. Chronic combined systolic (congestive) and diastolic (congestive) heart failure (HCC)  sacubitril-valsartan (ENTRESTO) 24-26 MG per tablet    isosorbide mononitrate (IMDUR) 30 MG extended release tablet    clopidogrel (PLAVIX) 75 MG tablet    torsemide (DEMADEX) 20 MG tablet    carvedilol (COREG) 6.25 MG tablet      3. Complete atrioventricular block (HCC)        4. ICD (implantable cardioverter-defibrillator), dual, in situ        5. Coronary artery disease involving native coronary artery of native heart without angina pectoris        6. Symptomatic bradycardia        7. Essential hypertension        8. Mixed hyperlipidemia        9. Persistent left superior vena cava        10. Familial hypercholesterolemia        11. Coronary artery disease involving native heart without angina pectoris, unspecified vessel or lesion type  clopidogrel (PLAVIX) 75 MG tablet          PLAN:  No orders of the defined types were placed in this encounter. Orders Placed This Encounter   Medications    sacubitril-valsartan (ENTRESTO) 24-26 MG per tablet     Sig: Take 1 tablet by mouth in the morning and 1 tablet before bedtime. Dispense:  60 tablet     Refill:  5    isosorbide mononitrate (IMDUR) 30 MG extended release tablet     Sig: Take 1 tablet by mouth in the morning.      Dispense:  90 tablet     Refill:  3    clopidogrel (PLAVIX) 75 MG tablet     Sig: TAKE ONE (1) TABLET BY MOUTH EVERY DAY     Dispense:  90 tablet     Refill:  3    torsemide (DEMADEX) 20 MG tablet     Sig: Take 1 tablet by mouth in the morning. Dispense:  90 tablet     Refill:  3    carvedilol (COREG) 6.25 MG tablet     Sig: Take 1 tablet by mouth in the morning and 1 tablet in the evening. Take with meals. Dispense:  180 tablet     Refill:  3         Continue present medications  Recommend follow-up assessment in 6 months    Return in about 6 months (around 2/11/2023) for return to Dr. Lydia Siu only. Jamil Kemp MD 8/11/2022 3:03 PM CDT    [facility]      Thisdictation was generated by voice recognition computer software. Although all attempts are made to edit the dictation for accuracy, there may be errors in the transcription that are not intended.

## 2022-08-11 NOTE — PROGRESS NOTES
ICD interrogated  Presenting rhythm: AS , AP 27.9%,  100%  Battery status: 10 years  Lead status: lead impedance within range and stable  Sensing: P waves 6.4 mV,  R waves paced  Thresholds:  Atrial 0.5 V @ 0.4ms, ventricular 0.5 @ 0.4ms  Observations:  none  Reprogramming for sensitivity and threshold testing, Changes made per [employer]. See scanned report for details.   Next [facility] home check scheduled for 11/14/22

## 2022-08-17 DIAGNOSIS — M54.50 CHRONIC MIDLINE LOW BACK PAIN WITHOUT SCIATICA: ICD-10-CM

## 2022-08-17 DIAGNOSIS — G89.29 CHRONIC MIDLINE LOW BACK PAIN WITHOUT SCIATICA: ICD-10-CM

## 2022-08-17 RX ORDER — HYDROCODONE BITARTRATE AND ACETAMINOPHEN 10; 325 MG/1; MG/1
1 TABLET ORAL EVERY 6 HOURS PRN
Qty: 120 TABLET | Refills: 0 | Status: SHIPPED | OUTPATIENT
Start: 2022-08-17 | End: 2022-09-13 | Stop reason: SDUPTHER

## 2022-08-17 NOTE — TELEPHONE ENCOUNTER
Nya Farrar called to request a refill on his medication. Last office visit : 7/29/2022   Next office visit : 10/28/2022     Last UDS:   Amphetamine Screen, Urine   Date Value Ref Range Status   04/23/2018 Negative  Final     Barbiturate Screen, Urine   Date Value Ref Range Status   04/23/2018 Negative  Final     Benzodiazepine Screen, Urine   Date Value Ref Range Status   04/23/2018 Negative  Final     Cocaine Metabolite Screen, Urine   Date Value Ref Range Status   04/23/2018 Negative  Final     MDMA, Urine   Date Value Ref Range Status   04/23/2018 Negative  Final     Methamphetamine, Urine   Date Value Ref Range Status   04/23/2018 Negative  Final     Opiate Scrn, Ur   Date Value Ref Range Status   04/23/2018 Positive  Final     Oxycodone Screen, Ur   Date Value Ref Range Status   04/23/2018 Negative  Final     PCP Screen, Urine   Date Value Ref Range Status   04/23/2018 Negative  Final     Propoxyphene Screen, Urine   Date Value Ref Range Status   04/23/2018 Negative  Final     Tricyclic Antidepressants, Urine   Date Value Ref Range Status   04/23/2018 Negative  Final       Last Samy Burger: 6/24/22  Medication Contract: 4/23/18   Last Fill: 7/20/22    Requested Prescriptions     Pending Prescriptions Disp Refills    HYDROcodone-acetaminophen (NORCO)  MG per tablet 120 tablet 0     Sig: Take 1 tablet by mouth every 6 hours as needed for Pain for up to 30 days. Please approve or refuse this medication.    Carmenza Morales

## 2022-09-01 DIAGNOSIS — I50.42 CHRONIC COMBINED SYSTOLIC (CONGESTIVE) AND DIASTOLIC (CONGESTIVE) HEART FAILURE (HCC): ICD-10-CM

## 2022-09-01 DIAGNOSIS — I42.0 DILATED CARDIOMYOPATHY (HCC): ICD-10-CM

## 2022-09-01 RX ORDER — TORSEMIDE 20 MG/1
TABLET ORAL
Qty: 90 TABLET | Refills: 5 | Status: SHIPPED | OUTPATIENT
Start: 2022-09-01

## 2022-09-12 DIAGNOSIS — G89.29 CHRONIC MIDLINE LOW BACK PAIN WITHOUT SCIATICA: ICD-10-CM

## 2022-09-12 DIAGNOSIS — M54.50 CHRONIC MIDLINE LOW BACK PAIN WITHOUT SCIATICA: ICD-10-CM

## 2022-09-13 RX ORDER — HYDROCODONE BITARTRATE AND ACETAMINOPHEN 10; 325 MG/1; MG/1
1 TABLET ORAL EVERY 6 HOURS PRN
Qty: 120 TABLET | Refills: 0 | Status: SHIPPED | OUTPATIENT
Start: 2022-09-13 | End: 2022-10-17 | Stop reason: SDUPTHER

## 2022-09-16 ENCOUNTER — TELEPHONE (OUTPATIENT)
Dept: CARDIOLOGY CLINIC | Age: 77
End: 2022-09-16

## 2022-09-16 NOTE — TELEPHONE ENCOUNTER
Date: TBD    Cardiologist: Dr. Caicedo Pisek    Procedure: lumbar radiculopathy    Surgeon: Dr. Evelio Gold    Last Office Visit: 8-11-22    Reason for office visit and medical concerns addressed at this office visit: cardiomyopathy, CHF, CAD, HTN, hyperlipidemia, AAA    Testing Performed and Date of Service:  Echo 1-12-22  EKG 1-19-22  Heart Cath 1-12-22    Does the patient have a stent? If so, what type? Not in last year   Underwent elective heart catheterization 1/12/2022 that showed severely impaired LV function with global hypokinesis. Patent stent mid LAD and noncritical disease proximal to the stent. Patent stent in RPDA    Current Medications: norco, demadex, imdur, plavix, coreg, lipitor, pepcid, effexor, glycolax, flonase, nitro     Is the patient currently taking an anticoagulant? If so, what is the diagnosis the patient has been given to warrant the need for the anticoagulant?  Plavix     Additional Notes: Med hold on Plavix

## 2022-10-17 ENCOUNTER — TELEPHONE (OUTPATIENT)
Dept: FAMILY MEDICINE CLINIC | Age: 77
End: 2022-10-17

## 2022-10-17 DIAGNOSIS — G89.29 CHRONIC MIDLINE LOW BACK PAIN WITHOUT SCIATICA: ICD-10-CM

## 2022-10-17 DIAGNOSIS — M54.50 CHRONIC MIDLINE LOW BACK PAIN WITHOUT SCIATICA: ICD-10-CM

## 2022-10-17 RX ORDER — HYDROCODONE BITARTRATE AND ACETAMINOPHEN 10; 325 MG/1; MG/1
1 TABLET ORAL EVERY 6 HOURS PRN
Qty: 120 TABLET | Refills: 0 | Status: SHIPPED | OUTPATIENT
Start: 2022-10-17 | End: 2022-11-16

## 2022-10-17 NOTE — TELEPHONE ENCOUNTER
----- Message from Patricio Sorenson sent at 10/17/2022 11:00 AM CDT -----  Subject: Refill Request    QUESTIONS  Name of Medication? HYDROcodone-acetaminophen (NORCO)  MG per tablet  Patient-reported dosage and instructions? Take 1 tablet by mouth every 6   hours as needed for Pain for up to 30 days. How many days do you have left? 0  Preferred Pharmacy? [address] [facility]  Pharmacy phone number (if available)? [phone]  Additional Information for Provider? 120 pills  ---------------------------------------------------------------------------  --------------  CALL BACK INFO  What is the best way for the office to contact you? OK to leave message on   voicemail  Preferred Call Back Phone Number? [phone]  ---------------------------------------------------------------------------  --------------  SCRIPT ANSWERS  Relationship to Patient?  Self

## 2022-10-17 NOTE — TELEPHONE ENCOUNTER
Tony Ramirez called to request a refill on his medication. Last office visit : 7/29/2022   Next office visit : 10/28/2022     Last UDS:   Amphetamine Screen, Urine   Date Value Ref Range Status   04/23/2018 Negative  Final     Barbiturate Screen, Urine   Date Value Ref Range Status   04/23/2018 Negative  Final     Benzodiazepine Screen, Urine   Date Value Ref Range Status   04/23/2018 Negative  Final     Cocaine Metabolite Screen, Urine   Date Value Ref Range Status   04/23/2018 Negative  Final     MDMA, Urine   Date Value Ref Range Status   04/23/2018 Negative  Final     Methamphetamine, Urine   Date Value Ref Range Status   04/23/2018 Negative  Final     Opiate Scrn, Ur   Date Value Ref Range Status   04/23/2018 Positive  Final     Oxycodone Screen, Ur   Date Value Ref Range Status   04/23/2018 Negative  Final     PCP Screen, Urine   Date Value Ref Range Status   04/23/2018 Negative  Final     Propoxyphene Screen, Urine   Date Value Ref Range Status   04/23/2018 Negative  Final     Tricyclic Antidepressants, Urine   Date Value Ref Range Status   04/23/2018 Negative  Final       Last Areli Pleva: 10/17/22  Medication Contract: 4/23/2018   Last Fill: 9/13/22    Requested Prescriptions     Pending Prescriptions Disp Refills    HYDROcodone-acetaminophen (NORCO)  MG per tablet 120 tablet 0     Sig: Take 1 tablet by mouth every 6 hours as needed for Pain for up to 30 days. Please approve or refuse this medication.    Brady Cordero MA

## 2022-10-18 ENCOUNTER — HOSPITAL ENCOUNTER (OUTPATIENT)
Dept: VASCULAR LAB | Age: 77
Discharge: HOME | End: 2022-10-18
Payer: MEDICARE

## 2022-10-18 ENCOUNTER — OFFICE VISIT (OUTPATIENT)
Dept: VASCULAR SURGERY | Age: 77
End: 2022-10-18
Payer: MEDICARE

## 2022-10-18 VITALS
OXYGEN SATURATION: 99 % | DIASTOLIC BLOOD PRESSURE: 92 MMHG | HEART RATE: 70 BPM | TEMPERATURE: 98 F | SYSTOLIC BLOOD PRESSURE: 151 MMHG | RESPIRATION RATE: 18 BRPM

## 2022-10-18 DIAGNOSIS — I73.9 PVD (PERIPHERAL VASCULAR DISEASE) (HCC): Primary | ICD-10-CM

## 2022-10-18 DIAGNOSIS — M54.42 CHRONIC BILATERAL LOW BACK PAIN WITH LEFT-SIDED SCIATICA: ICD-10-CM

## 2022-10-18 DIAGNOSIS — I73.9 PVD (PERIPHERAL VASCULAR DISEASE) (HCC): ICD-10-CM

## 2022-10-18 DIAGNOSIS — G89.29 CHRONIC BILATERAL LOW BACK PAIN WITH LEFT-SIDED SCIATICA: ICD-10-CM

## 2022-10-18 DIAGNOSIS — I65.23 BILATERAL CAROTID ARTERY STENOSIS: ICD-10-CM

## 2022-10-18 PROCEDURE — 99214 OFFICE O/P EST MOD 30 MIN: CPT | Performed by: PHYSICIAN ASSISTANT

## 2022-10-18 PROCEDURE — 1123F ACP DISCUSS/DSCN MKR DOCD: CPT | Performed by: PHYSICIAN ASSISTANT

## 2022-10-18 PROCEDURE — 93923 UPR/LXTR ART STDY 3+ LVLS: CPT

## 2022-10-18 PROCEDURE — 93880 EXTRACRANIAL BILAT STUDY: CPT

## 2022-10-19 DIAGNOSIS — I71.40 ABDOMINAL AORTIC ANEURYSM (AAA) WITHOUT RUPTURE, UNSPECIFIED PART: Primary | ICD-10-CM

## 2022-10-21 DIAGNOSIS — I65.23 BILATERAL CAROTID ARTERY STENOSIS: Primary | ICD-10-CM

## 2022-10-21 DIAGNOSIS — I73.9 PVD (PERIPHERAL VASCULAR DISEASE) (HCC): ICD-10-CM

## 2022-10-21 NOTE — PROGRESS NOTES
Patient Care Team:  Fernanda Yanez MD as PCP - General (Family Medicine)  Fernanda Yanez MD as PCP - [facility] Provider  Modesta Lynch MD as Consulting Physician (Gastroenterology)  Sriram Bui MD as Consulting Physician (Interventional Cardiology)      History and Physical  Mr. Lyndsay Rizo is a 57-year-old male who has a past medical history that includes CAD, status post stenting, CHF, hypertension, chronic back pain with left leg radiculopathy, hyperlipidemia, sleep apnea, tobacco abuse, AAA, s/p repair, CVA, CA, carotid artery stenosis status post right carotid endarterectomy and PVD. Today we are following up for his carotid artery stenosis and PVD. Currently he is on Plavix 75 mg and atorvastatin 40 mg daily. He denies any new onset of partial or complete loss of vision affecting only one eye, speech difficulty or lateralizing weakness, numbness/tingling. He denies any lifestyle limiting claudication, ischemic rest pain or nonhealing wounds on his lower extremities. He reports chronic back pain with numbness and tingling and weakness left leg. (Unchanged). He is a 1 pack/day smoker. He reports no abdominal pain.      Glen Metz is a 76 y.o. male with the following history reviewed and recorded in [facility]:  Patient Active Problem List    Diagnosis Date Noted    Dilated cardiomyopathy [facility])      Priority: High    Decreased cardiac ejection fraction 01/19/2022     Priority: High    Unstable angina (HCC)      Priority: High    Complete atrioventricular block (HCC)      Priority: High    Acute on chronic systolic CHF (congestive heart failure) ([address] 07/26/2021    Hypoxemia 07/26/2021    Gastrointestinal stromal tumor of stomach ([address] 07/19/2021    Symptomatic bradycardia 02/16/2021    Atherosclerosis of native artery of both lower extremities ([address] 07/30/2019    Bilateral carotid artery stenosis 07/30/2019    Hyperparathyroidism ([address] 10/04/2018    Recurrent major depressive disorder in partial remission ([facility] 07/11/2018    Chronic midline low back pain without sciatica 04/23/2018    Simple chronic bronchitis ([facility] 10/02/2017    Depression with anxiety 09/28/2017    Hyperglycemia 09/28/2017    Gastroesophageal reflux disease without esophagitis 09/28/2017    Injury of right hypoglossal nerve 08/04/2016    Stenosis of right carotid artery     Coronary artery disease involving native heart without angina pectoris     Cerebrovascular accident (CVA) due to embolism of right middle cerebral artery ([facility]     Essential hypertension     CKD (chronic kidney disease) stage 3, GFR 30-59 ml/min ([facility] 07/17/2016    Obstructive sleep apnea syndrome 07/16/2016    Cerebrovascular accident (CVA) due to embolism ([facility] 02/16/2016    Mixed hyperlipidemia     AAA (abdominal aortic aneurysm) 10/10/2012     Current Outpatient Medications   Medication Sig Dispense Refill    HYDROcodone-acetaminophen (NORCO)  MG per tablet Take 1 tablet by mouth every 6 hours as needed for Pain for up to 30 days. 120 tablet 0    torsemide (DEMADEX) 20 MG tablet TAKE 1 TABLET BY MOUTH DAILY 90 tablet 5    sacubitril-valsartan (ENTRESTO) 24-26 MG per tablet Take 1 tablet by mouth in the morning and 1 tablet before bedtime. 60 tablet 5    isosorbide mononitrate (IMDUR) 30 MG extended release tablet Take 1 tablet by mouth in the morning. 90 tablet 3    clopidogrel (PLAVIX) 75 MG tablet TAKE ONE (1) TABLET BY MOUTH EVERY DAY 90 tablet 3    carvedilol (COREG) 6.25 MG tablet Take 1 tablet by mouth in the morning and 1 tablet in the evening. Take with meals.  180 tablet 3    atorvastatin (LIPITOR) 40 MG tablet TAKE ONE (1) TABLET BY MOUTH EVERY DAY 90 tablet 3    famotidine (PEPCID) 20 MG tablet Take 1 tablet by mouth 2 times daily 180 tablet 2    polyethylene glycol (GLYCOLAX) 17 g packet Take 17 g by mouth daily as needed for Constipation      fluticasone (FLONASE) 50 MCG/ACT nasal spray 1 spray by Each Nostril route daily as needed nitroGLYCERIN (NITROSTAT) 0.4 MG SL tablet Place 0.4 mg under the tongue every 5 minutes as needed for Chest pain       No current facility-administered medications for this visit. Allergies: Influenza virus vaccine and Penicillin g  Past Medical History:   Diagnosis Date    AAA (abdominal aortic aneurysm)     Acute on chronic systolic CHF (congestive heart failure) ([address] 7/26/2021    Aortic dissection ([address] 10/10/2012    CAD (coronary artery disease)     stents    Cancer (HCC)     lung&lymphnode    Carotid artery occlusion     Cerebrovascular accident (CVA) ([address]     Cerebrovascular accident (CVA) due to embolism ([address] 02/16/2016    Cerebrovascular accident (CVA) due to embolism of right middle cerebral artery ([address]     COVID-19 2/6/2021    Depression     HTN (hypertension)     Hyperlipidemia     MI (myocardial infarction) ([address]     acute 5/13/2015    Non Hodgkin's lymphoma [facility]     Palliative care patient 02/16/2021    Sleep apnea     no c-pap    Unspecified sleep apnea      Past Surgical History:   Procedure Laterality Date    CARDIAC CATHETERIZATION  5/13/15  [address] [employer]    EF 40%. stent to LAD, stent to posterior descending branch of RCA. CAROTID ENDARTERECTOMY Right 07/22/2016    Right carotid endarterectomy,carotid arteriograms. [facility]    COLONOSCOPY  04/09/2018    [facility]    ENDOSCOPY, COLON, DIAGNOSTIC      LUNG CANCER SURGERY      right side    LYMPHADENECTOMY      removed lymphnodes     OTHER SURGICAL HISTORY  2022    pt had a pacemaker and defib    PACEMAKER PLACEMENT      RECTAL SURGERY      fistula     UPPER GASTROINTESTINAL ENDOSCOPY N/A 01/08/2020    Dr Baltazar Brunson (Dr [facility] pt) w/EUS-Small low grade GIST in the stomach, repeat in 1 yr    UPPER GASTROINTESTINAL ENDOSCOPY  11/08/2019    Juan Manuel: normal    UPPER GASTROINTESTINAL ENDOSCOPY  04/13/2018    Dr Ryley Horn: normal, 2cm HH, small amount of food in stomach w/dark fluid,but not kathy blood    VASCULAR SURGERY  10/29/12 [facility]    open exposure of R common femoral artery; percutaneous cannulation of L  femoral artery with 7-Uzbek sheath; suprarenal abd aortagram with bilat iliofem arteriogram; endoluminal graft repair of AAA with endologix [id number] main body, 28x95 infrarenal cuff; balloon angioplasty of infrarenal abd aorta and endoluminal graft with coda balloon; completion abd aortogram w bilat iliofem arteriogram;     VASCULAR SURGERY  10/29/ [facility]     (cont) open repair of right common femoral ateriotomy; perclose repair of left common femoral artery puncture site. Family History   Problem Relation Age of Onset    High Blood Pressure Father     High Blood Pressure Mother     Diabetes Mother     Colon Polyps Brother     Cancer Brother     Colon Cancer Neg Hx      Social History     Tobacco Use    Smoking status: Every Day     Packs/day: 1.00     Years: 40.00     Pack years: 40.00     Types: Cigarettes    Smokeless tobacco: Never    Tobacco comments:     quit smoking Dec 10, 2021. Substance Use Topics    Alcohol use: No       Review of Systems    Constitutional - no significant activity change, appetite change, or unexpected weight change. No fever or chills. No diaphoresis or significant fatigue. HENT - no significant rhinorrhea or epistaxis. No tinnitus or significant hearing loss. Eyes - no sudden vision change or amaurosis. Respiratory - no significant shortness of breath, wheezing, or stridor. No apnea, cough, or chest tightness associated with shortness of breath. Cardiovascular - no chest pain, syncope, or significant dizziness. No palpitations or significant leg swelling. (see HPI)  Gastrointestinal - no abdominal swelling or pain. No blood in stool. No severe constipation, diarrhea, nausea, or vomiting. Genitourinary - No difficulty urinating, dysuria, frequency, or urgency. No flank pain or hematuria. Musculoskeletal - chronic back pain with numbness and tingling left leg as well as weakness.    Skin - no color change, rash, pallor, or new wound.  Neurologic - no dizziness, facial asymmetry, or light headedness. No seizures. No speech difficulty or lateralizing weakness. Hematologic - no easy bruising or excessive bleeding. Psychiatric - no severe anxiety or nervousness. No confusion. All other review of systems are negative. Physical Exam    BP (!) 151/92 (Site: Right Upper Arm, Position: Sitting, Cuff Size: Medium Adult)   Pulse 70   Temp 98 °F (36.7 °C) (Oral)   Resp 18   SpO2 99%     Constitutional - well developed, well nourished. No diaphoresis or acute distress. HENT - head normocephalic. Right external ear canal appears normal.  Left external ear canal appears normal.  Septum appears midline. Eyes - conjunctiva normal.  EOMS normal.  No exudate. No icterus. Neck- ROM appears normal, no tracheal deviation. Cardiovascular - Regular rate and rhythm. Heart sounds are normal.  No murmur, rub, or gallop. Carotid pulses are 2+ to palpation bilaterally without bruit. Extremities - Radial and ulnar pulses are 2+ to palpation bilaterally. Femoral pulses are palpable. DP and PT pulses are palpable. No cyanosis, clubbing, or significant edema. No signs atheroembolic event. Pulmonary - effort appears normal.  No respiratory distress. Lungs - Breath sounds normal. No wheezes or rales. GI - Abdomen - soft, non tender, bowel sounds X 4 quadrants. No guarding or rebound tenderness. No distension or palpable mass. Genitourinary - deferred. Musculoskeletal - ROM appears normal.  No significant edema. Neurologic - alert and oriented X 3. Physiologic. Skin - warm, dry, and intact. No rash, erythema, or pallor. Psychiatric - mood, affect, and behavior appear normal.  Judgment and thought processes appear normal.    Risk factors for atherosclerosis of all vascular beds have been reviewed with the patient including:  Family history, tobacco abuse in all forms, elevated cholesterol, hyperlipidemia, and diabetes.     Lower extremity arterial study: 10/18/22  Right RACHEL 1.06, Left RACHEL 1.06. Individual films reviewed: Yes. Test results were reviewed and compared to previous study without significant changes noted. Results were reviewed with the patient. Carotid Doppler results: 10/18/22    Right CCA/ICA <50% stenotic  Left CCA/ICA <50% stenotic  Right verterbral artery flow is antegrade  Left verterbral artery flow is antegrade  Individual velocities reviewed: Yes. Results were reviewed and compared to previous study without significant changes noted. Results were reviewed with the patient. Assessment      1. PVD (peripheral vascular disease) ([facility]    2. Bilateral carotid artery stenosis    3. Chronic bilateral low back pain with left-sided sciatica          Plan      Recommend he continue Plavix 75 mg and atorvastatin 40 mg daily  Recommend no smoking  We discussed a low fat, low cholesterol diet   Daily exercise in moderation  We will order a CT A/P for surveillance of his AAA, s/p EVAR. After we have reviewed the imaging we will call him the results of further recommendations (patient is not wanting get his CT done until after mid February)  He will need a repeat lower extremity arterial study and carotid artery duplex scan in 12 months or sooner if claudication worsens, patient develops wound or IRP.  Patient was instructed to go to ER or call office immediately with any new onset of partial or complete loss of vision affecting only one eye, speech difficulty or lateralizing weakness, numbness/tingling

## 2022-11-14 DIAGNOSIS — M54.50 CHRONIC MIDLINE LOW BACK PAIN WITHOUT SCIATICA: ICD-10-CM

## 2022-11-14 DIAGNOSIS — I44.2 COMPLETE ATRIOVENTRICULAR BLOCK (HCC): ICD-10-CM

## 2022-11-14 DIAGNOSIS — Z95.810 ICD (IMPLANTABLE CARDIOVERTER-DEFIBRILLATOR), DUAL, IN SITU: Primary | ICD-10-CM

## 2022-11-14 DIAGNOSIS — I42.0 DILATED CARDIOMYOPATHY (HCC): ICD-10-CM

## 2022-11-14 DIAGNOSIS — G89.29 CHRONIC MIDLINE LOW BACK PAIN WITHOUT SCIATICA: ICD-10-CM

## 2022-11-14 DIAGNOSIS — I50.42 CHRONIC COMBINED SYSTOLIC (CONGESTIVE) AND DIASTOLIC (CONGESTIVE) HEART FAILURE (HCC): ICD-10-CM

## 2022-11-14 PROCEDURE — 93295 DEV INTERROG REMOTE 1/2/MLT: CPT | Performed by: CLINICAL NURSE SPECIALIST

## 2022-11-14 PROCEDURE — 93296 REM INTERROG EVL PM/IDS: CPT | Performed by: CLINICAL NURSE SPECIALIST

## 2022-11-14 RX ORDER — HYDROCODONE BITARTRATE AND ACETAMINOPHEN 10; 325 MG/1; MG/1
1 TABLET ORAL EVERY 6 HOURS PRN
Qty: 120 TABLET | Refills: 0 | Status: SHIPPED | OUTPATIENT
Start: 2022-11-14 | End: 2022-12-14

## 2022-11-14 NOTE — TELEPHONE ENCOUNTER
Terra Pozo called to request a refill on his medication. Last office visit : 7/29/2022   Next office visit : 1/3/2023     Last UDS:   Amphetamine Screen, Urine   Date Value Ref Range Status   04/23/2018 Negative  Final     Barbiturate Screen, Urine   Date Value Ref Range Status   04/23/2018 Negative  Final     Benzodiazepine Screen, Urine   Date Value Ref Range Status   04/23/2018 Negative  Final     Cocaine Metabolite Screen, Urine   Date Value Ref Range Status   04/23/2018 Negative  Final     MDMA, Urine   Date Value Ref Range Status   04/23/2018 Negative  Final     Methamphetamine, Urine   Date Value Ref Range Status   04/23/2018 Negative  Final     Opiate Scrn, Ur   Date Value Ref Range Status   04/23/2018 Positive  Final     Oxycodone Screen, Ur   Date Value Ref Range Status   04/23/2018 Negative  Final     PCP Screen, Urine   Date Value Ref Range Status   04/23/2018 Negative  Final     Propoxyphene Screen, Urine   Date Value Ref Range Status   04/23/2018 Negative  Final     Tricyclic Antidepressants, Urine   Date Value Ref Range Status   04/23/2018 Negative  Final       Last Fiorella Listen: 10/17/22  Medication Contract: 4/23/18   Last Fill: 10/17/22    Requested Prescriptions     Pending Prescriptions Disp Refills    HYDROcodone-acetaminophen (NORCO)  MG per tablet 120 tablet 0     Sig: Take 1 tablet by mouth every 6 hours as needed for Pain for up to 30 days. Please approve or refuse this medication.    Migue Jimenez MA

## 2022-12-14 DIAGNOSIS — G89.29 CHRONIC MIDLINE LOW BACK PAIN WITHOUT SCIATICA: ICD-10-CM

## 2022-12-14 DIAGNOSIS — M54.50 CHRONIC MIDLINE LOW BACK PAIN WITHOUT SCIATICA: ICD-10-CM

## 2022-12-14 NOTE — TELEPHONE ENCOUNTER
Aliya Seb called to request a refill on his medication. Last office visit : Visit date not found   Next office visit : 1/3/2023     Last UDS:   Amphetamine Screen, Urine   Date Value Ref Range Status   04/23/2018 Negative  Final     Barbiturate Screen, Urine   Date Value Ref Range Status   04/23/2018 Negative  Final     Benzodiazepine Screen, Urine   Date Value Ref Range Status   04/23/2018 Negative  Final     Cocaine Metabolite Screen, Urine   Date Value Ref Range Status   04/23/2018 Negative  Final     MDMA, Urine   Date Value Ref Range Status   04/23/2018 Negative  Final     Methamphetamine, Urine   Date Value Ref Range Status   04/23/2018 Negative  Final     Opiate Scrn, Ur   Date Value Ref Range Status   04/23/2018 Positive  Final     Oxycodone Screen, Ur   Date Value Ref Range Status   04/23/2018 Negative  Final     PCP Screen, Urine   Date Value Ref Range Status   04/23/2018 Negative  Final     Propoxyphene Screen, Urine   Date Value Ref Range Status   04/23/2018 Negative  Final     Tricyclic Antidepressants, Urine   Date Value Ref Range Status   04/23/2018 Negative  Final       Last Sruthi Muddy: 10/17/22  Medication Contract: DUE   Last Fill: 11/15/22    Requested Prescriptions     Pending Prescriptions Disp Refills    HYDROcodone-acetaminophen (NORCO)  MG per tablet 120 tablet 0     Sig: Take 1 tablet by mouth every 6 hours as needed for Pain for up to 30 days. Please approve or refuse this medication.    Tierney Julien MA

## 2022-12-15 RX ORDER — HYDROCODONE BITARTRATE AND ACETAMINOPHEN 10; 325 MG/1; MG/1
1 TABLET ORAL EVERY 6 HOURS PRN
Qty: 120 TABLET | Refills: 0 | Status: SHIPPED | OUTPATIENT
Start: 2022-12-15 | End: 2023-01-14

## 2022-12-15 NOTE — TELEPHONE ENCOUNTER
Can you please add \"Perform UDS, MOR, and update controlled substance agreement\" to appointment notes for next appointment.

## 2022-12-29 DIAGNOSIS — I50.42 CHRONIC COMBINED SYSTOLIC (CONGESTIVE) AND DIASTOLIC (CONGESTIVE) HEART FAILURE (HCC): ICD-10-CM

## 2022-12-29 DIAGNOSIS — I25.10 CORONARY ARTERY DISEASE INVOLVING NATIVE HEART WITHOUT ANGINA PECTORIS, UNSPECIFIED VESSEL OR LESION TYPE: ICD-10-CM

## 2022-12-29 DIAGNOSIS — I42.0 DILATED CARDIOMYOPATHY (HCC): ICD-10-CM

## 2022-12-29 RX ORDER — ISOSORBIDE MONONITRATE 30 MG/1
30 TABLET, EXTENDED RELEASE ORAL DAILY
Qty: 90 TABLET | Refills: 3 | Status: SHIPPED | OUTPATIENT
Start: 2022-12-29

## 2022-12-29 RX ORDER — CARVEDILOL 6.25 MG/1
6.25 TABLET ORAL 2 TIMES DAILY WITH MEALS
Qty: 180 TABLET | Refills: 3 | Status: SHIPPED | OUTPATIENT
Start: 2022-12-29

## 2022-12-29 RX ORDER — CLOPIDOGREL BISULFATE 75 MG/1
TABLET ORAL
Qty: 90 TABLET | Refills: 3 | Status: SHIPPED | OUTPATIENT
Start: 2022-12-29

## 2022-12-29 RX ORDER — TORSEMIDE 20 MG/1
TABLET ORAL
Qty: 90 TABLET | Refills: 3 | Status: SHIPPED | OUTPATIENT
Start: 2022-12-29

## 2022-12-30 DIAGNOSIS — K21.9 GASTROESOPHAGEAL REFLUX DISEASE WITHOUT ESOPHAGITIS: ICD-10-CM

## 2022-12-30 DIAGNOSIS — E78.01 FAMILIAL HYPERCHOLESTEROLEMIA: ICD-10-CM

## 2022-12-30 RX ORDER — ATORVASTATIN CALCIUM 40 MG/1
TABLET, FILM COATED ORAL
Qty: 90 TABLET | Refills: 3 | Status: SHIPPED | OUTPATIENT
Start: 2022-12-30

## 2022-12-30 RX ORDER — FAMOTIDINE 20 MG/1
20 TABLET, FILM COATED ORAL 2 TIMES DAILY
Qty: 180 TABLET | Refills: 3 | Status: SHIPPED | OUTPATIENT
Start: 2022-12-30

## 2023-01-03 ENCOUNTER — HOSPITAL ENCOUNTER (OUTPATIENT)
Dept: GENERAL RADIOLOGY | Age: 78
Discharge: HOME | End: 2023-01-03
Payer: MEDICARE

## 2023-01-03 ENCOUNTER — OFFICE VISIT (OUTPATIENT)
Dept: PRIMARY CARE CLINIC | Age: 78
End: 2023-01-03

## 2023-01-03 VITALS
SYSTOLIC BLOOD PRESSURE: 140 MMHG | TEMPERATURE: 98.1 F | DIASTOLIC BLOOD PRESSURE: 70 MMHG | BODY MASS INDEX: 35.46 KG/M2 | HEIGHT: 68 IN | WEIGHT: 234 LBS | OXYGEN SATURATION: 96 % | HEART RATE: 74 BPM

## 2023-01-03 DIAGNOSIS — G47.33 OBSTRUCTIVE SLEEP APNEA SYNDROME: ICD-10-CM

## 2023-01-03 DIAGNOSIS — R73.9 HYPERGLYCEMIA: ICD-10-CM

## 2023-01-03 DIAGNOSIS — E55.9 VITAMIN D DEFICIENCY: ICD-10-CM

## 2023-01-03 DIAGNOSIS — I73.9 PVD (PERIPHERAL VASCULAR DISEASE) (HCC): ICD-10-CM

## 2023-01-03 DIAGNOSIS — N18.30 STAGE 3 CHRONIC KIDNEY DISEASE, UNSPECIFIED WHETHER STAGE 3A OR 3B CKD (HCC): ICD-10-CM

## 2023-01-03 DIAGNOSIS — I10 ESSENTIAL HYPERTENSION: ICD-10-CM

## 2023-01-03 DIAGNOSIS — J01.00 ACUTE NON-RECURRENT MAXILLARY SINUSITIS: ICD-10-CM

## 2023-01-03 DIAGNOSIS — C49.A2 GASTROINTESTINAL STROMAL TUMOR OF STOMACH (HCC): ICD-10-CM

## 2023-01-03 DIAGNOSIS — R05.1 ACUTE COUGH: ICD-10-CM

## 2023-01-03 DIAGNOSIS — F33.41 RECURRENT MAJOR DEPRESSIVE DISORDER IN PARTIAL REMISSION (HCC): ICD-10-CM

## 2023-01-03 DIAGNOSIS — E66.01 SEVERE OBESITY (BMI 35.0-39.9) WITH COMORBIDITY (HCC): ICD-10-CM

## 2023-01-03 DIAGNOSIS — E78.2 MIXED HYPERLIPIDEMIA: ICD-10-CM

## 2023-01-03 DIAGNOSIS — D64.9 ANEMIA, UNSPECIFIED TYPE: ICD-10-CM

## 2023-01-03 DIAGNOSIS — J41.0 SIMPLE CHRONIC BRONCHITIS (HCC): ICD-10-CM

## 2023-01-03 DIAGNOSIS — R53.83 OTHER FATIGUE: ICD-10-CM

## 2023-01-03 DIAGNOSIS — N18.32 STAGE 3B CHRONIC KIDNEY DISEASE (HCC): ICD-10-CM

## 2023-01-03 DIAGNOSIS — R79.9 ABNORMAL BLOOD CHEMISTRY: ICD-10-CM

## 2023-01-03 DIAGNOSIS — Z79.899 DRUG THERAPY: ICD-10-CM

## 2023-01-03 DIAGNOSIS — I71.40 ABDOMINAL AORTIC ANEURYSM (AAA) WITHOUT RUPTURE, UNSPECIFIED PART: ICD-10-CM

## 2023-01-03 DIAGNOSIS — I44.2 COMPLETE ATRIOVENTRICULAR BLOCK (HCC): ICD-10-CM

## 2023-01-03 DIAGNOSIS — E79.0 HYPERURICEMIA: ICD-10-CM

## 2023-01-03 DIAGNOSIS — I42.0 DILATED CARDIOMYOPATHY (HCC): ICD-10-CM

## 2023-01-03 DIAGNOSIS — I25.10 CORONARY ARTERY DISEASE INVOLVING NATIVE CORONARY ARTERY OF NATIVE HEART WITHOUT ANGINA PECTORIS: ICD-10-CM

## 2023-01-03 DIAGNOSIS — F41.8 DEPRESSION WITH ANXIETY: ICD-10-CM

## 2023-01-03 DIAGNOSIS — E78.2 MIXED HYPERLIPIDEMIA: Primary | ICD-10-CM

## 2023-01-03 DIAGNOSIS — E21.3 HYPERPARATHYROIDISM (HCC): ICD-10-CM

## 2023-01-03 DIAGNOSIS — K21.9 GASTROESOPHAGEAL REFLUX DISEASE WITHOUT ESOPHAGITIS: ICD-10-CM

## 2023-01-03 LAB
ALBUMIN SERPL-MCNC: 4.5 G/DL (ref 3.5–5.2)
ALCOHOL URINE: NORMAL
ALP BLD-CCNC: 88 U/L (ref 40–130)
ALT SERPL-CCNC: 26 U/L (ref 5–41)
AMPHETAMINE SCREEN, URINE: NORMAL
ANION GAP SERPL CALCULATED.3IONS-SCNC: 15 MMOL/L (ref 7–19)
AST SERPL-CCNC: 17 U/L (ref 5–40)
BARBITURATE SCREEN, URINE: NORMAL
BASOPHILS ABSOLUTE: 0.1 K/UL (ref 0–0.2)
BASOPHILS RELATIVE PERCENT: 0.7 % (ref 0–1)
BENZODIAZEPINE SCREEN, URINE: NORMAL
BILIRUB SERPL-MCNC: 0.3 MG/DL (ref 0.2–1.2)
BILIRUBIN URINE: NEGATIVE
BLOOD, URINE: NEGATIVE
BUN BLDV-MCNC: 37 MG/DL (ref 8–23)
BUPRENORPHINE URINE: NORMAL
CALCIUM SERPL-MCNC: 10.8 MG/DL (ref 8.8–10.2)
CHLORIDE BLD-SCNC: 101 MMOL/L (ref 98–111)
CHOLESTEROL, TOTAL: 100 MG/DL (ref 160–199)
CLARITY: CLEAR
CO2: 22 MMOL/L (ref 22–29)
COCAINE METABOLITE SCREEN URINE: NORMAL
COLOR: YELLOW
CREAT SERPL-MCNC: 1.9 MG/DL (ref 0.5–1.2)
EOSINOPHILS ABSOLUTE: 0 K/UL (ref 0–0.6)
EOSINOPHILS RELATIVE PERCENT: 0.1 % (ref 0–5)
FENTANYL SCREEN, URINE: NORMAL
FERRITIN: 108 NG/ML (ref 30–400)
FOLATE: 11.3 NG/ML (ref 4.5–32.2)
GABAPENTIN SCREEN, URINE: NORMAL
GFR SERPL CREATININE-BSD FRML MDRD: 35 ML/MIN/{1.73_M2}
GLUCOSE BLD-MCNC: 117 MG/DL (ref 74–109)
GLUCOSE URINE: NEGATIVE MG/DL
HBA1C MFR BLD: 6.2 % (ref 4–6)
HCT VFR BLD CALC: 47.5 % (ref 42–52)
HDLC SERPL-MCNC: 36 MG/DL (ref 55–121)
HEMOGLOBIN: 15.1 G/DL (ref 14–18)
IMMATURE GRANULOCYTES #: 0.1 K/UL
IRON: 58 UG/DL (ref 59–158)
KETONES, URINE: NEGATIVE MG/DL
LDL CHOLESTEROL CALCULATED: 18 MG/DL
LEUKOCYTE ESTERASE, URINE: NEGATIVE
LYMPHOCYTES ABSOLUTE: 2.9 K/UL (ref 1.1–4.5)
LYMPHOCYTES RELATIVE PERCENT: 23.9 % (ref 20–40)
MCH RBC QN AUTO: 29.7 PG (ref 27–31)
MCHC RBC AUTO-ENTMCNC: 31.8 G/DL (ref 33–37)
MCV RBC AUTO: 93.3 FL (ref 80–94)
MDMA URINE: NORMAL
METHADONE SCREEN, URINE: NORMAL
METHAMPHETAMINE, URINE: NORMAL
MONOCYTES ABSOLUTE: 1.1 K/UL (ref 0–0.9)
MONOCYTES RELATIVE PERCENT: 8.6 % (ref 0–10)
NEUTROPHILS ABSOLUTE: 8.1 K/UL (ref 1.5–7.5)
NEUTROPHILS RELATIVE PERCENT: 66.3 % (ref 50–65)
NITRITE, URINE: NEGATIVE
OPIATE SCREEN URINE: NORMAL
OXYCODONE SCREEN URINE: NORMAL
PARATHYROID HORMONE INTACT: 367.6 PG/ML (ref 15–65)
PDW BLD-RTO: 14 % (ref 11.5–14.5)
PH UA: 5 (ref 5–8)
PHENCYCLIDINE SCREEN URINE: NORMAL
PHOSPHORUS: 3.7 MG/DL (ref 2.5–4.5)
PLATELET # BLD: 223 K/UL (ref 130–400)
PMV BLD AUTO: 11.4 FL (ref 9.4–12.4)
POTASSIUM SERPL-SCNC: 5.2 MMOL/L (ref 3.5–5)
PROPOXYPHENE SCREEN, URINE: NORMAL
PROTEIN UA: NEGATIVE MG/DL
RBC # BLD: 5.09 M/UL (ref 4.7–6.1)
SODIUM BLD-SCNC: 138 MMOL/L (ref 136–145)
SPECIFIC GRAVITY UA: 1.01 (ref 1–1.03)
SYNTHETIC CANNABINOIDS(K2) SCREEN, URINE: NORMAL
T4 FREE: 1.29 NG/DL (ref 0.93–1.7)
THC SCREEN, URINE: NORMAL
TOTAL PROTEIN: 7 G/DL (ref 6.6–8.7)
TRAMADOL SCREEN URINE: NORMAL
TRICYCLIC ANTIDEPRESSANTS, UR: NORMAL
TRIGL SERPL-MCNC: 231 MG/DL (ref 0–149)
TSH SERPL DL<=0.05 MIU/L-ACNC: 1.53 UIU/ML (ref 0.27–4.2)
URIC ACID, SERUM: 8.4 MG/DL (ref 3.4–7)
UROBILINOGEN, URINE: 0.2 E.U./DL
VITAMIN B-12: 510 PG/ML (ref 211–946)
VITAMIN D 25-HYDROXY: 23.1 NG/ML
WBC # BLD: 12.2 K/UL (ref 4.8–10.8)

## 2023-01-03 PROCEDURE — 71046 X-RAY EXAM CHEST 2 VIEWS: CPT

## 2023-01-03 PROCEDURE — 71046 X-RAY EXAM CHEST 2 VIEWS: CPT | Performed by: RADIOLOGY

## 2023-01-03 RX ORDER — PREDNISONE 20 MG/1
20 TABLET ORAL DAILY
Qty: 18 TABLET | Refills: 0 | Status: SHIPPED | OUTPATIENT
Start: 2023-01-03

## 2023-01-03 RX ORDER — ALLOPURINOL 100 MG/1
100 TABLET ORAL DAILY
Qty: 30 TABLET | Refills: 5 | Status: SHIPPED | OUTPATIENT
Start: 2023-01-03

## 2023-01-03 RX ORDER — AZITHROMYCIN 250 MG/1
250 TABLET, FILM COATED ORAL DAILY
Qty: 1 PACKET | Refills: 0 | Status: SHIPPED | OUTPATIENT
Start: 2023-01-03

## 2023-01-03 ASSESSMENT — PATIENT HEALTH QUESTIONNAIRE - PHQ9
6. FEELING BAD ABOUT YOURSELF - OR THAT YOU ARE A FAILURE OR HAVE LET YOURSELF OR YOUR FAMILY DOWN: 0
10. IF YOU CHECKED OFF ANY PROBLEMS, HOW DIFFICULT HAVE THESE PROBLEMS MADE IT FOR YOU TO DO YOUR WORK, TAKE CARE OF THINGS AT HOME, OR GET ALONG WITH OTHER PEOPLE: 0
SUM OF ALL RESPONSES TO PHQ QUESTIONS 1-9: 3
SUM OF ALL RESPONSES TO PHQ QUESTIONS 1-9: 3
7. TROUBLE CONCENTRATING ON THINGS, SUCH AS READING THE NEWSPAPER OR WATCHING TELEVISION: 0
SUM OF ALL RESPONSES TO PHQ QUESTIONS 1-9: 3
4. FEELING TIRED OR HAVING LITTLE ENERGY: 3
2. FEELING DOWN, DEPRESSED OR HOPELESS: 0
SUM OF ALL RESPONSES TO PHQ9 QUESTIONS 1 & 2: 0
3. TROUBLE FALLING OR STAYING ASLEEP: 0
5. POOR APPETITE OR OVEREATING: 0
8. MOVING OR SPEAKING SO SLOWLY THAT OTHER PEOPLE COULD HAVE NOTICED. OR THE OPPOSITE, BEING SO FIGETY OR RESTLESS THAT YOU HAVE BEEN MOVING AROUND A LOT MORE THAN USUAL: 0
9. THOUGHTS THAT YOU WOULD BE BETTER OFF DEAD, OR OF HURTING YOURSELF: 0
1. LITTLE INTEREST OR PLEASURE IN DOING THINGS: 0
SUM OF ALL RESPONSES TO PHQ QUESTIONS 1-9: 3

## 2023-01-03 NOTE — PROGRESS NOTES
[address] [facility] CARE  [address] [address]  [address] [address]  Dept: [phone]  Dept Fax: [phone]  Loc: [phone]    Leslie Padilla is a 76 y.o. male who presents today for his medical conditions/complaints as noted below. Leslie Padilla is here for Follow-up, Sinus Problem, and Ear Fullness        HPI:   CC: Here today to discuss the following:    3 weeks of cough congestion. - cough:  nonproductive. - post nasal drainage.   - No fever chills. Hyperlipidemia  Tolerating current cholesterol medication without side effects. . Attempting to reduce processed sugar and cholesterol from diet. He has missed his follow-up appointments with gastroenterology regarding his gastrointestinal stromal tumor  Gastroesophageal Reflux Disease  Symptoms currently under control. Medication adequately controls symptoms. No hematochezia or melena. Congestive Heart Failure  Compliant with current medications without side effects. Denies worsening dyspnea on exertion, paroxysmal nocturnal dyspnea, or orthopnea. No lower extremity edema. No chest pain. Depression  Compliant with medications. No adverse effects from medication. Depression symptoms are stable today. No suicidal or homicidal ideation. Energy, concentration, and sleep are stable. No issues with apathy. Hypertension  Compliant with medications. No adverse effects from medication. No lightheadedness, palpitations, or chest pain. HPI    Subjective:      Review of Systems   Constitutional:  Negative for chills and fever. HENT:  Positive for congestion, postnasal drip and rhinorrhea. Negative for sinus pressure, sinus pain, sneezing and sore throat. Respiratory:  Positive for cough and shortness of breath. Musculoskeletal:  Positive for arthralgias, back pain and myalgias. Negative for gait problem, joint swelling, neck pain and neck stiffness.        SeeHPI for visit specific review of symptoms. All others negative      Objective:   BP (!) 140/70 (Site: Left Upper Arm, Position: Sitting)   Pulse 74   Temp 98.1 °F (36.7 °C) (Temporal)   Ht 5' 8\" (1.727 m)   Wt 234 lb (106.1 kg)   SpO2 96%   BMI 35.58 kg/m²   Physical Exam  Constitutional:       Appearance: He is well-developed. He is not ill-appearing. Cardiovascular:      Rate and Rhythm: Normal rate and regular rhythm. Heart sounds: No murmur heard. No friction rub. Pulmonary:      Effort: Pulmonary effort is normal. No respiratory distress. Breath sounds: Normal breath sounds. No wheezing or rales. Abdominal:      General: There is no distension. Palpations: Abdomen is soft. Tenderness: There is no abdominal tenderness. Musculoskeletal:      Cervical back: Neck supple. No tenderness. Lymphadenopathy:      Cervical: No cervical adenopathy. Neurological:      Mental Status: He is alert.    Psychiatric:         Behavior: Behavior normal.         Recent Results (from the past 672 hour(s))   POCT Rapid Drug Screen    Collection Time: 01/03/23 12:00 AM   Result Value Ref Range    Alcohol, Urine NEG     Amphetamine Screen, Urine NEG     Barbiturate Screen, Urine NEG     Benzodiazepine Screen, Urine NEG     Buprenorphine Urine NEG     Cocaine Metabolite Screen, Urine NEG     FENTANYL SCREEN, URINE NEG     Gabapentin Screen, Urine NEG     MDMA, Urine NEG     Methadone Screen, Urine NEG     Methamphetamine, Urine NEG     Opiate Scrn, Ur POS     Oxycodone Screen, Ur NEG     PCP Screen, Urine NEG     Propoxyphene Screen, Urine NEG     Synthetic Cannabinoids (K2) Screen, Urine NEG     THC Screen, Urine NEG     Tramadol Scrn, Ur NEG     Tricyclic Antidepressants, Urine NEG    Uric Acid    Collection Time: 01/03/23  3:46 PM   Result Value Ref Range    Uric Acid, Serum 8.4 (H) 3.4 - 7.0 mg/dL   Phosphorus    Collection Time: 01/03/23  3:46 PM   Result Value Ref Range    Phosphorus 3.7 2.5 - 4.5 mg/dL Iron    Collection Time: 01/03/23  3:46 PM   Result Value Ref Range    Iron 58 (L) 59 - 158 ug/dL   Hemoglobin A1C    Collection Time: 01/03/23  3:46 PM   Result Value Ref Range    Hemoglobin A1C 6.2 (H) 4.0 - 6.0 %   Lipid Panel    Collection Time: 01/03/23  3:46 PM   Result Value Ref Range    Cholesterol, Total 100 (L) 160 - 199 mg/dL    Triglycerides 231 (H) 0 - 149 mg/dL    HDL 36 (L) 55 - 121 mg/dL    LDL Calculated 18 <100 mg/dL   Comprehensive Metabolic Panel    Collection Time: 01/03/23  3:46 PM   Result Value Ref Range    Sodium 138 136 - 145 mmol/L    Potassium 5.2 (H) 3.5 - 5.0 mmol/L    Chloride 101 98 - 111 mmol/L    CO2 22 22 - 29 mmol/L    Anion Gap 15 7 - 19 mmol/L    Glucose 117 (H) 74 - 109 mg/dL    BUN 37 (H) 8 - 23 mg/dL    Creatinine 1.9 (H) 0.5 - 1.2 mg/dL    Est, Glom Filt Rate 35 (A) >60    Calcium 10.8 (H) 8.8 - 10.2 mg/dL    Total Protein 7.0 6.6 - 8.7 g/dL    Albumin 4.5 3.5 - 5.2 g/dL    Total Bilirubin 0.3 0.2 - 1.2 mg/dL    Alkaline Phosphatase 88 40 - 130 U/L    ALT 26 5 - 41 U/L    AST 17 5 - 40 U/L   CBC with Auto Differential    Collection Time: 01/03/23  3:46 PM   Result Value Ref Range    WBC 12.2 (H) 4.8 - 10.8 K/uL    RBC 5.09 4.70 - 6.10 M/uL    Hemoglobin 15.1 14.0 - 18.0 g/dL    Hematocrit 47.5 42.0 - 52.0 %    MCV 93.3 80.0 - 94.0 fL    MCH 29.7 27.0 - 31.0 pg    MCHC 31.8 (L) 33.0 - 37.0 g/dL    RDW 14.0 11.5 - 14.5 %    Platelets 301 162 - 220 K/uL    MPV 11.4 9.4 - 12.4 fL    Neutrophils % 66.3 (H) 50.0 - 65.0 %    Lymphocytes % 23.9 20.0 - 40.0 %    Monocytes % 8.6 0.0 - 10.0 %    Eosinophils % 0.1 0.0 - 5.0 %    Basophils % 0.7 0.0 - 1.0 %    Neutrophils Absolute 8.1 (H) 1.5 - 7.5 K/uL    Immature Granulocytes # 0.1 K/uL    Lymphocytes Absolute 2.9 1.1 - 4.5 K/uL    Monocytes Absolute 1.10 (H) 0.00 - 0.90 K/uL    Eosinophils Absolute 0.00 0.00 - 0.60 K/uL    Basophils Absolute 0.10 0.00 - 0.20 K/uL   Urinalysis    Collection Time: 01/03/23  4:27 PM   Result Value Ref Range    Color, UA YELLOW Straw/Yellow    Clarity, UA Clear Clear    Glucose, Ur Negative Negative mg/dL    Bilirubin Urine Negative Negative    Ketones, Urine Negative Negative mg/dL    Specific Gravity, UA 1.013 1.005 - 1.030    Blood, Urine Negative Negative    pH, UA 5.0 5.0 - 8.0    Protein, UA Negative Negative mg/dL    Urobilinogen, Urine 0.2 <2.0 E.U./dL    Nitrite, Urine Negative Negative    Leukocyte Esterase, Urine Negative Negative       XR CHEST (2 VW)    Result Date: 1/3/2023  NO PRIOR REPORT AVAILABLE Exam: X-RAYS OF THE CHEST Clinical data: Acute cough. Technique: PA and lateral views of the chest. Prior studies: Radiographs of the chest dated 01/27/2022 report. Findings: The trachea is midline. The heart size is within normal limits. No infiltrate, effusion or pneumothorax. Right lower transjugular pacer noted in good position. There is modest dextroscoliosis of the dorsal spine. Pleural adhesions from the right hemidiaphragm noted as is very mild atelectasis in the lingula. 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 The soft tissues and bony structures demonstrate no acute pathology. Right lower transjugular pacer in good position Modest dextroscoliosis dorsal spine. Rule out adhesions from the right hemidiaphragm. Very mild atelectasis in the lingula. 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 Dictated and Electronically Signed by Salazar Colby MD at 03-Jan-2023 05:48:37 PM                     Assessment & Plan: The following diagnoses and conditions are stable with no further orders unless indicated:  1.  Mixed hyperlipidemia  Lab Results   Component Value Date    CHOL 100 (L) 01/03/2023    CHOL 91 (L) 07/19/2021    CHOL 87 (L) 02/16/2021     Lab Results   Component Value Date    TRIG 231 (H) 01/03/2023    TRIG 106 07/19/2021    TRIG 208 (H) 02/16/2021     Lab Results   Component Value Date    HDL 36 (L) 01/03/2023    HDL 36 (L) 07/19/2021    HDL 25 (L) 02/16/2021     Lab Results   Component Value Date    LDLCALC 18 01/03/2023    LDLCALC 34 07/19/2021    LDLCALC 20 02/16/2021     No results found for: LABVLDL, VLDL  No results found for: CHOLHDLRATIO  Current medication:  Atorvastatin 40 mg daily  Cholesterol stable    2. Gastrointestinal stromal tumor of stomach ([address] gastroenterology.  -Last visit was February 2020  -Reminded him to follow-up with them. They wanted to repeat esophagoduodenoscopy and EUS in 1 year  -He has now was 3 years past due      3. Simple chronic bronchitis ([address]  He has albuterol for as needed use. Declines pulmonary function testing    4. Dilated cardiomyopathy ([facility]  Continue with recommendations per cardiology    5. Recurrent major depressive disorder in partial remission (HCC)  Stable    6. PVD (peripheral vascular disease) ([facility])  Plavix  Encouraged him to keep his appointment with vascular  7. Complete atrioventricular block ([facility])  Recommendations per cardiology    8. Hyperparathyroidism ([facility]     Lab Results   Component Value Date    .6 (H) 01/03/2023    CALCIUM 10.8 (H) 01/03/2023    PHOS 3.7 01/03/2023   Is been seen by ENT in the past and declined parathyroid surgery. We will continue monitoring calcium level      9. Stage 3b chronic kidney disease ([facility] [address]  Lab Results   Component Value Date    CREATININE 1.9 (H) 01/03/2023     Lab Results   Component Value Date    BUN 37 (H) 01/03/2023     Reinforced goals of adequate hydration. Recommended he avoid NSAIDs such as naproxen and ibuprofen. Encouraged him to continue following with nephrology        10. Abdominal aortic aneurysm (AAA) without rupture, unspecified part  Continues to be followed by [facility] vascular    11. Obstructive sleep apnea syndrome  Improved him to continue with CPAP    12. Fatigue  Multifactorial.  Reviewed diet and adequate sleep    13.  Coronary artery disease involving native coronary artery of native heart without angina pectoris/CHF  Continues to be followed by [facility]  Beta-blocker  Plavix 75 mg daily  Imdur 30 mg daily  Entresto  Torsemide  No chest pain or palpitations today. 14. Essential hypertension  BP Readings from Last 3 Encounters:   01/03/23 (!) 140/70   10/18/22 (!) 151/92   08/11/22 96/64     Current medications:  Carvedilol 6.25 mg twice a day  Blood pressure is borderline elevated today. He attributes it to increased pain. He was hypotensive as recently as August.  We will continue to monitor  15. Depression with anxiety  Stable    16. Hyperglycemia  Lab Results   Component Value Date    LABA1C 6.2 (H) 01/03/2023    LABA1C 6.3 (H) 02/16/2021    LABA1C 6.0 01/04/2021     Lab Results   Component Value Date    LDLCALC 18 01/03/2023    CREATININE 1.9 (H) 01/03/2023     Stable  Encouraged ADA diet    17. Gastroesophageal reflux disease without esophagitis  Pepcid 20 mg twice a day  Stable  18. Chronic lower back pain. Hydrocodone 10 mg 4 times daily as needed for pain  Going to pain management in [place]  [address] [place] [address]/[facility]   Dr Juanjose Alicea him to discuss further medication management with his pain specialist    19. Acute cough  XR CHEST (2 VW)    Result Date: 1/3/2023  NO PRIOR REPORT AVAILABLE Exam: X-RAYS OF THE CHEST Clinical data: Acute cough. Technique: PA and lateral views of the chest. Prior studies: Radiographs of the chest dated 01/27/2022 report. Findings: The trachea is midline. The heart size is within normal limits. No infiltrate, effusion or pneumothorax. Right lower transjugular pacer noted in good position. There is modest dextroscoliosis of the dorsal spine. Pleural adhesions from the right hemidiaphragm noted as is very mild atelectasis in the lingula. 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 The soft tissues and bony structures demonstrate no acute pathology. Right lower transjugular pacer in good position Modest dextroscoliosis dorsal spine.  Rule out adhesions from the right hemidiaphragm. Very mild atelectasis in the lingula. 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 Dictated and Electronically Signed by Lolly Shaffer MD at 03-Jan-2023 05:48:37 PM             No evidence of pneumonia  Peers to have acute bronchitis  Z-Marcelo  Albuterol  Prednisone  20.  Obesity:   Encouraged caloric restriction for weight loss which may improve his back pain and breathing  Orders Placed This Encounter   Procedures    XR CHEST (2 VW)     Standing Status:   Future     Number of Occurrences:   1     Standing Expiration Date:   1/3/2024     Order Specific Question:   Reason for exam:     Answer:   Shortness of breath    CBC with Auto Differential     Standing Status:   Future     Number of Occurrences:   1     Standing Expiration Date:   1/3/2024    Comprehensive Metabolic Panel     Standing Status:   Future     Number of Occurrences:   1     Standing Expiration Date:   1/3/2024    Lipid Panel     Standing Status:   Future     Number of Occurrences:   1     Standing Expiration Date:   1/3/2024     Order Specific Question:   Is Patient Fasting?/# of Hours     Answer:   Y/12    Hemoglobin A1C     Standing Status:   Future     Number of Occurrences:   1     Standing Expiration Date:   1/3/2024    Folate     Standing Status:   Future     Number of Occurrences:   1     Standing Expiration Date:   1/3/2024    Vitamin D 25 Hydroxy     Standing Status:   Future     Number of Occurrences:   1     Standing Expiration Date:   1/3/2024    Vitamin B12     Standing Status:   Future     Number of Occurrences:   1     Standing Expiration Date:   1/3/2024    TSH     Standing Status:   Future     Number of Occurrences:   1     Standing Expiration Date:   1/3/2024    T4, Free     Standing Status:   Future     Number of Occurrences:   1     Standing Expiration Date:   9/2/5680    COMP METABOLIC PROFILE     Standing Status:   Future     Number of Occurrences:   1 Standing Expiration Date:   1/3/2024    Iron     Standing Status:   Future     Number of Occurrences:   1     Standing Expiration Date:   1/3/2024     Order Specific Question:   Is Patient Fasting? Answer:   yes     Order Specific Question:   No of Hours? Answer:   12    Ferritin     Standing Status:   Future     Number of Occurrences:   1     Standing Expiration Date:   1/3/2024    Phosphorus     Standing Status:   Future     Number of Occurrences:   1     Standing Expiration Date:   1/3/2024    PTH, Intact     Standing Status:   Future     Number of Occurrences:   1     Standing Expiration Date:   1/3/2024    Urinalysis     Standing Status:   Future     Number of Occurrences:   1     Standing Expiration Date:   1/3/2024    Uric Acid     Standing Status:   Future     Number of Occurrences:   1     Standing Expiration Date:   1/3/2024    Transferrin     Standing Status:   Future     Number of Occurrences:   1     Standing Expiration Date:   1/3/2024    POCT Rapid Drug Screen           Return in about 3 months (around 4/3/2023) for Routine follow up - 20 minutes. Discussed use, benefit, and side effects of prescribed medications. All patient questions answered. Pt voiced understanding. Reviewed health maintenance. Instructedto continue current medications, diet and exercise. Patient agreed with treatmentplan.  Follow up as directed.     _______________________________________________________________      Past Medical History:   Diagnosis Date    AAA (abdominal aortic aneurysm)     Acute on chronic systolic CHF (congestive heart failure) ([facility] [address] 7/26/2021    Aortic dissection ([facility] [address] 10/10/2012    CAD (coronary artery disease)     stents    Cancer (HCC)     lung&lymphnode    Carotid artery occlusion     Cerebrovascular accident (CVA) ([facility] [address]     Cerebrovascular accident (CVA) due to embolism ([address] 02/16/2016    Cerebrovascular accident (CVA) due to embolism of right middle cerebral artery ([facility] [address] COVID-19 2/6/2021    Depression     HTN (hypertension)     Hyperlipidemia     MI (myocardial infarction) ([facility] [address]     acute 5/13/2015    Non Hodgkin's lymphoma ([facility] [address]     Palliative care patient 02/16/2021    Sleep apnea     no c-pap    Unspecified sleep apnea       Past Surgical History:   Procedure Laterality Date    CARDIAC CATHETERIZATION  5/13/15  [address]    EF 40%. stent to LAD, stent to posterior descending branch of RCA. CAROTID ENDARTERECTOMY Right 07/22/2016    Right carotid endarterectomy,carotid arteriograms. [facility]    COLONOSCOPY  04/09/2018    Juan Manuel    ENDOSCOPY, COLON, DIAGNOSTIC      LUNG CANCER SURGERY      right side    LYMPHADENECTOMY      removed lymphnodes     OTHER SURGICAL HISTORY  2022    pt had a pacemaker and defib    PACEMAKER PLACEMENT      RECTAL SURGERY      fistula     UPPER GASTROINTESTINAL ENDOSCOPY N/A 01/08/2020    Dr Israel Wilhelm (Dr Parveen Jacobo pt) w/EUS-Small low grade GIST in the stomach, repeat in 1 yr    UPPER GASTROINTESTINAL ENDOSCOPY  11/08/2019    Juan Manuel: normal    UPPER GASTROINTESTINAL ENDOSCOPY  04/13/2018    Dr Paula Dickinson: normal, 2cm HH, small amount of food in stomach w/dark fluid,but not kathy blood    VASCULAR SURGERY  10/29/12 [facility]    open exposure of R common femoral artery; percutaneous cannulation of L  femoral artery with 7-Khmer sheath; suprarenal abd aortagram with bilat iliofem arteriogram; endoluminal graft repair of AAA with endologix [id number] main body, 28x95 infrarenal cuff; balloon angioplasty of infrarenal abd aorta and endoluminal graft with coda balloon; completion abd aortogram w bilat iliofem arteriogram;     VASCULAR SURGERY  10/29/ [facility]     (cont) open repair of right common femoral ateriotomy; perclose repair of left common femoral artery puncture site.         Family History   Problem Relation Age of Onset    High Blood Pressure Father     High Blood Pressure Mother     Diabetes Mother     Colon Polyps Brother     Cancer Brother     Colon Cancer Neg Hx        Social History     Tobacco Use    Smoking status: Every Day     Packs/day: 1.00     Years: 40.00     Pack years: 40.00     Types: Cigarettes    Smokeless tobacco: Never    Tobacco comments:     quit smoking Dec 10, 2021. Substance Use Topics    Alcohol use: No     Current Outpatient Medications   Medication Sig Dispense Refill    famotidine (PEPCID) 20 MG tablet Take 1 tablet by mouth 2 times daily 180 tablet 3    atorvastatin (LIPITOR) 40 MG tablet TAKE ONE (1) TABLET BY MOUTH EVERY DAY 90 tablet 3    carvedilol (COREG) 6.25 MG tablet Take 1 tablet by mouth 2 times daily (with meals) 180 tablet 3    isosorbide mononitrate (IMDUR) 30 MG extended release tablet Take 1 tablet by mouth daily 90 tablet 3    torsemide (DEMADEX) 20 MG tablet TAKE 1 TABLET BY MOUTH DAILY 90 tablet 3    clopidogrel (PLAVIX) 75 MG tablet TAKE ONE (1) TABLET BY MOUTH EVERY DAY 90 tablet 3    HYDROcodone-acetaminophen (NORCO)  MG per tablet Take 1 tablet by mouth every 6 hours as needed for Pain for up to 30 days. 120 tablet 0    sacubitril-valsartan (ENTRESTO) 24-26 MG per tablet Take 1 tablet by mouth in the morning and 1 tablet before bedtime. 60 tablet 5    polyethylene glycol (GLYCOLAX) 17 g packet Take 17 g by mouth daily as needed for Constipation      fluticasone (FLONASE) 50 MCG/ACT nasal spray 1 spray by Each Nostril route daily as needed       nitroGLYCERIN (NITROSTAT) 0.4 MG SL tablet Place 0.4 mg under the tongue every 5 minutes as needed for Chest pain       No current facility-administered medications for this visit.      Allergies   Allergen Reactions    Influenza Virus Vaccine Swelling    Penicillin G Itching       Health Maintenance   Topic Date Due    Hepatitis C screen  Never done    DTaP/Tdap/Td vaccine (1 - Tdap) Never done    Shingles vaccine (1 of 2) Never done    Low dose CT lung screening  Never done    Pneumococcal 65+ years Vaccine (2 - PCV) 01/09/2019    COVID-19 Vaccine (3 - Booster for Moderna series) 06/04/2021 Depression Monitoring  07/29/2023    Annual Wellness Visit (AWV)  07/30/2023    Lipids  01/03/2024    Hepatitis A vaccine  Aged Out    Hib vaccine  Aged Out    Meningococcal (ACWY) vaccine  Aged Out       _______________________________________________________________    Note dictated using Dragon Dictation software  Sometimes this dictation software makes erroneous transcriptions.

## 2023-01-05 LAB — TRANSFERRIN: 242 MG/DL (ref 200–360)

## 2023-01-07 ASSESSMENT — ENCOUNTER SYMPTOMS
SINUS PRESSURE: 0
BACK PAIN: 1
SHORTNESS OF BREATH: 1
SORE THROAT: 0
RHINORRHEA: 1
SINUS PAIN: 0
COUGH: 1

## 2023-01-16 DIAGNOSIS — G89.29 CHRONIC MIDLINE LOW BACK PAIN WITHOUT SCIATICA: ICD-10-CM

## 2023-01-16 DIAGNOSIS — M54.50 CHRONIC MIDLINE LOW BACK PAIN WITHOUT SCIATICA: ICD-10-CM

## 2023-01-16 RX ORDER — HYDROCODONE BITARTRATE AND ACETAMINOPHEN 10; 325 MG/1; MG/1
1 TABLET ORAL EVERY 6 HOURS PRN
Qty: 120 TABLET | Refills: 0 | Status: SHIPPED | OUTPATIENT
Start: 2023-01-16 | End: 2023-02-15

## 2023-02-13 ENCOUNTER — HOSPITAL ENCOUNTER (OUTPATIENT)
Dept: CT IMAGING | Age: 78
Discharge: HOME | End: 2023-02-13
Payer: MEDICARE

## 2023-02-13 ENCOUNTER — OFFICE VISIT (OUTPATIENT)
Dept: CARDIOLOGY CLINIC | Age: 78
End: 2023-02-13
Payer: COMMERCIAL

## 2023-02-13 VITALS
HEIGHT: 68 IN | BODY MASS INDEX: 34.86 KG/M2 | DIASTOLIC BLOOD PRESSURE: 82 MMHG | WEIGHT: 230 LBS | HEART RATE: 66 BPM | SYSTOLIC BLOOD PRESSURE: 130 MMHG

## 2023-02-13 DIAGNOSIS — I42.0 DILATED CARDIOMYOPATHY (HCC): ICD-10-CM

## 2023-02-13 DIAGNOSIS — R00.1 SYMPTOMATIC BRADYCARDIA: ICD-10-CM

## 2023-02-13 DIAGNOSIS — E78.2 MIXED HYPERLIPIDEMIA: ICD-10-CM

## 2023-02-13 DIAGNOSIS — I25.10 CORONARY ARTERY DISEASE INVOLVING NATIVE HEART WITHOUT ANGINA PECTORIS, UNSPECIFIED VESSEL OR LESION TYPE: ICD-10-CM

## 2023-02-13 DIAGNOSIS — I71.40 ABDOMINAL AORTIC ANEURYSM (AAA) WITHOUT RUPTURE, UNSPECIFIED PART: ICD-10-CM

## 2023-02-13 DIAGNOSIS — Q26.1 PERSISTENT LEFT SUPERIOR VENA CAVA: ICD-10-CM

## 2023-02-13 DIAGNOSIS — I50.42 CHRONIC COMBINED SYSTOLIC (CONGESTIVE) AND DIASTOLIC (CONGESTIVE) HEART FAILURE (HCC): Primary | ICD-10-CM

## 2023-02-13 DIAGNOSIS — I44.2 COMPLETE ATRIOVENTRICULAR BLOCK (HCC): ICD-10-CM

## 2023-02-13 DIAGNOSIS — Z95.810 ICD (IMPLANTABLE CARDIOVERTER-DEFIBRILLATOR), DUAL, IN SITU: ICD-10-CM

## 2023-02-13 DIAGNOSIS — I10 ESSENTIAL HYPERTENSION: ICD-10-CM

## 2023-02-13 PROCEDURE — 4004F PT TOBACCO SCREEN RCVD TLK: CPT | Performed by: INTERNAL MEDICINE

## 2023-02-13 PROCEDURE — G8417 CALC BMI ABV UP PARAM F/U: HCPCS | Performed by: INTERNAL MEDICINE

## 2023-02-13 PROCEDURE — 99214 OFFICE O/P EST MOD 30 MIN: CPT | Performed by: INTERNAL MEDICINE

## 2023-02-13 PROCEDURE — 93282 PRGRMG EVAL IMPLANTABLE DFB: CPT | Performed by: INTERNAL MEDICINE

## 2023-02-13 PROCEDURE — 3075F SYST BP GE 130 - 139MM HG: CPT | Performed by: INTERNAL MEDICINE

## 2023-02-13 PROCEDURE — G1010 CDSM STANSON: HCPCS

## 2023-02-13 PROCEDURE — G8427 DOCREV CUR MEDS BY ELIG CLIN: HCPCS | Performed by: INTERNAL MEDICINE

## 2023-02-13 PROCEDURE — 3079F DIAST BP 80-89 MM HG: CPT | Performed by: INTERNAL MEDICINE

## 2023-02-13 PROCEDURE — 1123F ACP DISCUSS/DSCN MKR DOCD: CPT | Performed by: INTERNAL MEDICINE

## 2023-02-13 PROCEDURE — G8484 FLU IMMUNIZE NO ADMIN: HCPCS | Performed by: INTERNAL MEDICINE

## 2023-02-13 RX ORDER — CARVEDILOL 12.5 MG/1
12.5 TABLET ORAL 2 TIMES DAILY WITH MEALS
Qty: 180 TABLET | Refills: 3 | Status: SHIPPED | OUTPATIENT
Start: 2023-02-13

## 2023-02-13 RX ORDER — LISINOPRIL 5 MG/1
5 TABLET ORAL DAILY
Qty: 90 TABLET | Refills: 3 | Status: SHIPPED | OUTPATIENT
Start: 2023-02-13

## 2023-02-13 ASSESSMENT — ENCOUNTER SYMPTOMS
GASTROINTESTINAL NEGATIVE: 1
DIARRHEA: 0
SHORTNESS OF BREATH: 0
EYES NEGATIVE: 1
VOMITING: 0
RESPIRATORY NEGATIVE: 1
NAUSEA: 0

## 2023-02-13 NOTE — PROGRESS NOTES
[facility] Progress Note                            Date:  2/13/2023  Patient: Karolynn Libman  Age:  76 y.o., 1945      Reason for evaluation:         SUBJECTIVE:    Returns today follow-up assessment blood pressure 130/82 heart 66 to get out of breath from time to time. Denies anginal chest pain. Device interrogated today functioning appropriately. He cannot afford the Entresto had gotten some samples on occasion or 2 but is on nothing at the present time. We will start him on lisinopril and increase his dose of Coreg. No other complaints or issues reported. Blood pressure 130/82 heart 66. Review of Systems   Constitutional: Negative. Negative for chills, fever and unexpected weight change. HENT: Negative. Eyes: Negative. Respiratory: Negative. Negative for shortness of breath. Cardiovascular: Negative. Negative for chest pain. Gastrointestinal: Negative. Negative for diarrhea, nausea and vomiting. Endocrine: Negative. Genitourinary: Negative. Musculoskeletal: Negative. Skin: Negative. Neurological: Negative. All other systems reviewed and are negative. OBJECTIVE:     /82   Pulse 66   Ht 5' 8\" (1.727 m)   Wt 230 lb (104.3 kg)   BMI 34.97 kg/m²     Labs:   CBC: No results for input(s): WBC, HGB, HCT, PLT in the last 72 hours. BMP:No results for input(s): NA, K, CO2, BUN, CREATININE, LABGLOM, GLUCOSE in the last 72 hours. BNP: No results for input(s): BNP in the last 72 hours. PT/INR: No results for input(s): PROTIME, INR in the last 72 hours. APTT:No results for input(s): APTT in the last 72 hours. CARDIAC ENZYMES:No results for input(s): CKTOTAL, CKMB, CKMBINDEX, TROPONINI in the last 72 hours.   FASTING LIPID PANEL:  Lab Results   Component Value Date/Time    HDL 36 01/03/2023 03:46 PM    LDLDIRECT 71 02/11/2016 05:53 AM    LDLCALC 18 01/03/2023 03:46 PM    TRIG 231 01/03/2023 03:46 PM     LIVER PROFILE:No results for input(s): AST, ALT, LABALBU in the last 72 hours. Past Medical History:   Diagnosis Date    AAA (abdominal aortic aneurysm)     Acute on chronic systolic CHF (congestive heart failure) ([address] 7/26/2021    Aortic dissection ([address] 10/10/2012    CAD (coronary artery disease)     stents    Cancer (HCC)     lung&lymphnode    Carotid artery occlusion     Cerebrovascular accident (CVA) ([address]     Cerebrovascular accident (CVA) due to embolism ([address] 02/16/2016    Cerebrovascular accident (CVA) due to embolism of right middle cerebral artery ([address]     COVID-19 2/6/2021    Depression     HTN (hypertension)     Hyperlipidemia     MI (myocardial infarction) ([address]     acute 5/13/2015    Non Hodgkin's lymphoma ([address]     Palliative care patient 02/16/2021    Sleep apnea     no c-pap    Unspecified sleep apnea      Past Surgical History:   Procedure Laterality Date    CARDIAC CATHETERIZATION  5/13/15  [address]    EF 40%. stent to LAD, stent to posterior descending branch of RCA. CAROTID ENDARTERECTOMY Right 07/22/2016    Right carotid endarterectomy,carotid arteriograms. S    COLONOSCOPY  04/09/2018    Dr Mary Johnson, COLON, DIAGNOSTIC      LUNG CANCER SURGERY Right     LYMPHADENECTOMY      removed lymphnodes     OTHER SURGICAL HISTORY  2022    pt had a pacemaker and defib    PACEMAKER PLACEMENT      RECTAL SURGERY      fistula     UPPER GASTROINTESTINAL ENDOSCOPY N/A 01/08/2020    Dr Pati Gerardo (Dr Kriste Skiff pt) w/EUS-Small low grade GIST in the stomach, repeat in 1 yr    UPPER GASTROINTESTINAL ENDOSCOPY  11/08/2019    Dr Zambrano-Single submucosal papule (nodule) found in stomach, may need EUS    UPPER GASTROINTESTINAL ENDOSCOPY  04/13/2018    Dr Casimiro Marin  HH, small amount of food in stomach w/dark fluid,but not kathy blood    UPPER GASTROINTESTINAL ENDOSCOPY  04/13/2018    Dr Osvaldo Ghotra, small amount of food in stomach w/dark fluid    VASCULAR SURGERY  10/29/12 S    open exposure of R common femoral artery; percutaneous cannulation of L  femoral artery with 7-french sheath; suprarenal abd aortagram with bilat iliofem arteriogram; endoluminal graft repair of AAA with endologix [id number] main body, 28x95 infrarenal cuff; balloon angioplasty of infrarenal abd aorta and endoluminal graft with coda balloon; completion abd aortogram w bilat iliofem arteriogram;     VASCULAR SURGERY  10/29/ [facility]     (cont) open repair of right common femoral ateriotomy; perclose repair of left common femoral artery puncture site. Family History   Problem Relation Age of Onset    High Blood Pressure Father     High Blood Pressure Mother     Diabetes Mother     Colon Polyps Brother     Cancer Brother     Colon Cancer Neg Hx      Allergies   Allergen Reactions    Influenza Virus Vaccine Swelling    Penicillin G Itching     Current Outpatient Medications   Medication Sig Dispense Refill    HYDROcodone-acetaminophen (NORCO)  MG per tablet Take 1 tablet by mouth every 6 hours as needed for Pain for up to 30 days.  120 tablet 0    allopurinol (ZYLOPRIM) 100 MG tablet Take 1 tablet by mouth daily 30 tablet 5    famotidine (PEPCID) 20 MG tablet Take 1 tablet by mouth 2 times daily 180 tablet 3    atorvastatin (LIPITOR) 40 MG tablet TAKE ONE (1) TABLET BY MOUTH EVERY DAY 90 tablet 3    carvedilol (COREG) 6.25 MG tablet Take 1 tablet by mouth 2 times daily (with meals) 180 tablet 3    isosorbide mononitrate (IMDUR) 30 MG extended release tablet Take 1 tablet by mouth daily 90 tablet 3    torsemide (DEMADEX) 20 MG tablet TAKE 1 TABLET BY MOUTH DAILY 90 tablet 3    clopidogrel (PLAVIX) 75 MG tablet TAKE ONE (1) TABLET BY MOUTH EVERY DAY 90 tablet 3    polyethylene glycol (GLYCOLAX) 17 g packet Take 17 g by mouth daily as needed for Constipation      fluticasone (FLONASE) 50 MCG/ACT nasal spray 1 spray by Each Nostril route daily as needed       nitroGLYCERIN (NITROSTAT) 0.4 MG SL tablet Place 0.4 mg under the tongue every 5 minutes as needed for Chest pain       No current facility-administered medications for this visit. Social History     Socioeconomic History    Marital status:      Spouse name: Not on file    Number of children: Not on file    Years of education: Not on file    Highest education level: Not on file   Occupational History    Not on file   Tobacco Use    Smoking status: Former     Packs/day: 1.00     Years: 40.00     Pack years: 40.00     Types: Cigarettes    Smokeless tobacco: Never    Tobacco comments:     quit smoking Dec 10, 2021. Vaping Use    Vaping Use: Never used   Substance and Sexual Activity    Alcohol use: No    Drug use: No    Sexual activity: Not on file   Other Topics Concern    Not on file   Social History Narrative     50+ yearsHe has 1 son and 1 daughterWorks for a telephone Rosalinda Curling active duty 4-1/2 yearsHe does not go to the [employer] presently[employer] high school 2 years of college[employer] hunting and fishingeMindful alcohol or substance usage he does smoke 1 pack/dayPhysically sedentary     Social Determinants of Health     Financial Resource Strain: Not on file   Food Insecurity: Not on file   Transportation Needs: Not on file   Physical Activity: Inactive    Days of Exercise per Week: 0 days    Minutes of Exercise per Session: 0 min   Stress: Not on file   Social Connections: Not on file   Intimate Partner Violence: Not on file   Housing Stability: Not on file       Physical Examination:  /82   Pulse 66   Ht 5' 8\" (1.727 m)   Wt 230 lb (104.3 kg)   BMI 34.97 kg/m²   Physical Exam  Vitals reviewed. Constitutional:       Appearance: He is well-developed. Neck:      Vascular: No carotid bruit or JVD. Cardiovascular:      Rate and Rhythm: Normal rate and regular rhythm. Heart sounds: Normal heart sounds. No murmur heard. No friction rub. No gallop. Pulmonary:      Effort: Pulmonary effort is normal. No respiratory distress.       Breath sounds: Normal breath sounds. No wheezing or rales. Abdominal:      General: There is no distension. Tenderness: There is no abdominal tenderness. Lymphadenopathy:      Cervical: No cervical adenopathy. Skin:     General: Skin is warm and dry. ASSESSMENT:     Diagnosis Orders   1. Chronic combined systolic (congestive) and diastolic (congestive) heart failure ([address]        2. Dilated cardiomyopathy ([address]        3. Coronary artery disease involving native heart without angina pectoris, unspecified vessel or lesion type        4. ICD (implantable cardioverter-defibrillator), dual, in situ        5. Complete atrioventricular block (HCC)        6. Essential hypertension        7. Persistent left superior vena cava        8. Mixed hyperlipidemia        9. Symptomatic bradycardia            PLAN:  No orders of the defined types were placed in this encounter. No orders of the defined types were placed in this encounter. Continue present medications  Lisinopril 5 mg p.o. nightly  Increase Coreg 12.5 p.o. twice daily  Follow-up assessment 3 months    Return in about 3 months (around 5/13/2023) for return to Dr. Alex Borjas only. Kyle Sanchez MD 2/13/2023 3:28 PM [facility]      Thisdictation was generated by voice recognition computer software. Although all attempts are made to edit the dictation for accuracy, there may be errors in the transcription that are not intended.

## 2023-02-13 NOTE — PROGRESS NOTES
ICD interrogated  Presenting rhythm: AS/, AP 46.7%,  100%  Battery status: 9.3 years  Lead status: lead impedance within range and stable  Sensing: P waves 6.0 mV,  R waves 6.9 mV  Thresholds:  Atrial 0.500 V @ 0.4ms, ventricular 0.750 V @ 0.4ms  Observations:  None  See scanned report for details  Reprogramming for sensitivity and threshold testing  Next [facility] home check scheduled for 05/16/23

## 2023-02-14 DIAGNOSIS — M54.50 CHRONIC MIDLINE LOW BACK PAIN WITHOUT SCIATICA: ICD-10-CM

## 2023-02-14 DIAGNOSIS — G89.29 CHRONIC MIDLINE LOW BACK PAIN WITHOUT SCIATICA: ICD-10-CM

## 2023-02-14 RX ORDER — HYDROCODONE BITARTRATE AND ACETAMINOPHEN 10; 325 MG/1; MG/1
1 TABLET ORAL EVERY 6 HOURS PRN
Qty: 120 TABLET | Refills: 0 | Status: SHIPPED | OUTPATIENT
Start: 2023-02-14 | End: 2023-03-16

## 2023-02-14 NOTE — TELEPHONE ENCOUNTER
February 14, 2023       Bharati Shore MD  [address] [place] [address]  [place] [id number]  Via In Basket      Patient: Tomasa Olivares   YOB: 1984   Date of Visit: 2/14/2023       Dear Dr. Shore:    Thank you for referring Tomasa Olivares to me for evaluation. Below are my notes for this visit with her.    If you have questions, please do not hesitate to call me. I look forward to following your patient along with you.      Sincerely,        Amaya Silva CNP        CC: No Recipients  Amaya Silva CNP  2/14/2023  9:14 AM  Signed  SLEEP MEDICINE CONSULT NOTE:    Referring provider: Bharati Shore MD  PCP: Bharati Shore MD    Reason for referral/Visit: Sleep evaluation r/o and/or manage sleep disorders.    Tomasa Olivares is a 38 year old female   Past medical history of hypertension, obesity, vitamin D deficiency-supplements in past, heavy menses (NORY-not iron OTC) is presenting to the sleep center for sleep evaluation.  The patient has been experiencing some snoring symptoms, leg cramps at night and feeling more tired than usual-just started back taking iron which has helped with the tiredness. No EDS. PT is not on medications for HTN, recently diagnosed. PT denies CP, SOB, visual changes or headaches currently.     2/14/23-PT here to review sleep study results.      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    Bedtime: 2200  Awake time: 0600  Sleep position: all   Sleep onset latency: 30min  Sleeping aid medications: No  Awakenings # and episodes of nocturia: no    [x]Snoring  []Witnessed apneas  []Dyspneic arousal  []Morning dry mouth  [x]Morning headache    [x]Non-restorative sleep  []Excessive daytime sleepiness or tired during the day  []Naps    []Urge to move legs and/or uncomfortable tingling or burning in legs  [x]Cramping or jerking of the legs during sleep    []Cataplexy  []Sleep paralysis  []Hypnogognic or hypnopompnic hallucinations  []Sleep  Refilled medication and e-scribed to pharmacy. Confirm prescription was due. Make sure MOR and urine drug screen is up to date. attacks    []Parasomnias:      PRIOR SLEEP STUDY DATE:  HST-2023 AHI was 9.8 per hour, oxygen lulu was 72%. Mild Obstructive Sleep apnea (BLU)       Compliance Download:   Machine therapy (leave blank if not available or no therapy):  DATES:    % DAYS > 4 HOURS       %   Ave Usage (days used)      HOURS   95th% PRESSURE    95th% MASK LEAK       L/MIN   AHI         /HR        Mauk Sleepiness Scale:     Mauk Score     Mauk total score    3              Patient Active Problem List   Diagnosis   • Leg cramps   • Weight gain   • History of vitamin D deficiency   • Abnormal uterine bleeding   • Hypertension   • Snoring     Past Surgical History:   Procedure Laterality Date   •  section, low transverse     • Spine surgery procedure unlisted       Family History   Problem Relation Age of Onset   • Diabetes Father    • Hypertension Father    • Hypertension Brother    • Hypertension Maternal Grandmother    • Diabetes Paternal Grandmother    • Hypertension Paternal Grandmother    • Diabetes Paternal Grandfather    • Hypertension Paternal Grandfather      Social History     Tobacco Use   • Smoking status: Never   • Smokeless tobacco: Never   Vaping Use   • Vaping Use: never used   Substance Use Topics   • Alcohol use: Not Currently   • Drug use: Not Currently     Types: Cannabinols     ALLERGIES:  No Known Allergies  No current outpatient medications on file.     No current facility-administered medications for this visit.       Review of Systems:    Review of Systems   Constitutional: Positive for malaise/fatigue. Negative for chills, diaphoresis, fever and weight loss.   HENT: Negative.    Eyes: Negative for pain, photophobia and redness.   Cardiovascular: Negative for chest pain and cyanosis.   Respiratory: Positive for snoring. Negative for hemoptysis.    Hematologic/Lymphatic: Negative.    Skin: Negative for color change and unusual hair distribution.   Musculoskeletal: Negative for neck pain.    Psychiatric/Behavioral: Negative for altered mental status and hallucinations.         Physical Examination:  Visit Vitals  BP (!) 155/106 (BP Location: RUE - Right upper extremity, Patient Position: Sitting)   Pulse 75   Resp 16   Ht 5' 7\" (1.702 m)   Wt 92 kg (202 lb 13.2 oz)   LMP 01/15/2023 (Exact Date)   SpO2 97%   BMI 31.77 kg/m²     Body mass index is 31.77 kg/m².      Nasal Passages: [x]Clear   [] Congested  Palate: Jaramillo [] I  [] II   [x] III   [] IV              Eyrtherna/edema [x]none []+1  []+2  []+3  []+4     Gen: No acute distress. Pleasant and interactive.  Head:  Normocephalic and atraumatic.  Eyes: Conjunctiva and sclera normal.   Heart:  Normal rate and regular rhythm, no murmur.  Lungs:  Normal respiratory rate and effort, breath sounds equal.  Extremities:  No edema in lower extremities.   Skin: Warm and dry, no rash.  Musculoskeletal:  No joint swelling or tenderness  Psych:  Affect was appropriate to situation and mood was normal.  Neuro:  Alert and oriented, attention and recall preserved         ASSESSMENT and PLAN:    Problem List Items Addressed This Visit    None  Visit Diagnoses     BLU (obstructive sleep apnea)    -  Primary    Relevant Orders    SERVICE TO HOME CARE RESPIRATORY THERAPY            Comments/Recommendations:  Based on the patient's medical history, symptoms,and sleep study results:  HST-02/09/2023 AHI was 9.8 per hour, oxygen lulu was 72%. Mild Obstructive Sleep apnea (BLU)    CPAP titration study was explained to the patient and the patient decline an in lab titration study and would like to proceed with APAP was ordered with a 5-20 cm H2O.      • We reviewed the nature of sleep apnea and any other common sleep disturbances that may be associated with HPI. The elevated cardiovascular risks and other health consequences associated with this condition and reviewed treatment options in detail regarding sleep apnea, which include, mandibular advancement device  etc.  • Follow up in 3 months or 35-45 days after starting APAP therapy to ensure compliance and tolerance of APAP therapy to assess for leaks.  • PT instructed to ensure compliance, that usage of PAP for greater than 4 hrs in a 24hr period is needed and to use machine with sleeping including naps.   • Instructed on good sleeping habits and maintaining a health diet and exercise.  • DME order placed and information reviewed regarding contacting company/role discussed-desiree.   • Explained to patient it is a national shortage due to pandemic of CPAP/BIPAP equipment. It may take several weeks for the equipment to be sent to the patient.   • The patient was cautioned not to drive while sleepy.    PT BP is high and the pt denies SOB, CP, Visual changes, or severe headaches currently.    Encouraged pt to recheck His/her blood pressure in an hour after this appointment.  PT instructed to take any prescribed medications for BP if not done already. BP is high and if the BP remains high (systolic (top number) over 160 and diastolic (bottom number) over 100)  to contact the PCP  or proceed to ED/urgent care. Pt encouraged to exercise and follow a low sodium diet, low fat diet along to manage symptoms. If on CPAP therapy, pt is encourage to be compliant with therapy. If severe headache , chest pain, shortness of breath, blurred vision, racing heart beats occurs patient instructed to proceed to ER. Follow up with PCP regarding high BP for additional treatment options for BP management.        Patient and/or patient's representative verbalize understanding of ongoing plan of care and was instructed to call with any ongoing questions and concerns            Return in about 3 months (around 5/14/2023).    Amaya Silva, MSN, APRN, NP-C

## 2023-02-14 NOTE — TELEPHONE ENCOUNTER
Eduard Bauer called to request a refill on his medication. Last office visit : 1/3/2023   Next office visit : 4/11/2023     Last UDS:   Amphetamine Screen, Urine   Date Value Ref Range Status   01/03/2023 NEG  Final     Barbiturate Screen, Urine   Date Value Ref Range Status   01/03/2023 NEG  Final     Benzodiazepine Screen, Urine   Date Value Ref Range Status   01/03/2023 NEG  Final     Buprenorphine Urine   Date Value Ref Range Status   01/03/2023 NEG  Final     Cocaine Metabolite Screen, Urine   Date Value Ref Range Status   01/03/2023 NEG  Final     Gabapentin Screen, Urine   Date Value Ref Range Status   01/03/2023 NEG  Final     MDMA, Urine   Date Value Ref Range Status   01/03/2023 NEG  Final     Methamphetamine, Urine   Date Value Ref Range Status   01/03/2023 NEG  Final     Opiate Scrn, Ur   Date Value Ref Range Status   01/03/2023 POS  Final     Oxycodone Screen, Ur   Date Value Ref Range Status   01/03/2023 NEG  Final     PCP Screen, Urine   Date Value Ref Range Status   01/03/2023 NEG  Final     Propoxyphene Screen, Urine   Date Value Ref Range Status   01/03/2023 NEG  Final     THC Screen, Urine   Date Value Ref Range Status   01/03/2023 NEG  Final     Tricyclic Antidepressants, Urine   Date Value Ref Range Status   01/03/2023 NEG  Final       Last Franco Moritz: 2/14/23  Medication Contract: 1/3/22   Last Fill: 1/16/23    Requested Prescriptions     Pending Prescriptions Disp Refills    HYDROcodone-acetaminophen (NORCO)  MG per tablet 120 tablet 0     Sig: Take 1 tablet by mouth every 6 hours as needed for Pain for up to 30 days. Please approve or refuse this medication.    Gemma Del Valle MA

## 2023-02-17 ENCOUNTER — TELEPHONE (OUTPATIENT)
Dept: VASCULAR SURGERY | Age: 78
End: 2023-02-17

## 2023-02-17 NOTE — TELEPHONE ENCOUNTER
Patient had ct but had to move appt to April. Discuss findings of ct. 1 see about 1mm growth left IIA. There was no contrast used. In order to see more, contrast would be required. He does have renal failure. They will consider this and see us in April as scheduled.

## 2023-02-27 ENCOUNTER — TELEPHONE (OUTPATIENT)
Dept: PRIMARY CARE CLINIC | Age: 78
End: 2023-02-27

## 2023-02-27 NOTE — TELEPHONE ENCOUNTER
Pt daughter called asking for an ENT referral to Dr. Mark Gonsales for his sinus problems that were discussed at appt in January. She states that he had fluid behind his ears at that appt and still having problems.  Please advise if ok for referral.

## 2023-02-27 NOTE — TELEPHONE ENCOUNTER
----- Message from Florence Todd sent at 2/27/2023 12:58 PM CST -----  Subject: Message to Provider    QUESTIONS  Information for Provider? Patients daughter Rafael Grimm called for the third   time requesting a referral for ENT Dr Corbin Canavan. Rafael Grimm has not   heard from anyone and I was unable to get her through to the office. Please call Rafael Grimm to advise.  ---------------------------------------------------------------------------  --------------  Faye ANGELO  [phone]; Do not leave any message, patient will call back for answer  ---------------------------------------------------------------------------  --------------  SCRIPT ANSWERS  Relationship to Patient? Other  Representative Name? Rafael Grimm  Is the Representative on the appropriate HIPAA document in Epic?  Yes

## 2023-03-10 DIAGNOSIS — M54.50 CHRONIC MIDLINE LOW BACK PAIN WITHOUT SCIATICA: ICD-10-CM

## 2023-03-10 DIAGNOSIS — G89.29 CHRONIC MIDLINE LOW BACK PAIN WITHOUT SCIATICA: ICD-10-CM

## 2023-03-10 NOTE — TELEPHONE ENCOUNTER
Jai Painting called to request a refill on his medication. Last office visit : Visit date not found   Next office visit : 04-11-23    Last UDS:   Amphetamine Screen, Urine   Date Value Ref Range Status   01/03/2023 NEG  Final     Barbiturate Screen, Urine   Date Value Ref Range Status   01/03/2023 NEG  Final     Benzodiazepine Screen, Urine   Date Value Ref Range Status   01/03/2023 NEG  Final     Buprenorphine Urine   Date Value Ref Range Status   01/03/2023 NEG  Final     Cocaine Metabolite Screen, Urine   Date Value Ref Range Status   01/03/2023 NEG  Final     Gabapentin Screen, Urine   Date Value Ref Range Status   01/03/2023 NEG  Final     MDMA, Urine   Date Value Ref Range Status   01/03/2023 NEG  Final     Methamphetamine, Urine   Date Value Ref Range Status   01/03/2023 NEG  Final     Opiate Scrn, Ur   Date Value Ref Range Status   01/03/2023 POS  Final     Oxycodone Screen, Ur   Date Value Ref Range Status   01/03/2023 NEG  Final     PCP Screen, Urine   Date Value Ref Range Status   01/03/2023 NEG  Final     Propoxyphene Screen, Urine   Date Value Ref Range Status   01/03/2023 NEG  Final     THC Screen, Urine   Date Value Ref Range Status   01/03/2023 NEG  Final     Tricyclic Antidepressants, Urine   Date Value Ref Range Status   01/03/2023 NEG  Final       Last Nunica Chol: 2/14/23  Medication Contract:    Last Fill: 2/14/23    Requested Prescriptions     Pending Prescriptions Disp Refills    HYDROcodone-acetaminophen (NORCO)  MG per tablet 120 tablet 0     Sig: Take 1 tablet by mouth every 6 hours as needed for Pain for up to 30 days. Please approve or refuse this medication.    Tricia Han MA

## 2023-03-11 RX ORDER — HYDROCODONE BITARTRATE AND ACETAMINOPHEN 10; 325 MG/1; MG/1
1 TABLET ORAL EVERY 6 HOURS PRN
Qty: 120 TABLET | Refills: 0 | Status: SHIPPED | OUTPATIENT
Start: 2023-03-11 | End: 2023-04-10

## 2023-03-22 ENCOUNTER — TELEPHONE (OUTPATIENT)
Dept: CARDIOLOGY CLINIC | Age: 78
End: 2023-03-22

## 2023-03-22 NOTE — TELEPHONE ENCOUNTER
Date: TBD    Cardiologist: Dr Haseeb Worthington    Procedure: Pain mgn injection     Surgeon: Larry Cedeno NP     Last Office Visit: 2-13-23    Reason for office visit and medical concerns addressed at this office visit: CHF, CAD, ICD, HTN, complete atrioventricular block     Testing Performed and Date of Service:  ICD report 11-14-22    Does the patient have a stent? If so, what type? Not in last year     Current Medications: norco, lisinopril, coreg, allopurinol, pepcid, lipitor, imdur, torsemide, plavix, flonase, nitro     Is the patient currently taking an anticoagulant? If so, what is the diagnosis the patient has been given to warrant the need for the anticoagulant?  Plavix     Additional Notes: med hold on plavix

## 2023-06-19 ENCOUNTER — TELEPHONE (OUTPATIENT)
Dept: GASTROENTEROLOGY | Facility: CLINIC | Age: 78
End: 2023-06-19
Payer: MEDICARE

## 2023-08-13 ENCOUNTER — HOSPITAL ENCOUNTER (OUTPATIENT)
Facility: HOSPITAL | Age: 78
Setting detail: OBSERVATION
Discharge: HOME | End: 2023-08-17
Attending: EMERGENCY MEDICINE | Admitting: INTERNAL MEDICINE
Payer: MEDICARE

## 2023-08-13 DIAGNOSIS — R07.9 CHEST PAIN, UNSPECIFIED TYPE: Primary | ICD-10-CM

## 2023-08-13 DIAGNOSIS — I71.20 THORACIC AORTIC ANEURYSM WITHOUT RUPTURE, UNSPECIFIED PART: ICD-10-CM

## 2023-08-13 DIAGNOSIS — I50.43 ACUTE ON CHRONIC COMBINED SYSTOLIC AND DIASTOLIC CHF (CONGESTIVE HEART FAILURE): ICD-10-CM

## 2023-08-13 DIAGNOSIS — I27.20 PULMONARY HYPERTENSION: ICD-10-CM

## 2023-08-13 DIAGNOSIS — I31.9 PERICARDITIS, UNSPECIFIED CHRONICITY, UNSPECIFIED TYPE: ICD-10-CM

## 2023-08-13 DIAGNOSIS — N18.9 CHRONIC KIDNEY DISEASE, UNSPECIFIED CKD STAGE: ICD-10-CM

## 2023-08-13 DIAGNOSIS — I71.40 ABDOMINAL AORTIC ANEURYSM (AAA) WITHOUT RUPTURE, UNSPECIFIED PART: ICD-10-CM

## 2023-08-13 DIAGNOSIS — K31.89 GASTRIC MASS: ICD-10-CM

## 2023-08-13 LAB
BILIRUB UR QL STRIP: NEGATIVE
CLARITY UR: CLEAR
COLOR UR: YELLOW
GEN 5 2HR TROPONIN T REFLEX: 69 NG/L
GLUCOSE UR STRIP-MCNC: NEGATIVE MG/DL
HGB UR QL STRIP.AUTO: NEGATIVE
HOLD SPECIMEN: NORMAL
KETONES UR QL STRIP: NEGATIVE
LEUKOCYTE ESTERASE UR QL STRIP.AUTO: NEGATIVE
NITRITE UR QL STRIP: NEGATIVE
PH UR STRIP.AUTO: 5.5 [PH] (ref 5–8)
PROT UR QL STRIP: NEGATIVE
SP GR UR STRIP: 1.02 (ref 1–1.03)
TROPONIN T DELTA: 7 NG/L
TROPONIN T SERPL HS-MCNC: 62 NG/L
TROPONIN T SERPL HS-MCNC: 84 NG/L
UROBILINOGEN UR QL STRIP: NORMAL
WHOLE BLOOD HOLD COAG: NORMAL
WHOLE BLOOD HOLD SPECIMEN: NORMAL

## 2023-08-13 PROCEDURE — 25010000002 MORPHINE PER 10 MG: Performed by: FAMILY MEDICINE

## 2023-08-13 PROCEDURE — 99284 EMERGENCY DEPT VISIT MOD MDM: CPT

## 2023-08-13 PROCEDURE — 81003 URINALYSIS AUTO W/O SCOPE: CPT | Performed by: FAMILY MEDICINE

## 2023-08-13 PROCEDURE — 93010 ELECTROCARDIOGRAM REPORT: CPT | Performed by: INTERNAL MEDICINE

## 2023-08-13 PROCEDURE — 25010000002 NITROGLYCERIN 200 MCG/ML SOLUTION: Performed by: FAMILY MEDICINE

## 2023-08-13 PROCEDURE — 25010000002 METHYLPREDNISOLONE PER 125 MG: Performed by: FAMILY MEDICINE

## 2023-08-13 PROCEDURE — 96376 TX/PRO/DX INJ SAME DRUG ADON: CPT

## 2023-08-13 PROCEDURE — 25010000002 ONDANSETRON PER 1 MG: Performed by: EMERGENCY MEDICINE

## 2023-08-13 PROCEDURE — 36415 COLL VENOUS BLD VENIPUNCTURE: CPT

## 2023-08-13 PROCEDURE — 96375 TX/PRO/DX INJ NEW DRUG ADDON: CPT

## 2023-08-13 PROCEDURE — 93005 ELECTROCARDIOGRAM TRACING: CPT | Performed by: FAMILY MEDICINE

## 2023-08-13 PROCEDURE — G0378 HOSPITAL OBSERVATION PER HR: HCPCS

## 2023-08-13 PROCEDURE — 93005 ELECTROCARDIOGRAM TRACING: CPT | Performed by: EMERGENCY MEDICINE

## 2023-08-13 PROCEDURE — 84484 ASSAY OF TROPONIN QUANT: CPT | Performed by: FAMILY MEDICINE

## 2023-08-13 PROCEDURE — 25010000002 MORPHINE PER 10 MG: Performed by: EMERGENCY MEDICINE

## 2023-08-13 PROCEDURE — 84484 ASSAY OF TROPONIN QUANT: CPT | Performed by: EMERGENCY MEDICINE

## 2023-08-13 PROCEDURE — 96365 THER/PROPH/DIAG IV INF INIT: CPT

## 2023-08-13 RX ORDER — METHYLPREDNISOLONE SODIUM SUCCINATE 125 MG/2ML
125 INJECTION, POWDER, LYOPHILIZED, FOR SOLUTION INTRAMUSCULAR; INTRAVENOUS ONCE
Status: COMPLETED | OUTPATIENT
Start: 2023-08-13 | End: 2023-08-13

## 2023-08-13 RX ORDER — METOPROLOL SUCCINATE 50 MG/1
50 TABLET, EXTENDED RELEASE ORAL DAILY
Status: DISCONTINUED | OUTPATIENT
Start: 2023-08-13 | End: 2023-08-14

## 2023-08-13 RX ORDER — PREDNISONE 20 MG/1
20 TABLET ORAL DAILY
Status: DISCONTINUED | OUTPATIENT
Start: 2023-08-13 | End: 2023-08-13

## 2023-08-13 RX ORDER — TERAZOSIN 5 MG/1
5 CAPSULE ORAL NIGHTLY
Status: DISCONTINUED | OUTPATIENT
Start: 2023-08-13 | End: 2023-08-17 | Stop reason: HOSPADM

## 2023-08-13 RX ORDER — CLOPIDOGREL BISULFATE 75 MG/1
75 TABLET ORAL DAILY
Status: DISCONTINUED | OUTPATIENT
Start: 2023-08-13 | End: 2023-08-17 | Stop reason: HOSPADM

## 2023-08-13 RX ORDER — LISINOPRIL 20 MG/1
40 TABLET ORAL DAILY
Status: DISCONTINUED | OUTPATIENT
Start: 2023-08-13 | End: 2023-08-15

## 2023-08-13 RX ORDER — VENLAFAXINE 37.5 MG/1
75 TABLET ORAL DAILY
Status: DISCONTINUED | OUTPATIENT
Start: 2023-08-13 | End: 2023-08-17 | Stop reason: HOSPADM

## 2023-08-13 RX ORDER — PREDNISONE 20 MG/1
20 TABLET ORAL 2 TIMES DAILY WITH MEALS
Status: DISCONTINUED | OUTPATIENT
Start: 2023-08-13 | End: 2023-08-13

## 2023-08-13 RX ORDER — MORPHINE SULFATE 2 MG/ML
2 INJECTION, SOLUTION INTRAMUSCULAR; INTRAVENOUS
Status: DISCONTINUED | OUTPATIENT
Start: 2023-08-13 | End: 2023-08-16

## 2023-08-13 RX ORDER — NITROGLYCERIN 0.4 MG/1
0.4 TABLET SUBLINGUAL
Status: DISCONTINUED | OUTPATIENT
Start: 2023-08-13 | End: 2023-08-17 | Stop reason: HOSPADM

## 2023-08-13 RX ORDER — ATORVASTATIN CALCIUM 40 MG/1
40 TABLET, FILM COATED ORAL DAILY
Status: DISCONTINUED | OUTPATIENT
Start: 2023-08-13 | End: 2023-08-17 | Stop reason: HOSPADM

## 2023-08-13 RX ORDER — NITROGLYCERIN 20 MG/100ML
10-50 INJECTION INTRAVENOUS
Status: DISCONTINUED | OUTPATIENT
Start: 2023-08-13 | End: 2023-08-14

## 2023-08-13 RX ORDER — LANOLIN ALCOHOL/MO/W.PET/CERES
3 CREAM (GRAM) TOPICAL NIGHTLY PRN
Status: DISCONTINUED | OUTPATIENT
Start: 2023-08-13 | End: 2023-08-17 | Stop reason: HOSPADM

## 2023-08-13 RX ORDER — ONDANSETRON 2 MG/ML
4 INJECTION INTRAMUSCULAR; INTRAVENOUS EVERY 6 HOURS PRN
Status: DISCONTINUED | OUTPATIENT
Start: 2023-08-13 | End: 2023-08-17 | Stop reason: HOSPADM

## 2023-08-13 RX ORDER — SODIUM CHLORIDE 0.9 % (FLUSH) 0.9 %
10 SYRINGE (ML) INJECTION AS NEEDED
Status: DISCONTINUED | OUTPATIENT
Start: 2023-08-13 | End: 2023-08-17 | Stop reason: HOSPADM

## 2023-08-13 RX ORDER — ONDANSETRON 2 MG/ML
4 INJECTION INTRAMUSCULAR; INTRAVENOUS ONCE
Status: COMPLETED | OUTPATIENT
Start: 2023-08-13 | End: 2023-08-13

## 2023-08-13 RX ORDER — PREDNISONE 20 MG/1
20 TABLET ORAL 2 TIMES DAILY WITH MEALS
Status: DISCONTINUED | OUTPATIENT
Start: 2023-08-14 | End: 2023-08-15

## 2023-08-13 RX ORDER — HYDROCODONE BITARTRATE AND ACETAMINOPHEN 10; 325 MG/1; MG/1
1 TABLET ORAL EVERY 6 HOURS PRN
Status: DISCONTINUED | OUTPATIENT
Start: 2023-08-13 | End: 2023-08-16

## 2023-08-13 RX ORDER — ASPIRIN 81 MG/1
81 TABLET ORAL DAILY
Status: DISCONTINUED | OUTPATIENT
Start: 2023-08-13 | End: 2023-08-17 | Stop reason: HOSPADM

## 2023-08-13 RX ORDER — CHLORTHALIDONE 25 MG/1
25 TABLET ORAL DAILY
Status: DISCONTINUED | OUTPATIENT
Start: 2023-08-13 | End: 2023-08-14

## 2023-08-13 RX ORDER — ACETAMINOPHEN 325 MG/1
650 TABLET ORAL EVERY 6 HOURS PRN
Status: DISCONTINUED | OUTPATIENT
Start: 2023-08-13 | End: 2023-08-17 | Stop reason: HOSPADM

## 2023-08-13 RX ADMIN — CLOPIDOGREL BISULFATE 75 MG: 75 TABLET, FILM COATED ORAL at 18:21

## 2023-08-13 RX ADMIN — TERAZOSIN HYDROCHLORIDE 5 MG: 5 CAPSULE ORAL at 21:03

## 2023-08-13 RX ADMIN — LISINOPRIL 40 MG: 20 TABLET ORAL at 18:20

## 2023-08-13 RX ADMIN — METHYLPREDNISOLONE SODIUM SUCCINATE 125 MG: 125 INJECTION, POWDER, LYOPHILIZED, FOR SOLUTION INTRAMUSCULAR; INTRAVENOUS at 21:04

## 2023-08-13 RX ADMIN — MORPHINE SULFATE 4 MG: 4 INJECTION, SOLUTION INTRAMUSCULAR; INTRAVENOUS at 16:05

## 2023-08-13 RX ADMIN — CHLORTHALIDONE 25 MG: 25 TABLET ORAL at 18:20

## 2023-08-13 RX ADMIN — ATORVASTATIN CALCIUM 40 MG: 40 TABLET, FILM COATED ORAL at 18:20

## 2023-08-13 RX ADMIN — ONDANSETRON 4 MG: 2 INJECTION INTRAMUSCULAR; INTRAVENOUS at 16:05

## 2023-08-13 RX ADMIN — HYDROCODONE BITARTRATE AND ACETAMINOPHEN 1 TABLET: 10; 325 TABLET ORAL at 17:05

## 2023-08-13 RX ADMIN — NITROGLYCERIN 10 MCG/MIN: 20 INJECTION INTRAVENOUS at 23:47

## 2023-08-13 RX ADMIN — MORPHINE SULFATE 2 MG: 2 INJECTION, SOLUTION INTRAMUSCULAR; INTRAVENOUS at 23:36

## 2023-08-13 RX ADMIN — METOPROLOL SUCCINATE 50 MG: 50 TABLET, EXTENDED RELEASE ORAL at 18:20

## 2023-08-13 RX ADMIN — VENLAFAXINE HYDROCHLORIDE 75 MG: 37.5 TABLET ORAL at 18:21

## 2023-08-13 RX ADMIN — ACETAMINOPHEN 650 MG: 325 TABLET, FILM COATED ORAL at 21:08

## 2023-08-13 NOTE — ED PROVIDER NOTES
"Subjective   History of Present Illness  The patient is a 78 years old gentleman who was at [facility] came over there with chest pain and chest discomfort.  Patient was going having ongoing chest pain.  With borderline negative cardiac markers with renal insufficiency.\"  Was placed to us because patient's cardiologist in [facility] which was on diversion secondary to mishap in the ED.    Patient also underwent a CT angiogram of the abdomen pelvis at [facility] details of the CT scan having explained the patient patient has got abdominal and thoracic aortic aneurysms mass in the stomach and some other incidental findings for which she needs follow-up with his primary MD for reevaluation reassessment.  Essentially the CT angiogram of the chest abdomen showed a rounded structure adjacent to the lesser curvature of the stomach which could reflect enlarged lymph nodes or neoplasm gastric diverticulum for which patient needs follow-up patient be advised about that.  Also there was no dissection of thoracic aorta there was ectasia of the ascending thoracic aorta measuring 4 cm dilation of the aorta at the aortic hiatus measuring 4.2 cm dilated main pulmonary artery no pulmonary embolus and multivessel coronary artery calcification aortoiliac stent graft with no endoleak native aneurysm sac measuring 3.7 cm in dimension.    Chest Pain  Pain location:  L chest  Pain quality: sharp and tightness    Pain radiates to:  Does not radiate  Pain severity:  Moderate  Onset quality:  Sudden  Duration:  3 days  Timing:  Constant  Progression:  Unchanged  Chronicity:  New  Context: breathing and movement    Context: not drug use, not eating, not raising an arm, not at rest, not stress and not trauma    Relieved by:  Rest  Worsened by:  Coughing and deep breathing  Ineffective treatments:  None tried  Associated symptoms: no abdominal pain, no AICD problem, no anorexia, no anxiety, no back pain, no claudication, " no dysphagia, no fatigue, no fever, no headache, no heartburn, no lower extremity edema, no nausea, no numbness, no orthopnea, no palpitations, no shortness of breath, no vomiting and no weakness    Risk factors: coronary artery disease, male sex, obesity and smoking    Risk factors: no aortic disease, no hypertension, no immobilization, no Marfan's syndrome and no prior DVT/PE      Review of Systems   Constitutional: Negative.  Negative for fatigue and fever.   HENT: Negative.  Negative for trouble swallowing.    Respiratory:  Negative for shortness of breath.    Cardiovascular:  Positive for chest pain. Negative for palpitations, orthopnea and claudication.   Gastrointestinal: Negative.  Negative for abdominal distention, abdominal pain, anorexia, heartburn, nausea and vomiting.   Endocrine: Negative.    Genitourinary: Negative.    Musculoskeletal: Negative.  Negative for back pain and neck pain.   Skin:  Negative for color change and pallor.   Neurological: Negative.  Negative for syncope, weakness, light-headedness, numbness and headaches.   Hematological: Negative.  Does not bruise/bleed easily.   All other systems reviewed and are negative.    Past Medical History:   Diagnosis Date    AAA (abdominal aortic aneurysm)     Cancer     lymphoma    Colon polyp     Coronary artery disease     Diverticulitis     HTN (hypertension)     Lung cancer     Skin cancer     Sleep apnea     Stroke        No Known Allergies    Past Surgical History:   Procedure Laterality Date    ANAL FISSURECTOMY      CARDIAC CATHETERIZATION      CAROTID ARTERY ANGIOPLASTY      COLONOSCOPY  09/16/2014    COLONOSCOPY N/A 4/9/2018    Procedure: COLONOSCOPY WITH ANESTHESIA;  Surgeon: Ruth Wellington MD;  Location: [facility] ENDOSCOPY;  Service: Gastroenterology    ENDOSCOPY N/A 4/13/2018    Procedure: ESOPHAGOGASTRODUODENOSCOPY WITH ANESTHESIA;  Surgeon: Mike Sheridan MD;  Location: [facility] ENDOSCOPY;  Service: Gastroenterology    ENDOSCOPY N/A  11/8/2019    Procedure: ESOPHAGOGASTRODUODENOSCOPY WITH ANESTHESIA;  Surgeon: Ruth Wellington MD;  Location: [facility] ENDOSCOPY;  Service: Gastroenterology    LUNG LOBECTOMY         Family History   Problem Relation Age of Onset    Colon cancer Brother     Cancer Maternal Aunt     Cancer Maternal Uncle     Cancer Paternal Grandmother        Social History     Socioeconomic History    Marital status:    Tobacco Use    Smoking status: Every Day     Packs/day: 0.50     Types: Cigarettes     Start date: 1959    Smokeless tobacco: Never    Tobacco comments:     stopped and started several times   Substance and Sexual Activity    Alcohol use: Yes     Comment: occasionally    Drug use: No    Sexual activity: Defer           Objective   Physical Exam  Vitals and nursing note reviewed. Exam conducted with a chaperone present.   Constitutional:       General: He is not in acute distress.     Appearance: Normal appearance. He is well-developed. He is obese. He is not toxic-appearing.   HENT:      Head: Normocephalic and atraumatic.      Nose: Nose normal.      Mouth/Throat:      Mouth: Mucous membranes are moist.      Pharynx: Uvula midline.   Eyes:      General: Lids are normal. Lids are everted, no foreign bodies appreciated.      Conjunctiva/sclera: Conjunctivae normal.      Pupils: Pupils are equal, round, and reactive to light.   Neck:      Vascular: Normal carotid pulses. No carotid bruit or JVD.      Trachea: Trachea and phonation normal. No tracheal deviation.   Cardiovascular:      Rate and Rhythm: Normal rate and regular rhythm.      Chest Wall: PMI is not displaced.      Pulses: Normal pulses.      Heart sounds: Normal heart sounds.     No gallop.   Pulmonary:      Effort: Pulmonary effort is normal. No tachypnea, accessory muscle usage or respiratory distress.      Breath sounds: Normal breath sounds. No stridor. No decreased breath sounds, wheezing, rhonchi or rales.   Abdominal:      General: Bowel sounds  are normal. There is no distension.      Palpations: Abdomen is soft.      Tenderness: There is no abdominal tenderness.   Musculoskeletal:         General: No swelling. Normal range of motion.      Cervical back: Full passive range of motion without pain, normal range of motion and neck supple. No rigidity.      Right lower leg: No tenderness. Edema present.      Left lower leg: No tenderness. Edema present.      Comments:   The dorsalis pedis and posterior tibial femoral and popliteal pulses are palpable and +2 bilaterally.  Homans sign is negative   Skin:     General: Skin is warm and dry.      Capillary Refill: Capillary refill takes less than 2 seconds.      Coloration: Skin is not jaundiced or pale.      Nails: There is no clubbing.   Neurological:      General: No focal deficit present.      Mental Status: He is alert and oriented to person, place, and time.      GCS: GCS eye subscore is 4. GCS verbal subscore is 5. GCS motor subscore is 6.      Cranial Nerves: No cranial nerve deficit.      Motor: Motor function is intact.      Gait: Gait normal.      Deep Tendon Reflexes: Reflexes are normal and symmetric. Reflexes normal.   Psychiatric:         Speech: Speech normal.         Behavior: Behavior normal.       Procedures           ED Course  ED Course as of 08/13/23 1404   Sun Aug 13, 2023   1402 Heart score 4    Well score 3 [TS]      ED Course User Index  [TS] Dex Taylor MD                                           Medical Decision Making  Differential Diagnosis:  I considered chest wall pain, muscle strain, costochondritis, pleurisy, rib fracture, herpes zoster, cardiovascular etiology, myocardial infarction, intermediate coronary syndrome, unstable angina, angina, aortic dissection, pericarditis, pulmonary etiology, pulmonary embolism, pneumonia, pneumothorax, lung cancer, gastroesophageal reflux disease, esophagitis, esophageal spasm and gastrointestinal etiology as a possible cause of chest pain in  this patient. This is a partial list of diagnoses considered.        Amount and/or Complexity of Data Reviewed  Labs: ordered.     Details: From the transferring facility was reviewed  ECG/medicine tests: ordered.     Details: Paced rhythm  Discussion of management or test interpretation with external provider(s): Discussed with Dr. Bhagat and Dr. Jaciel chan    Risk  Risk Details: His behavior chest pain his pain etiology is worsening breath moving in a certain way this could be pericarditis.  Tropes are slightly elevated he also has got chronic kidney disease.  Will be admitted to the medicine service        Final diagnoses:   Chest pain, unspecified type   Pericarditis, unspecified chronicity, unspecified type   Chronic kidney disease, unspecified CKD stage   Thoracic aortic aneurysm without rupture, unspecified part   Abdominal aortic aneurysm (AAA) without rupture, unspecified part   Pulmonary hypertension   Gastric mass       ED Disposition  ED Disposition       ED Disposition   Decision to Admit    Condition   --    Comment   Level of Care: Telemetry [5]   Diagnosis: Chest pain [[id number]]   Admitting Physician: DREW BHAGAT [[id number]]   Attending Physician: DREW BHAGAT [[id number]]   Certification: I Certify That Inpatient Hospital Services Are Medically Necessary For Greater Than 2 Midnights                 No follow-up provider specified.       Medication List      No changes were made to your prescriptions during this visit.            Dex Taylor MD  08/13/23 0020

## 2023-08-13 NOTE — PLAN OF CARE
Goal Outcome Evaluation:    Still having chest pain; cardiac diet ordered; trop/delta 8 elevated

## 2023-08-14 ENCOUNTER — APPOINTMENT (OUTPATIENT)
Dept: CARDIOLOGY | Facility: HOSPITAL | Age: 78
End: 2023-08-14
Payer: MEDICARE

## 2023-08-14 LAB
ANION GAP SERPL CALCULATED.3IONS-SCNC: 12 MMOL/L (ref 5–15)
BASOPHILS # BLD AUTO: 0.01 10*3/MM3 (ref 0–0.2)
BASOPHILS NFR BLD AUTO: 0.1 % (ref 0–1.5)
BH CV ECHO MEAS - AO MAX PG: 12.7 MMHG
BH CV ECHO MEAS - AO MEAN PG: 7 MMHG
BH CV ECHO MEAS - AO ROOT DIAM: 3.8 CM
BH CV ECHO MEAS - AO V2 MAX: 178 CM/SEC
BH CV ECHO MEAS - AO V2 VTI: 35.5 CM
BH CV ECHO MEAS - AVA(I,D): 1.85 CM2
BH CV ECHO MEAS - EDV(CUBED): 98.6 ML
BH CV ECHO MEAS - EDV(MOD-SP2): 112.3 ML
BH CV ECHO MEAS - EDV(MOD-SP4): 138.8 ML
BH CV ECHO MEAS - EF(MOD-BP): 56 %
BH CV ECHO MEAS - EF(MOD-SP2): 58.2 %
BH CV ECHO MEAS - EF(MOD-SP4): 48 %
BH CV ECHO MEAS - ESV(CUBED): 35 ML
BH CV ECHO MEAS - ESV(MOD-SP2): 47 ML
BH CV ECHO MEAS - ESV(MOD-SP4): 72.1 ML
BH CV ECHO MEAS - FS: 29.2 %
BH CV ECHO MEAS - IVS/LVPW: 1.16 CM
BH CV ECHO MEAS - IVSD: 1.63 CM
BH CV ECHO MEAS - LA DIMENSION: 3.9 CM
BH CV ECHO MEAS - LAT PEAK E' VEL: 6.3 CM/SEC
BH CV ECHO MEAS - LV DIASTOLIC VOL/BSA (35-75): 62.6 CM2
BH CV ECHO MEAS - LV MASS(C)D: 291 GRAMS
BH CV ECHO MEAS - LV MAX PG: 3 MMHG
BH CV ECHO MEAS - LV MEAN PG: 1 MMHG
BH CV ECHO MEAS - LV SYSTOLIC VOL/BSA (12-30): 32.5 CM2
BH CV ECHO MEAS - LV V1 MAX: 86.1 CM/SEC
BH CV ECHO MEAS - LV V1 VTI: 17.3 CM
BH CV ECHO MEAS - LVIDD: 4.6 CM
BH CV ECHO MEAS - LVIDS: 3.3 CM
BH CV ECHO MEAS - LVOT AREA: 3.8 CM2
BH CV ECHO MEAS - LVOT DIAM: 2.2 CM
BH CV ECHO MEAS - LVPWD: 1.4 CM
BH CV ECHO MEAS - MED PEAK E' VEL: 4.2 CM/SEC
BH CV ECHO MEAS - MV A MAX VEL: 54.8 CM/SEC
BH CV ECHO MEAS - MV DEC TIME: 0.18 MSEC
BH CV ECHO MEAS - MV E MAX VEL: 73.3 CM/SEC
BH CV ECHO MEAS - MV E/A: 1.34
BH CV ECHO MEAS - MV MAX PG: 3.7 MMHG
BH CV ECHO MEAS - MV MEAN PG: 1 MMHG
BH CV ECHO MEAS - MV V2 VTI: 27.8 CM
BH CV ECHO MEAS - MVA(VTI): 2.37 CM2
BH CV ECHO MEAS - PA V2 MAX: 103 CM/SEC
BH CV ECHO MEAS - RAP SYSTOLE: 8 MMHG
BH CV ECHO MEAS - RVSP: 30.3 MMHG
BH CV ECHO MEAS - SI(MOD-BP): 30.8 ML/M2
BH CV ECHO MEAS - SI(MOD-SP2): 29.5 ML/M2
BH CV ECHO MEAS - SI(MOD-SP4): 30.1 ML/M2
BH CV ECHO MEAS - SV(LVOT): 65.8 ML
BH CV ECHO MEAS - SV(MOD-SP2): 65.3 ML
BH CV ECHO MEAS - SV(MOD-SP4): 66.6 ML
BH CV ECHO MEAS - TAPSE (>1.6): 2.29 CM
BH CV ECHO MEAS - TR MAX PG: 22.3 MMHG
BH CV ECHO MEAS - TR MAX VEL: 236 CM/SEC
BH CV ECHO MEASUREMENTS AVERAGE E/E' RATIO: 13.96
BH CV XLRA - TDI S': 10.1 CM/SEC
BUN SERPL-MCNC: 55 MG/DL (ref 8–23)
BUN/CREAT SERPL: 24 (ref 7–25)
CALCIUM SPEC-SCNC: 11 MG/DL (ref 8.6–10.5)
CHLORIDE SERPL-SCNC: 101 MMOL/L (ref 98–107)
CO2 SERPL-SCNC: 23 MMOL/L (ref 22–29)
CREAT SERPL-MCNC: 2.29 MG/DL (ref 0.76–1.27)
DEPRECATED RDW RBC AUTO: 51.2 FL (ref 37–54)
EGFRCR SERPLBLD CKD-EPI 2021: 28.2 ML/MIN/1.73
EOSINOPHIL # BLD AUTO: 0 10*3/MM3 (ref 0–0.4)
EOSINOPHIL NFR BLD AUTO: 0 % (ref 0.3–6.2)
ERYTHROCYTE [DISTWIDTH] IN BLOOD BY AUTOMATED COUNT: 15 % (ref 12.3–15.4)
GLUCOSE SERPL-MCNC: 188 MG/DL (ref 65–99)
HCT VFR BLD AUTO: 43.1 % (ref 37.5–51)
HGB BLD-MCNC: 12.8 G/DL (ref 13–17.7)
IMM GRANULOCYTES # BLD AUTO: 0.07 10*3/MM3 (ref 0–0.05)
IMM GRANULOCYTES NFR BLD AUTO: 0.8 % (ref 0–0.5)
INR PPP: 1.02 (ref 0.91–1.09)
LEFT ATRIUM VOLUME INDEX: 32.3 ML/M2
LEFT ATRIUM VOLUME: 76.8 ML
LV EF 2D ECHO EST: 50 %
LYMPHOCYTES # BLD AUTO: 1.51 10*3/MM3 (ref 0.7–3.1)
LYMPHOCYTES NFR BLD AUTO: 17.1 % (ref 19.6–45.3)
MCH RBC QN AUTO: 27.5 PG (ref 26.6–33)
MCHC RBC AUTO-ENTMCNC: 29.7 G/DL (ref 31.5–35.7)
MCV RBC AUTO: 92.7 FL (ref 79–97)
MONOCYTES # BLD AUTO: 0.11 10*3/MM3 (ref 0.1–0.9)
MONOCYTES NFR BLD AUTO: 1.2 % (ref 5–12)
NEUTROPHILS NFR BLD AUTO: 7.14 10*3/MM3 (ref 1.7–7)
NEUTROPHILS NFR BLD AUTO: 80.8 % (ref 42.7–76)
NRBC BLD AUTO-RTO: 0 /100 WBC (ref 0–0.2)
NT-PROBNP SERPL-MCNC: 875 PG/ML (ref 0–1800)
PLATELET # BLD AUTO: 164 10*3/MM3 (ref 140–450)
PMV BLD AUTO: 11.9 FL (ref 6–12)
POTASSIUM SERPL-SCNC: 6.4 MMOL/L (ref 3.5–5.2)
PROTHROMBIN TIME: 13.5 SECONDS (ref 11.8–14.8)
QT INTERVAL: 422 MS
QT INTERVAL: 492 MS
QTC INTERVAL: 445 MS
QTC INTERVAL: 492 MS
RBC # BLD AUTO: 4.65 10*6/MM3 (ref 4.14–5.8)
SODIUM SERPL-SCNC: 136 MMOL/L (ref 136–145)
TROPONIN T SERPL HS-MCNC: 46 NG/L
WBC NRBC COR # BLD: 8.84 10*3/MM3 (ref 3.4–10.8)

## 2023-08-14 PROCEDURE — 99213 OFFICE O/P EST LOW 20 MIN: CPT | Performed by: NURSE PRACTITIONER

## 2023-08-14 PROCEDURE — 25010000002 ENOXAPARIN PER 10 MG: Performed by: FAMILY MEDICINE

## 2023-08-14 PROCEDURE — 63710000001 PREDNISONE PER 1 MG: Performed by: FAMILY MEDICINE

## 2023-08-14 PROCEDURE — 25510000001 PERFLUTREN 6.52 MG/ML SUSPENSION: Performed by: HOSPITALIST

## 2023-08-14 PROCEDURE — 99204 OFFICE O/P NEW MOD 45 MIN: CPT | Performed by: HOSPITALIST

## 2023-08-14 PROCEDURE — G0378 HOSPITAL OBSERVATION PER HR: HCPCS

## 2023-08-14 PROCEDURE — 96366 THER/PROPH/DIAG IV INF ADDON: CPT

## 2023-08-14 PROCEDURE — 99203 OFFICE O/P NEW LOW 30 MIN: CPT | Performed by: NURSE PRACTITIONER

## 2023-08-14 PROCEDURE — 83880 ASSAY OF NATRIURETIC PEPTIDE: CPT

## 2023-08-14 PROCEDURE — 93306 TTE W/DOPPLER COMPLETE: CPT

## 2023-08-14 PROCEDURE — 63710000001 INSULIN REGULAR HUMAN PER 5 UNITS: Performed by: FAMILY MEDICINE

## 2023-08-14 PROCEDURE — 85610 PROTHROMBIN TIME: CPT | Performed by: FAMILY MEDICINE

## 2023-08-14 PROCEDURE — 25010000002 FUROSEMIDE PER 20 MG

## 2023-08-14 PROCEDURE — 25010000002 CALCIUM GLUCONATE PER 10 ML: Performed by: FAMILY MEDICINE

## 2023-08-14 PROCEDURE — 96375 TX/PRO/DX INJ NEW DRUG ADDON: CPT

## 2023-08-14 PROCEDURE — 84484 ASSAY OF TROPONIN QUANT: CPT

## 2023-08-14 PROCEDURE — 85025 COMPLETE CBC W/AUTO DIFF WBC: CPT

## 2023-08-14 PROCEDURE — 93306 TTE W/DOPPLER COMPLETE: CPT | Performed by: HOSPITALIST

## 2023-08-14 PROCEDURE — 80048 BASIC METABOLIC PNL TOTAL CA: CPT

## 2023-08-14 PROCEDURE — 96372 THER/PROPH/DIAG INJ SC/IM: CPT

## 2023-08-14 RX ORDER — DEXTROSE MONOHYDRATE 25 G/50ML
25 INJECTION, SOLUTION INTRAVENOUS ONCE
Status: COMPLETED | OUTPATIENT
Start: 2023-08-14 | End: 2023-08-14

## 2023-08-14 RX ORDER — ISOSORBIDE MONONITRATE 30 MG/1
30 TABLET, EXTENDED RELEASE ORAL DAILY
COMMUNITY

## 2023-08-14 RX ORDER — TORSEMIDE 20 MG/1
20 TABLET ORAL DAILY
COMMUNITY

## 2023-08-14 RX ORDER — FUROSEMIDE 10 MG/ML
40 INJECTION INTRAMUSCULAR; INTRAVENOUS ONCE
Status: COMPLETED | OUTPATIENT
Start: 2023-08-14 | End: 2023-08-14

## 2023-08-14 RX ORDER — CARVEDILOL 6.25 MG/1
6.25 TABLET ORAL 2 TIMES DAILY WITH MEALS
COMMUNITY
End: 2023-08-17 | Stop reason: HOSPADM

## 2023-08-14 RX ORDER — ALLOPURINOL 100 MG/1
100 TABLET ORAL DAILY
COMMUNITY

## 2023-08-14 RX ORDER — ENOXAPARIN SODIUM 150 MG/ML
1 INJECTION SUBCUTANEOUS EVERY 12 HOURS
Status: DISCONTINUED | OUTPATIENT
Start: 2023-08-15 | End: 2023-08-15

## 2023-08-14 RX ORDER — ISOSORBIDE MONONITRATE 30 MG/1
30 TABLET, EXTENDED RELEASE ORAL
Status: DISCONTINUED | OUTPATIENT
Start: 2023-08-14 | End: 2023-08-17 | Stop reason: HOSPADM

## 2023-08-14 RX ORDER — ENOXAPARIN SODIUM 150 MG/ML
1 INJECTION SUBCUTANEOUS ONCE
Status: COMPLETED | OUTPATIENT
Start: 2023-08-14 | End: 2023-08-14

## 2023-08-14 RX ORDER — PREGABALIN 75 MG/1
75 CAPSULE ORAL 2 TIMES DAILY
COMMUNITY

## 2023-08-14 RX ORDER — METOPROLOL SUCCINATE 100 MG/1
100 TABLET, EXTENDED RELEASE ORAL DAILY
Status: DISCONTINUED | OUTPATIENT
Start: 2023-08-15 | End: 2023-08-17 | Stop reason: HOSPADM

## 2023-08-14 RX ORDER — ENOXAPARIN SODIUM 150 MG/ML
1 INJECTION SUBCUTANEOUS ONCE
Status: COMPLETED | OUTPATIENT
Start: 2023-08-15 | End: 2023-08-15

## 2023-08-14 RX ADMIN — ENOXAPARIN SODIUM 105 MG: 150 INJECTION SUBCUTANEOUS at 13:16

## 2023-08-14 RX ADMIN — TERAZOSIN HYDROCHLORIDE 5 MG: 5 CAPSULE ORAL at 20:28

## 2023-08-14 RX ADMIN — FUROSEMIDE 40 MG: 10 INJECTION, SOLUTION INTRAVENOUS at 09:33

## 2023-08-14 RX ADMIN — ATORVASTATIN CALCIUM 40 MG: 40 TABLET, FILM COATED ORAL at 09:33

## 2023-08-14 RX ADMIN — CALCIUM GLUCONATE 1000 MG: 98 INJECTION, SOLUTION INTRAVENOUS at 20:27

## 2023-08-14 RX ADMIN — ASPIRIN 81 MG: 81 TABLET, COATED ORAL at 09:34

## 2023-08-14 RX ADMIN — DEXTROSE MONOHYDRATE 25 G: 25 INJECTION, SOLUTION INTRAVENOUS at 18:34

## 2023-08-14 RX ADMIN — ISOSORBIDE MONONITRATE 30 MG: 30 TABLET, EXTENDED RELEASE ORAL at 17:46

## 2023-08-14 RX ADMIN — CLOPIDOGREL BISULFATE 75 MG: 75 TABLET, FILM COATED ORAL at 09:34

## 2023-08-14 RX ADMIN — VENLAFAXINE HYDROCHLORIDE 75 MG: 37.5 TABLET ORAL at 09:34

## 2023-08-14 RX ADMIN — PREDNISONE 20 MG: 20 TABLET ORAL at 09:34

## 2023-08-14 RX ADMIN — SODIUM ZIRCONIUM CYCLOSILICATE 10 G: 10 POWDER, FOR SUSPENSION ORAL at 20:27

## 2023-08-14 RX ADMIN — INSULIN HUMAN 10 UNITS: 100 INJECTION, SOLUTION PARENTERAL at 18:34

## 2023-08-14 RX ADMIN — PREDNISONE 20 MG: 20 TABLET ORAL at 17:46

## 2023-08-14 RX ADMIN — PERFLUTREN 13.04 MG: 6.52 INJECTION, SUSPENSION INTRAVENOUS at 08:52

## 2023-08-14 RX ADMIN — METOPROLOL SUCCINATE 50 MG: 50 TABLET, EXTENDED RELEASE ORAL at 09:34

## 2023-08-14 RX ADMIN — LISINOPRIL 40 MG: 20 TABLET ORAL at 09:34

## 2023-08-14 NOTE — CONSULTS
[facility]  INPATIENT WOUND & OSTOMY CONSULTATION    Today's Date: 08/14/23    Patient Name: Dalton Cheatham  MRN: [id number]  CSN: [id number]  PCP: Rikki Worrell MD  Referring Provider:   Consulting Provider (From admission, onward)      Start Ordered     Status Ordering Provider    08/14/23 0230 08/14/23 0229  Inpatient Wound Care Provider Consult  Once        Specialty:  Wound Care  Provider:  Johanna Shipman APRN Acknowledged MOORE, JONATHAN SCOTT           Attending Provider: Mahad Shields MD  Length of Stay: 1    SUBJECTIVE   Chief Complaint: Wounds of bilateral buttocks, left anterior abdomen, right anterior fourth toe    HPI: Dalton Cheatham, a 78 y.o.male, presents with a past medical history of Hypertension, diverticulitis, stroke, AAA, coronary artery disease, lymphoma, lung cancer, and skin cancer.  A full past medical history as listed below.  Patient presented to the hospital on 8/13 as a transfer from [facility] with chest pain and was admitted for further investigation and treatment.    Inpatient wound care consulted due to wounds of bilateral buttocks, left anterior abdomen, and right anterior fourth toe.  Wounds were present on admission.  Patient states he burned self with heating pad on buttocks when trying to get heat to testicles due to pain.  There are open areas on buttocks that appear to be more consistent with friction and pressure.  The wound on the right fourth toe is related to patient reportedly trying to pull his toenail off.  He states toenail is growing into other toes causing pain.  Abdominal fold has powder present decreased erythema when compared to picture.  Patient was unaware he had skin breakdown to this area.  Currently responding well to powder.    Patient uses walker to walk very short distances and stays in chair most of the time.  He states he sits and sleeps in chair a majority of his time.  Patient lives at home with wife  who is a history of stroke and granddaughter.  He states he receives care from granddaughter and daughter.    Visit Dx:    ICD-10-CM ICD-9-CM   1. Chest pain, unspecified type  R07.9 786.50   2. Pericarditis, unspecified chronicity, unspecified type  I31.9 423.9   3. Chronic kidney disease, unspecified CKD stage  N18.9 585.9   4. Thoracic aortic aneurysm without rupture, unspecified part  I71.20 441.2   5. Abdominal aortic aneurysm (AAA) without rupture, unspecified part  I71.40 441.4   6. Pulmonary hypertension  I27.20 416.8   7. Gastric mass  K31.89 537.89       Hospital Problem List:     Chest pain      History:   Past Medical History:   Diagnosis Date    AAA (abdominal aortic aneurysm)     Cancer     lymphoma    Colon polyp     Coronary artery disease     Diverticulitis     HTN (hypertension)     Lung cancer     Skin cancer     Sleep apnea     Stroke      Past Surgical History:   Procedure Laterality Date    ANAL FISSURECTOMY      CARDIAC CATHETERIZATION      CAROTID ARTERY ANGIOPLASTY      COLONOSCOPY  09/16/2014    COLONOSCOPY N/A 4/9/2018    Procedure: COLONOSCOPY WITH ANESTHESIA;  Surgeon: Ruth Wellington MD;  Location: [facility] ENDOSCOPY;  Service: Gastroenterology    ENDOSCOPY N/A 4/13/2018    Procedure: ESOPHAGOGASTRODUODENOSCOPY WITH ANESTHESIA;  Surgeon: Mike Sheridan MD;  Location: [facility] ENDOSCOPY;  Service: Gastroenterology    ENDOSCOPY N/A 11/8/2019    Procedure: ESOPHAGOGASTRODUODENOSCOPY WITH ANESTHESIA;  Surgeon: Ruth Wellington MD;  Location: [facility] ENDOSCOPY;  Service: Gastroenterology    LUNG LOBECTOMY       Social History     Socioeconomic History    Marital status:    Tobacco Use    Smoking status: Every Day     Packs/day: 0.50     Types: Cigarettes     Start date: 1959    Smokeless tobacco: Never    Tobacco comments:     stopped and started several times   Vaping Use    Vaping Use: Never used   Substance and Sexual Activity    Alcohol use: Never     Comment: occasionally    Drug  use: No    Sexual activity: Defer     Family History   Problem Relation Age of Onset    Colon cancer Brother     Cancer Maternal Aunt     Cancer Maternal Uncle     Cancer Paternal Grandmother        Allergies:  No Known Allergies    Medications:    Current Facility-Administered Medications:     acetaminophen (TYLENOL) tablet 650 mg, 650 mg, Oral, Q6H PRN, Mahad Shields MD, 650 mg at 08/13/23 2108    aspirin EC tablet 81 mg, 81 mg, Oral, Daily, Mahad Shields MD, 81 mg at 08/14/23 0934    atorvastatin (LIPITOR) tablet 40 mg, 40 mg, Oral, Daily, Mahad Shields MD, 40 mg at 08/14/23 0933    clopidogrel (PLAVIX) tablet 75 mg, 75 mg, Oral, Daily, Mahad Shields MD, 75 mg at 08/14/23 0934    HYDROcodone-acetaminophen (NORCO)  MG per tablet 1 tablet, 1 tablet, Oral, Q6H PRN, Mahad Shields MD, 1 tablet at 08/13/23 1705    lisinopril (PRINIVIL,ZESTRIL) tablet 40 mg, 40 mg, Oral, Daily, Mahad Shields MD, 40 mg at 08/14/23 0934    melatonin tablet 3 mg, 3 mg, Oral, Nightly PRN, Mahad Shields MD    metoprolol succinate XL (TOPROL-XL) 24 hr tablet 50 mg, 50 mg, Oral, Daily, Mahad Shields MD, 50 mg at 08/14/23 0934    Morphine sulfate (PF) injection 2 mg, 2 mg, Intravenous, Q2H PRN, Saman Bills MD, 2 mg at 08/13/23 2336    nitroglycerin (NITROSTAT) SL tablet 0.4 mg, 0.4 mg, Sublingual, Q5 Min PRN, Mahad Shields MD    nitroglycerin (TRIDIL) 200 mcg/ml infusion, 10-50 mcg/min, Intravenous, Titrated, Saman Bills MD, Stopped at 08/14/23 0934    ondansetron (ZOFRAN) injection 4 mg, 4 mg, Intravenous, Q6H PRN, Mahad Shields MD    predniSONE (DELTASONE) tablet 20 mg, 20 mg, Oral, BID With Meals, Mahad Shields MD, 20 mg at 08/14/23 0934    [COMPLETED] Insert Peripheral IV, , , Once **AND** sodium chloride 0.9 % flush 10 mL, 10 mL, Intravenous, PRN, Mahad Shields MD    terazosin (HYTRIN) capsule 5 mg, 5  mg, Oral, Nightly, Mahad Shields MD, 5 mg at 08/13/23 2101    venlafaxine (EFFEXOR) tablet 75 mg, 75 mg, Oral, Daily, Mahad Shields MD, 75 mg at 08/14/23 0934    Review of Systems:   Review of Systems   Constitutional:  Positive for fatigue. Negative for chills and fever.   HENT:  Negative for rhinorrhea and sore throat.    Respiratory:  Positive for cough. Negative for shortness of breath.    Cardiovascular:  Positive for chest pain and leg swelling. Negative for palpitations.   Gastrointestinal:  Negative for diarrhea, nausea and vomiting.   Genitourinary:  Negative for flank pain and hematuria.   Musculoskeletal:  Positive for back pain, gait problem and myalgias. Negative for arthralgias.   Skin:  Positive for color change and wound.   Neurological:  Positive for weakness. Negative for dizziness.   Psychiatric/Behavioral:  Negative for agitation and behavioral problems.        OBJECTIVE     Vitals:    08/14/23 0825   BP: 117/55   Pulse:    Resp:    Temp:    SpO2:        PHYSICAL EXAM:   Physical Exam  Vitals and nursing note reviewed.   Constitutional:       General: He is awake.      Appearance: He is obese.      Comments: Body mass index is 34.85 kg/mý.    HENT:      Head: Normocephalic and atraumatic.   Eyes:      General: Lids are normal. Gaze aligned appropriately.   Cardiovascular:      Rate and Rhythm: Normal rate and regular rhythm.      Pulses:           Dorsalis pedis pulses are 2+ on the right side and 2+ on the left side.   Pulmonary:      Effort: Pulmonary effort is normal. No respiratory distress.   Abdominal:      General: Abdomen is protuberant.      Palpations: Abdomen is soft.   Musculoskeletal:      Cervical back: Normal range of motion and neck supple.   Feet:      Right foot:      Skin integrity: Erythema and dry skin present.      Toenail Condition: Right toenails are abnormally thick. Fungal disease present.     Left foot:      Skin integrity: Erythema and dry skin  present.      Toenail Condition: Left toenails are abnormally thick. Fungal disease present.  Skin:     General: Skin is warm and dry.      Findings: Erythema and wound present.      Comments: Right fourth toe at site of toenail where patient reportedly tried pulling toenail off.  Dry serosanguineous drainage present.  Bilateral buttocks erythema that is blanchable.  Open skin limited to skin breakdown.  No subcutaneous tissue present.  No active drainage currently.  Edges even attached to wound bed.  Abdominal fold is red and slightly denuded with improvement when compared to pictures on admission.  Powder present to entire area.   Neurological:      Mental Status: He is alert and oriented to person, place, and time.      Motor: Weakness present.      Gait: Gait abnormal.   Psychiatric:         Attention and Perception: Attention normal.         Mood and Affect: Mood normal.         Speech: Speech normal.         Behavior: Behavior is cooperative.            Results Review:  Lab Results (last 48 hours)       Procedure Component Value Units Date/Time    High Sensitivity Troponin T [[id number]]  (Abnormal) Collected: 08/13/23 2042    Specimen: Blood Updated: 08/13/23 2117     HS Troponin T 84 ng/L     Narrative:      High Sensitive Troponin T Reference Range:  <10.0 ng/L- Negative Female for AMI  <15.0 ng/L- Negative Male for AMI  >=10 - Abnormal Female indicating possible myocardial injury.  >=15 - Abnormal Male indicating possible myocardial injury.   Clinicians would have to utilize clinical acumen, EKG, Troponin, and serial changes to determine if it is an Acute Myocardial Infarction or myocardial injury due to an underlying chronic condition.         Urinalysis With Culture If Indicated - Urine, Clean Catch [[id number]]  (Normal) Collected: 08/13/23 2038    Specimen: Urine, Clean Catch Updated: 08/13/23 2050     Color, UA Yellow     Appearance, UA Clear     pH, UA 5.5     Specific Gravity, UA 1.024     Glucose, UA  Negative     Ketones, UA Negative     Bilirubin, UA Negative     Blood, UA Negative     Protein, UA Negative     Leuk Esterase, UA Negative     Nitrite, UA Negative     Urobilinogen, UA 0.2 E.U./dL    Narrative:      In absence of clinical symptoms, the presence of pyuria, bacteria, and/or nitrites on the urinalysis result does not correlate with infection.  Urine microscopic not indicated.    High Sensitivity Troponin T 2Hr [[id number]]  (Abnormal) Collected: 08/13/23 1559    Specimen: Blood Updated: 08/13/23 1620     HS Troponin T 69 ng/L      Troponin T Delta 7 ng/L     Narrative:      High Sensitive Troponin T Reference Range:  <10.0 ng/L- Negative Female for AMI  <15.0 ng/L- Negative Male for AMI  >=10 - Abnormal Female indicating possible myocardial injury.  >=15 - Abnormal Male indicating possible myocardial injury.   Clinicians would have to utilize clinical acumen, EKG, Troponin, and serial changes to determine if it is an Acute Myocardial Infarction or myocardial injury due to an underlying chronic condition.         [place] Draw [[id number]] Collected: 08/13/23 1303    Specimen: Blood Updated: 08/13/23 1415    Narrative:      The following orders were created for panel order [place] Draw.  Procedure                               Abnormality         Status                     ---------                               -----------         ------                     Green Top (Gel)[[id number]]                                  Final result               Lavender Top[[id number]]                                     Final result               Gray Top[[id number]]                                                                    Light Blue Top[[id number]]                                   Final result                 Please view results for these tests on the individual orders.    Green Top (Gel) [[id number]] Collected: 08/13/23 1303    Specimen: Blood Updated: 08/13/23 1415     Extra Tube Hold for add-ons.     Comment:  Auto resulted.       Lavender Top [[id number]] Collected: 08/13/23 1303    Specimen: Blood Updated: 08/13/23 1415     Extra Tube hold for add-on     Comment: Auto resulted       Light Blue Top [[id number]] Collected: 08/13/23 1303    Specimen: Blood Updated: 08/13/23 1415     Extra Tube Hold for add-ons.     Comment: Auto resulted       Single High Sensitivity Troponin T [[id number]]  (Abnormal) Collected: 08/13/23 1303    Specimen: Blood Updated: 08/13/23 1327     HS Troponin T 62 ng/L     Narrative:      High Sensitive Troponin T Reference Range:  <10.0 ng/L- Negative Female for AMI  <15.0 ng/L- Negative Male for AMI  >=10 - Abnormal Female indicating possible myocardial injury.  >=15 - Abnormal Male indicating possible myocardial injury.   Clinicians would have to utilize clinical acumen, EKG, Troponin, and serial changes to determine if it is an Acute Myocardial Infarction or myocardial injury due to an underlying chronic condition.               Imaging Results (Last 72 Hours)       ** No results found for the last 72 hours. **               ASSESSMENT/PLAN       Examination and evaluation of wound(s) was performed.    DIAGNOSIS:   Open wound of bilateral buttocks limited to skin breakdown  Onychomycosis  Wound of right 4th toe limited to skin breakdown  Intertrigo  At high risk for skin breakdown  Impaired mobility and ADLs      PLAN:   Open wound of bilateral buttocks is limited to skin breakdown will be treated with barrier cream and pressure prevention measures as listed below.  Wound of right fourth toe will be cleaned with normal saline and painted with Betadine to avoid infection.  Intertrigo will be treated with skin care routine as listed below.  Suggest patient follow-up with podiatrist at home after discharged          Start     Ordered    08/14/23 2000  Wound Care  Every 12 Hours        Question Answer Comment   Wound Locations Right fourth down    Wound Care Instructions Clean with normal saline.   Paint with Betadine.        08/14/23 1010    08/14/23 1800  Skin Care  2 Times Daily      Question Answer Comment   Wound Locations Skin folds    Wound Care Instructions Clean with no rinse cleansing foam.  Pat dry.  Use baby powder to skin followed in place pillowcase to pad area.  Change pillowcases often to maintain dry environment.        08/14/23 1010    08/14/23 1200  Apply Moisture Barrier After Any Incontinence  Every 4 Hours      Question:  Wound Care Instructions  Answer:  Apply Moisture Barrier After Any Incontinence    08/14/23 1010    08/14/23 1011  Apply Waffle mattress overlay Until Discontinued  Until Discontinued        Question:  Supply to be applied:  Answer:  Waffle mattress overlay    08/14/23 1010    08/14/23 1010  Use Repositioning Wedge to Position Patient  Continuous        Comments: Use Comfort Glide repositioning sheet and wedges to position patient.    08/14/23 1010    08/14/23 1009  Turn Patient  Now Then Every 2 Hours         08/14/23 1010    08/14/23 1009  Elevate Heels Off of Bed  Until Discontinued         08/14/23 1010    08/14/23 1009  Use Seat Cushion When Up In Chair  Continuous         08/14/23 1010                Discussed findings and treatment plan including risks, benefits, and treatment options with patient in detail. Patient agreed with treatment plan.      This document has been electronically signed by CARLA Jarvis on 8/14/2023 09:36 CDT

## 2023-08-14 NOTE — PROGRESS NOTES
[facility] Medicine Services  INPATIENT PROGRESS NOTE    Patient Name: Dalton Cheatham  Date of Admission: 8/13/2023  Today's Date: 08/14/23  Length of Stay: 1  Primary Care Physician: Rikki Worrell MD    Subjective   Chief Complaint: chest pain  HPI   Had worsening chest pain throughout the night, has had some heaviness  Troponin levels have continued to rise  Cardiology has been consulted and has evaluated the patient.        Review of Systems   Constitutional:  Negative for fatigue and fever.   HENT:  Negative for congestion and ear pain.    Eyes:  Negative for redness and visual disturbance.   Respiratory:  Negative for cough, shortness of breath and wheezing.    Cardiovascular:  Positive for chest pain. Negative for palpitations.   Gastrointestinal:  Negative for abdominal pain, diarrhea, nausea and vomiting.   Endocrine: Negative for cold intolerance and heat intolerance.   Genitourinary:  Negative for dysuria and frequency.   Musculoskeletal:  Negative for arthralgias and back pain.   Skin:  Negative for rash and wound.   Neurological:  Negative for dizziness and headaches.   Psychiatric/Behavioral:  Negative for confusion. The patient is not nervous/anxious.     All pertinent negatives and positives are as above. All other systems have been reviewed and are negative unless otherwise stated.     Objective    Temp:  [97.2 øF (36.2 øC)-98.2 øF (36.8 øC)] 97.4 øF (36.3 øC)  Heart Rate:  [60-78] 64  Resp:  [16-22] 16  BP: (109-170)/(53-90) 117/55  Physical Exam        Results Review:  I have reviewed the labs, radiology results, and diagnostic studies.    Laboratory Data:          Results from last 7 days   Lab Units 08/13/23  0514   SODIUM mmol/L 137   POTASSIUM mmol/L 5.5*   CHLORIDE mmol/L 104   TOTAL CO2 mmol/L 25   BUN mg/dL 44*   CREATININE mg/dL 2.31*   CALCIUM mg/dL 10.4*   BILIRUBIN mg/dL 0.2   ALK PHOS U/L 131   ALT (SGPT) U/L 25   AST (SGOT) U/L 20        Culture Data:   No results found for: BLOODCX, URINECX, WOUNDCX, MRSACX, RESPCX, STOOLCX    Radiology Data:   Imaging Results (Last 24 Hours)       ** No results found for the last 24 hours. **            I have reviewed the patient's current medications.     Assessment/Plan   Assessment  Active Hospital Problems    Diagnosis     **Chest pain          Treatment Plan  The patient will be admitted to my service here at [facility].      1.  Chest pain  -Trend Troponins  -ASA  -Plavix  -Lipitor  -Prednisone  -Cardiology consulted     2.  CAD  -ASA  -Plavix  -Lipitor  -Metoprolol  -Lisinopril     3.  BPH  -Hytrin     4.  HTN  -Lisinopril  -Metoprolol    5.  Hyperkalemia  -Recheck           Medical Decision Making  Number and Complexity of problems: 4 problems, moderate complexity     Risk:  High:  decision on admission/observation     Differential Diagnosis:costochrindritis/pleuristy     Conditions and Status        Condition is worsening.     MDM Data  1:  Tests/Documents/Independent Historians:  A:  Prior external notes from unique source reviewed:  B.  Review of the Results of Each Unique Test: (2) CBC, Troponin  C.  Assessment requiring an independent Historian:     2.  Independent interpretation of Tests:(1)  A.  Radiology Interpretation:   B:  EKG/Telemetry Interpretation: (1) NSR     3:  Discussion of management or Test interpretation:  n/a        Care Planning  Shared decision making: patient agreeable to treatment plan  Code status and discussions: full code      Disposition  Social Determinants of Health that impact treatment or disposition: n/a  I expect the patient to be discharged to home in 1-2 days.     Electronically signed by Mahad Shields MD, 08/14/23, 09:59 CDT.

## 2023-08-14 NOTE — CASE MANAGEMENT/SOCIAL WORK
Discharge Planning Assessment  [facility]     Patient Name: Dalton Cheatham  MRN: [id number]  Today's Date: 8/14/2023    Admit Date: 8/13/2023        Discharge Needs Assessment       Row Name 08/14/23 1415       Living Environment    People in Home child(shahram), adult;spouse    Current Living Arrangements home    Potentially Unsafe Housing Conditions unable to assess    Primary Care Provided by child(shahram)    Family Caregiver if Needed child(shahram), adult       Discharge Needs Assessment    Equipment Currently Used at Home oxygen;walker, rolling;cane, quad;commode    Equipment Needed After Discharge shower chair    Current Discharge Risk cognitively impaired;physical impairment    Discharge Coordination/Progress Spoke with patient at bedside for dc planning. Lives with spouse and daughter. Daughter is caregiver for him and his wife with help from granddaughter. Has home oxygen and does not know where it comes from. Has pcp and Rx coverage with [employer] insurance. Patient states has been to [facility] in the past for rehab. Will continue to follow for dc needs.                   Discharge Plan    No documentation.                 Continued Care and Services - Admitted Since 8/13/2023    Coordination has not been started for this encounter.          Demographic Summary    No documentation.                  Functional Status    No documentation.                  Psychosocial    No documentation.                  Abuse/Neglect    No documentation.                  Legal    No documentation.                  Substance Abuse    No documentation.                  Patient Forms    No documentation.                     Merlina A Fletcher, RN

## 2023-08-14 NOTE — PLAN OF CARE
Problem: Adult Inpatient Plan of Care  Goal: Plan of Care Review  Flowsheets  Taken 8/14/2023 0644 by Michelle Perez, RN  Progress: no change  Plan of Care Reviewed With: patient  Taken 8/14/2023 0518 by Mandi Harmon, RN  Outcome Evaluation: Excoriation noted. Pressure injuries noted to coccyx, wound care consulted. Wound to R toe, CARLOS. Pictures of wounds in chart. Coccyx cleansed with NS, Barrier ointment applied, & foam dressing applied. External catheter in place. Turning pt q 2 hours, wedges in use. Bed alarm on. Pt c/o anterior L side chest pain, he stated it was worse with movement and deep breaths. Dr. Bills notifed. EKG obtained, see results in chart. IV morphine given with some relief. IV nitro drip infusing as ordered with CP improved. Pt has been resting throughout the shift. Cardiology consult today. Safety maintained. Tele- AV/ 60-64.

## 2023-08-14 NOTE — CASE MANAGEMENT/SOCIAL WORK
Discharge Planning Assessment  [facility]     Patient Name: Dalton Cheatham  MRN: [id number]  Today's Date: 8/14/2023    Admit Date: 8/13/2023        Discharge Needs Assessment       Row Name 08/14/23 1451       Discharge Needs Assessment    Discharge Coordination/Progress Patient would like a wheelchair at discharge.      Row Name 08/14/23 1415       Living Environment    People in Home child(shahram), adult;spouse    Current Living Arrangements home    Potentially Unsafe Housing Conditions unable to assess    Primary Care Provided by child(shahram)    Family Caregiver if Needed child(shahram), adult       Discharge Needs Assessment    Equipment Currently Used at Home oxygen;walker, rolling;cane, quad;commode    Equipment Needed After Discharge shower chair    Current Discharge Risk cognitively impaired;physical impairment    Discharge Coordination/Progress Spoke with patient at bedside for dc planning. Lives with spouse and daughter. Daughter is caregiver for him and his wife with help from granddaughter. Has home oxygen and does not know where it comes from. Has pcp and Rx coverage with [employer] insurance. Patient states has been to [facility] in the past for rehab. Will continue to follow for dc needs.                   Discharge Plan    No documentation.                 Continued Care and Services - Admitted Since 8/13/2023    Coordination has not been started for this encounter.          Demographic Summary    No documentation.                  Functional Status    No documentation.                  Psychosocial    No documentation.                  Abuse/Neglect    No documentation.                  Legal    No documentation.                  Substance Abuse    No documentation.                  Patient Forms    No documentation.                     Merlina A Fletcher, RN

## 2023-08-14 NOTE — PLAN OF CARE
Goal Outcome Evaluation:              Outcome Evaluation: NTN assessment complete. % of two meals, 960 mL oral fluid total (admit to present). BM four days ago. Encouraged daily menu selections; pt aware of cardiac MNT. Pressure injuries noted. NTN following per protocol.

## 2023-08-14 NOTE — PROGRESS NOTES
"Pharmacy Dosing Service  Anticoagulant  Enoxaparin    Assessment/Action/Plan:  Pharmacy consulted to dose enoxaparin for ACS. Initiated enoxaparin 1mg/kg SQ Q12h. Pharmacy will continue to follow & adjust dose accordingly.      Subjective:  Dalton Cheatham is a 78 y.o. male on Enoxaparin 105 mg SQ every 12 hours for ACS.    Objective:  [Ht: 175.3 cm (69\"); Wt: 107 kg (236 lb); BMI: Body mass index is 34.85 kg/mý.]  Estimated Creatinine Clearance: 31 mL/min (A) (by C-G formula based on SCr of 2.29 mg/dL (H)).     Lab Results   Component Value Date    INR 1.02 08/14/2023    INR 1.21 11/16/2021    INR 1.21 (H) 07/26/2021    PROTIME 13.5 08/14/2023    PROTIME 15.6 (H) 11/16/2021    PROTIME 15.5 (H) 07/26/2021      Lab Results   Component Value Date    HGB 12.8 (L) 08/14/2023    HGB 15.1 01/03/2023    HGB 13.0 (L) 01/19/2022      Lab Results   Component Value Date     08/14/2023     01/03/2023     01/19/2022       Todd Flores, PharmD  08/14/23 13:11 CDT     "

## 2023-08-14 NOTE — CONSULTS
[facility]ART [facility] CONSULT NOTE    Referring Provider: No Known Provider    Reason for Consultation: Elevated troponin/chest pain    Chief complaint:   Chief Complaint   Patient presents with    Chest Pain     PT c/o of CP and shoulder pain since this morning. PT came from [facility].        Subjective .     History of present illness:  Dalton Cheatham is a 78 y.o. male with known history of coronary artery disease, chronic systolic and diastolic congestive heart failure, AAA, hypertension, stroke, ICD, and sleep apnea who cardiology is consulted on for chest pain and elevated troponin.    Patient is followed by Dr. Linder at [facility] for his cardiology care.  Patient had echocardiogram January 2022 which showed LVEF estimate of 23%.  He had left heart catheterization in January 2022 which showed patent stent of his mid LAD with noncritical disease proximal to the stent.  He also had a patent stent in the R-PDA.  It appears he was last seen in clinic 2/13/2023.  At that time he had ICD interrogation which showed V pacing 100% as well as 9.3 years of battery life.  It appears at that appointment his only complaint was dyspnea on exertion.  He denied having any anginal symptoms at that time.      Patient presented to [facility] 8/13/2023.  He was a transfer from outlying facility due to elevated troponin and chest pain.  In our ER the initial troponin was 62 with a repeat at 69 with a +7 delta.  Overnight his troponin has continued to increase to 84.  EKG shows no significant abnormalities.  Patient had BNP of 559 at outside facility, however given his age this is not significant.  Chest x-ray impression from outside facility did show potential mild edema.    Upon examination the patient is laying comfortably in bed.  He is sleeping.  Upon awakening he tells me that he has had a sharp chest pain through the left side of his chest through his back over the last 2 days.  He said  it began when he was just sitting in his recliner at rest.  He sleeps in a recliner at night because he is unable to lie flat due to his congestive heart failure.  The patient states that the chest pain has not had any other associated symptoms such as shortness of breath, diaphoresis, dizziness, or lightheadedness.  He does describe that the chest pain is worse with deep inhalation.  He feels like the pain is also worse if you press hard on his chest.    He states he has had some additional swelling in his lower extremities over the last 2 weeks.  He thinks that his urinary output has decreased, he does take a diuretic every day at home.  He states he has never felt this pain before, even with his previous cardiac events.        History  Past Medical History:   Diagnosis Date    AAA (abdominal aortic aneurysm)     Cancer     lymphoma    Colon polyp     Coronary artery disease     Diverticulitis     HTN (hypertension)     Lung cancer     Skin cancer     Sleep apnea     Stroke    ,   Past Surgical History:   Procedure Laterality Date    ANAL FISSURECTOMY      CARDIAC CATHETERIZATION      CAROTID ARTERY ANGIOPLASTY      COLONOSCOPY  09/16/2014    COLONOSCOPY N/A 4/9/2018    Procedure: COLONOSCOPY WITH ANESTHESIA;  Surgeon: Ruth Wellington MD;  Location: [facility] ENDOSCOPY;  Service: Gastroenterology    ENDOSCOPY N/A 4/13/2018    Procedure: ESOPHAGOGASTRODUODENOSCOPY WITH ANESTHESIA;  Surgeon: Mike Sheridan MD;  Location: [facility] ENDOSCOPY;  Service: Gastroenterology    ENDOSCOPY N/A 11/8/2019    Procedure: ESOPHAGOGASTRODUODENOSCOPY WITH ANESTHESIA;  Surgeon: Ruth Wellington MD;  Location: [facility] ENDOSCOPY;  Service: Gastroenterology    LUNG LOBECTOMY     ,   Family History   Problem Relation Age of Onset    Colon cancer Brother     Cancer Maternal Aunt     Cancer Maternal Uncle     Cancer Paternal Grandmother    ,   Social History     Tobacco Use    Smoking status: Every Day     Packs/day: 0.50     Types: Cigarettes      Start date: 1959    Smokeless tobacco: Never    Tobacco comments:     stopped and started several times   Vaping Use    Vaping Use: Never used   Substance Use Topics    Alcohol use: Never     Comment: occasionally    Drug use: No   ,     Medications    Prior to Admission medications    Medication Sig Start Date End Date Taking? Authorizing Provider   atorvastatin (LIPITOR) 40 MG tablet Take 1 tablet by mouth Daily.   Yes Ivana Sargent MD   clopidogrel (PLAVIX) 75 MG tablet Take 1 tablet by mouth Daily. Hold.  May resume 4/22/18. 4/16/18  Yes Ute Flores APRN   famotidine (PEPCID) 20 MG tablet Take 1 tablet by mouth 2 (Two) Times a Day. 10/13/17  Yes Ivana Sargent MD   HYDROcodone-acetaminophen (NORCO)  MG per tablet Take 1 tablet by mouth Every 6 (Six) Hours As Needed for Mild Pain. 9/28/17  Yes Ivana Sargent MD   lisinopril (PRINIVIL,ZESTRIL) 40 MG tablet Take 1 tablet by mouth Daily. 10/23/17  Yes Ivana Sargent MD   metoprolol succinate XL (TOPROL-XL) 50 MG 24 hr tablet Take 1 tablet by mouth Daily. 9/28/17  Yes Ivana Sargent MD   venlafaxine (EFFEXOR) 75 MG tablet Take 1 tablet by mouth Daily. 10/16/17  Yes Ivana Sargent MD   aspirin 81 MG EC tablet Take 1 tablet by mouth Daily. Hold.  May resume 4/22/18. 4/16/18   Ute Flores APRN   chlorthalidone (HYGROTON) 25 MG tablet Take 1 tablet by mouth Daily.    ProviderIvana MD   doxazosin (CARDURA) 4 MG tablet Take 1 tablet by mouth Every Night.    ProviderIvana MD   nitroglycerin (NITROSTAT) 0.4 MG SL tablet Place 0.4 mg under the tongue.    Ivana Sargent MD       Current Facility-Administered Medications   Medication Dose Route Frequency Provider Last Rate Last Admin    acetaminophen (TYLENOL) tablet 650 mg  650 mg Oral Q6H PRN Mahad Shields MD   650 mg at 08/13/23 2108    aspirin EC tablet 81 mg  81 mg Oral Daily Mahad Shields MD        atorvastatin  (LIPITOR) tablet 40 mg  40 mg Oral Daily Mahad Shields MD   40 mg at 08/13/23 1820    chlorthalidone (HYGROTON) tablet 25 mg  25 mg Oral Daily Mahad Shields MD   25 mg at 08/13/23 1820    clopidogrel (PLAVIX) tablet 75 mg  75 mg Oral Daily Mahad Shields MD   75 mg at 08/13/23 1821    HYDROcodone-acetaminophen (NORCO)  MG per tablet 1 tablet  1 tablet Oral Q6H PRN Mahad Shields MD   1 tablet at 08/13/23 1705    lisinopril (PRINIVIL,ZESTRIL) tablet 40 mg  40 mg Oral Daily Mahad Shields MD   40 mg at 08/13/23 1820    melatonin tablet 3 mg  3 mg Oral Nightly PRN Mahad Shields MD        metoprolol succinate XL (TOPROL-XL) 24 hr tablet 50 mg  50 mg Oral Daily Mahad Shields MD   50 mg at 08/13/23 1820    Morphine sulfate (PF) injection 2 mg  2 mg Intravenous Q2H PRN Saman Bills MD   2 mg at 08/13/23 2336    nitroglycerin (NITROSTAT) SL tablet 0.4 mg  0.4 mg Sublingual Q5 Min PRN Mahad Shields MD        nitroglycerin (TRIDIL) 200 mcg/ml infusion  10-50 mcg/min Intravenous Titrated Saman Bills MD 3 mL/hr at 08/13/23 2347 10 mcg/min at 08/13/23 2347    ondansetron (ZOFRAN) injection 4 mg  4 mg Intravenous Q6H PRN Mahad Shields MD        predniSONE (DELTASONE) tablet 20 mg  20 mg Oral BID With Meals Mahad Shields MD        sodium chloride 0.9 % flush 10 mL  10 mL Intravenous PRN Mahad Shields MD        terazosin (HYTRIN) capsule 5 mg  5 mg Oral Nightly Mahad Shields MD   5 mg at 08/13/23 2103    venlafaxine (EFFEXOR) tablet 75 mg  75 mg Oral Daily Mahad Shields MD   75 mg at 08/13/23 1821       Allergies:  Patient has no known allergies.    Review of Systems  Review of Systems   Constitutional: Positive for malaise/fatigue.   Cardiovascular:  Positive for dyspnea on exertion, leg swelling and orthopnea. Negative for irregular heartbeat.     Objective     Physical Exam:  Patient  "Vitals for the past 24 hrs:   BP Temp Temp src Pulse Resp SpO2 Height Weight   08/14/23 0700 117/66 97.4 øF (36.3 øC) Oral 64 16 96 % -- --   08/14/23 0400 117/55 97.2 øF (36.2 øC) Oral 64 18 96 % -- --   08/14/23 0024 109/53 -- -- -- -- -- -- --   08/13/23 2345 129/56 -- -- -- -- -- -- --   08/13/23 2317 123/63 -- -- 60 18 91 % -- --   08/13/23 2029 125/58 97.6 øF (36.4 øC) Oral 68 18 95 % -- --   08/13/23 1647 156/74 97.5 øF (36.4 øC) Oral 78 20 100 % 175.3 cm (69\") 107 kg (236 lb 3.2 oz)   08/13/23 1531 148/72 -- -- 62 20 97 % -- --   08/13/23 1501 170/80 -- -- 63 -- 98 % -- --   08/13/23 1431 134/76 -- -- 60 20 96 % -- --   08/13/23 1401 134/71 -- -- 60 20 97 % -- --   08/13/23 1331 151/83 -- -- 60 22 97 % -- --   08/13/23 1229 159/90 98.2 øF (36.8 øC) -- 77 22 100 % 172 cm (67.72\") 110 kg (242 lb 8.1 oz)     Constitutional:       Appearance: Healthy appearance. Not in distress.   Neck:      Vascular: JVD normal.   Pulmonary:      Effort: Pulmonary effort is normal.      Breath sounds: Normal breath sounds. No wheezing. No rhonchi. No rales.   Cardiovascular:      PMI at left midclavicular line. Normal rate. Regular rhythm. Normal S1. Normal S2.       Murmurs: There is no murmur.      No gallop.  No click. No rub.   Edema:     Peripheral edema present.     Pretibial: bilateral trace edema of the pretibial area.     Ankle: bilateral 1+ pitting edema of the ankle.     Feet: bilateral 2+ pitting edema of the feet.  Musculoskeletal: Normal range of motion.      Cervical back: Normal range of motion. Skin:     General: Skin is warm and dry.   Neurological:      Mental Status: Alert and oriented to person, place and time.       Results Review:   I reviewed the patient's new clinical results.    Lab Results (last 72 hours)       Procedure Component Value Units Date/Time    High Sensitivity Troponin T [[id number]]  (Abnormal) Collected: 08/13/23 2042    Specimen: Blood Updated: 08/13/23 2117     HS Troponin T 84 ng/L     " Narrative:      High Sensitive Troponin T Reference Range:  <10.0 ng/L- Negative Female for AMI  <15.0 ng/L- Negative Male for AMI  >=10 - Abnormal Female indicating possible myocardial injury.  >=15 - Abnormal Male indicating possible myocardial injury.   Clinicians would have to utilize clinical acumen, EKG, Troponin, and serial changes to determine if it is an Acute Myocardial Infarction or myocardial injury due to an underlying chronic condition.         Urinalysis With Culture If Indicated - Urine, Clean Catch [[id number]]  (Normal) Collected: 08/13/23 2038    Specimen: Urine, Clean Catch Updated: 08/13/23 2050     Color, UA Yellow     Appearance, UA Clear     pH, UA 5.5     Specific Gravity, UA 1.024     Glucose, UA Negative     Ketones, UA Negative     Bilirubin, UA Negative     Blood, UA Negative     Protein, UA Negative     Leuk Esterase, UA Negative     Nitrite, UA Negative     Urobilinogen, UA 0.2 E.U./dL    Narrative:      In absence of clinical symptoms, the presence of pyuria, bacteria, and/or nitrites on the urinalysis result does not correlate with infection.  Urine microscopic not indicated.    High Sensitivity Troponin T 2Hr [[id number]]  (Abnormal) Collected: 08/13/23 1559    Specimen: Blood Updated: 08/13/23 1620     HS Troponin T 69 ng/L      Troponin T Delta 7 ng/L     Narrative:      High Sensitive Troponin T Reference Range:  <10.0 ng/L- Negative Female for AMI  <15.0 ng/L- Negative Male for AMI  >=10 - Abnormal Female indicating possible myocardial injury.  >=15 - Abnormal Male indicating possible myocardial injury.   Clinicians would have to utilize clinical acumen, EKG, Troponin, and serial changes to determine if it is an Acute Myocardial Infarction or myocardial injury due to an underlying chronic condition.         [place] Draw [[id number]] Collected: 08/13/23 1303    Specimen: Blood Updated: 08/13/23 1415    Narrative:      The following orders were created for panel order [place]  Draw.  Procedure                               Abnormality         Status                     ---------                               -----------         ------                     Green Top (Gel)[[id number]]                                  Final result               Lavender Top[[id number]]                                     Final result               Gray Top[[id number]]                                                                    Light Blue Top[[id number]]                                   Final result                 Please view results for these tests on the individual orders.    Green Top (Gel) [[id number]] Collected: 08/13/23 1303    Specimen: Blood Updated: 08/13/23 1415     Extra Tube Hold for add-ons.     Comment: Auto resulted.       Lavender Top [[id number]] Collected: 08/13/23 1303    Specimen: Blood Updated: 08/13/23 1415     Extra Tube hold for add-on     Comment: Auto resulted       Light Blue Top [[id number]] Collected: 08/13/23 1303    Specimen: Blood Updated: 08/13/23 1415     Extra Tube Hold for add-ons.     Comment: Auto resulted       Single High Sensitivity Troponin T [[id number]]  (Abnormal) Collected: 08/13/23 1303    Specimen: Blood Updated: 08/13/23 1327     HS Troponin T 62 ng/L     Narrative:      High Sensitive Troponin T Reference Range:  <10.0 ng/L- Negative Female for AMI  <15.0 ng/L- Negative Male for AMI  >=10 - Abnormal Female indicating possible myocardial injury.  >=15 - Abnormal Male indicating possible myocardial injury.   Clinicians would have to utilize clinical acumen, EKG, Troponin, and serial changes to determine if it is an Acute Myocardial Infarction or myocardial injury due to an underlying chronic condition.                 No results found for: ECHOEFEST    Imaging Results (Last 72 Hours)       ** No results found for the last 72 hours. **                  Assessment & Plan       Elevated troponin/chest pain: ACS is a consideration, however low suspicion..  Patient  does have elevated troponin, however he does seem to have worsening of his lower extremity swelling which may be a component of CHF.  Attending service thinks this may be costochondritis and is treating it as such.  -We will recheck troponin as well as other relevant lab work this morning  - We will give one-time IV dose of Lasix 40 mg, will stop home chlorthalidone.  Given his lower GFR he may benefit from loop diuretic rather than thiazide going forward.  -Patient was on nitroglycerin drip at 10, I have paused upon examination and have notified nursing staff to monitor for worsening of chest pain    Coronary artery disease: Patient is currently chest pain-free upon examination  - Continue dual antiplatelet therapy of aspirin and Plavix  - Continue atorvastatin 40 mg daily, last lipid panel shows well-controlled LDL  - Continue beta-blocker of Toprol-XL 50 mg daily, continue ACE inhibitor of 40 mg lisinopril daily    Hypertension: Normotensive over the last 12 hours.  - Discontinue chlorthalidone in favor of loop diuretic, monitor for hypertension  - Continue lisinopril 40 mg daily, Toprol-XL 50 mg daily.    Chronic systolic and diastolic congestive heart failure: Patient states he does have worsening of his chronic lower extremity swelling.  He does feel like his activity tolerance has been decreased over the last 2 weeks.  -Echo pending  - I have given a one-time dose of IV 40 mg Lasix to see if he has significant output with this.  Monitor for improvement in chest pain symptoms  - Patient unfortunately cannot be initiated on MRI given GFR less than 30  - Given history of not being able to afford medications will not start SGL 2 inhibitor at this time  - Patient has been on Entresto in the past but was unable to afford it, continue lisinopril 40 mg daily  - Continue beta-blocker 50 mg daily  -Patient does have ICD for secondary prevention.    Further orders per Dr. Malik    Thank you for asking us to follow this  patient with you.       Electronically signed by Julito Pradhan, CARLA, 08/14/23, 8:16 AM CDT.

## 2023-08-14 NOTE — H&P
[facility]  HISTORY AND PHYSICAL    Date of Admission: 8/13/2023  Primary Care Physician: Rikki Worrell MD    Subjective   Primary Historian: Patient    Chief Complaint: Stabbing chest pain    History of Present Illness  Mr. Cheatham is an 78 year old gentleman who resides in [place], [place].  He has a history of HTN and CAD.  He follows with Dr. Rikki Worrell for primary care support.      He presents to the ER as a transfer for chest pain.  He has had chest pain the last 2 days.  It is burning and stabbing.  It is reproducible on palpation.  His chest wall is actually quite tender.      He went to the ER in [place], [place].  Troponin was elevated.  He was transferred to our ER for evaluation.  He is being admitted for further evaluation and management.          Review of Systems   Constitutional:  Negative for fatigue and fever.   HENT:  Negative for congestion and ear pain.    Eyes:  Negative for redness and visual disturbance.   Respiratory:  Negative for cough, shortness of breath and wheezing.    Cardiovascular:  Negative for chest pain and palpitations.   Gastrointestinal:  Negative for abdominal pain, diarrhea, nausea and vomiting.   Endocrine: Negative for cold intolerance and heat intolerance.   Genitourinary:  Negative for dysuria and frequency.   Musculoskeletal:  Negative for arthralgias and back pain.        Chest wall tenderness   Skin:  Negative for rash and wound.   Neurological:  Negative for dizziness and headaches.   Psychiatric/Behavioral:  Negative for confusion. The patient is not nervous/anxious.           Otherwise complete ROS reviewed and negative except as mentioned in the HPI.    Past Medical History:   Past Medical History:   Diagnosis Date    AAA (abdominal aortic aneurysm)     Cancer     lymphoma    Colon polyp     Coronary artery disease     Diverticulitis     HTN (hypertension)     Lung cancer     Skin cancer     Sleep apnea      Stroke      Past Surgical History:  Past Surgical History:   Procedure Laterality Date    ANAL FISSURECTOMY      CARDIAC CATHETERIZATION      CAROTID ARTERY ANGIOPLASTY      COLONOSCOPY  09/16/2014    COLONOSCOPY N/A 4/9/2018    Procedure: COLONOSCOPY WITH ANESTHESIA;  Surgeon: Ruth Wellington MD;  Location: [facility] ENDOSCOPY;  Service: Gastroenterology    ENDOSCOPY N/A 4/13/2018    Procedure: ESOPHAGOGASTRODUODENOSCOPY WITH ANESTHESIA;  Surgeon: Mike Sheridan MD;  Location: [facility] ENDOSCOPY;  Service: Gastroenterology    ENDOSCOPY N/A 11/8/2019    Procedure: ESOPHAGOGASTRODUODENOSCOPY WITH ANESTHESIA;  Surgeon: Ruth Wellington MD;  Location: [facility] ENDOSCOPY;  Service: Gastroenterology    LUNG LOBECTOMY       Social History:  reports that he has been smoking cigarettes. He started smoking about 64 years ago. He has been smoking an average of .5 packs per day. He has never used smokeless tobacco. He reports that he does not drink alcohol and does not use drugs.    Family History: family history includes Cancer in his maternal aunt, maternal uncle, and paternal grandmother; Colon cancer in his brother.       Allergies:  No Known Allergies    Medications:  Prior to Admission medications    Medication Sig Start Date End Date Taking? Authorizing Provider   atorvastatin (LIPITOR) 40 MG tablet Take 1 tablet by mouth Daily.   Yes Ivana Sargent MD   clopidogrel (PLAVIX) 75 MG tablet Take 1 tablet by mouth Daily. Hold.  May resume 4/22/18. 4/16/18  Yes Ute Flores APRN   famotidine (PEPCID) 20 MG tablet Take 1 tablet by mouth 2 (Two) Times a Day. 10/13/17  Yes Ivana Sargent MD   HYDROcodone-acetaminophen (NORCO)  MG per tablet Take 1 tablet by mouth Every 6 (Six) Hours As Needed for Mild Pain. 9/28/17  Yes Ivana Sargent MD   lisinopril (PRINIVIL,ZESTRIL) 40 MG tablet Take 1 tablet by mouth Daily. 10/23/17  Yes Ivana Sargent MD   metoprolol succinate XL (TOPROL-XL) 50 MG 24  "hr tablet Take 1 tablet by mouth Daily. 9/28/17  Yes Ivana Sargent MD   venlafaxine (EFFEXOR) 75 MG tablet Take 1 tablet by mouth Daily. 10/16/17  Yes Ivana Sargent MD   aspirin 81 MG EC tablet Take 1 tablet by mouth Daily. Hold.  May resume 4/22/18. 4/16/18   Ute Flores APRN   chlorthalidone (HYGROTON) 25 MG tablet Take 1 tablet by mouth Daily.    ProviderIvana MD   doxazosin (CARDURA) 4 MG tablet Take 1 tablet by mouth Every Night.    ProviderIvana MD   nitroglycerin (NITROSTAT) 0.4 MG SL tablet Place 0.4 mg under the tongue.    Provider, MD Ivana     I have utilized all available immediate resources to obtain, update, or review the patient's current medications (including all prescriptions, over-the-counter products, herbals, cannabis/cannabidiol products, and vitamin/mineral/dietary (nutritional) supplements).    Objective     Vital Signs: /74 (BP Location: Right arm)   Pulse 78   Temp 97.5 øF (36.4 øC) (Oral)   Resp 20   Ht 175.3 cm (69\")   Wt 107 kg (236 lb 3.2 oz)   SpO2 100%   BMI 34.88 kg/mý   Physical Exam  Vitals reviewed.   Constitutional:       Appearance: He is well-developed.   HENT:      Head: Normocephalic and atraumatic.      Right Ear: External ear normal.      Left Ear: External ear normal.      Nose: Nose normal.   Eyes:      General: No scleral icterus.        Right eye: No discharge.         Left eye: No discharge.      Conjunctiva/sclera: Conjunctivae normal.      Pupils: Pupils are equal, round, and reactive to light.   Neck:      Thyroid: No thyromegaly.      Trachea: No tracheal deviation.   Cardiovascular:      Rate and Rhythm: Normal rate and regular rhythm.      Heart sounds: Normal heart sounds. No murmur heard.    No friction rub. No gallop.   Pulmonary:      Effort: Pulmonary effort is normal. No respiratory distress.      Breath sounds: Normal breath sounds. No stridor. No wheezing or rales.   Chest:      Chest wall: No " tenderness.   Abdominal:      General: Bowel sounds are normal. There is no distension.      Palpations: Abdomen is soft. There is no mass.      Tenderness: There is no abdominal tenderness. There is no guarding or rebound.      Hernia: No hernia is present.   Musculoskeletal:         General: No deformity. Normal range of motion.      Cervical back: Normal range of motion and neck supple.   Lymphadenopathy:      Cervical: No cervical adenopathy.   Skin:     General: Skin is warm and dry.      Coloration: Skin is not pale.      Findings: No erythema or rash.   Neurological:      Mental Status: He is alert and oriented to person, place, and time.      Cranial Nerves: No cranial nerve deficit.      Motor: No abnormal muscle tone.      Coordination: Coordination normal.      Deep Tendon Reflexes: Reflexes are normal and symmetric. Reflexes normal.   Psychiatric:         Behavior: Behavior normal.         Thought Content: Thought content normal.         Judgment: Judgment normal.            Results Reviewed:  Lab Results (last 24 hours)       Procedure Component Value Units Date/Time    High Sensitivity Troponin T 2Hr [[id number]]  (Abnormal) Collected: 08/13/23 1559    Specimen: Blood Updated: 08/13/23 1620     HS Troponin T 69 ng/L      Troponin T Delta 7 ng/L     Narrative:      High Sensitive Troponin T Reference Range:  <10.0 ng/L- Negative Female for AMI  <15.0 ng/L- Negative Male for AMI  >=10 - Abnormal Female indicating possible myocardial injury.  >=15 - Abnormal Male indicating possible myocardial injury.   Clinicians would have to utilize clinical acumen, EKG, Troponin, and serial changes to determine if it is an Acute Myocardial Infarction or myocardial injury due to an underlying chronic condition.         [place] Draw [[id number]] Collected: 08/13/23 1303    Specimen: Blood Updated: 08/13/23 1415    Narrative:      The following orders were created for panel order [place] Draw.  Procedure                                Abnormality         Status                     ---------                               -----------         ------                     Green Top (Gel)[[id number]]                                  Final result               Lavender Top[[id number]]                                     Final result               Gray Top[[id number]]                                                                    Light Blue Top[[id number]]                                   Final result                 Please view results for these tests on the individual orders.    Green Top (Gel) [[id number]] Collected: 08/13/23 1303    Specimen: Blood Updated: 08/13/23 1415     Extra Tube Hold for add-ons.     Comment: Auto resulted.       Lavender Top [[id number]] Collected: 08/13/23 1303    Specimen: Blood Updated: 08/13/23 1415     Extra Tube hold for add-on     Comment: Auto resulted       Light Blue Top [[id number]] Collected: 08/13/23 1303    Specimen: Blood Updated: 08/13/23 1415     Extra Tube Hold for add-ons.     Comment: Auto resulted       Single High Sensitivity Troponin T [[id number]]  (Abnormal) Collected: 08/13/23 1303    Specimen: Blood Updated: 08/13/23 1327     HS Troponin T 62 ng/L     Narrative:      High Sensitive Troponin T Reference Range:  <10.0 ng/L- Negative Female for AMI  <15.0 ng/L- Negative Male for AMI  >=10 - Abnormal Female indicating possible myocardial injury.  >=15 - Abnormal Male indicating possible myocardial injury.   Clinicians would have to utilize clinical acumen, EKG, Troponin, and serial changes to determine if it is an Acute Myocardial Infarction or myocardial injury due to an underlying chronic condition.               Imaging Results (Last 24 Hours)       ** No results found for the last 24 hours. **          I have personally reviewed and interpreted the radiology studies and ECG obtained at time of admission.     Assessment / Plan   Assessment:   Active Hospital Problems    Diagnosis     **Chest  pain        Treatment Plan  The patient will be admitted to my service here at [facility].     1.  Chest pain  -Trend Troponins  -ASA  -Plavix  -Lipitor  -Suspect this represents costochondritis  -Colchicine  -Prednisone    2.  CAD  -ASA  -Plavix  -Lipitor  -Metoprolol  -Lisinopril    3.  BPH  -Hytrin    4.  HTN  -Lisinopril  -Metoprolol  -Hygrton        Medical Decision Making  Number and Complexity of problems: 4 problems, moderate complexity    Risk:  High:  decision on admission/observation    Differential Diagnosis:costochrindritis/pleuristy    Conditions and Status        Condition is worsening.     [facility] Data  1:  Tests/Documents/Independent Historians:  A:  Prior external notes from unique source reviewed:  B.  Review of the Results of Each Unique Test: (3) CBC, CMP, Troponin  C.  Assessment requiring an independent Historian:    2.  Independent interpretation of Tests:  A.  Radiology Interpretation: (1) CTA Chest:  No PE, No dissection, Aortic Aneurysm  B:  EKG/Telemetry Interpretation: (1) NSR    3:  Discussion of management or Test interpretation:  n/a       Care Planning  Shared decision making: patient agreeable to treatment plan  Code status and discussions: full code    Disposition  Social Determinants of Health that impact treatment or disposition: n/a  Estimated length of stay is 1-2 days.     I confirmed that the patient's advanced care plan is present, code status is documented, and a surrogate decision maker is listed in the patient's medical record.     The patient's surrogate decision maker is his wife.     The patient was seen and examined by me on 8/13/23 at 1920.    Electronically signed by Mahad Shields MD, 08/13/23, 19:41 CDT.

## 2023-08-15 ENCOUNTER — APPOINTMENT (OUTPATIENT)
Dept: CARDIOLOGY | Facility: HOSPITAL | Age: 78
End: 2023-08-15
Payer: MEDICARE

## 2023-08-15 PROBLEM — N40.0 BPH WITHOUT OBSTRUCTION/LOWER URINARY TRACT SYMPTOMS: Status: ACTIVE | Noted: 2023-08-15

## 2023-08-15 PROBLEM — E87.5 HYPERKALEMIA: Status: ACTIVE | Noted: 2023-08-15

## 2023-08-15 PROBLEM — I50.43 ACUTE ON CHRONIC COMBINED SYSTOLIC AND DIASTOLIC CHF (CONGESTIVE HEART FAILURE): Status: ACTIVE | Noted: 2023-08-15

## 2023-08-15 PROBLEM — N18.4 CKD (CHRONIC KIDNEY DISEASE) STAGE 4, GFR 15-29 ML/MIN: Status: ACTIVE | Noted: 2023-08-15

## 2023-08-15 PROBLEM — R77.8 ELEVATED TROPONIN: Status: ACTIVE | Noted: 2023-08-15

## 2023-08-15 PROBLEM — I25.119 CORONARY ARTERY DISEASE INVOLVING NATIVE HEART WITH ANGINA PECTORIS: Status: ACTIVE | Noted: 2023-08-15

## 2023-08-15 LAB
ANION GAP SERPL CALCULATED.3IONS-SCNC: 10 MMOL/L (ref 5–15)
BH CV STRESS BP STAGE 1: NORMAL
BH CV STRESS COMMENTS STAGE 1: NORMAL
BH CV STRESS DOSE REGADENOSON STAGE 1: 0.4
BH CV STRESS DURATION MIN STAGE 1: 0
BH CV STRESS DURATION SEC STAGE 1: 10
BH CV STRESS HR STAGE 1: 61
BH CV STRESS PROTOCOL 1: NORMAL
BH CV STRESS RECOVERY BP: NORMAL MMHG
BH CV STRESS RECOVERY HR: 66 BPM
BH CV STRESS STAGE 1: 1
BUN SERPL-MCNC: 70 MG/DL (ref 8–23)
BUN/CREAT SERPL: 26.6 (ref 7–25)
CALCIUM SPEC-SCNC: 10.7 MG/DL (ref 8.6–10.5)
CHLORIDE SERPL-SCNC: 102 MMOL/L (ref 98–107)
CO2 SERPL-SCNC: 24 MMOL/L (ref 22–29)
CREAT SERPL-MCNC: 2.63 MG/DL (ref 0.76–1.27)
EGFRCR SERPLBLD CKD-EPI 2021: 23.8 ML/MIN/1.73
GLUCOSE SERPL-MCNC: 170 MG/DL (ref 65–99)
MAXIMAL PREDICTED HEART RATE: 140 BPM
PERCENT MAX PREDICTED HR: 43.57 %
POTASSIUM SERPL-SCNC: 5.5 MMOL/L (ref 3.5–5.2)
SODIUM SERPL-SCNC: 136 MMOL/L (ref 136–145)
STRESS BASELINE BP: NORMAL MMHG
STRESS BASELINE HR: 64 BPM
STRESS PERCENT HR: 51 %
STRESS POST EXERCISE DUR MIN: 0 MIN
STRESS POST EXERCISE DUR SEC: 10 SEC
STRESS POST PEAK BP: NORMAL MMHG
STRESS POST PEAK HR: 61 BPM
STRESS TARGET HR: 119 BPM

## 2023-08-15 PROCEDURE — G0378 HOSPITAL OBSERVATION PER HR: HCPCS

## 2023-08-15 PROCEDURE — 0 TECHNETIUM TETROFOSMIN KIT: Performed by: INTERNAL MEDICINE

## 2023-08-15 PROCEDURE — 93018 CV STRESS TEST I&R ONLY: CPT | Performed by: HOSPITALIST

## 2023-08-15 PROCEDURE — 78452 HT MUSCLE IMAGE SPECT MULT: CPT | Performed by: HOSPITALIST

## 2023-08-15 PROCEDURE — 25010000002 ENOXAPARIN PER 10 MG: Performed by: FAMILY MEDICINE

## 2023-08-15 PROCEDURE — A9502 TC99M TETROFOSMIN: HCPCS | Performed by: INTERNAL MEDICINE

## 2023-08-15 PROCEDURE — 99214 OFFICE O/P EST MOD 30 MIN: CPT

## 2023-08-15 PROCEDURE — 80048 BASIC METABOLIC PNL TOTAL CA: CPT | Performed by: HOSPITALIST

## 2023-08-15 PROCEDURE — 25010000002 REGADENOSON 0.4 MG/5ML SOLUTION: Performed by: HOSPITALIST

## 2023-08-15 PROCEDURE — 78452 HT MUSCLE IMAGE SPECT MULT: CPT

## 2023-08-15 PROCEDURE — 93017 CV STRESS TEST TRACING ONLY: CPT

## 2023-08-15 PROCEDURE — 96372 THER/PROPH/DIAG INJ SC/IM: CPT

## 2023-08-15 RX ORDER — LIDOCAINE 50 MG/G
1 PATCH TOPICAL
Status: DISCONTINUED | OUTPATIENT
Start: 2023-08-15 | End: 2023-08-17 | Stop reason: HOSPADM

## 2023-08-15 RX ORDER — PREDNISONE 20 MG/1
20 TABLET ORAL DAILY
Status: DISCONTINUED | OUTPATIENT
Start: 2023-08-16 | End: 2023-08-17 | Stop reason: HOSPADM

## 2023-08-15 RX ORDER — ENOXAPARIN SODIUM 150 MG/ML
1 INJECTION SUBCUTANEOUS EVERY 24 HOURS
Status: DISCONTINUED | OUTPATIENT
Start: 2023-08-16 | End: 2023-08-16

## 2023-08-15 RX ORDER — REGADENOSON 0.08 MG/ML
0.4 INJECTION, SOLUTION INTRAVENOUS ONCE
Status: COMPLETED | OUTPATIENT
Start: 2023-08-15 | End: 2023-08-15

## 2023-08-15 RX ADMIN — SODIUM ZIRCONIUM CYCLOSILICATE 10 G: 10 POWDER, FOR SUSPENSION ORAL at 10:18

## 2023-08-15 RX ADMIN — HYDROCODONE BITARTRATE AND ACETAMINOPHEN 1 TABLET: 10; 325 TABLET ORAL at 23:57

## 2023-08-15 RX ADMIN — ATORVASTATIN CALCIUM 40 MG: 40 TABLET, FILM COATED ORAL at 10:19

## 2023-08-15 RX ADMIN — METOPROLOL SUCCINATE 100 MG: 100 TABLET, FILM COATED, EXTENDED RELEASE ORAL at 10:27

## 2023-08-15 RX ADMIN — HYDROCODONE BITARTRATE AND ACETAMINOPHEN 1 TABLET: 10; 325 TABLET ORAL at 18:18

## 2023-08-15 RX ADMIN — ASPIRIN 81 MG: 81 TABLET, COATED ORAL at 10:20

## 2023-08-15 RX ADMIN — VENLAFAXINE HYDROCHLORIDE 75 MG: 37.5 TABLET ORAL at 10:19

## 2023-08-15 RX ADMIN — CLOPIDOGREL BISULFATE 75 MG: 75 TABLET, FILM COATED ORAL at 10:20

## 2023-08-15 RX ADMIN — TETROFOSMIN 1 DOSE: 1.38 INJECTION, POWDER, LYOPHILIZED, FOR SOLUTION INTRAVENOUS at 11:33

## 2023-08-15 RX ADMIN — TERAZOSIN HYDROCHLORIDE 5 MG: 5 CAPSULE ORAL at 20:16

## 2023-08-15 RX ADMIN — REGADENOSON 0.4 MG: 0.08 INJECTION, SOLUTION INTRAVENOUS at 11:24

## 2023-08-15 RX ADMIN — LIDOCAINE 1 PATCH: 50 PATCH CUTANEOUS at 10:24

## 2023-08-15 RX ADMIN — TETROFOSMIN 1 DOSE: 1.38 INJECTION, POWDER, LYOPHILIZED, FOR SOLUTION INTRAVENOUS at 09:55

## 2023-08-15 RX ADMIN — ENOXAPARIN SODIUM 105 MG: 150 INJECTION SUBCUTANEOUS at 05:46

## 2023-08-15 RX ADMIN — SODIUM ZIRCONIUM CYCLOSILICATE 10 G: 10 POWDER, FOR SUSPENSION ORAL at 16:33

## 2023-08-15 RX ADMIN — SODIUM ZIRCONIUM CYCLOSILICATE 10 G: 10 POWDER, FOR SUSPENSION ORAL at 20:16

## 2023-08-15 NOTE — PROGRESS NOTES
[facility] -  Progress Note     LOS: 0 days   Patient Care Team:  Rikki Worrell MD as PCP - General (Family Medicine)  Dionicio Cardenas MD as Consulting Physician (Otolaryngology)    Chief Complaint: Follow-up chest pain    Subjective     Interval History:   Upon my initial examination the patient is sleeping in bed.  Upon awakening patient states he had intermittent chest pain throughout the night.  He states the pain is worse when he takes a deep breath or coughs.  He says the pain is even worse if you press hard on his chest.  He said he would like to proceed with stress test at this time to further evaluate potential cardiac causes of his chest pain.  I called and I spoke with the daughter and she agrees that proceeding with stress test at this time is what the patient wants.      Review of Systems:     Review of Systems   Respiratory:  Negative for shortness of breath.    Cardiovascular:  Positive for chest pain. Negative for leg swelling.   Objective     Vital Sign Min/Max for last 24 hours  Temp  Min: 97.1 øF (36.2 øC)  Max: 98.6 øF (37 øC)   BP  Min: 89/54  Max: 133/74   Pulse  Min: 60  Max: 67   Resp  Min: 18  Max: 18   SpO2  Min: 90 %  Max: 96 %   No data recorded   Weight  Min: 112 kg (247 lb 9.2 oz)  Max: 112 kg (247 lb 9.2 oz)         08/14/23 2008   Weight: 112 kg (247 lb 9.2 oz)       Telemetry: V paced and AV paced in the 60s      Physical Exam:    Constitutional:       Appearance: Frail. Chronically ill-appearing.   Neck:      Vascular: JVD normal.   Pulmonary:      Effort: Pulmonary effort is normal.      Breath sounds: Normal breath sounds. No wheezing. No rhonchi. No rales.   Cardiovascular:      PMI at left midclavicular line. Normal rate. Regular rhythm. Normal S1. Normal S2.       Murmurs: There is no murmur.      No gallop.  No click. No rub.   Edema:     Peripheral edema absent.   Musculoskeletal: Normal range of motion.      Cervical back: Normal range  of motion. Skin:     General: Skin is warm and dry.   Neurological:      Mental Status: Alert and oriented to person, place and time.     Results Review:   Lab Results (last 72 hours)       Procedure Component Value Units Date/Time    Basic Metabolic Panel [[id number]]  (Abnormal) Collected: 08/15/23 0826    Specimen: Blood Updated: 08/15/23 0917     Glucose 170 mg/dL      BUN 70 mg/dL      Creatinine 2.63 mg/dL      Sodium 136 mmol/L      Potassium 5.5 mmol/L      Chloride 102 mmol/L      CO2 24.0 mmol/L      Calcium 10.7 mg/dL      BUN/Creatinine Ratio 26.6     Anion Gap 10.0 mmol/L      eGFR 23.8 mL/min/1.73     Narrative:      GFR Normal >60  Chronic Kidney Disease <60  Kidney Failure <15    The GFR formula is only valid for adults with stable renal function between ages 18 and 70.    Protime-INR [[id number]]  (Normal) Collected: 08/14/23 1216    Specimen: Blood Updated: 08/14/23 1232     Protime 13.5 Seconds      INR 1.02    Basic Metabolic Panel [[id number]]  (Abnormal) Collected: 08/14/23 0916    Specimen: Blood Updated: 08/14/23 1044     Glucose 188 mg/dL      BUN 55 mg/dL      Creatinine 2.29 mg/dL      Sodium 136 mmol/L      Potassium 6.4 mmol/L      Comment: Slight hemolysis detected by analyzer. Results may be affected.        Chloride 101 mmol/L      CO2 23.0 mmol/L      Calcium 11.0 mg/dL      BUN/Creatinine Ratio 24.0     Anion Gap 12.0 mmol/L      eGFR 28.2 mL/min/1.73     Narrative:      GFR Normal >60  Chronic Kidney Disease <60  Kidney Failure <15    The GFR formula is only valid for adults with stable renal function between ages 18 and 70.    High Sensitivity Troponin T [[id number]]  (Abnormal) Collected: 08/14/23 0916    Specimen: Blood Updated: 08/14/23 1030     HS Troponin T 46 ng/L     Narrative:      High Sensitive Troponin T Reference Range:  <10.0 ng/L- Negative Female for AMI  <15.0 ng/L- Negative Male for AMI  >=10 - Abnormal Female indicating possible myocardial injury.  >=15 - Abnormal  Male indicating possible myocardial injury.   Clinicians would have to utilize clinical acumen, EKG, Troponin, and serial changes to determine if it is an Acute Myocardial Infarction or myocardial injury due to an underlying chronic condition.         BNP [[id number]]  (Normal) Collected: 08/14/23 0916    Specimen: Blood Updated: 08/14/23 1030     proBNP 875.0 pg/mL     Narrative:      Among patients with dyspnea, NT-proBNP is highly sensitive for the detection of acute congestive heart failure. In addition NT-proBNP of <300 pg/ml effectively rules out acute congestive heart failure with 99% negative predictive value.      CBC & Differential [[id number]]  (Abnormal) Collected: 08/14/23 0916    Specimen: Blood Updated: 08/14/23 1001    Narrative:      The following orders were created for panel order CBC & Differential.  Procedure                               Abnormality         Status                     ---------                               -----------         ------                     CBC Auto Differential[[id number]]        Abnormal            Final result                 Please view results for these tests on the individual orders.    CBC Auto Differential [[id number]]  (Abnormal) Collected: 08/14/23 0916    Specimen: Blood Updated: 08/14/23 1001     WBC 8.84 10*3/mm3      RBC 4.65 10*6/mm3      Hemoglobin 12.8 g/dL      Hematocrit 43.1 %      MCV 92.7 fL      MCH 27.5 pg      MCHC 29.7 g/dL      RDW 15.0 %      RDW-SD 51.2 fl      MPV 11.9 fL      Platelets 164 10*3/mm3      Neutrophil % 80.8 %      Lymphocyte % 17.1 %      Monocyte % 1.2 %      Eosinophil % 0.0 %      Basophil % 0.1 %      Immature Grans % 0.8 %      Neutrophils, Absolute 7.14 10*3/mm3      Lymphocytes, Absolute 1.51 10*3/mm3      Monocytes, Absolute 0.11 10*3/mm3      Eosinophils, Absolute 0.00 10*3/mm3      Basophils, Absolute 0.01 10*3/mm3      Immature Grans, Absolute 0.07 10*3/mm3      nRBC 0.0 /100 WBC     High Sensitivity Troponin T  [[id number]]  (Abnormal) Collected: 08/13/23 2042    Specimen: Blood Updated: 08/13/23 2117     HS Troponin T 84 ng/L     Narrative:      High Sensitive Troponin T Reference Range:  <10.0 ng/L- Negative Female for AMI  <15.0 ng/L- Negative Male for AMI  >=10 - Abnormal Female indicating possible myocardial injury.  >=15 - Abnormal Male indicating possible myocardial injury.   Clinicians would have to utilize clinical acumen, EKG, Troponin, and serial changes to determine if it is an Acute Myocardial Infarction or myocardial injury due to an underlying chronic condition.         Urinalysis With Culture If Indicated - Urine, Clean Catch [[id number]]  (Normal) Collected: 08/13/23 2038    Specimen: Urine, Clean Catch Updated: 08/13/23 2050     Color, UA Yellow     Appearance, UA Clear     pH, UA 5.5     Specific Gravity, UA 1.024     Glucose, UA Negative     Ketones, UA Negative     Bilirubin, UA Negative     Blood, UA Negative     Protein, UA Negative     Leuk Esterase, UA Negative     Nitrite, UA Negative     Urobilinogen, UA 0.2 E.U./dL    Narrative:      In absence of clinical symptoms, the presence of pyuria, bacteria, and/or nitrites on the urinalysis result does not correlate with infection.  Urine microscopic not indicated.    High Sensitivity Troponin T 2Hr [[id number]]  (Abnormal) Collected: 08/13/23 1559    Specimen: Blood Updated: 08/13/23 1620     HS Troponin T 69 ng/L      Troponin T Delta 7 ng/L     Narrative:      High Sensitive Troponin T Reference Range:  <10.0 ng/L- Negative Female for AMI  <15.0 ng/L- Negative Male for AMI  >=10 - Abnormal Female indicating possible myocardial injury.  >=15 - Abnormal Male indicating possible myocardial injury.   Clinicians would have to utilize clinical acumen, EKG, Troponin, and serial changes to determine if it is an Acute Myocardial Infarction or myocardial injury due to an underlying chronic condition.         [place] Draw [[id number]] Collected: 08/13/23 1303     Specimen: Blood Updated: 08/13/23 1415    Narrative:      The following orders were created for panel order [place] Draw.  Procedure                               Abnormality         Status                     ---------                               -----------         ------                     Green Top (Gel)[[id number]]                                  Final result               Lavender Top[[id number]]                                     Final result               Gray Top[[id number]]                                                                    Light Blue Top[[id number]]                                   Final result                 Please view results for these tests on the individual orders.    Green Top (Gel) [[id number]] Collected: 08/13/23 1303    Specimen: Blood Updated: 08/13/23 1415     Extra Tube Hold for add-ons.     Comment: Auto resulted.       Lavender Top [[id number]] Collected: 08/13/23 1303    Specimen: Blood Updated: 08/13/23 1415     Extra Tube hold for add-on     Comment: Auto resulted       Light Blue Top [[id number]] Collected: 08/13/23 1303    Specimen: Blood Updated: 08/13/23 1415     Extra Tube Hold for add-ons.     Comment: Auto resulted       Single High Sensitivity Troponin T [[id number]]  (Abnormal) Collected: 08/13/23 1303    Specimen: Blood Updated: 08/13/23 1327     HS Troponin T 62 ng/L     Narrative:      High Sensitive Troponin T Reference Range:  <10.0 ng/L- Negative Female for AMI  <15.0 ng/L- Negative Male for AMI  >=10 - Abnormal Female indicating possible myocardial injury.  >=15 - Abnormal Male indicating possible myocardial injury.   Clinicians would have to utilize clinical acumen, EKG, Troponin, and serial changes to determine if it is an Acute Myocardial Infarction or myocardial injury due to an underlying chronic condition.                     Echo EF Estimated  Lab Results   Component Value Date    ECHOEFEST 50.0 08/14/2023         Cath Ejection Fraction  Quantitative  No results found for: [facility]        Medication Review: yes  Current Facility-Administered Medications   Medication Dose Route Frequency Provider Last Rate Last Admin    acetaminophen (TYLENOL) tablet 650 mg  650 mg Oral Q6H PRN Mahad Shields MD   650 mg at 08/13/23 2108    aspirin EC tablet 81 mg  81 mg Oral Daily Mahad Shields MD   81 mg at 08/14/23 0934    atorvastatin (LIPITOR) tablet 40 mg  40 mg Oral Daily Mahad Shields MD   40 mg at 08/14/23 0933    clopidogrel (PLAVIX) tablet 75 mg  75 mg Oral Daily Mahad Shields MD   75 mg at 08/14/23 0934    [START ON 8/16/2023] Enoxaparin Sodium (LOVENOX) syringe 105 mg  1 mg/kg Subcutaneous Q24H Michael Dale MD        HYDROcodone-acetaminophen (NORCO)  MG per tablet 1 tablet  1 tablet Oral Q6H PRN Mahad Shields MD   1 tablet at 08/13/23 1705    isosorbide mononitrate (IMDUR) 24 hr tablet 30 mg  30 mg Oral Q24H Julito Pradhan APRN   30 mg at 08/14/23 1746    lidocaine (LIDODERM) 5 % 1 patch  1 patch Transdermal Q24H Michael Dale MD        melatonin tablet 3 mg  3 mg Oral Nightly PRN Mahad Shields MD        metoprolol succinate XL (TOPROL-XL) 24 hr tablet 100 mg  100 mg Oral Daily Julito Pradhan APRN        Morphine sulfate (PF) injection 2 mg  2 mg Intravenous Q2H PRN Saman Bills MD   2 mg at 08/13/23 2336    nitroglycerin (NITROSTAT) SL tablet 0.4 mg  0.4 mg Sublingual Q5 Min PRN Mahad Shields MD        ondansetron (ZOFRAN) injection 4 mg  4 mg Intravenous Q6H PRN Mahad Shields MD        Pharmacy to Dose enoxaparin (LOVENOX)   Does not apply Continuous PRN Julito Pradhan APRN        [START ON 8/16/2023] predniSONE (DELTASONE) tablet 20 mg  20 mg Oral Daily Michael Dale MD        sodium chloride 0.9 % flush 10 mL  10 mL Intravenous PRN Mahad Shields MD        sodium zirconium cyclosilicate (LOKELMA) pack 10 g  10 g Oral TID  Mahad Shields MD   10 g at 08/14/23 2027    terazosin (HYTRIN) capsule 5 mg  5 mg Oral Nightly Mahad Shields MD   5 mg at 08/14/23 2028    venlafaxine (EFFEXOR) tablet 75 mg  75 mg Oral Daily Mahad Shields MD   75 mg at 08/14/23 0934         Assessment & Plan       Chest pain/elevated troponin: Etiology still unclear.  Could be component of ACS, however given patient's current symptoms and feels like it may be more musculoskeletal  - Lidocaine patch ordered by attending service, agree with this approach  - We will proceed with Lexiscan for further evaluation of potential ischemia  - Pain did not improve with Lasix yesterday, worsening kidney function today.  Defer further diuresis at this time  - We will continue to hold off on nitroglycerin drip given that patient is resting comfortably when no one is in the room  -We will do renally dosed Lovenox for 48 hours  -Patient was started on Imdur 30 mg yesterday daily.    Coronary artery disease: Patient currently has chest pain with deep inspiration or coughing.  See above for plan regarding this  - Continue dual antiplatelet therapy of aspirin and Plavix, could consider transition to aspirin and Brilinta at discharge given recurrence of chest pain while on DAPT  - Continue atorvastatin 40 mg daily  - Continue beta-blocker of Toprol-XL 50 mg daily as well as ACE inhibitor 40 mg lisinopril daily    Hypertension: Patient is borderline hypotensive this morning, we stopped his chlorthalidone yesterday in favor of loop diuresis  - Continue beta-blocker and ACE inhibitor as noted above  - Could consider gentle rehydration, monitor throughout day.    Further orders per Dr. Malik      Electronically signed by CARLA Briggs, 08/15/23, 9:42 AM CDT.

## 2023-08-15 NOTE — PROGRESS NOTES
"    [facility] Medicine Services  INPATIENT PROGRESS NOTE    Patient Name: Dalton Cheatham  Date of Admission: 8/13/2023  Today's Date: 08/15/23  Length of Stay: 0  Primary Care Physician: Rikki Worrell MD    Subjective   Chief Complaint: Chest pain  HPI   Patient continues to report some pain in the left side of his chest, and agree with previous clinicians that it does seem reproducible.  Patient describes having bouts of coughing recently and feels like \"I pulled a muscle.\"  He does report worsening pain when palpating over the left side of his chest.  Denies any shortness of breath.  Reports a cough productive of occasional yellow sputum but feels like that the cough is slowly to get better.  Has not been exerting himself hardly at all while in the hospital so he was unsure if he would have any dyspnea with exertion.  No acute events from overnight.    Review of Systems   All pertinent negatives and positives are as above. All other systems have been reviewed and are negative unless otherwise stated.     Objective    Temp:  [97.1 øF (36.2 øC)-98.6 øF (37 øC)] 98.6 øF (37 øC)  Heart Rate:  [60-67] 67  Resp:  [18] 18  BP: ()/(48-74) 89/54  Physical Exam  Vitals reviewed.   Constitutional:       Appearance: He is ill-appearing.   HENT:      Head: Normocephalic.      Mouth/Throat:      Mouth: Mucous membranes are moist.   Eyes:      Pupils: Pupils are equal, round, and reactive to light.   Cardiovascular:      Rate and Rhythm: Normal rate.   Pulmonary:      Effort: Pulmonary effort is normal. No respiratory distress.   Musculoskeletal:         General: Swelling (trace) present. No deformity.   Skin:     Capillary Refill: Capillary refill takes less than 2 seconds.      Findings: Bruising present.   Neurological:      Mental Status: He is alert.      Motor: Weakness present.   Psychiatric:         Mood and Affect: Mood normal.           Results Review:  I have reviewed " the labs, radiology results, and diagnostic studies.    Laboratory Data:   Results from last 7 days   Lab Units 08/14/23  0916   WBC 10*3/mm3 8.84   HEMOGLOBIN g/dL 12.8*   HEMATOCRIT % 43.1   PLATELETS 10*3/mm3 164        Results from last 7 days   Lab Units 08/14/23  0916 08/13/23  0514   SODIUM mmol/L 136 137   POTASSIUM mmol/L 6.4* 5.5*   CHLORIDE mmol/L 101 104   TOTAL CO2 mmol/L  --  25   CO2 mmol/L 23.0  --    BUN mg/dL 55* 44*   CREATININE mg/dL 2.29* 2.31*   CALCIUM mg/dL 11.0* 10.4*   BILIRUBIN mg/dL  --  0.2   ALK PHOS U/L  --  131   ALT (SGPT) U/L  --  25   AST (SGOT) U/L  --  20   GLUCOSE mg/dL 188*  --        Culture Data:   No results found for: BLOODCX, URINECX, WOUNDCX, MRSACX, RESPCX, STOOLCX    Radiology Data:   Imaging Results (Last 24 Hours)       ** No results found for the last 24 hours. **            I have reviewed the patient's current medications.     Assessment/Plan   Assessment  Active Hospital Problems    Diagnosis     **Chest pain     Hyperkalemia     CKD (chronic kidney disease) stage 4, GFR 15-29 ml/min     Elevated troponin     Coronary artery disease involving native heart with angina pectoris     Acute on chronic combined systolic and diastolic CHF (congestive heart failure)     BPH without obstruction/lower urinary tract symptoms        Treatment Plan  Cardiology recommendations reviewed from yesterday  Echo results as follows:  Results for orders placed during the hospital encounter of 08/13/23    Adult Transthoracic Echo Complete W/ Cont if Necessary Per Protocol    Interpretation Summary    Left ventricular systolic function is normal. Calculated left ventricular EF = 56% Left ventricular ejection fraction appears to be 51 - 55%.    Left ventricular wall thickness is consistent with moderate concentric hypertrophy.    Right ventricle is not well visualized but appears probably normal in size with noraml systolic function.    Left atrial cavity is borderline dilated.    There  is moderate calcification of the aortic valve without significant stenosis or regurgitation.    3.  BMP pending this AM to reassess renal function and hyperkalemia  4.  Hold ACE I pending results of K level.  Not a good candidate for spironolactone  5.  1 dose of 40 mg of IV Lasix administered yesterday on 8/14  6.  Continue aspirin, Plavix, statin  7.  Toprol-XL titrated  8.  Continue Imdur  9.  Currently on Lovenox at 1 mg/kg dosing - will change to once daily dosing given renal insufficiency  10.  Wound care  11.  Taper Prednisone  12.  Patient has been receiving Lokelma  13.  Appreciate cardiology input; considering Lexiscan today  14.  Trial of Lidoderm patch      Medical Decision Making  Number and Complexity of problems: High complexity; chest pain with elevated troponin and concern for possible myocardial injury, in addition to severe hyperkalemia in patient with known chronic kidney disease; in addition to acute on chronic systolic and diastolic CHF      Conditions and Status        Condition is unchanged.     MDM Data  External documents reviewed: none  Cardiac tracing (EKG, telemetry) interpretation: AV pacing 60s  Radiology interpretation: no new radiology studies  Labs reviewed: as above; BMP pending this AM  Any tests that were considered but not ordered: NM Lexiscan     Decision rules/scores evaluated (example [id number], [place], etc): none at this time     Discussed with: patient     Care Planning  Shared decision making: discussed with patient in detail with agreement to proceed with treatment plan as outlined  Code status and discussions: Full Code    Disposition  Social Determinants of Health that impact treatment or disposition: None apparent at this time  I expect the patient to be discharged to home in 1-3 days.    Electronically signed by Michael Dale MD, 08/15/23, 08:50 CDT.

## 2023-08-15 NOTE — PLAN OF CARE
Goal Outcome Evaluation:  Plan of Care Reviewed With: patient, family        Progress: improving  Outcome Evaluation: Stress test today appears to be positive for small blockage but chest pain still mostly explained by costochondritis. C/o pain this evening. Vitals WNL. Plan appears to be for medical management.

## 2023-08-16 LAB
ANION GAP SERPL CALCULATED.3IONS-SCNC: 7 MMOL/L (ref 5–15)
BUN SERPL-MCNC: 64 MG/DL (ref 8–23)
BUN/CREAT SERPL: 30.6 (ref 7–25)
CALCIUM SPEC-SCNC: 10.1 MG/DL (ref 8.6–10.5)
CHLORIDE SERPL-SCNC: 103 MMOL/L (ref 98–107)
CO2 SERPL-SCNC: 26 MMOL/L (ref 22–29)
CREAT SERPL-MCNC: 2.09 MG/DL (ref 0.76–1.27)
EGFRCR SERPLBLD CKD-EPI 2021: 31.4 ML/MIN/1.73
GLUCOSE SERPL-MCNC: 117 MG/DL (ref 65–99)
POTASSIUM SERPL-SCNC: 4.2 MMOL/L (ref 3.5–5.2)
SODIUM SERPL-SCNC: 136 MMOL/L (ref 136–145)

## 2023-08-16 PROCEDURE — G0378 HOSPITAL OBSERVATION PER HR: HCPCS

## 2023-08-16 PROCEDURE — 99214 OFFICE O/P EST MOD 30 MIN: CPT

## 2023-08-16 PROCEDURE — 25010000002 ENOXAPARIN PER 10 MG: Performed by: INTERNAL MEDICINE

## 2023-08-16 PROCEDURE — 63710000001 PREDNISONE PER 1 MG: Performed by: INTERNAL MEDICINE

## 2023-08-16 PROCEDURE — 80048 BASIC METABOLIC PNL TOTAL CA: CPT | Performed by: INTERNAL MEDICINE

## 2023-08-16 PROCEDURE — 96372 THER/PROPH/DIAG INJ SC/IM: CPT

## 2023-08-16 RX ORDER — HYDROCODONE BITARTRATE AND ACETAMINOPHEN 10; 325 MG/1; MG/1
1 TABLET ORAL EVERY 6 HOURS PRN
Status: DISCONTINUED | OUTPATIENT
Start: 2023-08-16 | End: 2023-08-17 | Stop reason: HOSPADM

## 2023-08-16 RX ADMIN — Medication 3 MG: at 20:54

## 2023-08-16 RX ADMIN — METOPROLOL SUCCINATE 100 MG: 100 TABLET, FILM COATED, EXTENDED RELEASE ORAL at 09:43

## 2023-08-16 RX ADMIN — ENOXAPARIN SODIUM 105 MG: 150 INJECTION SUBCUTANEOUS at 05:56

## 2023-08-16 RX ADMIN — SODIUM ZIRCONIUM CYCLOSILICATE 10 G: 10 POWDER, FOR SUSPENSION ORAL at 09:44

## 2023-08-16 RX ADMIN — VENLAFAXINE HYDROCHLORIDE 75 MG: 37.5 TABLET ORAL at 09:43

## 2023-08-16 RX ADMIN — ISOSORBIDE MONONITRATE 30 MG: 30 TABLET, EXTENDED RELEASE ORAL at 09:44

## 2023-08-16 RX ADMIN — TERAZOSIN HYDROCHLORIDE 5 MG: 5 CAPSULE ORAL at 20:52

## 2023-08-16 RX ADMIN — HYDROCODONE BITARTRATE AND ACETAMINOPHEN 1 TABLET: 10; 325 TABLET ORAL at 05:56

## 2023-08-16 RX ADMIN — HYDROCODONE BITARTRATE AND ACETAMINOPHEN 1 TABLET: 10; 325 TABLET ORAL at 18:17

## 2023-08-16 RX ADMIN — PREDNISONE 20 MG: 20 TABLET ORAL at 09:44

## 2023-08-16 RX ADMIN — CLOPIDOGREL BISULFATE 75 MG: 75 TABLET, FILM COATED ORAL at 09:43

## 2023-08-16 RX ADMIN — ATORVASTATIN CALCIUM 40 MG: 40 TABLET, FILM COATED ORAL at 09:44

## 2023-08-16 RX ADMIN — SODIUM ZIRCONIUM CYCLOSILICATE 10 G: 10 POWDER, FOR SUSPENSION ORAL at 16:01

## 2023-08-16 RX ADMIN — LIDOCAINE 1 PATCH: 50 PATCH CUTANEOUS at 09:44

## 2023-08-16 RX ADMIN — ASPIRIN 81 MG: 81 TABLET, COATED ORAL at 09:44

## 2023-08-16 RX ADMIN — HYDROCODONE BITARTRATE AND ACETAMINOPHEN 1 TABLET: 10; 325 TABLET ORAL at 12:17

## 2023-08-16 RX ADMIN — ACETAMINOPHEN 650 MG: 325 TABLET, FILM COATED ORAL at 02:49

## 2023-08-16 NOTE — PROGRESS NOTES
[facility] Medicine Services  INPATIENT PROGRESS NOTE    Patient Name: Dalton Cheatham  Date of Admission: 8/13/2023  Today's Date: 08/16/23  Length of Stay: 0  Primary Care Physician: Rikki Worrell MD    Subjective   Chief Complaint: Follow-up  HPI     No new complaints  Asking when he can go home.  Mentioned to him about the intermediate risk study on stress testing.  Awaiting further information from cardiology  States that he still has some discomfort on the left side of his chest as he points to it.    States that he lives with wife and daughter.      Review of Systems   All pertinent negatives and positives are as above. All other systems have been reviewed and are negative unless otherwise stated.     Objective    Temp:  [97.8 øF (36.6 øC)-98.7 øF (37.1 øC)] 97.8 øF (36.6 øC)  Heart Rate:  [60-64] 61  Resp:  [16-18] 18  BP: (115-130)/(49-66) 130/66  Physical Exam  Obese man  Not in any distress  On 2 L nasal cannula with saturation in the mid 90s obese with BMI of 37    GEN: Awake, alert, interactive, in NAD  HEENT: Atraumatic, PERRLA, EOMI, Anicteric, Trachea midline  Lungs: CTAB, no wheezing/rales/rhonchi  Heart: RRR, +S1/s2, no rub  ABD: Globular abdomen soft, nt/nd, +BS, no guarding/rebound  Extremities: atraumatic, no cyanosis, no edema  Skin: no rashes or lesions  Neuro: AAOx3, no focal deficits  Psych: normal mood & affect        Results Review:  I have reviewed the labs, radiology results, and diagnostic studies.    Laboratory Data:   Results from last 7 days   Lab Units 08/14/23  0916   WBC 10*3/mm3 8.84   HEMOGLOBIN g/dL 12.8*   HEMATOCRIT % 43.1   PLATELETS 10*3/mm3 164        Results from last 7 days   Lab Units 08/16/23  0711 08/15/23  0826 08/14/23  0916 08/13/23  0514   SODIUM mmol/L 136 136 136 137   POTASSIUM mmol/L 4.2 5.5* 6.4* 5.5*   CHLORIDE mmol/L 103 102 101 104   TOTAL CO2 mmol/L  --   --   --  25   CO2 mmol/L 26.0 24.0 23.0  --    BUN mg/dL  64* 70* 55* 44*   CREATININE mg/dL 2.09* 2.63* 2.29* 2.31*   CALCIUM mg/dL 10.1 10.7* 11.0* 10.4*   BILIRUBIN mg/dL  --   --   --  0.2   ALK PHOS U/L  --   --   --  131   ALT (SGPT) U/L  --   --   --  25   AST (SGOT) U/L  --   --   --  20   GLUCOSE mg/dL 117* 170* 188*  --        Culture Data:   No results found for: BLOODCX, URINECX, WOUNDCX, MRSACX, RESPCX, STOOLCX    Radiology Data:   Imaging Results (Last 24 Hours)       ** No results found for the last 24 hours. **            I have reviewed the patient's current medications.     Assessment/Plan   Assessment  Active Hospital Problems    Diagnosis     **Chest pain     Hyperkalemia     CKD (chronic kidney disease) stage 4, GFR 15-29 ml/min     Elevated troponin     Coronary artery disease involving native heart with angina pectoris     Acute on chronic combined systolic and diastolic CHF (congestive heart failure)     BPH without obstruction/lower urinary tract symptoms              Medical Decision Making  Number and Complexity of problems:   Hyperkalemia-resolved  Acute kidney injury improving  Chest pain.  Intermediate risk stress test study.  Cardiology following.  Cardiology: NSTEMI/coronary artery disease.  Patient on dual antiplatelet therapy, Toprol-XL, atorvastatin.  Lovenox has been discontinued by cardiology  Hypertension- beta-blocker, isosorbide mononitrate, terazosin.  Previously on lisinopril but likely due to hyperkalemia was discontinued.  Monitor blood pressure  Aneurysmal dilatation of the aorta at the aortic habitus.  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  Awaiting further recommendation  Medications reviewed.  Continue present management      aspirin, 81 mg, Oral, Daily  atorvastatin, 40 mg, Oral, Daily  clopidogrel, 75 mg, Oral, Daily  isosorbide mononitrate, 30 mg, Oral, Q24H  lidocaine, 1 patch, Transdermal, Q24H  metoprolol succinate XL, 100 mg, Oral, Daily  predniSONE, 20  mg, Oral, Daily  terazosin, 5 mg, Oral, Nightly  venlafaxine, 75 mg, Oral, Daily  Conditions and Status  Fair     MDM Data  External documents reviewed: Epic and other notes    Cardiac tracing (EKG, telemetry) interpretation: Results for orders placed during the hospital encounter of 08/13/23    Adult Transthoracic Echo Complete W/ Cont if Necessary Per Protocol    Interpretation Summary    Left ventricular systolic function is normal. Calculated left ventricular EF = 56% Left ventricular ejection fraction appears to be 51 - 55%.    Left ventricular wall thickness is consistent with moderate concentric hypertrophy.    Right ventricle is not well visualized but appears probably normal in size with noraml systolic function.    Left atrial cavity is borderline dilated.    There is moderate calcification of the aortic valve without significant stenosis or regurgitation.      Radiology interpretation: CT angiogram of the chest, abdomen pelvis reviewed.  So much information that needs to be further looked at correlated  Labs reviewed: Reviewed  Any tests that were considered but not ordered: None     Decision rules/scores evaluated (example [id number], Wells, etc): None     Discussed with: Patient and nursing staff     Care Planning  Shared decision making: Consultant and patient  Code status and discussions: Full code    Disposition  Social Determinants of Health that impact treatment or disposition: None identified at this time  I expect the patient to be discharged to yet to be determined    Electronically signed by Daryl Cummings MD, 08/16/23, 18:17 CDT.

## 2023-08-16 NOTE — PLAN OF CARE
Goal Outcome Evaluation:   Pain controlled through PRN pain meds. Pt is A&Ox4 and has been afebrile. He has been in and out of the chair today.

## 2023-08-16 NOTE — PROGRESS NOTES
[facility] -  Progress Note     LOS: 0 days   Patient Care Team:  Rikki Worrell MD as PCP - General (Family Medicine)  Dionicio Cardenas MD as Consulting Physician (Otolaryngology)    Chief Complaint: Follow-up chest pain    Subjective     Interval History:   Overnight patient has minimal cardiac complaints.  He still says that he has a pain in the left side of his chest with movement or if you press on it.  He denies any shortness of breath.  He denies any worsening of the chest pain with ambulation.  He denies any other cardiac symptoms such as presyncope, syncope, diaphoresis, or worsening lower extremity swelling.  His activity tolerance is stable according to him and near his baseline.  Stress test did show an area of potential partial reversibility, however this is also in the area of his known mid LAD infarct and therefore utility of left heart catheterization for further revascularization is questionable      Review of Systems:     Review of Systems   Cardiovascular:  Positive for chest pain (Musculoskeletal, worse with palpation.).   Objective     Vital Sign Min/Max for last 24 hours  Temp  Min: 97.4 øF (36.3 øC)  Max: 98.7 øF (37.1 øC)   BP  Min: 115/62  Max: 154/86   Pulse  Min: 60  Max: 64   Resp  Min: 18  Max: 18   SpO2  Min: 93 %  Max: 100 %   No data recorded   Weight  Min: 113 kg (249 lb 1.9 oz)  Max: 113 kg (249 lb 1.9 oz)         08/15/23  1129   Weight: 113 kg (249 lb 1.9 oz)       Telemetry:       Physical Exam:    Constitutional:       Appearance: Healthy appearance. Not in distress.   Neck:      Vascular: JVD normal.   Pulmonary:      Effort: Pulmonary effort is normal.      Breath sounds: Normal breath sounds. No wheezing. No rhonchi. No rales.   Cardiovascular:      PMI at left midclavicular line. Normal rate. Regular rhythm. Normal S1. Normal S2.       Murmurs: There is no murmur.      No gallop.  No click. No rub.   Edema:     Peripheral edema absent.    Musculoskeletal: Normal range of motion.      Cervical back: Normal range of motion. Skin:     General: Skin is warm and dry.   Neurological:      Mental Status: Alert and oriented to person, place and time.     Results Review:   Lab Results (last 72 hours)       Procedure Component Value Units Date/Time    Basic Metabolic Panel [[id number]]  (Abnormal) Collected: 08/16/23 0711    Specimen: Blood Updated: 08/16/23 0744     Glucose 117 mg/dL      BUN 64 mg/dL      Creatinine 2.09 mg/dL      Sodium 136 mmol/L      Potassium 4.2 mmol/L      Chloride 103 mmol/L      CO2 26.0 mmol/L      Calcium 10.1 mg/dL      BUN/Creatinine Ratio 30.6     Anion Gap 7.0 mmol/L      eGFR 31.4 mL/min/1.73     Narrative:      GFR Normal >60  Chronic Kidney Disease <60  Kidney Failure <15    The GFR formula is only valid for adults with stable renal function between ages 18 and 70.    Basic Metabolic Panel [[id number]]  (Abnormal) Collected: 08/15/23 0826    Specimen: Blood Updated: 08/15/23 0917     Glucose 170 mg/dL      BUN 70 mg/dL      Creatinine 2.63 mg/dL      Sodium 136 mmol/L      Potassium 5.5 mmol/L      Chloride 102 mmol/L      CO2 24.0 mmol/L      Calcium 10.7 mg/dL      BUN/Creatinine Ratio 26.6     Anion Gap 10.0 mmol/L      eGFR 23.8 mL/min/1.73     Narrative:      GFR Normal >60  Chronic Kidney Disease <60  Kidney Failure <15    The GFR formula is only valid for adults with stable renal function between ages 18 and 70.    Protime-INR [[id number]]  (Normal) Collected: 08/14/23 1216    Specimen: Blood Updated: 08/14/23 1232     Protime 13.5 Seconds      INR 1.02    Basic Metabolic Panel [[id number]]  (Abnormal) Collected: 08/14/23 0916    Specimen: Blood Updated: 08/14/23 1044     Glucose 188 mg/dL      BUN 55 mg/dL      Creatinine 2.29 mg/dL      Sodium 136 mmol/L      Potassium 6.4 mmol/L      Comment: Slight hemolysis detected by analyzer. Results may be affected.        Chloride 101 mmol/L      CO2 23.0 mmol/L      Calcium  11.0 mg/dL      BUN/Creatinine Ratio 24.0     Anion Gap 12.0 mmol/L      eGFR 28.2 mL/min/1.73     Narrative:      GFR Normal >60  Chronic Kidney Disease <60  Kidney Failure <15    The GFR formula is only valid for adults with stable renal function between ages 18 and 70.    High Sensitivity Troponin T [[id number]]  (Abnormal) Collected: 08/14/23 0916    Specimen: Blood Updated: 08/14/23 1030     HS Troponin T 46 ng/L     Narrative:      High Sensitive Troponin T Reference Range:  <10.0 ng/L- Negative Female for AMI  <15.0 ng/L- Negative Male for AMI  >=10 - Abnormal Female indicating possible myocardial injury.  >=15 - Abnormal Male indicating possible myocardial injury.   Clinicians would have to utilize clinical acumen, EKG, Troponin, and serial changes to determine if it is an Acute Myocardial Infarction or myocardial injury due to an underlying chronic condition.         BNP [[id number]]  (Normal) Collected: 08/14/23 0916    Specimen: Blood Updated: 08/14/23 1030     proBNP 875.0 pg/mL     Narrative:      Among patients with dyspnea, NT-proBNP is highly sensitive for the detection of acute congestive heart failure. In addition NT-proBNP of <300 pg/ml effectively rules out acute congestive heart failure with 99% negative predictive value.      CBC & Differential [[id number]]  (Abnormal) Collected: 08/14/23 0916    Specimen: Blood Updated: 08/14/23 1001    Narrative:      The following orders were created for panel order CBC & Differential.  Procedure                               Abnormality         Status                     ---------                               -----------         ------                     CBC Auto Differential[[id number]]        Abnormal            Final result                 Please view results for these tests on the individual orders.    CBC Auto Differential [[id number]]  (Abnormal) Collected: 08/14/23 0916    Specimen: Blood Updated: 08/14/23 1001     WBC 8.84 10*3/mm3      RBC 4.65  10*6/mm3      Hemoglobin 12.8 g/dL      Hematocrit 43.1 %      MCV 92.7 fL      MCH 27.5 pg      MCHC 29.7 g/dL      RDW 15.0 %      RDW-SD 51.2 fl      MPV 11.9 fL      Platelets 164 10*3/mm3      Neutrophil % 80.8 %      Lymphocyte % 17.1 %      Monocyte % 1.2 %      Eosinophil % 0.0 %      Basophil % 0.1 %      Immature Grans % 0.8 %      Neutrophils, Absolute 7.14 10*3/mm3      Lymphocytes, Absolute 1.51 10*3/mm3      Monocytes, Absolute 0.11 10*3/mm3      Eosinophils, Absolute 0.00 10*3/mm3      Basophils, Absolute 0.01 10*3/mm3      Immature Grans, Absolute 0.07 10*3/mm3      nRBC 0.0 /100 WBC     High Sensitivity Troponin T [[id number]]  (Abnormal) Collected: 08/13/23 2042    Specimen: Blood Updated: 08/13/23 2117     HS Troponin T 84 ng/L     Narrative:      High Sensitive Troponin T Reference Range:  <10.0 ng/L- Negative Female for AMI  <15.0 ng/L- Negative Male for AMI  >=10 - Abnormal Female indicating possible myocardial injury.  >=15 - Abnormal Male indicating possible myocardial injury.   Clinicians would have to utilize clinical acumen, EKG, Troponin, and serial changes to determine if it is an Acute Myocardial Infarction or myocardial injury due to an underlying chronic condition.         Urinalysis With Culture If Indicated - Urine, Clean Catch [[id number]]  (Normal) Collected: 08/13/23 2038    Specimen: Urine, Clean Catch Updated: 08/13/23 2050     Color, UA Yellow     Appearance, UA Clear     pH, UA 5.5     Specific Gravity, UA 1.024     Glucose, UA Negative     Ketones, UA Negative     Bilirubin, UA Negative     Blood, UA Negative     Protein, UA Negative     Leuk Esterase, UA Negative     Nitrite, UA Negative     Urobilinogen, UA 0.2 E.U./dL    Narrative:      In absence of clinical symptoms, the presence of pyuria, bacteria, and/or nitrites on the urinalysis result does not correlate with infection.  Urine microscopic not indicated.    High Sensitivity Troponin T 2Hr [[id number]]  (Abnormal)  Collected: 08/13/23 1559    Specimen: Blood Updated: 08/13/23 1620     HS Troponin T 69 ng/L      Troponin T Delta 7 ng/L     Narrative:      High Sensitive Troponin T Reference Range:  <10.0 ng/L- Negative Female for AMI  <15.0 ng/L- Negative Male for AMI  >=10 - Abnormal Female indicating possible myocardial injury.  >=15 - Abnormal Male indicating possible myocardial injury.   Clinicians would have to utilize clinical acumen, EKG, Troponin, and serial changes to determine if it is an Acute Myocardial Infarction or myocardial injury due to an underlying chronic condition.         [place] Draw [[id number]] Collected: 08/13/23 1303    Specimen: Blood Updated: 08/13/23 1415    Narrative:      The following orders were created for panel order [place] Draw.  Procedure                               Abnormality         Status                     ---------                               -----------         ------                     Green Top (Gel)[[id number]]                                  Final result               Lavender Top[[id number]]                                     Final result               Gray Top[[id number]]                                                                    Light Blue Top[[id number]]                                   Final result                 Please view results for these tests on the individual orders.    Green Top (Gel) [[id number]] Collected: 08/13/23 1303    Specimen: Blood Updated: 08/13/23 1415     Extra Tube Hold for add-ons.     Comment: Auto resulted.       Lavender Top [[id number]] Collected: 08/13/23 1303    Specimen: Blood Updated: 08/13/23 1415     Extra Tube hold for add-on     Comment: Auto resulted       Light Blue Top [[id number]] Collected: 08/13/23 1303    Specimen: Blood Updated: 08/13/23 1415     Extra Tube Hold for add-ons.     Comment: Auto resulted       Single High Sensitivity Troponin T [[id number]]  (Abnormal) Collected: 08/13/23 1303    Specimen: Blood Updated:  08/13/23 1327     HS Troponin T 62 ng/L     Narrative:      High Sensitive Troponin T Reference Range:  <10.0 ng/L- Negative Female for AMI  <15.0 ng/L- Negative Male for AMI  >=10 - Abnormal Female indicating possible myocardial injury.  >=15 - Abnormal Male indicating possible myocardial injury.   Clinicians would have to utilize clinical acumen, EKG, Troponin, and serial changes to determine if it is an Acute Myocardial Infarction or myocardial injury due to an underlying chronic condition.                     Echo EF Estimated  Lab Results   Component Value Date    ECHOEFEST 50.0 08/14/2023         Cath Ejection Fraction Quantitative  No results found for: CATHEF        Medication Review: yes  Current Facility-Administered Medications   Medication Dose Route Frequency Provider Last Rate Last Admin    acetaminophen (TYLENOL) tablet 650 mg  650 mg Oral Q6H PRN Mahad Shields MD   650 mg at 08/16/23 0249    aspirin EC tablet 81 mg  81 mg Oral Daily Mahad Shields MD   81 mg at 08/16/23 0944    atorvastatin (LIPITOR) tablet 40 mg  40 mg Oral Daily Mahad Shields MD   40 mg at 08/16/23 0944    clopidogrel (PLAVIX) tablet 75 mg  75 mg Oral Daily Mahad Shields MD   75 mg at 08/16/23 0943    HYDROcodone-acetaminophen (NORCO)  MG per tablet 1 tablet  1 tablet Oral Q6H PRN Mahad Shields MD   1 tablet at 08/16/23 0556    isosorbide mononitrate (IMDUR) 24 hr tablet 30 mg  30 mg Oral Q24H Julito Pradhan APRN   30 mg at 08/16/23 0944    lidocaine (LIDODERM) 5 % 1 patch  1 patch Transdermal Q24H Michael Dale MD   1 patch at 08/16/23 0944    melatonin tablet 3 mg  3 mg Oral Nightly PRN Mahad Shields MD        metoprolol succinate XL (TOPROL-XL) 24 hr tablet 100 mg  100 mg Oral Daily Julito Pradhan APRN   100 mg at 08/16/23 0943    Morphine sulfate (PF) injection 2 mg  2 mg Intravenous Q2H PRN Saman Bills MD   2 mg at 08/13/23 8950     nitroglycerin (NITROSTAT) SL tablet 0.4 mg  0.4 mg Sublingual Q5 Min PRN Mahad Shields MD        ondansetron (ZOFRAN) injection 4 mg  4 mg Intravenous Q6H PRN Mahad Shields MD        Pharmacy to Dose enoxaparin (LOVENOX)   Does not apply Continuous PRN Julito Pradhan APRN        predniSONE (DELTASONE) tablet 20 mg  20 mg Oral Daily Michael Dlae MD   20 mg at 08/16/23 0944    sodium chloride 0.9 % flush 10 mL  10 mL Intravenous PRN Mahad Shields MD        sodium zirconium cyclosilicate (LOKELMA) pack 10 g  10 g Oral TID Mahad Shields MD   10 g at 08/16/23 0944    terazosin (HYTRIN) capsule 5 mg  5 mg Oral Nightly Mahad Shields MD   5 mg at 08/15/23 2016    venlafaxine (EFFEXOR) tablet 75 mg  75 mg Oral Daily Mahad Shields MD   75 mg at 08/16/23 0943         Assessment & Plan       Non-STEMI/coronary artery disease: Given abnormal stress test consideration of ACS is warranted.  We have had multiple discussions with the patient and patient's daughter and have lean towards medical management of this.  - Patient has received 48 hours of therapeutic Lovenox, will discontinue this at this time  - Continue dual antiplatelet therapy of aspirin and Plavix  - Tolerating initiation of Imdur well, no side effects at this time  - Continue Toprol- mg daily  -Atorvastatin 40 mg daily      Hypertension: Blood pressure is improved and in normotensive range at this time  - Beta-blocker therapy  as noted above  - ACE inhibitor stopped by attending service due to kidney dysfunction, agree with holding at this time.    Further orders per Dr. Malik      Electronically signed by CARLA Briggs, 08/16/23, 10:23 AM CDT.

## 2023-08-17 ENCOUNTER — READMISSION MANAGEMENT (OUTPATIENT)
Dept: CALL CENTER | Facility: HOSPITAL | Age: 78
End: 2023-08-17
Payer: MEDICARE

## 2023-08-17 VITALS
DIASTOLIC BLOOD PRESSURE: 58 MMHG | SYSTOLIC BLOOD PRESSURE: 118 MMHG | HEART RATE: 58 BPM | HEIGHT: 69 IN | OXYGEN SATURATION: 99 % | BODY MASS INDEX: 36.17 KG/M2 | WEIGHT: 244.2 LBS | RESPIRATION RATE: 16 BRPM | TEMPERATURE: 97 F

## 2023-08-17 LAB
ANION GAP SERPL CALCULATED.3IONS-SCNC: 11 MMOL/L (ref 5–15)
BUN SERPL-MCNC: 58 MG/DL (ref 8–23)
BUN/CREAT SERPL: 37.9 (ref 7–25)
CALCIUM SPEC-SCNC: 10 MG/DL (ref 8.6–10.5)
CHLORIDE SERPL-SCNC: 103 MMOL/L (ref 98–107)
CO2 SERPL-SCNC: 23 MMOL/L (ref 22–29)
CREAT SERPL-MCNC: 1.53 MG/DL (ref 0.76–1.27)
DEPRECATED RDW RBC AUTO: 48.1 FL (ref 37–54)
EGFRCR SERPLBLD CKD-EPI 2021: 45.7 ML/MIN/1.73
ERYTHROCYTE [DISTWIDTH] IN BLOOD BY AUTOMATED COUNT: 14.6 % (ref 12.3–15.4)
GLUCOSE SERPL-MCNC: 136 MG/DL (ref 65–99)
HCT VFR BLD AUTO: 36.9 % (ref 37.5–51)
HGB BLD-MCNC: 11.3 G/DL (ref 13–17.7)
MCH RBC QN AUTO: 27.4 PG (ref 26.6–33)
MCHC RBC AUTO-ENTMCNC: 30.6 G/DL (ref 31.5–35.7)
MCV RBC AUTO: 89.6 FL (ref 79–97)
PLATELET # BLD AUTO: 159 10*3/MM3 (ref 140–450)
PMV BLD AUTO: 11.6 FL (ref 6–12)
POTASSIUM SERPL-SCNC: 4.4 MMOL/L (ref 3.5–5.2)
RBC # BLD AUTO: 4.12 10*6/MM3 (ref 4.14–5.8)
SODIUM SERPL-SCNC: 137 MMOL/L (ref 136–145)
WBC NRBC COR # BLD: 8.58 10*3/MM3 (ref 3.4–10.8)

## 2023-08-17 PROCEDURE — 63710000001 PREDNISONE PER 1 MG: Performed by: INTERNAL MEDICINE

## 2023-08-17 PROCEDURE — 80048 BASIC METABOLIC PNL TOTAL CA: CPT | Performed by: INTERNAL MEDICINE

## 2023-08-17 PROCEDURE — G0378 HOSPITAL OBSERVATION PER HR: HCPCS

## 2023-08-17 PROCEDURE — 99214 OFFICE O/P EST MOD 30 MIN: CPT

## 2023-08-17 PROCEDURE — 85027 COMPLETE CBC AUTOMATED: CPT | Performed by: INTERNAL MEDICINE

## 2023-08-17 RX ORDER — ASPIRIN 81 MG/1
81 TABLET ORAL DAILY
Qty: 30 TABLET | Refills: 0 | Status: SHIPPED | OUTPATIENT
Start: 2023-08-18

## 2023-08-17 RX ORDER — TORSEMIDE 20 MG/1
20 TABLET ORAL DAILY
Status: DISCONTINUED | OUTPATIENT
Start: 2023-08-17 | End: 2023-08-17 | Stop reason: HOSPADM

## 2023-08-17 RX ORDER — METOPROLOL SUCCINATE 100 MG/1
100 TABLET, EXTENDED RELEASE ORAL DAILY
Qty: 30 TABLET | Refills: 0 | Status: SHIPPED | OUTPATIENT
Start: 2023-08-18

## 2023-08-17 RX ORDER — LIDOCAINE 50 MG/G
1 PATCH TOPICAL
Qty: 30 PATCH | Refills: 0 | Status: SHIPPED | OUTPATIENT
Start: 2023-08-18

## 2023-08-17 RX ORDER — PREDNISONE 20 MG/1
20 TABLET ORAL DAILY
Qty: 5 TABLET | Refills: 0 | Status: SHIPPED | OUTPATIENT
Start: 2023-08-18 | End: 2023-08-23

## 2023-08-17 RX ADMIN — HYDROCODONE BITARTRATE AND ACETAMINOPHEN 1 TABLET: 10; 325 TABLET ORAL at 12:03

## 2023-08-17 RX ADMIN — LIDOCAINE 1 PATCH: 50 PATCH CUTANEOUS at 08:57

## 2023-08-17 RX ADMIN — EMPAGLIFLOZIN 10 MG: 10 TABLET, FILM COATED ORAL at 12:03

## 2023-08-17 RX ADMIN — HYDROCODONE BITARTRATE AND ACETAMINOPHEN 1 TABLET: 10; 325 TABLET ORAL at 00:08

## 2023-08-17 RX ADMIN — PREDNISONE 20 MG: 20 TABLET ORAL at 08:54

## 2023-08-17 RX ADMIN — TORSEMIDE 20 MG: 20 TABLET ORAL at 12:03

## 2023-08-17 RX ADMIN — ACETAMINOPHEN 650 MG: 325 TABLET, FILM COATED ORAL at 03:58

## 2023-08-17 RX ADMIN — ATORVASTATIN CALCIUM 40 MG: 40 TABLET, FILM COATED ORAL at 08:55

## 2023-08-17 RX ADMIN — CLOPIDOGREL BISULFATE 75 MG: 75 TABLET, FILM COATED ORAL at 08:54

## 2023-08-17 RX ADMIN — ASPIRIN 81 MG: 81 TABLET, COATED ORAL at 08:54

## 2023-08-17 RX ADMIN — HYDROCODONE BITARTRATE AND ACETAMINOPHEN 1 TABLET: 10; 325 TABLET ORAL at 06:18

## 2023-08-17 RX ADMIN — METOPROLOL SUCCINATE 100 MG: 100 TABLET, FILM COATED, EXTENDED RELEASE ORAL at 08:54

## 2023-08-17 RX ADMIN — VENLAFAXINE HYDROCHLORIDE 75 MG: 37.5 TABLET ORAL at 08:54

## 2023-08-17 RX ADMIN — ISOSORBIDE MONONITRATE 30 MG: 30 TABLET, EXTENDED RELEASE ORAL at 08:54

## 2023-08-17 NOTE — CASE MANAGEMENT/SOCIAL WORK
Continued Stay Note   [place]     Patient Name: Dalton Cheatham  MRN: [id number]  Today's Date: 8/17/2023    Admit Date: 8/13/2023    Plan: Home   Discharge Plan       Row Name 08/17/23 0917       Plan    Plan Home    Patient/Family in Agreement with Plan yes    Provided Post Acute Provider List? Yes    Post Acute Provider List DME Supplier    Delivered To Patient    Method of Delivery Telephone    Final Discharge Disposition Code 01 - home or self-care    Final Note Pt requested a wc.  Pt was provided options and chose [facility].  SW has informed [facility] at [facility] and sent referral.  They will bring wc to hospital room shortly.                   Discharge Codes    No documentation.                 Expected Discharge Date and Time       Expected Discharge Date Expected Discharge Time    Aug 17, 2023               MAYCO Vences

## 2023-08-17 NOTE — DISCHARGE SUMMARY
[facility] Medicine Services  DISCHARGE SUMMARY       Date of Admission: 8/13/2023  Date of Discharge:  8/17/2023  Primary Care Physician: Rikki Worrell MD    Presenting Problem/History of Present Illness:    Stabbing chest pain   Final Discharge Diagnoses:  Active Hospital Problems    Diagnosis     **Chest pain     Hyperkalemia     CKD (chronic kidney disease) stage 4, GFR 15-29 ml/min     Elevated troponin     Coronary artery disease involving native heart with angina pectoris     Acute on chronic combined systolic and diastolic CHF (congestive heart failure)     BPH without obstruction/lower urinary tract symptoms        Consults:   Dr. Malik    Procedures Performed: None    Pertinent Test Results:   Results for orders placed during the hospital encounter of 08/13/23    Adult Transthoracic Echo Complete W/ Cont if Necessary Per Protocol    Interpretation Summary    Left ventricular systolic function is normal. Calculated left ventricular EF = 56% Left ventricular ejection fraction appears to be 51 - 55%.    Left ventricular wall thickness is consistent with moderate concentric hypertrophy.    Right ventricle is not well visualized but appears probably normal in size with noraml systolic function.    Left atrial cavity is borderline dilated.    There is moderate calcification of the aortic valve without significant stenosis or regurgitation.      Imaging Results (All)       None          LAB RESULTS:      Lab 08/17/23  0459 08/14/23  1216 08/14/23  0916   WBC 8.58  --  8.84   HEMOGLOBIN 11.3*  --  12.8*   HEMATOCRIT 36.9*  --  43.1   PLATELETS 159  --  164   NEUTROS ABS  --   --  7.14*   IMMATURE GRANS (ABS)  --   --  0.07*   LYMPHS ABS  --   --  1.51   MONOS ABS  --   --  0.11   EOS ABS  --   --  0.00   MCV 89.6  --  92.7   PROTIME  --  13.5  --          Lab 08/17/23  0459 08/16/23  0711 08/15/23  0826 08/14/23  0916 08/13/23  0514   SODIUM 137 136 136 136 137    POTASSIUM 4.4 4.2 5.5* 6.4* 5.5*   CHLORIDE 103 103 102 101 104   TOTAL CO2  --   --   --   --  25   CO2 23.0 26.0 24.0 23.0  --    ANION GAP 11.0 7.0 10.0 12.0 8   BUN 58* 64* 70* 55* 44*   CREATININE 1.53* 2.09* 2.63* 2.29* 2.31*   EGFR 45.7* 31.4* 23.8* 28.2*  --    GLUCOSE 136* 117* 170* 188*  --    CALCIUM 10.0 10.1 10.7* 11.0* 10.4*         Lab 08/13/23  0514   TOTAL PROTEIN 7.5   ALBUMIN 3.7   ALT (SGPT) 25   AST (SGOT) 20   BILIRUBIN 0.2   ALK PHOS 131         Lab 08/14/23  1216 08/14/23  0916 08/13/23  2042 08/13/23  1559 08/13/23  1303   PROBNP  --  875.0  --   --   --    HSTROP T  --  46* 84* 69* 62*   PROTIME 13.5  --   --   --   --    INR 1.02  --   --   --   --                  Brief Urine Lab Results  (Last result in the past 365 days)        Color   Clarity   Blood   Leuk Est   Nitrite   Protein   CREAT   Urine HCG        08/13/23 2038 Yellow   Clear   Negative   Negative   Negative   Negative                 Microbiology Results (last 10 days)       ** No results found for the last 240 hours. **            Hospital Course:   Patient is an 78-year-old man who I first saw on the third day of hospitalization.  He presented on August 13 with stabbing chest pain and been found with elevated troponin.  Patient underwent stress test which was interpreted by cardiologist as intermediate risk study.  In their progress note/recommendation they mention patient has  NSTEMI.  They discussed with family who elected to treat this medically rather than with invasive intervention given his chronic kidney disease.  He will be discharged on dual antiplatelet therapy of aspirin and Plavix, Toprol  mg daily, statin.  They will follow-up with their primary care cardiologist Dr. Linder at [facility].  Cardiology also mentioned patient has chronic systolic and diastolic heart failure.  It appears that from recent echocardiogram there is an improvement in his EF based on their statement were EF is now 51 to  "55%.  They believe patient is appears relatively euvolemic that they now added Jardiance on top of torsemide on their discharge medications.  Patient had issue with hyperkalemia treated accordingly.  For this reason, ACE inhibitor was not restarted.  For the same reason, patient is not a good candidate for spironolactone  Patient will be discharged on Lidoderm patch as it is believed that the left-sided chest pain has component of musculoskeletal because it worse with deep breathing or palpation.  I also noted were Dr. Dale initiated patient on prednisone.  I will continue this for the next 5 days and stop.  Overall he is doing better and been cleared by cardiology for discharge.  I spoke to his daughter over the phone while at bedside.  All questions were answered to the best of my abilities.  There are medications in the list that I reconciled reportedly \"no longer taking\".  This includes venlafaxine, doxazosin.  I strongly advised patient to follow closely with primary care provider and discussed.     Note that during this hospitalization he came in with creatinine of 2.31.  It peaked at 2.63.  Between January 31 to May 24, 2023 his creatinine ranged from 1.68 to 2.27.  His creatinine at discharge is 1.53.  Depending on which value there either with look at he has a swing from stage IV to stage IIIa chronic kidney disease.    Physical Exam on Discharge:  /61 (BP Location: Left arm, Patient Position: Lying)   Pulse 60   Temp 97.9 øF (36.6 øC) (Oral)   Resp 16   Ht 175.3 cm (69.02\")   Wt 111 kg (244 lb 3.2 oz)   SpO2 99%   BMI 36.05 kg/mý   Physical Exam  Obese man  Not in any distress  On 2 L nasal cannula with saturation in the mid 90s obese with BMI of 37     GEN: Awake, alert, interactive, in NAD  HEENT: Atraumatic, PERRLA, EOMI, Anicteric, Trachea midline  Lungs: CTAB, no wheezing/rales/rhonchi  Heart: RRR, +S1/s2, no rub  ABD: Globular abdomen soft, nt/nd, +BS, no " guarding/rebound  Extremities: atraumatic, no cyanosis, no edema  Skin: no rashes or lesions  Neuro: AAOx3, no focal deficits  Psych: normal mood & affect  No significant change from yesterday's exam  Condition on Discharge: Stable    Discharge Disposition:  Home or Self Care    Discharge Medications:     Discharge Medications        New Medications        Instructions Start Date   empagliflozin 10 MG tablet tablet  Commonly known as: JARDIANCE   10 mg, Oral, Daily      lidocaine 5 %  Commonly known as: LIDODERM   1 patch, Transdermal, Every 24 Hours Scheduled, Remove & Discard patch within 12 hours or as directed by MD   Start Date: August 18, 2023     predniSONE 20 MG tablet  Commonly known as: DELTASONE   20 mg, Oral, Daily   Start Date: August 18, 2023            Changes to Medications        Instructions Start Date   aspirin 81 MG EC tablet  What changed: additional instructions   81 mg, Oral, Daily   Start Date: August 18, 2023     metoprolol succinate  MG 24 hr tablet  Commonly known as: TOPROL-XL  What changed:   medication strength  how much to take   100 mg, Oral, Daily   Start Date: August 18, 2023            Continue These Medications        Instructions Start Date   allopurinol 100 MG tablet  Commonly known as: ZYLOPRIM   100 mg, Oral, Daily      atorvastatin 40 MG tablet  Commonly known as: LIPITOR   40 mg, Oral, Daily      clopidogrel 75 MG tablet  Commonly known as: PLAVIX   75 mg, Oral, Daily, Hold.  May resume 4/22/18.      famotidine 20 MG tablet  Commonly known as: PEPCID   20 mg, Oral, 2 Times Daily      HYDROcodone-acetaminophen  MG per tablet  Commonly known as: NORCO   1 tablet, Oral, Every 6 Hours PRN      isosorbide mononitrate 30 MG 24 hr tablet  Commonly known as: IMDUR   30 mg, Oral, Daily      nitroglycerin 0.4 MG SL tablet  Commonly known as: NITROSTAT   0.4 mg, Sublingual, Every 5 Minutes PRN      pregabalin 75 MG capsule  Commonly known as: LYRICA   75 mg, Oral, 2 Times  Daily      torsemide 20 MG tablet  Commonly known as: DEMADEX   20 mg, Oral, Daily             Stop These Medications      carvedilol 6.25 MG tablet  Commonly known as: COREG     lisinopril 5 MG tablet  Commonly known as: PRINIVIL,ZESTRIL              This patient has current or prior documentation of an left ventricular ejection fraction (LVEF) of less than or equal to 40%.    ACE inhibitor, ARB not prescribed due to hyperkalemia which was treated during this hospitalization.  Further recommendation as per cardiologist.    Patient on beta-blocker.    Discharge Diet:   Diet Instructions       Diet: Cardiac Diets; Healthy Heart (2-3 Na+); Regular Texture (IDDSI 7); Thin (IDDSI 0)      Discharge Diet: Cardiac Diets    Cardiac Diet: Healthy Heart (2-3 Na+)    Texture: Regular Texture (IDDSI 7)    Fluid Consistency: Thin (IDDSI 0)            Activity at Discharge:   Activity Instructions       Gradually Increase Activity Until at Pre-Hospitalization Level              Follow-up Appointments:   Primary care provider within 1 week.  Encouraged to look back at medication reconciliation as there are medications which were listed no longer taking like doxazosin and venlafaxine.    Cardiology per their recommendation  Heart failure clinic in 1 week    Future Appointments   Date Time Provider Department Center   8/22/2023  8:30 AM  [facility] None   8/24/2023 11:15 AM Julito Pradhan APRN [facility]       Test Results Pending at Discharge: None    Electronically signed by Daryl Cummings MD, 08/17/23, 09:49 CDT.    Time: > 30 minutes.

## 2023-08-17 NOTE — PROGRESS NOTES
[facility] -  Progress Note     LOS: 0 days   Patient Care Team:  Rikki Worrell MD as PCP - General (Family Medicine)  Dionicio Cardenas MD as Consulting Physician (Otolaryngology)    Chief Complaint: Follow-up chest pain    Subjective     Interval History:   Overnight patient had minimal cardiac complaints.  Pain is still present on the left side of his chest, however determined to be most likely musculoskeletal.  Lidocaine patch in place.  Denies any worsening of the chest pain.  He denies any other cardiac symptoms at this time.  Patient would like to go home if he is able to.        Review of Systems:     Review of Systems   Cardiovascular:  Positive for chest pain (Left sided, musculoskeletal).   Objective     Vital Sign Min/Max for last 24 hours  Temp  Min: 97.7 øF (36.5 øC)  Max: 98.7 øF (37.1 øC)   BP  Min: 111/55  Max: 130/61   Pulse  Min: 60  Max: 63   Resp  Min: 16  Max: 18   SpO2  Min: 95 %  Max: 99 %   No data recorded   Weight  Min: 111 kg (244 lb 3.2 oz)  Max: 111 kg (244 lb 3.2 oz)         08/16/23  2100   Weight: 111 kg (244 lb 3.2 oz)       Telemetry: AV pacing with rates in the 60s.      Physical Exam:    Constitutional:       Appearance: Healthy appearance. Not in distress.   Neck:      Vascular: JVD normal.   Pulmonary:      Effort: Pulmonary effort is normal.      Breath sounds: Normal breath sounds. No wheezing. No rhonchi. No rales.   Cardiovascular:      PMI at left midclavicular line. Normal rate. Regular rhythm. Normal S1. Normal S2.       Murmurs: There is no murmur.      No gallop.  No click. No rub.   Edema:     Peripheral edema absent.   Musculoskeletal: Normal range of motion.      Cervical back: Normal range of motion. Skin:     General: Skin is warm and dry.   Neurological:      Mental Status: Alert and oriented to person, place and time.     Results Review:   Lab Results (last 72 hours)       Procedure Component Value Units Date/Time    Basic  Metabolic Panel [[id number]]  (Abnormal) Collected: 08/17/23 0459    Specimen: Blood Updated: 08/17/23 0541     Glucose 136 mg/dL      BUN 58 mg/dL      Creatinine 1.53 mg/dL      Sodium 137 mmol/L      Potassium 4.4 mmol/L      Chloride 103 mmol/L      CO2 23.0 mmol/L      Calcium 10.0 mg/dL      BUN/Creatinine Ratio 37.9     Anion Gap 11.0 mmol/L      eGFR 45.7 mL/min/1.73     Narrative:      GFR Normal >60  Chronic Kidney Disease <60  Kidney Failure <15    The GFR formula is only valid for adults with stable renal function between ages 18 and 70.    CBC (No Diff) [[id number]]  (Abnormal) Collected: 08/17/23 0459    Specimen: Blood Updated: 08/17/23 0520     WBC 8.58 10*3/mm3      RBC 4.12 10*6/mm3      Hemoglobin 11.3 g/dL      Hematocrit 36.9 %      MCV 89.6 fL      MCH 27.4 pg      MCHC 30.6 g/dL      RDW 14.6 %      RDW-SD 48.1 fl      MPV 11.6 fL      Platelets 159 10*3/mm3     [place] Draw [[id number]] Collected: 08/13/23 1303    Specimen: Blood Updated: 08/17/23 0452    Narrative:      The following orders were created for panel order [place] Draw.  Procedure                               Abnormality         Status                     ---------                               -----------         ------                     Green Top (Gel)[[id number]]                                  Final result               Lavender Top[[id number]]                                     Final result               Gray Top[[id number]]                                                                    Light Blue Top[[id number]]                                   Final result                 Please view results for these tests on the individual orders.    Basic Metabolic Panel [[id number]]  (Abnormal) Collected: 08/16/23 0711    Specimen: Blood Updated: 08/16/23 0744     Glucose 117 mg/dL      BUN 64 mg/dL      Creatinine 2.09 mg/dL      Sodium 136 mmol/L      Potassium 4.2 mmol/L      Chloride 103 mmol/L      CO2 26.0 mmol/L      Calcium  10.1 mg/dL      BUN/Creatinine Ratio 30.6     Anion Gap 7.0 mmol/L      eGFR 31.4 mL/min/1.73     Narrative:      GFR Normal >60  Chronic Kidney Disease <60  Kidney Failure <15    The GFR formula is only valid for adults with stable renal function between ages 18 and 70.    Basic Metabolic Panel [[id number]]  (Abnormal) Collected: 08/15/23 0826    Specimen: Blood Updated: 08/15/23 0917     Glucose 170 mg/dL      BUN 70 mg/dL      Creatinine 2.63 mg/dL      Sodium 136 mmol/L      Potassium 5.5 mmol/L      Chloride 102 mmol/L      CO2 24.0 mmol/L      Calcium 10.7 mg/dL      BUN/Creatinine Ratio 26.6     Anion Gap 10.0 mmol/L      eGFR 23.8 mL/min/1.73     Narrative:      GFR Normal >60  Chronic Kidney Disease <60  Kidney Failure <15    The GFR formula is only valid for adults with stable renal function between ages 18 and 70.    Protime-INR [[id number]]  (Normal) Collected: 08/14/23 1216    Specimen: Blood Updated: 08/14/23 1232     Protime 13.5 Seconds      INR 1.02    Basic Metabolic Panel [[id number]]  (Abnormal) Collected: 08/14/23 0916    Specimen: Blood Updated: 08/14/23 1044     Glucose 188 mg/dL      BUN 55 mg/dL      Creatinine 2.29 mg/dL      Sodium 136 mmol/L      Potassium 6.4 mmol/L      Comment: Slight hemolysis detected by analyzer. Results may be affected.        Chloride 101 mmol/L      CO2 23.0 mmol/L      Calcium 11.0 mg/dL      BUN/Creatinine Ratio 24.0     Anion Gap 12.0 mmol/L      eGFR 28.2 mL/min/1.73     Narrative:      GFR Normal >60  Chronic Kidney Disease <60  Kidney Failure <15    The GFR formula is only valid for adults with stable renal function between ages 18 and 70.    High Sensitivity Troponin T [[id number]]  (Abnormal) Collected: 08/14/23 0916    Specimen: Blood Updated: 08/14/23 1030     HS Troponin T 46 ng/L     Narrative:      High Sensitive Troponin T Reference Range:  <10.0 ng/L- Negative Female for AMI  <15.0 ng/L- Negative Male for AMI  >=10 - Abnormal Female indicating possible  myocardial injury.  >=15 - Abnormal Male indicating possible myocardial injury.   Clinicians would have to utilize clinical acumen, EKG, Troponin, and serial changes to determine if it is an Acute Myocardial Infarction or myocardial injury due to an underlying chronic condition.         BNP [[id number]]  (Normal) Collected: 08/14/23 0916    Specimen: Blood Updated: 08/14/23 1030     proBNP 875.0 pg/mL     Narrative:      Among patients with dyspnea, NT-proBNP is highly sensitive for the detection of acute congestive heart failure. In addition NT-proBNP of <300 pg/ml effectively rules out acute congestive heart failure with 99% negative predictive value.      CBC & Differential [[id number]]  (Abnormal) Collected: 08/14/23 0916    Specimen: Blood Updated: 08/14/23 1001    Narrative:      The following orders were created for panel order CBC & Differential.  Procedure                               Abnormality         Status                     ---------                               -----------         ------                     CBC Auto Differential[[id number]]        Abnormal            Final result                 Please view results for these tests on the individual orders.    CBC Auto Differential [[id number]]  (Abnormal) Collected: 08/14/23 0916    Specimen: Blood Updated: 08/14/23 1001     WBC 8.84 10*3/mm3      RBC 4.65 10*6/mm3      Hemoglobin 12.8 g/dL      Hematocrit 43.1 %      MCV 92.7 fL      MCH 27.5 pg      MCHC 29.7 g/dL      RDW 15.0 %      RDW-SD 51.2 fl      MPV 11.9 fL      Platelets 164 10*3/mm3      Neutrophil % 80.8 %      Lymphocyte % 17.1 %      Monocyte % 1.2 %      Eosinophil % 0.0 %      Basophil % 0.1 %      Immature Grans % 0.8 %      Neutrophils, Absolute 7.14 10*3/mm3      Lymphocytes, Absolute 1.51 10*3/mm3      Monocytes, Absolute 0.11 10*3/mm3      Eosinophils, Absolute 0.00 10*3/mm3      Basophils, Absolute 0.01 10*3/mm3      Immature Grans, Absolute 0.07 10*3/mm3      nRBC 0.0 /100 WBC                  Echo EF Estimated  Lab Results   Component Value Date    ECHOEFEST 50.0 08/14/2023         Cath Ejection Fraction Quantitative  No results found for: CATHEF        Medication Review: yes  Current Facility-Administered Medications   Medication Dose Route Frequency Provider Last Rate Last Admin    acetaminophen (TYLENOL) tablet 650 mg  650 mg Oral Q6H PRN Mahad Shields MD   650 mg at 08/17/23 0358    aspirin EC tablet 81 mg  81 mg Oral Daily Mahad Shields MD   81 mg at 08/17/23 0854    atorvastatin (LIPITOR) tablet 40 mg  40 mg Oral Daily Mahad Shields MD   40 mg at 08/17/23 0855    clopidogrel (PLAVIX) tablet 75 mg  75 mg Oral Daily Mahad Shields MD   75 mg at 08/17/23 0854    empagliflozin (JARDIANCE) tablet 10 mg  10 mg Oral Daily Julito Pradhan APRN        HYDROcodone-acetaminophen (NORCO)  MG per tablet 1 tablet  1 tablet Oral Q6H PRN Daryl Cummings MD   1 tablet at 08/17/23 0618    isosorbide mononitrate (IMDUR) 24 hr tablet 30 mg  30 mg Oral Q24H Julito Pradhan APRN   30 mg at 08/17/23 0854    lidocaine (LIDODERM) 5 % 1 patch  1 patch Transdermal Q24H Michael Dale MD   1 patch at 08/17/23 0857    melatonin tablet 3 mg  3 mg Oral Nightly PRN Mahad Shields MD   3 mg at 08/16/23 2054    metoprolol succinate XL (TOPROL-XL) 24 hr tablet 100 mg  100 mg Oral Daily Julito Pradhan APRN   100 mg at 08/17/23 0854    nitroglycerin (NITROSTAT) SL tablet 0.4 mg  0.4 mg Sublingual Q5 Min PRN Mahad Shields MD        ondansetron (ZOFRAN) injection 4 mg  4 mg Intravenous Q6H PRN Mahad Shields MD        predniSONE (DELTASONE) tablet 20 mg  20 mg Oral Daily Michael Dale MD   20 mg at 08/17/23 0854    sodium chloride 0.9 % flush 10 mL  10 mL Intravenous PRN Mahad Shields MD        terazosin (HYTRIN) capsule 5 mg  5 mg Oral Nightly Mahad Shields MD   5 mg at 08/16/23 2052    torsemide (DEMADEX)  tablet 20 mg  20 mg Oral Daily Julito Pradhan APRN        venlafaxine (EFFEXOR) tablet 75 mg  75 mg Oral Daily Mahad Shields MD   75 mg at 08/17/23 0854         Assessment & Plan       Non-STEMI: Patient and family have elected to treat his non-STEMI medically rather than with invasive intervention given his chronic kidney disease  -Patient received 48 hours of therapeutic Lovenox, this was discontinued yesterday  - Patient will discharge on dual antiplatelet therapy of aspirin and Plavix  - Patient still with left-sided chest pain, however this appears to be musculoskeletal as it is worse with deep breathing or palpation.  - She will go home on Toprol- mg daily as well as atorvastatin 40 mg daily  - Lisinopril currently held given recent in the setting of his hyperkalemia; we will defer restarting this at a low dose inpatient versus considering at outpatient follow-up to admitting provider.  - Recommend that the patient have close follow-up with his primary cardiologist, Dr. Linder, at [facility].    Chronic systolic and diastolic congestive heart failure: Appears relatively euvolemic at this time.  - We will add Jardiance to his regimen for volume management/optimization for his HFpEF as well as for CKD indication.  - We resumed his home torsemide 20 mg daily prior to discharge as well.    At this time cardiology will sign off. If there are any further questions or concerns during the patient's hospitalization to IV for her to contact us.      Electronically signed by CARLA Briggs, 08/17/23, 9:19 AM CDT.

## 2023-08-17 NOTE — PLAN OF CARE
Goal Outcome Evaluation:  Plan of Care Reviewed With: patient        Progress: no change  Outcome Evaluation: VSS, NSR, turn q 2, wound care of left toes performed, abdominal folds and bilateral buttocks. Pain relieved with prn Norco. Pt had no c/o SOB or chest pain. Hypertension improved. Cardiology medical mgmt in place, awaiting PT eval for possible transition home versus [place].

## 2023-08-18 NOTE — OUTREACH NOTE
Prep Survey      Flowsheet Row Responses   [facility] facility patient discharged from? [place]   Is LACE score < 7 ? No   Eligibility Readm Mgmt   Discharge diagnosis **Chest pain   Does the patient have one of the following disease processes/diagnoses(primary or secondary)? Other   Does the patient have Home health ordered? No   Is there a DME ordered? Yes   What DME was ordered? [facility] - [facility] - wheelchair   Prep survey completed? Yes            ABENA PAYNE - Registered Nurse

## 2023-08-23 ENCOUNTER — READMISSION MANAGEMENT (OUTPATIENT)
Dept: CALL CENTER | Facility: HOSPITAL | Age: 78
End: 2023-08-23
Payer: MEDICARE

## 2023-08-23 NOTE — OUTREACH NOTE
Medical Week 1 Survey      Flowsheet Row Responses   [facility] facility patient discharged from? [place]   Does the patient have one of the following disease processes/diagnoses(primary or secondary)? Other   Week 1 attempt successful? No   Unsuccessful attempts Attempt 1            Olga Lidia Balderas Registered Nurse

## 2023-09-01 ENCOUNTER — READMISSION MANAGEMENT (OUTPATIENT)
Dept: CALL CENTER | Facility: HOSPITAL | Age: 78
End: 2023-09-01
Payer: MEDICARE

## 2023-09-01 NOTE — OUTREACH NOTE
Medical Week 3 Survey      Flowsheet Row Responses   [facility] patient discharged from? [place]   Does the patient have one of the following disease processes/diagnoses(primary or secondary)? Other   Week 3 attempt successful? No   Unsuccessful attempts Attempt 1   Revoke Abraham HARGROVE - Registered Nurse

## 2024-09-24 NOTE — PROGRESS NOTES
Health Maintenance       DTaP/Tdap/Td Vaccine (2 - Tdap)  Overdue since 3/26/1997    Medicare Advantage- Medicare Wellness Visit (Yearly - January to December)  Never done    COVID-19 Vaccine (3 - 2023-24 season)  Overdue since 9/1/2024    Influenza Vaccine (1)  Due since 9/1/2024      Following review of the above:  Patient is not proceeding with: COVID-19 and Dtap/Tdap/Td    Note: Refer to final orders and clinician documentation.    Recent PHQ 2/9 Score    PHQ 2:  PHQ 2 Score Adult PHQ 2 Score Adult PHQ 2 Interpretation Little interest or pleasure in activity?   9/24/2024   1:13 PM 0 No further screening needed 0     PHQ-2/9 Depression Screening  Little interest or pleasure in activity?: Not at all  Feeling down, depressed or hopeless?: Not at all  Initial depression screening score:: 0  PHQ2 Interpretation: No further screening needed            Medicare Annual Wellness Visit - Subsequent    Name: Mel Riley Date: 2022   MRN: [id number] Sex: Male   Age: 76 y.o. Ethnicity: Non- / Non    : 1945 Race: White (non-)      Kiran Chandler is here for   Chief Complaint   Patient presents with    Medicare AWV        Screenings for behavioral, psychosocial and functional/safety risks, and cognitive dysfunction are all negative except as indicated below. These results, as well as other patient data from the [address] form, are documented in Flowsheets linked to this Encounter. Allergies   Allergen Reactions    Influenza Virus Vaccine Swelling    Penicillin G Itching       Prior to Visit Medications    Medication Sig Taking? Authorizing Provider   HYDROcodone-acetaminophen (NORCO)  MG per tablet Take 1 tablet by mouth every 6 hours as needed for Pain for up to 30 days.  Yes Christie Vidal MD   atorvastatin (LIPITOR) 40 MG tablet TAKE ONE (1) TABLET BY MOUTH EVERY DAY Yes Christie Vidal MD   isosorbide mononitrate (IMDUR) 30 MG extended release tablet TAKE 1 TABLET BY MOUTH DAILY Yes TREV Valencia - NP   famotidine (PEPCID) 20 MG tablet Take 1 tablet by mouth 2 times daily Yes Christie Vidal MD   carvedilol (COREG) 6.25 MG tablet Take 1 tablet by mouth 2 times daily (with meals) Yes TREV Galvan   sacubitril-valsartan (ENTRESTO) 24-26 MG per tablet Take 1 tablet by mouth 2 times daily Yes TREV Galvan   clopidogrel (PLAVIX) 75 MG tablet TAKE ONE (1) TABLET BY MOUTH EVERY DAY Yes Christie Vidal MD   torsemide (DEMADEX) 20 MG tablet Take 1 tablet by mouth daily Yes TREV Galvan   polyethylene glycol (GLYCOLAX) 17 g packet Take 17 g by mouth daily as needed for Constipation Yes Historical Provider, MD   fluticasone (FLONASE) 50 MCG/ACT nasal spray 1 spray by Each Nostril route daily as needed  Yes Historical Provider, MD   nitroGLYCERIN (NITROSTAT) 0.4 MG SL tablet Place 0.4 mg under the tongue every 5 minutes as needed for Chest pain Yes Historical Provider, MD   venlafaxine (EFFEXOR) 75 MG tablet TAKE ONE (1) TABLET BY MOUTH EVERY DAY  Patient not taking: Reported on 7/29/2022  Vijaya Brock MD         Past Medical History:   Diagnosis Date    AAA (abdominal aortic aneurysm) ([address]     Acute on chronic systolic CHF (congestive heart failure) ([address] 7/26/2021    Aortic dissection ([address] 10/10/2012    CAD (coronary artery disease)     stents    Cancer (HCC)     lung&lymphnode    Carotid artery occlusion     Cerebrovascular accident (CVA) ([address]     Cerebrovascular accident (CVA) due to embolism ([address] 02/16/2016    Cerebrovascular accident (CVA) due to embolism of right middle cerebral artery ([address]     COVID-19 2/6/2021    Depression     HTN (hypertension)     Hyperlipidemia     MI (myocardial infarction) ([address]     acute 5/13/2015    Non Hodgkin's lymphoma ([address]     Palliative care patient 02/16/2021    Sleep apnea     no c-pap    Unspecified sleep apnea          Past Surgical History:   Procedure Laterality Date    CARDIAC CATHETERIZATION  5/13/15  [facility]    EF 40%. stent to LAD, stent to posterior descending branch of RCA. CAROTID ENDARTERECTOMY Right 7/22/2016    Right carotid endarterectomy,carotid arteriograms. 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, repeat in 1 yr    UPPER GASTROINTESTINAL ENDOSCOPY  11/08/2019    [facility]: normal    UPPER GASTROINTESTINAL ENDOSCOPY  04/13/2018    Dr Matias Sapphire: normal, 2cm HH, small amount of food in stomach w/dark fluid,but not kathy blood    VASCULAR SURGERY  10/29/12 [facility]    open exposure of R common femoral artery; percutaneous cannulation of L  femoral artery with 7-Tongan sheath; suprarenal abd aortagram with bilat iliofem arteriogram; endoluminal graft repair of AAA with endologix [id number] main body, 28x95 infrarenal cuff; balloon angioplasty of infrarenal abd aorta and endoluminal graft with coda balloon; completion abd aortogram w bilat iliofem arteriogram;     VASCULAR SURGERY  10/29/ [facility]     (cont) open repair of right common femoral ateriotomy; perclose repair of left common femoral artery puncture site. Family History   Problem Relation Age of Onset    High Blood Pressure Father     High Blood Pressure Mother     Diabetes Mother     Colon Polyps Brother     Cancer Brother     Colon Cancer Neg Hx        CareTeam (Including outside providers/suppliers regularly involved in providing care):   Patient Care Team:  Keila Mays MD as PCP - General (Family Medicine)  Keila Mays MD as PCP - [facility] Empaneled Provider  Radonna Dandy, MD as Consulting Physician (Gastroenterology)  Dai Veliz MD as Consulting Physician (Interventional Cardiology)  Rose Goetz RN as Ambulatory Care Manager    Wt Readings from Last 3 Encounters:   07/29/22 222 lb 3.2 oz (100.8 kg)   06/08/22 225 lb (102.1 kg)   04/29/22 222 lb (100.7 kg)     Vitals:    07/29/22 1530   BP: 132/88   Pulse: 78   Temp: 98.2 °F (36.8 °C)   TempSrc: Temporal   SpO2: 97%   Weight: 222 lb 3.2 oz (100.8 kg)   Height: 5' 8\" (1.727 m)           The following problems were reviewed today and where indicated follow up appointments were made and/or referrals ordered.     Risk Factor Screenings with Interventions     Fall Risk:  2 or more falls in past year?: (!) yes  Fall with injury in past year?: no  Fall Risk Interventions:    Home safety tips provided    Depression:  PHQ-2 Score: 6  Depression Interventions:  He is not interested in additional medication at this time    Anxiety:     Anxiety Interventions:  Not indicated    Cognitive:  Clock Drawing Test (CDT): (!) Abnormal  Cognitive Impairment Interventions:  Family does report some cognitive issues but he does report Siv support from his family    Substance Abuse:  Social History     Socioeconomic History    Marital status:      Spouse name: Not on file    Number of children: Not on file    Years of education: Not on file    Highest education level: Not on file   Occupational History    Not on file   Tobacco Use    Smoking status: Former     Packs/day: 0.50     Years: 40.00     Pack years: 20.00     Types: Cigarettes    Smokeless tobacco: Never    Tobacco comments:     quit smoking Dec 10, 2021.     Vaping Use    Vaping Use: Never used   Substance and Sexual Activity    Alcohol use: No    Drug use: No    Sexual activity: Not on file   Other Topics Concern    Not on file   Social History Narrative     50+ yearsHe has 1 son and 1 daughterWorks for a telephone Jovan Nagelton active duty 4-1/2 yearsHe does not go to the [employer] presently[employer] high school 2 years of collegeEnjoys hunting and [employer]atal[employer]LoveThis alcohol or substance usage he does smoke 1 pack/dayPhysically sedentary     Social Determinants of Health     Financial Resource Strain: Not on file   Food Insecurity: Not on file   Transportation Needs: Not on file   Physical Activity: Inactive    Days of Exercise per Week: 0 days    Minutes of Exercise per Session: 0 min   Stress: Not on file   Social Connections: Not on file   Intimate Partner Violence: Not on file   Housing Stability: Not on file        Substance Abuse Interventions:  Not indicated    Health Risk Assessment:     General  In general, how would you say your health is?: (!) Poor  In the past 7 days, have you experienced any of the following: New or Increased Pain, New or Increased Fatigue, Loneliness, Social Isolation, Stress or Anger?: (!) Yes  Select all that apply: (!) New or Increased Pain, New or Increased Fatigue, Loneliness, Social Isolation  Do you get the social and emotional support that you need?: Yes  General Health Risk Interventions:  Home safety tips provided    Health Habits/Nutrition  On average, how many days per week do you engage in moderate to strenuous exercise (like a brisk walk)?: 0 days  On average, how many minutes do you engage in exercise at this level?: 0 min  Have you lost any weight without trying in the past 3 months?: No  Have you seen the dentist within the past year?: (!) No  Body mass index is 33.79 kg/m².   Health Habits/Nutrition Interventions:  Not indicated    Hearing/Vision  Do you or your family notice any trouble with your hearing that hasn't been managed with hearing aids?: (!) Yes  Do you have difficulty driving, watching TV, or doing any of your daily activities because of your eyesight?: (!) Yes  Have you had an eye exam within the past year?: (!) No  Hearing/Vision Interventions:  Recommended eye exam and hearing screen    Safety  Do you have working smoke detectors?: Yes  Do you have any tripping hazards - loose or unsecured carpets or rugs?: No  Do you have any tripping hazards - clutter in doorways, halls, or stairs?: No  Do you have either shower bars, grab bars, non-slip mats or non-slip surfaces in your shower or bathtub?: Yes  Do all of your stairways have a railing or banister?: Yes  Do you always fasten your seatbelt when you are in a car?: Yes  Safety Interventions:  Not indicated    ADLs  In the past 7 days, did you need help from others to perform any of the following everyday activities: Eating, dressing, grooming, bathing, toileting, or walking/balance?: No  In the past 7 days, did you need help from others to take care of any of the following: Laundry, housekeeping, banking/finances, shopping, telephone use, food preparation, transportation, or taking medications?: No  ADL Interventions:  Not indicated    Personalized Preventive Plan   Current Health Maintenance Status  Immunization History   Administered Date(s) Administered COVID-19, [address] [facility] [address], Primary or Immunocompromised, (age 12y+), IM, 100 mcg/0.5mL 01/20/2021, 04/09/2021    Influenza, High Dose (Fluzone 65 yrs and older) 09/28/2017, 10/02/2018    Influenza, Quadv, adjuvanted, 65 yrs +, IM, PF (Fluad) 10/28/2021    Influenza, Triv, inactivated, subunit, adjuvanted, IM (Fluad 65 yrs and older) 10/24/2019    Pneumococcal Polysaccharide ([id number]) 12/29/2008, 01/09/2018        Health Maintenance   Topic Date Due    Hepatitis C screen  Never done    DTaP/Tdap/Td vaccine (1 - Tdap) Never done    Shingles vaccine (1 of 2) Never done    Pneumococcal 65+ years Vaccine (2 - PCV) 01/09/2019    COVID-19 Vaccine (3 - Booster for Moderna series) 09/09/2021    Lipids  07/19/2022    Depression Monitoring  07/29/2023    Annual Wellness Visit (AWV)  07/30/2023    Hepatitis A vaccine  Aged Out    Hepatitis B vaccine  Aged Out    Hib vaccine  Aged Out    Meningococcal (ACWY) vaccine  Aged Out       Recommendations for [employer] Due: see orders.   Recommended screening schedule for the next 5-10 years is provided to the patient in written form: see Patient Instructions/AVS.

## (undated) DEVICE — ENDOGATOR AUXILIARY WATER JET CONNECTOR: Brand: ENDOGATOR

## (undated) DEVICE — TRAP,MUCUS SPECIMEN, 80CC: Brand: MEDLINE

## (undated) DEVICE — SENSR O2 OXIMAX FNGR A/ 18IN NONSTR

## (undated) DEVICE — Device: Brand: DEFENDO AIR/WATER/SUCTION AND BIOPSY VALVE

## (undated) DEVICE — CUFF,BP,DISP,1 TUBE,ADULT,HP: Brand: MEDLINE

## (undated) DEVICE — CONMED SCOPE SAVER BITE BLOCK, 20X27 MM: Brand: SCOPE SAVER

## (undated) DEVICE — ENDOSCOPIC ULTRASOUND FINE NEEDLE BIOPSY (FNB) DEVICE: Brand: ACQUIRE

## (undated) DEVICE — ENDO KIT,LOURDES HOSPITAL: Brand: MEDLINE INDUSTRIES, INC.

## (undated) DEVICE — THE SINGLE USE ETRAP – POLYP TRAP IS USED FOR SUCTION RETRIEVAL OF ENDOSCOPICALLY REMOVED POLYPS.: Brand: ETRAP

## (undated) DEVICE — MSK O2 MD CONCENTR A/ LF 7FT 1P/U

## (undated) DEVICE — THE CHANNEL CLEANING BRUSH IS A NYLON FLEXI BRUSH ATTACHED TO A FLEXIBLE PLASTIC SHEATH DESIGNED TO SAFELY REMOVE DEBRIS FROM FLEXIBLE ENDOSCOPES.

## (undated) DEVICE — Z DUPLICATE USE 2738952 SYSTEM VENT M AD NSL PAP DEV HD STRP 2L HYPRINFL BG MRI

## (undated) DEVICE — TBG SMPL FLTR LINE NASL 02/C02 A/ BX/100

## (undated) DEVICE — SNAR POLYP SENSATION MICRO OVL 13 240X40

## (undated) DEVICE — YANKAUER,BULB TIP WITH VENT: Brand: ARGYLE

## (undated) DEVICE — FRCP BX RADJAW4 NDL 2.8 240 STD OG